# Patient Record
Sex: MALE | Race: WHITE | NOT HISPANIC OR LATINO | Employment: OTHER | URBAN - METROPOLITAN AREA
[De-identification: names, ages, dates, MRNs, and addresses within clinical notes are randomized per-mention and may not be internally consistent; named-entity substitution may affect disease eponyms.]

---

## 2017-06-28 ENCOUNTER — NON-PROVIDER VISIT (OUTPATIENT)
Dept: CARDIOLOGY | Facility: MEDICAL CENTER | Age: 67
End: 2017-06-28
Payer: MEDICARE

## 2017-06-28 ENCOUNTER — OFFICE VISIT (OUTPATIENT)
Dept: CARDIOLOGY | Facility: MEDICAL CENTER | Age: 67
End: 2017-06-28
Payer: MEDICARE

## 2017-06-28 VITALS
BODY MASS INDEX: 31.83 KG/M2 | HEIGHT: 72 IN | SYSTOLIC BLOOD PRESSURE: 140 MMHG | HEART RATE: 60 BPM | WEIGHT: 235 LBS | DIASTOLIC BLOOD PRESSURE: 80 MMHG

## 2017-06-28 DIAGNOSIS — Z95.0 CARDIAC PACEMAKER IN SITU: ICD-10-CM

## 2017-06-28 DIAGNOSIS — I44.2 AV BLOCK, 3RD DEGREE (HCC): ICD-10-CM

## 2017-06-28 DIAGNOSIS — I10 ESSENTIAL HYPERTENSION: ICD-10-CM

## 2017-06-28 DIAGNOSIS — Z95.3 S/P AORTIC VALVE REPLACEMENT WITH BIOPROSTHETIC VALVE: ICD-10-CM

## 2017-06-28 PROCEDURE — 99214 OFFICE O/P EST MOD 30 MIN: CPT | Mod: 25 | Performed by: INTERNAL MEDICINE

## 2017-06-28 PROCEDURE — 93280 PM DEVICE PROGR EVAL DUAL: CPT | Performed by: INTERNAL MEDICINE

## 2017-06-28 NOTE — MR AVS SNAPSHOT
"        Matti Olguin   2017 9:00 AM   Office Visit   MRN: [id number]    Department:  [facility]   Dept Phone:  [phone]    Description:  Male : 1950   Provider:  Conner Thibodeaux M.D.           Reason for Visit     Follow-Up no recent blood work done.      Allergies as of 2017     Allergen Noted Reactions    Nkda [No Known Drug Allergy] 2011         You were diagnosed with     Cardiac pacemaker in situ   [V45.01.ICD-9-CM]       S/P aortic valve replacement with bioprosthetic valve   [559828]       Long term current use of anticoagulant therapy   [5813418]         Vital Signs     Blood Pressure Pulse Height Weight Body Mass Index Smoking Status    140/80 mmHg 60 1.829 m (6' 0.01\") 106.595 kg (235 lb) 31.86 kg/m2 Never Smoker       Basic Information     Date Of Birth Sex Race Ethnicity Preferred Language    1950 Male White Non- English      Your appointments     2017  1:15 PM   MR EXTREMITY WITHOUT (30) with [address] [facility] 2   [facility] - MRI - [address]    [address]  [place] [address] [phone]              Problem List              ICD-10-CM Priority Class Noted - Resolved    Stroke (CMS-HCC) I63.9   2011 - Present    Long term current use of anticoagulant therapy Z79.01   3/11/2013 - Present    HTN (hypertension) I10   2013 - Present    Heart valve replaced Z95.2   10/8/2013 - Present    Stroke-like symptoms R29.90 High  9/3/2014 - Present    HLD (hyperlipidemia) E78.5   2014 - Present    Subtherapeutic international normalized ratio (INR) R79.1 Medium  2014 - Present    Syncope R55   10/5/2014 - Present    Chest pain R07.9   10/5/2014 - Present    Dyspnea R06.00   10/15/2014 - Present    S/P aortic valve replacement with bioprosthetic valve: repeat AVR 10/2014 Z95.4   2015 - Present    Cardiac pacemaker in situ Z95.0   2015 - Present      Health Maintenance        Date Due Completion Dates  "    IMM DTaP/Tdap/Td Vaccine (1 - Tdap) 2/10/1971 ---    COLONOSCOPY 2/10/2002 ---    IMM ZOSTER VACCINE 2/10/2012 ---    IMM PNEUMOCOCCAL 65+ (ADULT) LOW/MEDIUM RISK SERIES (1 of 2 - PCV13) 2/10/2017 ---            Current Immunizations     Influenza TIV (IM) 4/3/2014    Influenza Vaccine Quad Inj (Pf) 10/6/2014  2:58 AM      Below and/or attached are the medications your provider expects you to take. Review all of your home medications and newly ordered medications with your provider and/or pharmacist. Follow medication instructions as directed by your provider and/or pharmacist. Please keep your medication list with you and share with your provider. Update the information when medications are discontinued, doses are changed, or new medications (including over-the-counter products) are added; and carry medication information at all times in the event of emergency situations     Allergies:  NKDA - (reactions not documented)               Medications  Valid as of: June 28, 2017 -  9:17 AM    Generic Name Brand Name Tablet Size Instructions for use    Aspirin (Chew Tab) ASA 81 MG Take 1 Tab by mouth every Monday, Wednesday, and Friday.        Folic Acid (Tab) FOLVITE 1 MG Take 1 Tab by mouth every day.        HydroCHLOROthiazide (Tab) HYDRODIURIL 12.5 MG Take 1 Tab by mouth every day.        Lisinopril (Tab) PRINIVIL 20 MG Take 1 Tab by mouth every day.        Metoprolol Tartrate (Tab) LOPRESSOR 50 MG Take 1 Tab by mouth 2 times a day.        Multiple Vitamin (Tab) THERAGRAN  Take 1 Tab by mouth every day.        Potassium Chloride Ebony CR (Tab CR) Kdur 20 MEQ Take 1 Tab by mouth 2 times a day.        Pravastatin Sodium (Tab) PRAVACHOL 20 MG Take 20 mg by mouth every 48 hours.        Pregabalin (Cap) LYRICA 50 MG Take 50 mg by mouth 2 times a day.        Thiamine HCl (Tab) THIAMINE 100 MG Take 1 Tab by mouth every day.        Zolpidem Tartrate (Tab) AMBIEN 5 MG Take 5 mg by mouth at bedtime as needed.        .                  Medicines prescribed today were sent to:     [facility] - [place] - [address]    [address] [facility] [address]    Phone: [phone] Fax: [phone]    Open 24 Hours?: No      Medication refill instructions:       If your prescription bottle indicates you have medication refills left, it is not necessary to call your provider’s office. Please contact your pharmacy and they will refill your medication.    If your prescription bottle indicates you do not have any refills left, you may request refills at any time through one of the following ways: The online Cityblis system (except Urgent Care), by calling your provider’s office, or by asking your pharmacy to contact your provider’s office with a refill request. Medication refills are processed only during regular business hours and may not be available until the next business day. Your provider may request additional information or to have a follow-up visit with you prior to refilling your medication.   *Please Note: Medication refills are assigned a new Rx number when refilled electronically. Your pharmacy may indicate that no refills were authorized even though a new prescription for the same medication is available at the pharmacy. Please request the medicine by name with the pharmacy before contacting your provider for a refill.           Cityblis Access Code: [email or web address]  Expires: 7/28/2017  9:17 AM    Cityblis  A secure, online tool to manage your health information     [email or web address]’s Cityblis® is a secure, online tool that connects you to your personalized health information from the privacy of your home -- day or night - making it very easy for you to manage your healthcare. Once the activation process is completed, you can even access your medical information using the Cityblis jonah, which is available for free in the Apple Jonah store or Google Play store.     Cityblis provides the  following levels of access (as shown below):   My Chart Features   [facility] Doctor [facility]  Specialists [facility]  Urgent  Care [facility]  Primary Care  Doctor   Email your healthcare team securely and privately 24/7 X X X    Manage appointments: schedule your next appointment; view details of past/upcoming appointments X      Request prescription refills. X      View recent personal medical records, including lab and immunizations X X X X   View health record, including health history, allergies, medications X X X X   Read reports about your outpatient visits, procedures, consult and ER notes X X X X   See your discharge summary, which is a recap of your hospital and/or ER visit that includes your diagnosis, lab results, and care plan. X X       How to register for MediBeacon:  1. Go to  [email or web address].  2. Click on the Sign Up Now box, which takes you to the New Member Sign Up page. You will need to provide the following information:  a. Enter your MediBeacon Access Code exactly as it appears at the top of this page. (You will not need to use this code after you’ve completed the sign-up process. If you do not sign up before the expiration date, you must request a new code.)   b. Enter your date of birth.   c. Enter your home email address.   d. Click Submit, and follow the next screen’s instructions.  3. Create a MediBeacon ID. This will be your MediBeacon login ID and cannot be changed, so think of one that is secure and easy to remember.  4. Create a MediBeacon password. You can change your password at any time.  5. Enter your Password Reset Question and Answer. This can be used at a later time if you forget your password.   6. Enter your e-mail address. This allows you to receive e-mail notifications when new information is available in MediBeacon.  7. Click Sign Up. You can now view your health information.    For assistance activating your MediBeacon account, call [phone]        Quit Tobacco Information      Do you want to quit using tobacco?    Quitting tobacco decreases risks of cancer, heart and lung disease, increases life expectancy, improves sense of taste and smell, and increases spending money, among other benefits.    If you are thinking about quitting, we can help.  • [facility] Quit Tobacco Program: 732.401.1321  o Program occurs weekly for four weeks and includes pharmacist consultation on products to support quitting smoking or chewing tobacco. A provider referral is needed for pharmacist consultation.  • Tobacco Users Help Hotline: 8-260-QUIT-NOW (417-8330) or https://[place].quitlogix.org/  o Free, confidential telephone and online coaching for [place] residents. Sessions are designed on a schedule that is convenient for you. Eligible clients receive free nicotine replacement therapy.  • Nationally: www.smokefree.gov  o Information and professional assistance to support both immediate and long-term needs as you become, and remain, a non-smoker. Smokefree.gov allows you to choose the help that best fits your needs.

## 2017-06-28 NOTE — Clinical Note
"     [facility]   [address]  [place] [address]  Phone: [phone]  Fax: [phone]              Matti Olguin  1950    Encounter Date: 6/28/2017    Conner Thibodeaux M.D.          PROGRESS NOTE:  Subjective:   Matti Olguin is a 67 y.o. male who presents today for follow-up of his history of aortic valve replacement with redo    He has been doing well      Pacemaker check today finds is functioning appropriately  Past Medical History   Diagnosis Date   • Valvular heart disease      AVR 12/1988   • Sleep apnea 10-03-13     having sleep study soon   • Anticoagulation monitoring, special range    • HTN (hypertension) 9/23/2013   • High cholesterol    • Unspecified hemorrhagic conditions      Coumadin   • Snoring    • Stroke (CMS-HCC)      left sided \"tingling\"   • Dyspnea 10/15/2014   • S/P aortic valve replacement with bioprosthetic valve 1/13/2015   • Cardiac pacemaker in situ 1/13/2015     Past Surgical History   Procedure Laterality Date   • Other orthopedic surgery       knee scope, right shoulder   • Aortic valve replacement  1988     titanium   • Recovery  9/21/2012     Performed by Matti Clemens M.D. at [facility]   • Recovery  10/8/2013     Performed by [facility] at [facility]   • Recovery  11/8/2013     Performed by Cath-Recovery Surgery at [facility]     Family History   Problem Relation Age of Onset   • Heart Attack Father    • Heart Disease Sister      History   Smoking status   • Never Smoker    Smokeless tobacco   • Current User   • Types: Chew     Allergies   Allergen Reactions   • Nkda [No Known Drug Allergy]      Outpatient Encounter Prescriptions as of 6/28/2017   Medication Sig Dispense Refill   • lisinopril (PRINIVIL) 20 MG TABS Take 1 Tab by mouth every day. 90 Tab 3   • hydrochlorothiazide (HYDRODIURIL) 12.5 MG tablet Take 1 Tab by " "mouth every day. 90 Tab 3   • potassium chloride SA (K-DUR) 20 MEQ TBCR Take 1 Tab by mouth 2 times a day. 180 Tab 3   • metoprolol (LOPRESSOR) 50 MG TABS Take 1 Tab by mouth 2 times a day. 60 Tab 3   • thiamine (THIAMINE) 100 MG tablet Take 1 Tab by mouth every day. 30 Tab 3   • folic acid (FOLVITE) 1 MG TABS Take 1 Tab by mouth every day. 30 Tab 3   • aspirin (ASA) 81 MG CHEW chewable tablet Take 1 Tab by mouth every Monday, Wednesday, and Friday. 12 Tab 0   • pravastatin (PRAVACHOL) 20 MG TABS Take 20 mg by mouth every 48 hours.     • zolpidem (AMBIEN) 5 MG TABS Take 5 mg by mouth at bedtime as needed.     • pregabalin (LYRICA) 50 MG capsule Take 50 mg by mouth 2 times a day.     • multivitamin (THERAGRAN) TABS Take 1 Tab by mouth every day. 30 Tab 3     No facility-administered encounter medications on file as of 6/28/2017.     Review of Systems   Constitutional: Negative for fever and chills.   HENT: Negative for sore throat.    Eyes: Negative for blurred vision.   Respiratory: Negative for cough and shortness of breath.    Cardiovascular: Negative for chest pain, palpitations, claudication, leg swelling and PND.   Gastrointestinal: Negative for nausea and abdominal pain.   Musculoskeletal: Negative for falls.   Skin: Negative for rash.   Neurological: Negative for dizziness, focal weakness, loss of consciousness, weakness and headaches.   Endo/Heme/Allergies: Does not bruise/bleed easily.        Objective:   /80 mmHg  Pulse 60  Ht 1.829 m (6' 0.01\")  Wt 106.595 kg (235 lb)  BMI 31.86 kg/m2    Physical Exam   Constitutional: No distress.   HENT:   Mouth/Throat: Oropharynx is clear and moist.   Eyes: No scleral icterus.   Neck: Neck supple. No JVD present.   Cardiovascular: Normal rate, regular rhythm, normal heart sounds and intact distal pulses.  Exam reveals no gallop and no friction rub.    No murmur heard.  Pulmonary/Chest: Effort normal. He has no rales.   Abdominal: Soft. Bowel sounds are " normal. There is no tenderness.   Musculoskeletal: He exhibits no edema.   Neurological: He is alert.   Skin: No rash noted. He is not diaphoretic.   Psychiatric: He has a normal mood and affect.       Assessment:     1. Cardiac pacemaker in situ     2. S/P aortic valve replacement with bioprosthetic valve: repeat AVR 10/2014     3. Essential hypertension         Medical Decision Making:  Today's Assessment / Status / Plan:     It was my pleasure to meet with Mr. Olguin.    He is accompanied by his wife    For his bowel start the conversation eventually he may need to have her inside his bowels prosthetic valve will continue monitor is by echocardiogram in a couple of years    Pacemaker is functioning appropriately    I will see Mr. Olguin back in 1 year time and encouraged him to follow up with us over the phone or e-mail using my MyChart as issues arise.    It is my pleasure to participate in the care of Mr. Olguin.  Please do not hesitate to contact me with questions or concerns.    Conner Thibodeaux MD PhD FAC  Cardiologist [facility]        Germaine Martinez M.D.  [address] [place] [id number]  VIA Facsimile: [phone]

## 2017-06-30 ASSESSMENT — ENCOUNTER SYMPTOMS
SORE THROAT: 0
DIZZINESS: 0
FALLS: 0
CLAUDICATION: 0
SHORTNESS OF BREATH: 0
WEAKNESS: 0
LOSS OF CONSCIOUSNESS: 0
CHILLS: 0
FEVER: 0
COUGH: 0
ABDOMINAL PAIN: 0
BRUISES/BLEEDS EASILY: 0
BLURRED VISION: 0
NAUSEA: 0
FOCAL WEAKNESS: 0
HEADACHES: 0
PND: 0
PALPITATIONS: 0

## 2017-07-01 NOTE — PROGRESS NOTES
"Subjective:   Matti Olguin is a 67 y.o. male who presents today for follow-up of his history of aortic valve replacement with redo    He has been doing well      Pacemaker check today finds is functioning appropriately  Past Medical History   Diagnosis Date   • Valvular heart disease      AVR 12/1988   • Sleep apnea 10-03-13     having sleep study soon   • Anticoagulation monitoring, special range    • HTN (hypertension) 9/23/2013   • High cholesterol    • Unspecified hemorrhagic conditions      Coumadin   • Snoring    • Stroke (CMS-[facility])      left sided \"tingling\"   • Dyspnea 10/15/2014   • S/P aortic valve replacement with bioprosthetic valve 1/13/2015   • Cardiac pacemaker in situ 1/13/2015     Past Surgical History   Procedure Laterality Date   • Other orthopedic surgery       knee scope, right shoulder   • Aortic valve replacement  1988     titanium   • Recovery  9/21/2012     Performed by Matti Clemens M.D. at [facility]   • Recovery  10/8/2013     Performed by [facility]Rec[facility] Surgery at [facility]   • Recovery  11/8/2013     Performed by Cath-Recovery Surgery at [facility]     Family History   Problem Relation Age of Onset   • Heart Attack Father    • Heart Disease Sister      History   Smoking status   • Never Smoker    Smokeless tobacco   • Current User   • Types: Chew     Allergies   Allergen Reactions   • Nkda [No Known Drug Allergy]      Outpatient Encounter Prescriptions as of 6/28/2017   Medication Sig Dispense Refill   • lisinopril (PRINIVIL) 20 MG TABS Take 1 Tab by mouth every day. 90 Tab 3   • hydrochlorothiazide (HYDRODIURIL) 12.5 MG tablet Take 1 Tab by mouth every day. 90 Tab 3   • potassium chloride SA (K-DUR) 20 MEQ TBCR Take 1 Tab by mouth 2 times a day. 180 Tab 3   • metoprolol (LOPRESSOR) 50 MG TABS Take 1 Tab by mouth 2 times a day. 60 Tab 3   • thiamine (THIAMINE) 100 MG tablet Take 1 Tab by mouth every day. 30 Tab " "3   • folic acid (FOLVITE) 1 MG TABS Take 1 Tab by mouth every day. 30 Tab 3   • aspirin (ASA) 81 MG CHEW chewable tablet Take 1 Tab by mouth every Monday, Wednesday, and Friday. 12 Tab 0   • pravastatin (PRAVACHOL) 20 MG TABS Take 20 mg by mouth every 48 hours.     • zolpidem (AMBIEN) 5 MG TABS Take 5 mg by mouth at bedtime as needed.     • pregabalin (LYRICA) 50 MG capsule Take 50 mg by mouth 2 times a day.     • multivitamin (THERAGRAN) TABS Take 1 Tab by mouth every day. 30 Tab 3     No facility-administered encounter medications on file as of 6/28/2017.     Review of Systems   Constitutional: Negative for fever and chills.   HENT: Negative for sore throat.    Eyes: Negative for blurred vision.   Respiratory: Negative for cough and shortness of breath.    Cardiovascular: Negative for chest pain, palpitations, claudication, leg swelling and PND.   Gastrointestinal: Negative for nausea and abdominal pain.   Musculoskeletal: Negative for falls.   Skin: Negative for rash.   Neurological: Negative for dizziness, focal weakness, loss of consciousness, weakness and headaches.   Endo/Heme/Allergies: Does not bruise/bleed easily.        Objective:   /80 mmHg  Pulse 60  Ht 1.829 m (6' 0.01\")  Wt 106.595 kg (235 lb)  BMI 31.86 kg/m2    Physical Exam   Constitutional: No distress.   HENT:   Mouth/Throat: Oropharynx is clear and moist.   Eyes: No scleral icterus.   Neck: Neck supple. No JVD present.   Cardiovascular: Normal rate, regular rhythm, normal heart sounds and intact distal pulses.  Exam reveals no gallop and no friction rub.    No murmur heard.  Pulmonary/Chest: Effort normal. He has no rales.   Abdominal: Soft. Bowel sounds are normal. There is no tenderness.   Musculoskeletal: He exhibits no edema.   Neurological: He is alert.   Skin: No rash noted. He is not diaphoretic.   Psychiatric: He has a normal mood and affect.       Assessment:     1. Cardiac pacemaker in situ     2. S/P aortic valve " replacement with bioprosthetic valve: repeat AVR 10/2014     3. Essential hypertension         Medical Decision Making:  Today's Assessment / Status / Plan:     It was my pleasure to meet with Mr. Olguin.    He is accompanied by his wife    For his bowel start the conversation eventually he may need to have her inside his bowels prosthetic valve will continue monitor is by echocardiogram in a couple of years    Pacemaker is functioning appropriately    I will see Mr. Olguin back in 1 year time and encouraged him to follow up with us over the phone or e-mail using my [employer] as issues arise.    It is my pleasure to participate in the care of Mr. Olguin.  Please do not hesitate to contact me with questions or concerns.    Conner Thibodeaux MD PhD FACC  Cardiologist [facility]

## 2017-07-03 ENCOUNTER — HOSPITAL ENCOUNTER (OUTPATIENT)
Dept: RADIOLOGY | Facility: MEDICAL CENTER | Age: 67
End: 2017-07-03
Attending: ORTHOPAEDIC SURGERY
Payer: MEDICARE

## 2017-07-03 DIAGNOSIS — M75.102 TEAR OF LEFT ROTATOR CUFF, UNSPECIFIED TEAR EXTENT: ICD-10-CM

## 2017-07-03 PROCEDURE — 73221 MRI JOINT UPR EXTREM W/O DYE: CPT | Mod: LT

## 2018-05-10 ENCOUNTER — NON-PROVIDER VISIT (OUTPATIENT)
Dept: CARDIOLOGY | Facility: MEDICAL CENTER | Age: 68
End: 2018-05-10
Payer: MEDICARE

## 2018-05-10 ENCOUNTER — OFFICE VISIT (OUTPATIENT)
Dept: CARDIOLOGY | Facility: MEDICAL CENTER | Age: 68
End: 2018-05-10
Payer: MEDICARE

## 2018-05-10 VITALS
OXYGEN SATURATION: 92 % | HEIGHT: 72 IN | BODY MASS INDEX: 32.64 KG/M2 | SYSTOLIC BLOOD PRESSURE: 120 MMHG | HEART RATE: 80 BPM | DIASTOLIC BLOOD PRESSURE: 80 MMHG | WEIGHT: 241 LBS

## 2018-05-10 DIAGNOSIS — I44.2 COMPLETE HEART BLOCK (HCC): Chronic | ICD-10-CM

## 2018-05-10 DIAGNOSIS — Z95.0 CARDIAC PACEMAKER IN SITU: ICD-10-CM

## 2018-05-10 DIAGNOSIS — R06.02 SHORTNESS OF BREATH: ICD-10-CM

## 2018-05-10 DIAGNOSIS — Z95.3 S/P AORTIC VALVE REPLACEMENT WITH BIOPROSTHETIC VALVE: ICD-10-CM

## 2018-05-10 DIAGNOSIS — E78.5 DYSLIPIDEMIA: ICD-10-CM

## 2018-05-10 DIAGNOSIS — I10 ESSENTIAL HYPERTENSION: ICD-10-CM

## 2018-05-10 DIAGNOSIS — Z95.2 HEART VALVE REPLACED: ICD-10-CM

## 2018-05-10 PROCEDURE — 93280 PM DEVICE PROGR EVAL DUAL: CPT | Performed by: INTERNAL MEDICINE

## 2018-05-10 PROCEDURE — 99214 OFFICE O/P EST MOD 30 MIN: CPT | Performed by: INTERNAL MEDICINE

## 2018-05-10 RX ORDER — POTASSIUM CHLORIDE 20 MEQ/1
20 TABLET, EXTENDED RELEASE ORAL DAILY
Qty: 90 TAB | Refills: 3 | Status: SHIPPED | OUTPATIENT
Start: 2018-05-10 | End: 2020-10-08 | Stop reason: SDUPTHER

## 2018-05-10 RX ORDER — LORAZEPAM 1 MG/1
1 TABLET ORAL PRN
COMMUNITY
End: 2019-01-12 | Stop reason: CLARIF

## 2018-05-10 RX ORDER — PREGABALIN 75 MG/1
75 CAPSULE ORAL 2 TIMES DAILY
COMMUNITY
End: 2020-10-08

## 2018-05-10 RX ORDER — HYDROCHLOROTHIAZIDE 12.5 MG/1
12.5 TABLET ORAL DAILY
Qty: 90 TAB | Refills: 3 | Status: ON HOLD | OUTPATIENT
Start: 2018-05-10 | End: 2019-01-13

## 2018-05-10 RX ORDER — PRAVASTATIN SODIUM 20 MG
20 TABLET ORAL DAILY
Qty: 90 TAB | Refills: 3 | Status: ON HOLD | OUTPATIENT
Start: 2018-05-10 | End: 2019-01-13

## 2018-05-10 RX ORDER — LANOLIN ALCOHOL/MO/W.PET/CERES
400 CREAM (GRAM) TOPICAL DAILY
COMMUNITY

## 2018-05-10 RX ORDER — LISINOPRIL 10 MG/1
10 TABLET ORAL 2 TIMES DAILY
Qty: 180 TAB | Refills: 3 | Status: ON HOLD | OUTPATIENT
Start: 2018-05-10 | End: 2019-01-13

## 2018-05-10 RX ORDER — LISINOPRIL 10 MG/1
10 TABLET ORAL DAILY
COMMUNITY
End: 2019-01-12 | Stop reason: CLARIF

## 2018-05-10 RX ORDER — METOPROLOL TARTRATE 50 MG/1
50 TABLET, FILM COATED ORAL 2 TIMES DAILY
Qty: 180 TAB | Refills: 3 | Status: ON HOLD | OUTPATIENT
Start: 2018-05-10 | End: 2019-01-13

## 2018-05-10 ASSESSMENT — ENCOUNTER SYMPTOMS
FOCAL WEAKNESS: 0
ABDOMINAL PAIN: 0
WEAKNESS: 0
TINGLING: 1
CHILLS: 0
FEVER: 0
BRUISES/BLEEDS EASILY: 0
SORE THROAT: 0
BLURRED VISION: 0
PALPITATIONS: 0
NAUSEA: 0
PND: 0
SHORTNESS OF BREATH: 0
COUGH: 0
FALLS: 0
DIZZINESS: 0
CLAUDICATION: 0

## 2018-05-10 NOTE — PROGRESS NOTES
"Chief Complaint   Patient presents with   • HTN (Controlled)     follow up       Subjective:   Matti Olguin is a 68 y.o. male who presents today for follow-up of his history of aortic valve replacement he had a change to a bioprosthetic valve in 2014 with long-standing prior history of mechanical AVR in 1998 he also has complete heart block and pacer    He has been doing well apparently on his pacer check there was some noise on the atrial lead which may have correlated with use of a weed eater    He had gained some weight recently normally is weights around 225    Past Medical History:   Diagnosis Date   • Anticoagulation monitoring, special range    • Cardiac pacemaker in situ 1/13/2015   • Complete heart block (HCC)    • Dyspnea 10/15/2014   • High cholesterol    • HTN (hypertension) 9/23/2013   • S/P aortic valve replacement with bioprosthetic valve 1/13/2015   • Sleep apnea 10-03-13    having sleep study soon   • Snoring    • Stroke (HCC)     left sided \"tingling\"   • Unspecified hemorrhagic conditions     Coumadin   • Valvular heart disease     AVR 12/1988     Past Surgical History:   Procedure Laterality Date   • RECOVERY  11/8/2013    Performed by [facility] at [facility]   • RECOVERY  10/8/2013    Performed by [facility]Rec[facility] Surgery at [facility]   • RECOVERY  9/21/2012    Performed by Matti Clemens M.D. at [facility]   • AORTIC VALVE REPLACEMENT  1988    titanium   • OTHER ORTHOPEDIC SURGERY      knee scope, right shoulder     Family History   Problem Relation Age of Onset   • Heart Attack Father    • Heart Disease Sister      Social History     Social History   • Marital status:      Spouse name: N/A   • Number of children: N/A   • Years of education: N/A     Occupational History   • Not on file.     Social History Main Topics   • Smoking status: Never Smoker   • Smokeless tobacco: Current User     Types: Chew "   • Alcohol use Yes      Comment: social drinker, not daily   • Drug use: No   • Sexual activity: Not on file     Other Topics Concern   • Not on file     Social History Narrative   • No narrative on file     Allergies   Allergen Reactions   • Nkda [No Known Drug Allergy]      Outpatient Encounter Prescriptions as of 5/10/2018   Medication Sig Dispense Refill   • aspirin EC (ECOTRIN) 81 MG Tablet Delayed Response Take 81 mg by mouth every day.     • folic acid (FOLVITE) 400 MCG tablet Take 400 mcg by mouth every day.     • lisinopril (PRINIVIL) 10 MG Tab Take 10 mg by mouth every day.     • pregabalin (LYRICA) 75 MG Cap Take 75 mg by mouth 2 times a day.     • LORazepam (ATIVAN) 1 MG Tab Take 1 mg by mouth as needed.     • hydroCHLOROthiazide (HYDRODIURIL) 12.5 MG tablet Take 1 Tab by mouth every day. 90 Tab 3   • potassium chloride SA (KDUR) 20 MEQ Tab CR Take 1 Tab by mouth every day. 90 Tab 3   • metoprolol (LOPRESSOR) 50 MG Tab Take 1 Tab by mouth 2 times a day. 180 Tab 3   • pravastatin (PRAVACHOL) 20 MG Tab Take 1 Tab by mouth every day. 90 Tab 3   • lisinopril (PRINIVIL) 10 MG Tab Take 1 Tab by mouth 2 times a day. 180 Tab 3   • multivitamin (THERAGRAN) TABS Take 1 Tab by mouth every day. 30 Tab 3   • zolpidem (AMBIEN) 5 MG TABS Take 5 mg by mouth at bedtime as needed.     • [DISCONTINUED] lisinopril (PRINIVIL) 20 MG TABS Take 1 Tab by mouth every day. (Patient taking differently: Take 10 mg by mouth 2 times a day.) 90 Tab 3   • [DISCONTINUED] hydrochlorothiazide (HYDRODIURIL) 12.5 MG tablet Take 1 Tab by mouth every day. 90 Tab 3   • [DISCONTINUED] potassium chloride SA (K-DUR) 20 MEQ TBCR Take 1 Tab by mouth 2 times a day. (Patient taking differently: Take 20 mEq by mouth every day.) 180 Tab 3   • [DISCONTINUED] metoprolol (LOPRESSOR) 50 MG TABS Take 1 Tab by mouth 2 times a day. 60 Tab 3   • folic acid (FOLVITE) 1 MG TABS Take 1 Tab by mouth every day. (Patient not taking: Reported on 5/10/2018) 30 Tab 3    • [DISCONTINUED] thiamine (THIAMINE) 100 MG tablet Take 1 Tab by mouth every day. (Patient not taking: Reported on 5/10/2018) 30 Tab 3   • [DISCONTINUED] aspirin (ASA) 81 MG CHEW chewable tablet Take 1 Tab by mouth every Monday, Wednesday, and Friday. (Patient taking differently: Take 81 mg by mouth every day.) 12 Tab 0   • [DISCONTINUED] pravastatin (PRAVACHOL) 20 MG TABS Take 20 mg by mouth every day.     • [DISCONTINUED] pregabalin (LYRICA) 50 MG capsule Take 50 mg by mouth 2 times a day.       No facility-administered encounter medications on file as of 5/10/2018.      Review of Systems   Constitutional: Negative for chills and fever.   HENT: Negative for sore throat.    Eyes: Negative for blurred vision.   Respiratory: Negative for cough and shortness of breath.    Cardiovascular: Negative for chest pain, palpitations, claudication, leg swelling and PND.   Gastrointestinal: Negative for abdominal pain and nausea.   Musculoskeletal: Positive for joint pain. Negative for falls.   Skin: Negative for rash.   Neurological: Positive for tingling. Negative for dizziness, focal weakness and weakness.   Endo/Heme/Allergies: Does not bruise/bleed easily.        Objective:   /80   Pulse 80   Ht 1.829 m (6')   Wt 109.3 kg (241 lb)   SpO2 92%   BMI 32.69 kg/m²     Physical Exam   Constitutional: No distress.   HENT:   Mouth/Throat: Oropharynx is clear and moist. No oropharyngeal exudate.   Eyes: No scleral icterus.   Neck: No JVD present.   Cardiovascular: Normal rate.  Exam reveals no gallop and no friction rub.    Murmur (Systolic ejection murmur) heard.  Pulmonary/Chest: No respiratory distress. He has no wheezes. He has no rales.   Abdominal: Soft. Bowel sounds are normal.   Musculoskeletal: He exhibits no edema.   Neurological: He is alert.   Skin: No rash noted. He is not diaphoretic.   Psychiatric: He has a normal mood and affect.   Last echocardiogram was 2016 he had moderately increased aortic valve  gradients      Assessment:     1. Essential hypertension  hydroCHLOROthiazide (HYDRODIURIL) 12.5 MG tablet    metoprolol (LOPRESSOR) 50 MG Tab    lisinopril (PRINIVIL) 10 MG Tab   2. Shortness of breath  potassium chloride SA (KDUR) 20 MEQ Tab CR   3. Heart valve replaced     4. S/P aortic valve replacement with bioprosthetic valve: repeat AVR 10/2014  ECHOCARDIOGRAM COMP W/O CONT   5. Dyslipidemia     6. Cardiac pacemaker in situ     7. Complete heart block (HCC)         Medical Decision Making:  Today's Assessment / Status / Plan:     It was my pleasure to meet with Mr. Olguin.    I renewed his heart medicines he is doing well with these blood pressures well controlled cholesterol been well controlled in the past    He will be reestablishing with a new primary care doctor this month    Next year in 2019 we will update his echocardiogram with the moderate gradients of his bioprosthetic valve    We will continue to follow-up on his pacemaker twice a year    I will see Mr. Olguin back in 1 year time and encouraged him to follow up with us over the phone or e-mail using my MyChart as issues arise.    It is my pleasure to participate in the care of Mr. Olguin.  Please do not hesitate to contact me with questions or concerns.    Conner Thibodeaux MD PhD FAC  Cardiologist [facility]

## 2018-05-10 NOTE — PATIENT INSTRUCTIONS
Please work on increasing these foods in your diet to increase your potassium levels    Highest potassium foods  Dried figs, molasses, seaweed    High potassium foods  Dried fruits (dates, prunes), nuts, avocados, bran cereal, wheat germ, lima beans    Potassium-rich food  Vegetables-spinach, tomatoes, broccoli, winter squash, beets, carrots, cauliflower, potatoes    Fruits-bananas, cantaloupe, kiwis, oranges, mangoes    Meats-ground beef, steak, pork, veal, lamb    *Adapted: Grayson RASMUSSEN. Hypokalemia. [place] Journal of Medicine 1998; 339:451.

## 2018-05-10 NOTE — LETTER
"     [facility]   [address], [address]  [place] [address]  Phone: [phone]  Fax: [phone]              Matti Olguin  1950    Encounter Date: 5/10/2018    Conner Thibodeaux M.D.          PROGRESS NOTE:  Chief Complaint   Patient presents with   • HTN (Controlled)     follow up       Subjective:   Matti Olguin is a 68 y.o. male who presents today for follow-up of his history of aortic valve replacement he had a change to a bioprosthetic valve in 2014 with long-standing prior history of mechanical AVR in 1998 he also has complete heart block and pacer    He has been doing well apparently on his pacer check there was some noise on the atrial lead which may have correlated with use of a weed eater    He had gained some weight recently normally is weights around 225    Past Medical History:   Diagnosis Date   • Anticoagulation monitoring, special range    • Cardiac pacemaker in situ 1/13/2015   • Complete heart block (HCC)    • Dyspnea 10/15/2014   • High cholesterol    • HTN (hypertension) 9/23/2013   • S/P aortic valve replacement with bioprosthetic valve 1/13/2015   • Sleep apnea 10-03-13    having sleep study soon   • Snoring    • Stroke (HCC)     left sided \"tingling\"   • Unspecified hemorrhagic conditions     Coumadin   • Valvular heart disease     AVR 12/1988     Past Surgical History:   Procedure Laterality Date   • RECOVERY  11/8/2013    Performed by [facility]   • RECOVERY  10/8/2013    Performed by [facility] Surgery [facility]   • RECOVERY  9/21/2012    Performed by Matti Clemens M.D. at [facility]   • AORTIC VALVE REPLACEMENT  1988    titanium   • OTHER ORTHOPEDIC SURGERY      knee scope, right shoulder     Family History   Problem Relation Age of Onset   • Heart Attack Father    • Heart Disease Sister      Social History     "     Social History   • Marital status:      Spouse name: N/A   • Number of children: N/A   • Years of education: N/A     Occupational History   • Not on file.     Social History Main Topics   • Smoking status: Never Smoker   • Smokeless tobacco: Current User     Types: Chew   • Alcohol use Yes      Comment: social drinker, not daily   • Drug use: No   • Sexual activity: Not on file     Other Topics Concern   • Not on file     Social History Narrative   • No narrative on file     Allergies   Allergen Reactions   • Nkda [No Known Drug Allergy]      Outpatient Encounter Prescriptions as of 5/10/2018   Medication Sig Dispense Refill   • aspirin EC (ECOTRIN) 81 MG Tablet Delayed Response Take 81 mg by mouth every day.     • folic acid (FOLVITE) 400 MCG tablet Take 400 mcg by mouth every day.     • lisinopril (PRINIVIL) 10 MG Tab Take 10 mg by mouth every day.     • pregabalin (LYRICA) 75 MG Cap Take 75 mg by mouth 2 times a day.     • LORazepam (ATIVAN) 1 MG Tab Take 1 mg by mouth as needed.     • hydroCHLOROthiazide (HYDRODIURIL) 12.5 MG tablet Take 1 Tab by mouth every day. 90 Tab 3   • potassium chloride SA (KDUR) 20 MEQ Tab CR Take 1 Tab by mouth every day. 90 Tab 3   • metoprolol (LOPRESSOR) 50 MG Tab Take 1 Tab by mouth 2 times a day. 180 Tab 3   • pravastatin (PRAVACHOL) 20 MG Tab Take 1 Tab by mouth every day. 90 Tab 3   • lisinopril (PRINIVIL) 10 MG Tab Take 1 Tab by mouth 2 times a day. 180 Tab 3   • multivitamin (THERAGRAN) TABS Take 1 Tab by mouth every day. 30 Tab 3   • zolpidem (AMBIEN) 5 MG TABS Take 5 mg by mouth at bedtime as needed.     • [DISCONTINUED] lisinopril (PRINIVIL) 20 MG TABS Take 1 Tab by mouth every day. (Patient taking differently: Take 10 mg by mouth 2 times a day.) 90 Tab 3   • [DISCONTINUED] hydrochlorothiazide (HYDRODIURIL) 12.5 MG tablet Take 1 Tab by mouth every day. 90 Tab 3   • [DISCONTINUED] potassium chloride SA (K-DUR) 20 MEQ TBCR Take 1 Tab by mouth 2 times a day.  (Patient taking differently: Take 20 mEq by mouth every day.) 180 Tab 3   • [DISCONTINUED] metoprolol (LOPRESSOR) 50 MG TABS Take 1 Tab by mouth 2 times a day. 60 Tab 3   • folic acid (FOLVITE) 1 MG TABS Take 1 Tab by mouth every day. (Patient not taking: Reported on 5/10/2018) 30 Tab 3   • [DISCONTINUED] thiamine (THIAMINE) 100 MG tablet Take 1 Tab by mouth every day. (Patient not taking: Reported on 5/10/2018) 30 Tab 3   • [DISCONTINUED] aspirin (ASA) 81 MG CHEW chewable tablet Take 1 Tab by mouth every Monday, Wednesday, and Friday. (Patient taking differently: Take 81 mg by mouth every day.) 12 Tab 0   • [DISCONTINUED] pravastatin (PRAVACHOL) 20 MG TABS Take 20 mg by mouth every day.     • [DISCONTINUED] pregabalin (LYRICA) 50 MG capsule Take 50 mg by mouth 2 times a day.       No facility-administered encounter medications on file as of 5/10/2018.      Review of Systems   Constitutional: Negative for chills and fever.   HENT: Negative for sore throat.    Eyes: Negative for blurred vision.   Respiratory: Negative for cough and shortness of breath.    Cardiovascular: Negative for chest pain, palpitations, claudication, leg swelling and PND.   Gastrointestinal: Negative for abdominal pain and nausea.   Musculoskeletal: Positive for joint pain. Negative for falls.   Skin: Negative for rash.   Neurological: Positive for tingling. Negative for dizziness, focal weakness and weakness.   Endo/Heme/Allergies: Does not bruise/bleed easily.        Objective:   /80   Pulse 80   Ht 1.829 m (6')   Wt 109.3 kg (241 lb)   SpO2 92%   BMI 32.69 kg/m²      Physical Exam   Constitutional: No distress.   HENT:   Mouth/Throat: Oropharynx is clear and moist. No oropharyngeal exudate.   Eyes: No scleral icterus.   Neck: No JVD present.   Cardiovascular: Normal rate.  Exam reveals no gallop and no friction rub.    Murmur (Systolic ejection murmur) heard.  Pulmonary/Chest: No respiratory distress. He has no wheezes. He has no  rales.   Abdominal: Soft. Bowel sounds are normal.   Musculoskeletal: He exhibits no edema.   Neurological: He is alert.   Skin: No rash noted. He is not diaphoretic.   Psychiatric: He has a normal mood and affect.   Last echocardiogram was 2016 he had moderately increased aortic valve gradients      Assessment:     1. Essential hypertension  hydroCHLOROthiazide (HYDRODIURIL) 12.5 MG tablet    metoprolol (LOPRESSOR) 50 MG Tab    lisinopril (PRINIVIL) 10 MG Tab   2. Shortness of breath  potassium chloride SA (KDUR) 20 MEQ Tab CR   3. Heart valve replaced     4. S/P aortic valve replacement with bioprosthetic valve: repeat AVR 10/2014  ECHOCARDIOGRAM COMP W/O CONT   5. Dyslipidemia     6. Cardiac pacemaker in situ     7. Complete heart block (HCC)         Medical Decision Making:  Today's Assessment / Status / Plan:     It was my pleasure to meet with Mr. Olguin.    I renewed his heart medicines he is doing well with these blood pressures well controlled cholesterol been well controlled in the past    He will be reestablishing with a new primary care doctor this month    Next year in 2019 we will update his echocardiogram with the moderate gradients of his bioprosthetic valve    We will continue to follow-up on his pacemaker twice a year    I will see Mr. Olguin back in 1 year time and encouraged him to follow up with us over the phone or e-mail using my MyChart as issues arise.    It is my pleasure to participate in the care of Mr. Olguin.  Please do not hesitate to contact me with questions or concerns.    Conner Thibodeaux MD PhD [facility]  Cardiologist [facility]        Brittany Bradshaw M.D.  [address] [place] [id number]  VIA Facsimile: [phone]

## 2018-08-07 ENCOUNTER — TELEPHONE (OUTPATIENT)
Dept: CARDIOLOGY | Facility: MEDICAL CENTER | Age: 68
End: 2018-08-07

## 2018-08-07 NOTE — TELEPHONE ENCOUNTER
Called patient back and informed him that Dr. Thibodeaux will fill his cardiac meds but for the Lyrica he would need to contact his PCP  He understood and will do so

## 2018-08-07 NOTE — TELEPHONE ENCOUNTER
----- Message from Gloria Coates sent at 8/7/2018  3:05 PM PDT -----  Regarding: Requesting refill for lyrica   Contact: [phone]        Pt of CW is requesting 90 day refill for pregabalin (LYRICA) 75 MG Cap. Sent to mail order pharmacy listed in chart. If question's can be reached at [phone].

## 2019-01-12 ENCOUNTER — HOSPITAL ENCOUNTER (OUTPATIENT)
Facility: MEDICAL CENTER | Age: 69
End: 2019-01-13
Attending: EMERGENCY MEDICINE | Admitting: INTERNAL MEDICINE
Payer: MEDICARE

## 2019-01-12 ENCOUNTER — APPOINTMENT (OUTPATIENT)
Dept: RADIOLOGY | Facility: MEDICAL CENTER | Age: 69
End: 2019-01-12
Attending: INTERNAL MEDICINE
Payer: MEDICARE

## 2019-01-12 ENCOUNTER — HOSPITAL ENCOUNTER (OUTPATIENT)
Dept: RADIOLOGY | Facility: MEDICAL CENTER | Age: 69
End: 2019-01-12

## 2019-01-12 ENCOUNTER — APPOINTMENT (OUTPATIENT)
Dept: CARDIOLOGY | Facility: MEDICAL CENTER | Age: 69
End: 2019-01-12
Attending: INTERNAL MEDICINE
Payer: MEDICARE

## 2019-01-12 DIAGNOSIS — I10 HYPERTENSION, UNSPECIFIED TYPE: ICD-10-CM

## 2019-01-12 DIAGNOSIS — I10 ESSENTIAL HYPERTENSION: ICD-10-CM

## 2019-01-12 DIAGNOSIS — R42 DIZZINESS: ICD-10-CM

## 2019-01-12 PROBLEM — E80.6 HYPERBILIRUBINEMIA: Status: ACTIVE | Noted: 2019-01-12

## 2019-01-12 PROBLEM — E66.09 CLASS 1 OBESITY DUE TO EXCESS CALORIES WITH SERIOUS COMORBIDITY AND BODY MASS INDEX (BMI) OF 32.0 TO 32.9 IN ADULT: Status: ACTIVE | Noted: 2019-01-12

## 2019-01-12 PROBLEM — E66.9 OBESITY: Status: ACTIVE | Noted: 2019-01-12

## 2019-01-12 PROBLEM — E66.811 CLASS 1 OBESITY DUE TO EXCESS CALORIES WITH SERIOUS COMORBIDITY AND BODY MASS INDEX (BMI) OF 32.0 TO 32.9 IN ADULT: Status: ACTIVE | Noted: 2019-01-12

## 2019-01-12 LAB
ALBUMIN SERPL BCP-MCNC: 4.3 G/DL (ref 3.2–4.9)
ALBUMIN/GLOB SERPL: 1.4 G/DL
ALP SERPL-CCNC: 46 U/L (ref 30–99)
ALT SERPL-CCNC: 41 U/L (ref 2–50)
AMPHET UR QL SCN: NEGATIVE
ANION GAP SERPL CALC-SCNC: 7 MMOL/L (ref 0–11.9)
APPEARANCE UR: CLEAR
APTT PPP: 26.5 SEC (ref 24.7–36)
AST SERPL-CCNC: 33 U/L (ref 12–45)
BARBITURATES UR QL SCN: NEGATIVE
BASOPHILS # BLD AUTO: 0.6 % (ref 0–1.8)
BASOPHILS # BLD: 0.04 K/UL (ref 0–0.12)
BENZODIAZ UR QL SCN: NEGATIVE
BILIRUB SERPL-MCNC: 1.8 MG/DL (ref 0.1–1.5)
BILIRUB UR QL STRIP.AUTO: NEGATIVE
BNP SERPL-MCNC: 105 PG/ML (ref 0–100)
BUN SERPL-MCNC: 22 MG/DL (ref 8–22)
BZE UR QL SCN: NEGATIVE
CALCIUM SERPL-MCNC: 9.1 MG/DL (ref 8.5–10.5)
CANNABINOIDS UR QL SCN: NEGATIVE
CHLORIDE SERPL-SCNC: 106 MMOL/L (ref 96–112)
CHOLEST SERPL-MCNC: 177 MG/DL (ref 100–199)
CO2 SERPL-SCNC: 26 MMOL/L (ref 20–33)
COLOR UR: YELLOW
CORTIS SERPL-MCNC: 12.7 UG/DL (ref 0–23)
CORTIS SERPL-MCNC: 8.8 UG/DL (ref 0–23)
CREAT SERPL-MCNC: 0.98 MG/DL (ref 0.5–1.4)
CRP SERPL HS-MCNC: 0.33 MG/DL (ref 0–0.75)
D DIMER PPP IA.FEU-MCNC: 1.5 UG/ML (FEU) (ref 0–0.5)
EKG IMPRESSION: NORMAL
EKG IMPRESSION: NORMAL
EOSINOPHIL # BLD AUTO: 0.19 K/UL (ref 0–0.51)
EOSINOPHIL NFR BLD: 3.1 % (ref 0–6.9)
ERYTHROCYTE [DISTWIDTH] IN BLOOD BY AUTOMATED COUNT: 45.9 FL (ref 35.9–50)
ERYTHROCYTE [SEDIMENTATION RATE] IN BLOOD BY WESTERGREN METHOD: 5 MM/HOUR (ref 0–20)
EST. AVERAGE GLUCOSE BLD GHB EST-MCNC: 123 MG/DL
FOLATE SERPL-MCNC: >23.6 NG/ML
GLOBULIN SER CALC-MCNC: 3.1 G/DL (ref 1.9–3.5)
GLUCOSE SERPL-MCNC: 116 MG/DL (ref 65–99)
GLUCOSE UR STRIP.AUTO-MCNC: NEGATIVE MG/DL
HBA1C MFR BLD: 5.9 % (ref 0–5.6)
HCT VFR BLD AUTO: 44 % (ref 42–52)
HDLC SERPL-MCNC: 42 MG/DL
HGB BLD-MCNC: 15.1 G/DL (ref 14–18)
IMM GRANULOCYTES # BLD AUTO: 0.01 K/UL (ref 0–0.11)
IMM GRANULOCYTES NFR BLD AUTO: 0.2 % (ref 0–0.9)
INR PPP: 1.05 (ref 0.87–1.13)
INR PPP: 1.05 (ref 0.87–1.13)
KETONES UR STRIP.AUTO-MCNC: NEGATIVE MG/DL
LDLC SERPL CALC-MCNC: 120 MG/DL
LEUKOCYTE ESTERASE UR QL STRIP.AUTO: NEGATIVE
LV EJECT FRACT  99904: 60
LYMPHOCYTES # BLD AUTO: 1.12 K/UL (ref 1–4.8)
LYMPHOCYTES NFR BLD: 18.1 % (ref 22–41)
MAGNESIUM SERPL-MCNC: 2.1 MG/DL (ref 1.5–2.5)
MCH RBC QN AUTO: 33.9 PG (ref 27–33)
MCHC RBC AUTO-ENTMCNC: 34.3 G/DL (ref 33.7–35.3)
MCV RBC AUTO: 98.7 FL (ref 81.4–97.8)
METHADONE UR QL SCN: NEGATIVE
MICRO URNS: NORMAL
MONOCYTES # BLD AUTO: 0.9 K/UL (ref 0–0.85)
MONOCYTES NFR BLD AUTO: 14.5 % (ref 0–13.4)
NEUTROPHILS # BLD AUTO: 3.94 K/UL (ref 1.82–7.42)
NEUTROPHILS NFR BLD: 63.5 % (ref 44–72)
NITRITE UR QL STRIP.AUTO: NEGATIVE
NRBC # BLD AUTO: 0 K/UL
NRBC BLD-RTO: 0 /100 WBC
OPIATES UR QL SCN: NEGATIVE
OXYCODONE UR QL SCN: NEGATIVE
PCP UR QL SCN: NEGATIVE
PH UR STRIP.AUTO: 6.5 [PH]
PHOSPHATE SERPL-MCNC: 2.6 MG/DL (ref 2.5–4.5)
PLATELET # BLD AUTO: 117 K/UL (ref 164–446)
PMV BLD AUTO: 12 FL (ref 9–12.9)
POTASSIUM SERPL-SCNC: 3.4 MMOL/L (ref 3.6–5.5)
PROCALCITONIN SERPL-MCNC: 0.07 NG/ML
PROPOXYPH UR QL SCN: NEGATIVE
PROT SERPL-MCNC: 7.4 G/DL (ref 6–8.2)
PROT UR QL STRIP: NEGATIVE MG/DL
PROTHROMBIN TIME: 13.8 SEC (ref 12–14.6)
PROTHROMBIN TIME: 13.8 SEC (ref 12–14.6)
RBC # BLD AUTO: 4.46 M/UL (ref 4.7–6.1)
RBC UR QL AUTO: NEGATIVE
SODIUM SERPL-SCNC: 139 MMOL/L (ref 135–145)
SP GR UR STRIP.AUTO: 1.02
TRIGL SERPL-MCNC: 77 MG/DL (ref 0–149)
TROPONIN I SERPL-MCNC: 0.02 NG/ML (ref 0–0.04)
TSH SERPL DL<=0.005 MIU/L-ACNC: 2.08 UIU/ML (ref 0.38–5.33)
UROBILINOGEN UR STRIP.AUTO-MCNC: 1 MG/DL
VIT B12 SERPL-MCNC: 365 PG/ML (ref 211–911)
WBC # BLD AUTO: 6.2 K/UL (ref 4.8–10.8)

## 2019-01-12 PROCEDURE — 93005 ELECTROCARDIOGRAM TRACING: CPT | Performed by: EMERGENCY MEDICINE

## 2019-01-12 PROCEDURE — 85025 COMPLETE CBC W/AUTO DIFF WBC: CPT

## 2019-01-12 PROCEDURE — 80307 DRUG TEST PRSMV CHEM ANLYZR: CPT

## 2019-01-12 PROCEDURE — 71275 CT ANGIOGRAPHY CHEST: CPT

## 2019-01-12 PROCEDURE — 93005 ELECTROCARDIOGRAM TRACING: CPT | Performed by: INTERNAL MEDICINE

## 2019-01-12 PROCEDURE — 85379 FIBRIN DEGRADATION QUANT: CPT

## 2019-01-12 PROCEDURE — 83880 ASSAY OF NATRIURETIC PEPTIDE: CPT

## 2019-01-12 PROCEDURE — 84484 ASSAY OF TROPONIN QUANT: CPT | Mod: 91

## 2019-01-12 PROCEDURE — 93306 TTE W/DOPPLER COMPLETE: CPT

## 2019-01-12 PROCEDURE — 86140 C-REACTIVE PROTEIN: CPT

## 2019-01-12 PROCEDURE — 99220 PR INITIAL OBSERVATION CARE,LEVL III: CPT | Performed by: INTERNAL MEDICINE

## 2019-01-12 PROCEDURE — 80061 LIPID PANEL: CPT

## 2019-01-12 PROCEDURE — 82607 VITAMIN B-12: CPT

## 2019-01-12 PROCEDURE — 82533 TOTAL CORTISOL: CPT

## 2019-01-12 PROCEDURE — 84100 ASSAY OF PHOSPHORUS: CPT

## 2019-01-12 PROCEDURE — 85652 RBC SED RATE AUTOMATED: CPT

## 2019-01-12 PROCEDURE — A9270 NON-COVERED ITEM OR SERVICE: HCPCS | Performed by: NURSE PRACTITIONER

## 2019-01-12 PROCEDURE — 81003 URINALYSIS AUTO W/O SCOPE: CPT | Mod: XU

## 2019-01-12 PROCEDURE — A9270 NON-COVERED ITEM OR SERVICE: HCPCS | Performed by: INTERNAL MEDICINE

## 2019-01-12 PROCEDURE — 700102 HCHG RX REV CODE 250 W/ 637 OVERRIDE(OP): Performed by: NURSE PRACTITIONER

## 2019-01-12 PROCEDURE — 85610 PROTHROMBIN TIME: CPT | Mod: 91

## 2019-01-12 PROCEDURE — 700117 HCHG RX CONTRAST REV CODE 255: Performed by: INTERNAL MEDICINE

## 2019-01-12 PROCEDURE — 93306 TTE W/DOPPLER COMPLETE: CPT | Mod: 26 | Performed by: INTERNAL MEDICINE

## 2019-01-12 PROCEDURE — 99285 EMERGENCY DEPT VISIT HI MDM: CPT

## 2019-01-12 PROCEDURE — 93880 EXTRACRANIAL BILAT STUDY: CPT

## 2019-01-12 PROCEDURE — 96372 THER/PROPH/DIAG INJ SC/IM: CPT | Mod: XU

## 2019-01-12 PROCEDURE — 700111 HCHG RX REV CODE 636 W/ 250 OVERRIDE (IP): Performed by: INTERNAL MEDICINE

## 2019-01-12 PROCEDURE — 36415 COLL VENOUS BLD VENIPUNCTURE: CPT

## 2019-01-12 PROCEDURE — 83036 HEMOGLOBIN GLYCOSYLATED A1C: CPT

## 2019-01-12 PROCEDURE — 87040 BLOOD CULTURE FOR BACTERIA: CPT

## 2019-01-12 PROCEDURE — 85730 THROMBOPLASTIN TIME PARTIAL: CPT

## 2019-01-12 PROCEDURE — 84145 PROCALCITONIN (PCT): CPT

## 2019-01-12 PROCEDURE — 83735 ASSAY OF MAGNESIUM: CPT

## 2019-01-12 PROCEDURE — 93010 ELECTROCARDIOGRAM REPORT: CPT | Performed by: INTERNAL MEDICINE

## 2019-01-12 PROCEDURE — G0378 HOSPITAL OBSERVATION PER HR: HCPCS

## 2019-01-12 PROCEDURE — 96374 THER/PROPH/DIAG INJ IV PUSH: CPT | Mod: XU

## 2019-01-12 PROCEDURE — 80053 COMPREHEN METABOLIC PANEL: CPT

## 2019-01-12 PROCEDURE — 93880 EXTRACRANIAL BILAT STUDY: CPT | Mod: 26 | Performed by: SURGERY

## 2019-01-12 PROCEDURE — 84443 ASSAY THYROID STIM HORMONE: CPT

## 2019-01-12 PROCEDURE — 82746 ASSAY OF FOLIC ACID SERUM: CPT

## 2019-01-12 PROCEDURE — 700102 HCHG RX REV CODE 250 W/ 637 OVERRIDE(OP): Performed by: INTERNAL MEDICINE

## 2019-01-12 RX ORDER — ONDANSETRON 4 MG/1
4 TABLET, ORALLY DISINTEGRATING ORAL EVERY 4 HOURS PRN
Status: DISCONTINUED | OUTPATIENT
Start: 2019-01-12 | End: 2019-01-13 | Stop reason: HOSPADM

## 2019-01-12 RX ORDER — POTASSIUM CHLORIDE 20 MEQ/1
40 TABLET, EXTENDED RELEASE ORAL EVERY 4 HOURS
Status: COMPLETED | OUTPATIENT
Start: 2019-01-12 | End: 2019-01-12

## 2019-01-12 RX ORDER — ONDANSETRON 2 MG/ML
4 INJECTION INTRAMUSCULAR; INTRAVENOUS EVERY 4 HOURS PRN
Status: DISCONTINUED | OUTPATIENT
Start: 2019-01-12 | End: 2019-01-13 | Stop reason: HOSPADM

## 2019-01-12 RX ORDER — HYDROCODONE BITARTRATE AND ACETAMINOPHEN 5; 325 MG/1; MG/1
1 TABLET ORAL EVERY 4 HOURS PRN
COMMUNITY

## 2019-01-12 RX ORDER — LISINOPRIL 20 MG/1
20 TABLET ORAL 2 TIMES DAILY
Status: DISCONTINUED | OUTPATIENT
Start: 2019-01-12 | End: 2019-01-13 | Stop reason: HOSPADM

## 2019-01-12 RX ORDER — ZOLPIDEM TARTRATE 5 MG/1
5 TABLET ORAL NIGHTLY PRN
Status: DISCONTINUED | OUTPATIENT
Start: 2019-01-12 | End: 2019-01-13 | Stop reason: HOSPADM

## 2019-01-12 RX ORDER — PRAVASTATIN SODIUM 20 MG
20 TABLET ORAL EVERY EVENING
Status: DISCONTINUED | OUTPATIENT
Start: 2019-01-12 | End: 2019-01-13

## 2019-01-12 RX ORDER — ASPIRIN 81 MG/1
324 TABLET, CHEWABLE ORAL DAILY
Status: DISCONTINUED | OUTPATIENT
Start: 2019-01-12 | End: 2019-01-13

## 2019-01-12 RX ORDER — HYDRALAZINE HYDROCHLORIDE 20 MG/ML
10 INJECTION INTRAMUSCULAR; INTRAVENOUS EVERY 4 HOURS PRN
Status: DISCONTINUED | OUTPATIENT
Start: 2019-01-12 | End: 2019-01-13 | Stop reason: HOSPADM

## 2019-01-12 RX ORDER — ACETAMINOPHEN 325 MG/1
650 TABLET ORAL EVERY 6 HOURS PRN
Status: DISCONTINUED | OUTPATIENT
Start: 2019-01-12 | End: 2019-01-13 | Stop reason: HOSPADM

## 2019-01-12 RX ORDER — HYDROCODONE BITARTRATE AND ACETAMINOPHEN 5; 325 MG/1; MG/1
1 TABLET ORAL EVERY 4 HOURS PRN
Status: DISCONTINUED | OUTPATIENT
Start: 2019-01-12 | End: 2019-01-13 | Stop reason: HOSPADM

## 2019-01-12 RX ORDER — ASPIRIN 325 MG
325 TABLET ORAL DAILY
Status: DISCONTINUED | OUTPATIENT
Start: 2019-01-12 | End: 2019-01-13

## 2019-01-12 RX ORDER — PREGABALIN 75 MG/1
75 CAPSULE ORAL 2 TIMES DAILY
Status: DISCONTINUED | OUTPATIENT
Start: 2019-01-12 | End: 2019-01-13 | Stop reason: HOSPADM

## 2019-01-12 RX ORDER — ASPIRIN 300 MG/1
300 SUPPOSITORY RECTAL DAILY
Status: DISCONTINUED | OUTPATIENT
Start: 2019-01-12 | End: 2019-01-13

## 2019-01-12 RX ORDER — ENALAPRILAT 1.25 MG/ML
1.25 INJECTION INTRAVENOUS EVERY 6 HOURS PRN
Status: DISCONTINUED | OUTPATIENT
Start: 2019-01-12 | End: 2019-01-13 | Stop reason: HOSPADM

## 2019-01-12 RX ORDER — LISINOPRIL 10 MG/1
10 TABLET ORAL 2 TIMES DAILY
Status: DISCONTINUED | OUTPATIENT
Start: 2019-01-12 | End: 2019-01-12

## 2019-01-12 RX ADMIN — POTASSIUM CHLORIDE 40 MEQ: 1500 TABLET, EXTENDED RELEASE ORAL at 18:47

## 2019-01-12 RX ADMIN — HYDROCODONE BITARTRATE AND ACETAMINOPHEN 1 TABLET: 5; 325 TABLET ORAL at 20:46

## 2019-01-12 RX ADMIN — ENOXAPARIN SODIUM 40 MG: 100 INJECTION SUBCUTANEOUS at 18:49

## 2019-01-12 RX ADMIN — PREGABALIN 75 MG: 75 CAPSULE ORAL at 18:48

## 2019-01-12 RX ADMIN — LISINOPRIL 20 MG: 20 TABLET ORAL at 18:47

## 2019-01-12 RX ADMIN — ZOLPIDEM TARTRATE 5 MG: 5 TABLET ORAL at 22:03

## 2019-01-12 RX ADMIN — PRAVASTATIN SODIUM 20 MG: 20 TABLET ORAL at 18:47

## 2019-01-12 RX ADMIN — HUMAN ALBUMIN MICROSPHERES AND PERFLUTREN 3 ML: 10; .22 INJECTION, SOLUTION INTRAVENOUS at 12:09

## 2019-01-12 RX ADMIN — METOPROLOL TARTRATE 50 MG: 25 TABLET, FILM COATED ORAL at 13:58

## 2019-01-12 RX ADMIN — ASPIRIN 325 MG: 325 TABLET ORAL at 08:38

## 2019-01-12 RX ADMIN — METOPROLOL TARTRATE 50 MG: 25 TABLET, FILM COATED ORAL at 18:47

## 2019-01-12 RX ADMIN — PREGABALIN 75 MG: 75 CAPSULE ORAL at 13:58

## 2019-01-12 RX ADMIN — IOHEXOL 85 ML: 350 INJECTION, SOLUTION INTRAVENOUS at 19:15

## 2019-01-12 RX ADMIN — LISINOPRIL 10 MG: 10 TABLET ORAL at 13:58

## 2019-01-12 RX ADMIN — POTASSIUM CHLORIDE 40 MEQ: 1500 TABLET, EXTENDED RELEASE ORAL at 22:03

## 2019-01-12 ASSESSMENT — PAIN SCALES - GENERAL
PAINLEVEL_OUTOF10: 7
PAINLEVEL_OUTOF10: 0
PAINLEVEL_OUTOF10: 0
PAINLEVEL_OUTOF10: 3
PAINLEVEL_OUTOF10: 0

## 2019-01-12 ASSESSMENT — LIFESTYLE VARIABLES
TOTAL SCORE: 0
CONSUMPTION TOTAL: NEGATIVE
TOTAL SCORE: 0
ALCOHOL_USE: YES
HAVE YOU EVER FELT YOU SHOULD CUT DOWN ON YOUR DRINKING: NO
TOTAL SCORE: 0
ON A TYPICAL DAY WHEN YOU DRINK ALCOHOL HOW MANY DRINKS DO YOU HAVE: 2
EVER FELT BAD OR GUILTY ABOUT YOUR DRINKING: NO
EVER HAD A DRINK FIRST THING IN THE MORNING TO STEADY YOUR NERVES TO GET RID OF A HANGOVER: NO
EVER_SMOKED: NEVER
TOTAL SCORE: 0
HAVE PEOPLE ANNOYED YOU BY CRITICIZING YOUR DRINKING: NO
ON A TYPICAL DAY WHEN YOU DRINK ALCOHOL HOW MANY DRINKS DO YOU HAVE: 2
HOW MANY TIMES IN THE PAST YEAR HAVE YOU HAD 5 OR MORE DRINKS IN A DAY: 0
CONSUMPTION TOTAL: NEGATIVE
SUBSTANCE_ABUSE: 0
DO YOU DRINK ALCOHOL: YES
HOW MANY TIMES IN THE PAST YEAR HAVE YOU HAD 5 OR MORE DRINKS IN A DAY: 0
HAVE PEOPLE ANNOYED YOU BY CRITICIZING YOUR DRINKING: NO
HAVE YOU EVER FELT YOU SHOULD CUT DOWN ON YOUR DRINKING: NO
AVERAGE NUMBER OF DAYS PER WEEK YOU HAVE A DRINK CONTAINING ALCOHOL: 2
AVERAGE NUMBER OF DAYS PER WEEK YOU HAVE A DRINK CONTAINING ALCOHOL: 2
TOTAL SCORE: 0
EVER FELT BAD OR GUILTY ABOUT YOUR DRINKING: NO
EVER HAD A DRINK FIRST THING IN THE MORNING TO STEADY YOUR NERVES TO GET RID OF A HANGOVER: NO
EVER_SMOKED: NEVER
TOTAL SCORE: 0

## 2019-01-12 ASSESSMENT — ENCOUNTER SYMPTOMS
NECK PAIN: 0
ORTHOPNEA: 0
COUGH: 0
TREMORS: 0
VOMITING: 0
DIZZINESS: 1
FLANK PAIN: 0
TINGLING: 0
SHORTNESS OF BREATH: 1
MYALGIAS: 0
HEADACHES: 0
WEAKNESS: 0
SPEECH CHANGE: 0
LOSS OF CONSCIOUSNESS: 0
CONSTIPATION: 0
FALLS: 0
NERVOUS/ANXIOUS: 1
CHILLS: 0
ABDOMINAL PAIN: 0
BACK PAIN: 0
SPUTUM PRODUCTION: 0
NAUSEA: 0
CLAUDICATION: 0
FEVER: 0
SEIZURES: 0
DIARRHEA: 0
SENSORY CHANGE: 0
DOUBLE VISION: 0
FOCAL WEAKNESS: 0
PND: 0
DEPRESSION: 0
BLOOD IN STOOL: 0
WHEEZING: 0
MEMORY LOSS: 0
HALLUCINATIONS: 0
DIAPHORESIS: 0
PALPITATIONS: 0
BLURRED VISION: 0
HEMOPTYSIS: 0
INSOMNIA: 0
HEARTBURN: 0

## 2019-01-12 ASSESSMENT — COPD QUESTIONNAIRES
COPD SCREENING SCORE: 5
HAVE YOU SMOKED AT LEAST 100 CIGARETTES IN YOUR ENTIRE LIFE: NO/DON'T KNOW
DO YOU EVER COUGH UP ANY MUCUS OR PHLEGM?: YES, A FEW DAYS A WEEK OR MONTH
DURING THE PAST 4 WEEKS HOW MUCH DID YOU FEEL SHORT OF BREATH: SOME OF THE TIME

## 2019-01-12 ASSESSMENT — PATIENT HEALTH QUESTIONNAIRE - PHQ9
SUM OF ALL RESPONSES TO PHQ9 QUESTIONS 1 AND 2: 0
1. LITTLE INTEREST OR PLEASURE IN DOING THINGS: NOT AT ALL
1. LITTLE INTEREST OR PLEASURE IN DOING THINGS: NOT AT ALL
2. FEELING DOWN, DEPRESSED, IRRITABLE, OR HOPELESS: NOT AT ALL
2. FEELING DOWN, DEPRESSED, IRRITABLE, OR HOPELESS: NOT AT ALL
SUM OF ALL RESPONSES TO PHQ9 QUESTIONS 1 AND 2: 0

## 2019-01-12 NOTE — PROGRESS NOTES
Pt arrived to unit via WC at 0800. Pt oriented to room, unit, and plan of care. Tele-monitor placed. All questions answered at this time. Call light within reach, fall precautions in place, will continue to monitor. MD notified of pt arrival

## 2019-01-12 NOTE — ED NOTES
"Pt requesting to be discharged. PT stating \"I feel fine.\"  Pt ambulated to bathroom again and around the nurses station with a steady gait. ERP aware of pt wanting to go home and will be in to talk with pt.   "

## 2019-01-12 NOTE — ED PROVIDER NOTES
ED Provider Note    Scribed for YVES Fitzgerald II* by Chilo Ma. 1/12/2019  2:13 AM    Means of Arrival: Ambulance  History obtained by: Patient  Limitations: None     CHIEF COMPLAINT  Chief Complaint   Patient presents with   • Hypertension       HPI  Matti Olguin is a 68 y.o. Male with a history of stroke who presents to the Emergency Department as a transfer from [facility] for evaluation of intermittent lightheadedness onset about 4 hours ago. He reports that he first noticed the lightheadedness when he stood up and tried walking after sitting down watching TV. He states that after walking 10 steps, he began to experience associated dizziness and could barely stand up because of it. He describes the dizziness as though the entire room was spinning. After this episode, which he states lasted about 10 minutes,  Matti went to the hospital. After subsequent episodes of standing, he has been feeling similar symptoms that were more mild than the first episode. At the previous facility, he also states that his blood pressure was very high, despite being compliant with his blood pressure medication. Here in the ED his blood pressure has alleviated after medication administration (clonidine) from the transferring facility. He also confirms that he takes Coumadin. He denies experiencing nausea, vomiting, loss of consciousness, and chest pain.     REVIEW OF SYSTEMS  Review of Systems   HENT:        Positive for hypertension.    Respiratory: Positive for shortness of breath.    Cardiovascular: Negative for chest pain.   Gastrointestinal: Negative for nausea and vomiting.   Neurological: Positive for dizziness. Negative for loss of consciousness.        Positive lightheadedness   All other systems reviewed and are negative.    See HPI for further details.    PAST MEDICAL HISTORY   has a past medical history of Anticoagulation monitoring, special range; Cardiac pacemaker in situ (1/13/2015);  Complete heart block (HCC); Dyspnea (10/15/2014); High cholesterol; HTN (hypertension) (9/23/2013); S/P aortic valve replacement with bioprosthetic valve (1/13/2015); Sleep apnea (10-03-13); Snoring; Stroke (HCC) (); Unspecified hemorrhagic conditions; and Valvular heart disease.    SOCIAL HISTORY  Social History     Social History Main Topics   • Smoking status: Never Smoker   • Smokeless tobacco: Current User     Types: Chew   • Alcohol use Yes      Comment: social drinker, not daily   • Drug use: No   • Sexual activity: Not on file       SURGICAL HISTORY   has a past surgical history that includes other orthopedic surgery; aortic valve replacement (1988); recovery (9/21/2012); recovery (10/8/2013); and recovery (11/8/2013).    CURRENT MEDICATIONS  Home Medications     Reviewed by Ildefonso Kowalski (Pharmacy Tech) on 01/12/19 at 0749  Med List Status: Complete   Medication Last Dose Status   aspirin EC (ECOTRIN) 81 MG Tablet Delayed Response 1/11/2019 Active   folic acid (FOLVITE) 400 MCG tablet 1/11/2019 Active   hydroCHLOROthiazide (HYDRODIURIL) 12.5 MG tablet 1/11/2019 Active   lisinopril (PRINIVIL) 10 MG Tab 1/11/2019 Active   metoprolol (LOPRESSOR) 50 MG Tab 1/11/2019 Active   multivitamin (THERAGRAN) TABS 1/12/2019 Active   potassium chloride SA (KDUR) 20 MEQ Tab CR 1/12/2019 Active   pravastatin (PRAVACHOL) 20 MG Tab 1/12/2019 Active   pregabalin (LYRICA) 75 MG Cap 1/11/2019 Active   zolpidem (AMBIEN) 5 MG TABS PRN Active                ALLERGIES  Allergies   Allergen Reactions   • Nkda [No Known Drug Allergy]        PHYSICAL EXAM  VITAL SIGNS: BP (!) 181/95   Pulse 80   Resp 18   Ht 1.829 m (6')   Wt 104.3 kg (230 lb)   BMI 31.19 kg/m²       Pulse ox interpretation: I interpret this pulse ox as normal.  Constitutional: Alert in no apparent distress. Pleasant 68 year old male.   HENT: No signs of trauma, Bilateral external ears normal, Nose normal.   Eyes: Pupils are equal, Conjunctiva normal,  Non-icteric.   Neck: Normal range of motion, No tenderness, Supple, No stridor.   Cardiovascular: Regular rate and rhythm, murmur present. Symmetric distal pulses. No cyanosis of extremities. No peripheral edema of extremities.  Thorax & Lungs: Normal breath sounds, No respiratory distress, No wheezing, No chest tenderness. Sternotomy wound scar.   Abdomen: Bowel sounds normal, Soft, No tenderness, No masses, No pulsatile masses. No peritoneal signs.  Skin: Warm, Dry, No erythema, No rash.   Back: No midline bony tenderness, No CVA tenderness.   Musculoskeletal: Good range of motion in all major joints. No tenderness to palpation or major deformities noted.   Neurologic: AAOx4. No slurred speech. No facial droop. Normal eye movements. No nystagmus. 5/5 strength in all 4 extremities. Normal finger to nose testing. Negative Romberg. Ambulatory.   Psychiatric: Affect normal, Judgment normal, Mood normal.     DIAGNOSTIC STUDIES / PROCEDURES    EKG Interpretation:  Interpreted by me  Rhythm:  Ventricularly paced   Rate: 57  Ectopy: none  Conduction: normal  Intervals: Acceptable.   ST Segments: No ST elevations or depressions.   T waves: no acute change  Q Waves: none  Clinical Impression: Ventricularly paced EKG. Does not meet STEMI criteria.     COURSE & MEDICAL DECISION MAKING  Pertinent Labs & Imaging studies reviewed. (See chart for details)    2:13 AM This is a 68 y.o. male who presents as transfer from [place] with hypertension, dizziness, and lightheadedness and the differential diagnosis includes but is not limited to hypertensive emergency, benign positional vertigo, vasovagal episode, arrhythmia, MI, and TIA. Ordered for EKG to evaluate. Which was not consistent with arrhythmia or STEMI. He has ventricular pacemaker. His blood pressure is now normal after receiving clonidine at OSH. I informed him that due to his risk factors (multiple prior strokes) and presenting symptoms of persistent vertigo, we will admit  him to the hospital for observation. He understood and agreed to the plan of care.     2:54 AM Ordered PT/INR and Troponin.    2:57 AM Paged Hospitalist.    3:14 AM I reviewed Matti's INR which is normal. I asked him again if he takes Coumadin and he told me that he no longer takes it. He also reported that his aortic valve is biosynthetic.     3:20 AM Consult with Dr. Cardenas (Hospitalist) who agreed to admit the patient for further evaluation of vertigo episode. My working differential HTN encephalopathy, BPV, TIA.     6:47 AM Patient reassessed at bedside. He informed me that after extensive walking around the ED, he was feeling significant improvement and no longer had any major concerns about his symptoms. He requested to be discharged. His wife was now present at bedside and provided secondary history. Her concern was that he had a prolonged episode of vertigo like symptoms. She says she thought maybe he was having a stroke. He never complained of chest pain or shortness of breath. Difficult to tell if his symptoms were due to TIA. He has no current CVA symptoms. His last stroke was 11 years ago. He says last echo of his heart and biosynthetic valve was over 2 years ago.      I did review outside records and results. Fairly unremarkable. Previous provider did order a ddimer and it is elevated. I spoke with him prior to patient transfer regarding CT imaging of chest since this level was elevated. He says he had a low suspicion for PE.  Based on Mr. Olguin's history that he provides me, I do not think I would have ordered a ddimer. I have low suspicion for PE because he has no chest pain, shortness of breath, leg swelling. Vitals are normal. He has no orthostatic symptoms.       DISPOSITION:  Admit    FOLLOW UP:      OUTPATIENT MEDICATIONS:  Current Discharge Medication List          FINAL IMPRESSION  1. Hypertension, unspecified type    2. Dizziness          I, Chilo Ma (Divya), am scribing for,  and in the presence of, AVERY Fitzgerald II.    Electronically signed by: Chilo Ma (Scribe), 1/12/2019    I, AVERY Fitzgerald II personally performed the services described in this documentation, as scribed by Chilo Ma in my presence, and it is both accurate and complete. C.     The note accurately reflects work and decisions made by me.  Edmond Jade II  1/12/2019  7:57 AM

## 2019-01-12 NOTE — ED NOTES
Report from APRIL Escobar  Pt now wishing to stay.  On cardiac monitor.  Awaiting room assignment.

## 2019-01-12 NOTE — ED NOTES
EMS transfer from [facility] for hypertensive crisis. Pt was seen at [facility] ED for dizziness and found to have a BP of 220/110. Pt was given clonidine without relief of symptoms at transferred to [facility] ED. Pt arrives somewhat dizzy and HTN.

## 2019-01-12 NOTE — ED NOTES
Dr. Parrish will be this patient's attending physician beginning @ 0700 today. Please page him with updates/questions.

## 2019-01-13 ENCOUNTER — PATIENT OUTREACH (OUTPATIENT)
Dept: HEALTH INFORMATION MANAGEMENT | Facility: OTHER | Age: 69
End: 2019-01-13

## 2019-01-13 VITALS
DIASTOLIC BLOOD PRESSURE: 100 MMHG | TEMPERATURE: 97.4 F | SYSTOLIC BLOOD PRESSURE: 156 MMHG | HEART RATE: 51 BPM | WEIGHT: 240.3 LBS | HEIGHT: 72 IN | OXYGEN SATURATION: 94 % | RESPIRATION RATE: 18 BRPM | BODY MASS INDEX: 32.55 KG/M2

## 2019-01-13 PROBLEM — I16.0 HYPERTENSIVE URGENCY: Status: ACTIVE | Noted: 2019-01-13

## 2019-01-13 LAB
ANION GAP SERPL CALC-SCNC: 6 MMOL/L (ref 0–11.9)
BUN SERPL-MCNC: 18 MG/DL (ref 8–22)
CALCIUM SERPL-MCNC: 9.2 MG/DL (ref 8.5–10.5)
CHLORIDE SERPL-SCNC: 109 MMOL/L (ref 96–112)
CO2 SERPL-SCNC: 24 MMOL/L (ref 20–33)
CREAT SERPL-MCNC: 0.87 MG/DL (ref 0.5–1.4)
ERYTHROCYTE [DISTWIDTH] IN BLOOD BY AUTOMATED COUNT: 46.8 FL (ref 35.9–50)
GLUCOSE SERPL-MCNC: 100 MG/DL (ref 65–99)
HCT VFR BLD AUTO: 43.1 % (ref 42–52)
HGB BLD-MCNC: 14.9 G/DL (ref 14–18)
MCH RBC QN AUTO: 34.3 PG (ref 27–33)
MCHC RBC AUTO-ENTMCNC: 34.6 G/DL (ref 33.7–35.3)
MCV RBC AUTO: 99.1 FL (ref 81.4–97.8)
PLATELET # BLD AUTO: 115 K/UL (ref 164–446)
PMV BLD AUTO: 12.1 FL (ref 9–12.9)
POTASSIUM SERPL-SCNC: 3.8 MMOL/L (ref 3.6–5.5)
RBC # BLD AUTO: 4.35 M/UL (ref 4.7–6.1)
SODIUM SERPL-SCNC: 139 MMOL/L (ref 135–145)
WBC # BLD AUTO: 4.9 K/UL (ref 4.8–10.8)

## 2019-01-13 PROCEDURE — 700111 HCHG RX REV CODE 636 W/ 250 OVERRIDE (IP): Performed by: INTERNAL MEDICINE

## 2019-01-13 PROCEDURE — 85027 COMPLETE CBC AUTOMATED: CPT

## 2019-01-13 PROCEDURE — 80048 BASIC METABOLIC PNL TOTAL CA: CPT

## 2019-01-13 PROCEDURE — A9270 NON-COVERED ITEM OR SERVICE: HCPCS | Performed by: INTERNAL MEDICINE

## 2019-01-13 PROCEDURE — 36415 COLL VENOUS BLD VENIPUNCTURE: CPT

## 2019-01-13 PROCEDURE — A9270 NON-COVERED ITEM OR SERVICE: HCPCS | Performed by: NURSE PRACTITIONER

## 2019-01-13 PROCEDURE — 96376 TX/PRO/DX INJ SAME DRUG ADON: CPT

## 2019-01-13 PROCEDURE — 700102 HCHG RX REV CODE 250 W/ 637 OVERRIDE(OP): Performed by: NURSE PRACTITIONER

## 2019-01-13 PROCEDURE — 99217 PR OBSERVATION CARE DISCHARGE: CPT | Performed by: INTERNAL MEDICINE

## 2019-01-13 PROCEDURE — 96372 THER/PROPH/DIAG INJ SC/IM: CPT

## 2019-01-13 PROCEDURE — G0378 HOSPITAL OBSERVATION PER HR: HCPCS

## 2019-01-13 PROCEDURE — 97161 PT EVAL LOW COMPLEX 20 MIN: CPT

## 2019-01-13 PROCEDURE — 700102 HCHG RX REV CODE 250 W/ 637 OVERRIDE(OP): Performed by: INTERNAL MEDICINE

## 2019-01-13 RX ORDER — CLONIDINE HYDROCHLORIDE 0.1 MG/1
0.1 TABLET ORAL EVERY 6 HOURS PRN
Qty: 60 TAB | Refills: 2 | Status: SHIPPED | OUTPATIENT
Start: 2019-01-13

## 2019-01-13 RX ORDER — LORAZEPAM 2 MG/ML
1 INJECTION INTRAMUSCULAR
Status: DISCONTINUED | OUTPATIENT
Start: 2019-01-13 | End: 2019-01-13 | Stop reason: HOSPADM

## 2019-01-13 RX ORDER — LISINOPRIL 10 MG/1
20 TABLET ORAL 2 TIMES DAILY
Qty: 180 TAB | Refills: 3 | Status: SHIPPED | OUTPATIENT
Start: 2019-01-13 | End: 2019-03-29 | Stop reason: SDUPTHER

## 2019-01-13 RX ORDER — CLOPIDOGREL BISULFATE 75 MG/1
75 TABLET ORAL DAILY
Qty: 30 TAB | Refills: 2 | Status: SHIPPED | OUTPATIENT
Start: 2019-01-13 | End: 2019-03-29

## 2019-01-13 RX ORDER — HYDROCHLOROTHIAZIDE 12.5 MG/1
25 TABLET ORAL DAILY
Qty: 60 TAB | Refills: 2 | Status: SHIPPED | OUTPATIENT
Start: 2019-01-13 | End: 2019-03-29 | Stop reason: SDUPTHER

## 2019-01-13 RX ORDER — CLOPIDOGREL BISULFATE 75 MG/1
75 TABLET ORAL DAILY
Status: DISCONTINUED | OUTPATIENT
Start: 2019-01-13 | End: 2019-01-13 | Stop reason: HOSPADM

## 2019-01-13 RX ORDER — HYDROCHLOROTHIAZIDE 25 MG/1
25 TABLET ORAL
Status: DISCONTINUED | OUTPATIENT
Start: 2019-01-13 | End: 2019-01-13 | Stop reason: HOSPADM

## 2019-01-13 RX ORDER — ATORVASTATIN CALCIUM 80 MG/1
80 TABLET, FILM COATED ORAL
Qty: 30 TAB | Refills: 2 | Status: SHIPPED | OUTPATIENT
Start: 2019-01-13

## 2019-01-13 RX ORDER — CARVEDILOL 6.25 MG/1
25 TABLET ORAL 2 TIMES DAILY WITH MEALS
Status: DISCONTINUED | OUTPATIENT
Start: 2019-01-13 | End: 2019-01-13 | Stop reason: HOSPADM

## 2019-01-13 RX ORDER — CARVEDILOL 25 MG/1
25 TABLET ORAL 2 TIMES DAILY WITH MEALS
Qty: 60 TAB | Refills: 2 | Status: SHIPPED | OUTPATIENT
Start: 2019-01-13 | End: 2019-03-29 | Stop reason: SDUPTHER

## 2019-01-13 RX ORDER — ATORVASTATIN CALCIUM 80 MG/1
80 TABLET, FILM COATED ORAL
Status: DISCONTINUED | OUTPATIENT
Start: 2019-01-13 | End: 2019-01-13 | Stop reason: HOSPADM

## 2019-01-13 RX ADMIN — ENALAPRILAT 1.25 MG: 2.5 INJECTION INTRAVENOUS at 06:26

## 2019-01-13 RX ADMIN — HYDROCHLOROTHIAZIDE 25 MG: 25 TABLET ORAL at 14:22

## 2019-01-13 RX ADMIN — HYDROCODONE BITARTRATE AND ACETAMINOPHEN 1 TABLET: 5; 325 TABLET ORAL at 14:22

## 2019-01-13 RX ADMIN — METOPROLOL TARTRATE 50 MG: 25 TABLET, FILM COATED ORAL at 04:46

## 2019-01-13 RX ADMIN — LISINOPRIL 20 MG: 20 TABLET ORAL at 04:45

## 2019-01-13 RX ADMIN — HYDROCODONE BITARTRATE AND ACETAMINOPHEN 1 TABLET: 5; 325 TABLET ORAL at 04:58

## 2019-01-13 RX ADMIN — METOPROLOL TARTRATE 50 MG: 25 TABLET, FILM COATED ORAL at 09:44

## 2019-01-13 RX ADMIN — ASPIRIN 325 MG: 325 TABLET ORAL at 04:45

## 2019-01-13 RX ADMIN — CLOPIDOGREL 75 MG: 75 TABLET, FILM COATED ORAL at 14:22

## 2019-01-13 RX ADMIN — ENALAPRILAT 1.25 MG: 2.5 INJECTION INTRAVENOUS at 07:28

## 2019-01-13 RX ADMIN — ENOXAPARIN SODIUM 40 MG: 100 INJECTION SUBCUTANEOUS at 04:46

## 2019-01-13 RX ADMIN — PREGABALIN 75 MG: 75 CAPSULE ORAL at 04:46

## 2019-01-13 ASSESSMENT — COGNITIVE AND FUNCTIONAL STATUS - GENERAL
MOBILITY SCORE: 23
SUGGESTED CMS G CODE MODIFIER MOBILITY: CI
CLIMB 3 TO 5 STEPS WITH RAILING: A LITTLE

## 2019-01-13 ASSESSMENT — PAIN SCALES - GENERAL
PAINLEVEL_OUTOF10: 0
PAINLEVEL_OUTOF10: 2
PAINLEVEL_OUTOF10: 7
PAINLEVEL_OUTOF10: 0

## 2019-01-13 ASSESSMENT — GAIT ASSESSMENTS
DISTANCE (FEET): 250
GAIT LEVEL OF ASSIST: STAND BY ASSIST

## 2019-01-13 NOTE — PROGRESS NOTES
Assumed pt care at 0700. Received report from Beth CHEN. A&O x4. Pt denies pain at this time. Respirations even and unlabored on RA. Pt still hypertensive on recheck at 171/100. Medicated with 2nd dose of  PRN antihypertensive per MAR.    Updated on POC, communication board updated. Bed locked and in lowest position. Call light and belongings within reach. Non-skid socks in place. Needs met, will continue to monitor.

## 2019-01-13 NOTE — ASSESSMENT & PLAN NOTE
I believe this patient's symptoms are in the setting of cerebral hypoperfusion from increased afterload in the setting of possible restenosis of the aortic valve in the setting of uncontrolled hypertension.  Other issues that need to be considered include venous thromboembolism in the setting of elevated d-dimer, arrhythmias, orthostatic hypotension, carotid stenosis/TIA.    EKG has been personally reviewed by me, paced rhythm    Chest x-ray at the outlying facility has been personally reviewed by me, no acute concerns    Patient will be admitted to the CDU for observation  We will cycle troponins, administer aspirin 325, obtain echocardiogram  Elevated d-dimer, check CTA PE  Obtain carotid duplex  We will improve his hypertensive control to a systolic blood pressure less than 130, we will check orthostatic signs  We will obtain physical therapy evaluation  We will interrogate the pacemaker  We will check TSH, cortisol, B12, folate, ESR, CRP, pro calcitonin, blood cultures, urinalysis, urine drug screen  We will monitor this patient overnight in the CDU    Based on evaluation above, we may consider cardiology consultation

## 2019-01-13 NOTE — PROGRESS NOTES
bp range of 164/105- 196/105, refuses iv prn meds for now, will recheck bp at 6 am and if still elevated will agree to get iv prn bp meds. To continue to monitor.

## 2019-01-13 NOTE — ASSESSMENT & PLAN NOTE
Afterload reduction, Aim SBP < 130  Increasing lisinopril   Continue metoprolol  Will titrate to achieve normotensive control

## 2019-01-13 NOTE — PROGRESS NOTES
Pt DC'd. IV removed, discharge instructions provided to patient, pt verbalizes understanding. Pt states all questions have been answered. Copy of discharge paperwork provided to pt, signed copy in chart. Pt states all belongings in possession. Pt ambulated off unit with spouse, denied need for WC or hospital escort.

## 2019-01-13 NOTE — DISCHARGE INSTRUCTIONS
Discharge Instructions    Discharged to home by car with relative. Discharged via walking, hospital escort: Refused.  Special equipment needed: Not Applicable    Be sure to schedule a follow-up appointment with your primary care doctor or any specialists as instructed.     Discharge Plan:   Diet Plan: Discussed  Activity Level: Discussed  Confirmed Follow up Appointment: Patient to Call and Schedule Appointment  Confirmed Symptoms Management: Discussed  Medication Reconciliation Updated: Yes  Influenza Vaccine Indication: Not indicated: Previously immunized this influenza season and > 8 years of age    I understand that a diet low in cholesterol, fat, and sodium is recommended for good health. Unless I have been given specific instructions below for another diet, I accept this instruction as my diet prescription.   Other diet: Heart healthy    Special Instructions: None    · Is patient discharged on Warfarin / Coumadin?   No     Depression / Suicide Risk    As you are discharged from this [facility] facility, it is important to learn how to keep safe from harming yourself.    Recognize the warning signs:  · Abrupt changes in personality, positive or negative- including increase in energy   · Giving away possessions  · Change in eating patterns- significant weight changes-  positive or negative  · Change in sleeping patterns- unable to sleep or sleeping all the time   · Unwillingness or inability to communicate  · Depression  · Unusual sadness, discouragement and loneliness  · Talk of wanting to die  · Neglect of personal appearance   · Rebelliousness- reckless behavior  · Withdrawal from people/activities they love  · Confusion- inability to concentrate     If you or a loved one observes any of these behaviors or has concerns about self-harm, here's what you can do:  · Talk about it- your feelings and reasons for harming yourself  · Remove any means that you might use to hurt yourself (examples: pills, rope,  extension cords, firearm)  · Get professional help from the community (Mental Health, Substance Abuse, psychological counseling)  · Do not be alone:Call your Safe Contact- someone whom you trust who will be there for you.  · Call your local CRISIS HOTLINE 424-7366 or 498-677-0727  · Call your local Children's Mobile Crisis Response Team [place] (208) 951-4702 or www.[facility]  · Call the toll free National Suicide Prevention Hotlines   · National Suicide Prevention Lifeline 435-609-BGUE (1273)  · National Hope Line Network 800-SUICIDE (477-0653)

## 2019-01-13 NOTE — DISCHARGE SUMMARY
Discharge Summary    CHIEF COMPLAINT ON ADMISSION  Chief Complaint   Patient presents with   • Hypertension       Reason for Admission  EMS     Admission Date  1/12/2019  Discharge Date  01/13/19    CODE STATUS  Full Code    HPI & HOSPITAL COURSE  This is a 68 y.o. 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 Thibodeaux, Cardiology), PPM for complete heart block, and previous stroke here with near syncope.  He was evaluated at [facility] and sent here for ongoing dizziness and positive d-dimer.  CTA head at outlying facility showed no acute findings.  D-dimer was elevated at 1.50.  CTA showed no pulmonary embolism.  Troponin were trended and negative.  Twelve-lead EKG showed a ventricular paced rhythm.  Pacemaker interrogation was done by the [employer] rep with no reported events.  Blood cultures were negative.  He had no indications of infectious process.  Carotid ultrasound showed less than 50% stenosis bilaterally.  Echo showed normal LV with EF 60%, normal function of bioprosthetic aortic valve and no pericardial effusion.  It is likely his presentation was due to hypertensive urgency. Multiple adjustments were made to his daily medication regime including addition of Plavix daily, DC metoprolol and take carvedilol 25 mg twice daily, stop taking lovastatin and start atorvastatin, increase lisinopril to 20 mg twice daily, increase HCTZ to 25 mg daily, and clonidine 0.1 mg every 6 hours as needed for SBP greater than 150. Presentation with not consistent with stroke etiology although he does have multiple risk factors neurological deficits to indicate stroke.  Would consider outpatient MRI should symptoms persist.  Currently he reports feeling back to his baseline level of functioning.  He was evaluated by physical therapy who have no recommended therapy needs at this time.  He is  ambulatory independently without chest pain, shortness of breath, palpitations, dizziness or weakness.  He is tolerating a diet without nausea vomiting.  He he and his wife at bedside are very eager for DC this afternoon back to their home in [place].  They are instructed to call 911 or return to the nearest emergency department for worsening symptoms.    Therefore, he is discharged in good and stable condition to home with close outpatient follow-up.    FOLLOW UP ITEMS POST DISCHARGE  -FU with PCP and Cardiology  -Return to the nearest ED or call 911 for worsening symptoms.   -See medication adjustments above.    DISCHARGE DIAGNOSES  Active Problems:    Near syncope POA: Yes    History of stroke POA: Yes      Overview: Due to sub theraputic INR    Essential hypertension POA: Yes    Dyslipidemia POA: Yes    Dyspnea POA: Yes    S/P aortic valve replacement with bioprosthetic valve: repeat AVR 10/2014 POA: Yes    Cardiac pacemaker in situ POA: Yes    Complete heart block (HCC) POA: Yes    Class 1 obesity due to excess calories with serious comorbidity and body mass index (BMI) of 32.0 to 32.9 in adult POA: Yes    Hyperbilirubinemia POA: Yes    Hypertensive urgency POA: Unknown  Resolved Problems:    * No resolved hospital problems. *      FOLLOW UP  Future Appointments  Date Time Provider Department Center   1/25/2019 8:30 AM PACER CHECK-[facility] None     Brittany Bradshaw M.D.  [address] [place] [id number]  [phone]      [facility]  unable to call office to schedule appointment due to weekend. Please call to schedule your appointment. Thank you       MEDICATIONS ON DISCHARGE     Medication List      START taking these medications      Instructions   atorvastatin 80 MG tablet  Commonly known as:  LIPITOR   Take 1 Tab by mouth every bedtime.  Dose:  80 mg     carvedilol 25 MG Tabs  Commonly known as:  COREG   Take 1 Tab by mouth 2 times a day, with meals.  Dose:  25 mg     cloNIDine 0.1 MG Tabs  Commonly  known as:  CATAPRES   Take 1 Tab by mouth every 6 hours as needed. As needed for SBP >150  Dose:  0.1 mg     clopidogrel 75 MG Tabs  Commonly known as:  PLAVIX   Take 1 Tab by mouth every day.  Dose:  75 mg        CHANGE how you take these medications      Instructions   hydroCHLOROthiazide 12.5 MG tablet  What changed:  how much to take  Commonly known as:  HYDRODIURIL   Take 2 Tabs by mouth every day.  Dose:  25 mg     lisinopril 10 MG Tabs  What changed:  how much to take  Commonly known as:  PRINIVIL   Take 2 Tabs by mouth 2 times a day.  Dose:  20 mg        CONTINUE taking these medications      Instructions   aspirin EC 81 MG Tbec  Commonly known as:  ECOTRIN   Take 81 mg by mouth every day.  Dose:  81 mg     folic acid 400 MCG tablet  Commonly known as:  FOLVITE   Take 400 mcg by mouth every day.  Dose:  400 mcg     HYDROcodone-acetaminophen 5-325 MG Tabs per tablet  Commonly known as:  NORCO   Take 1 Tab by mouth every four hours as needed.  Dose:  1 Tab     multivitamin Tabs   Take 1 Tab by mouth every day.  Dose:  1 Tab     potassium chloride SA 20 MEQ Tbcr  Commonly known as:  Kdur   Take 1 Tab by mouth every day.  Dose:  20 mEq     pregabalin 75 MG Caps  Commonly known as:  LYRICA   Take 75 mg by mouth 2 times a day.  Dose:  75 mg     zolpidem 5 MG Tabs  Commonly known as:  AMBIEN   Take 5 mg by mouth at bedtime as needed.  Dose:  5 mg        STOP taking these medications    metoprolol 50 MG Tabs  Commonly known as:  LOPRESSOR     pravastatin 20 MG Tabs  Commonly known as:  PRAVACHOL            Allergies  Allergies   Allergen Reactions   • Nkda [No Known Drug Allergy]        DIET  Orders Placed This Encounter   Procedures   • Diet Order Cardiac, 2 Gram Sodium     Standing Status:   Standing     Number of Occurrences:   1     Order Specific Question:   Diet:     Answer:   Cardiac [6]     Order Specific Question:   Diet:     Answer:   2 Gram Sodium [7]       ACTIVITY  As tolerated.  Weight bearing as  tolerated    CONSULTATIONS  NA    PROCEDURES  NA    LABORATORY  Lab Results   Component Value Date    SODIUM 139 01/13/2019    POTASSIUM 3.8 01/13/2019    CHLORIDE 109 01/13/2019    CO2 24 01/13/2019    GLUCOSE 100 (H) 01/13/2019    BUN 18 01/13/2019    CREATININE 0.87 01/13/2019    CREATININE 1.1 05/29/2007        Lab Results   Component Value Date    WBC 4.9 01/13/2019    HEMOGLOBIN 14.9 01/13/2019    HEMATOCRIT 43.1 01/13/2019    PLATELETCT 115 (L) 01/13/2019        NEFTALY Ray.

## 2019-01-13 NOTE — THERAPY
"Physical Therapy Evaluation completed.   Bed Mobility:  Supine to Sit: Supervised  Transfers: Sit to Stand: Supervised  Gait: Level Of Assist: Stand by Assist with No Equipment Needed       Plan of Care: Patient with no further skilled PT needs in the acute care setting at this time  Discharge Recommendations: Equipment: No Equipment Needed. Recommend outpatient transitional care services for continued physical therapy services.    See \"Rehab Therapy-Acute\" Patient Summary Report for complete documentation.     Pt was recently admitted for syncope/dizziness and has a hx of aortic valve replacement, CVA (L-sided deficits), HTN, DLD, and obesity. Pt was able to demonstrate SBA to SPV for all functional mobility at this time w/ no AD use. Pt was able to complete high dynamic balance testing, however, was demonstrated with slight LOB during testing. However, per pt, he does have some balance deficits from prior CVA and his current balance is at baseline at this time. Pt reports of baseline gait mechanics with functional mobility and no hx of falls at home. Pt is in no acute skillled PT needs at this time, anticipate pt to d/c home once medically clear, will recommend outpatient therapy services for optimal progression of balance.   "

## 2019-01-13 NOTE — PROGRESS NOTES
With complaints of 7/10 neck pain, medicated as scheduled. Ambulated to the bathroom with a steady gait.

## 2019-01-13 NOTE — H&P
Hospital Medicine History & Physical Note    Date of Service  1/12/2019    Primary Care Physician  Brittany Bradshaw M.D.    Consultants  None    Code Status  FULL CODE     Chief Complaint  Near Syncope     History of Presenting Illness  68 y.o. male who presented 1/12/2019 with Near syncope.     Patient has an underlying history of aortic root dissection, status post aortic root graft followed by a mechanical aortic valve placement which was followed by removing valve replacement subsequently.  In the setting of a subtherapeutic INR, he has had a previous stroke leading to left-sided deficits.  In addition he has underlying history of hypertension, dyslipidemia, obesity, complete heart block status post permanent pacemaker placement.  He is followed by Dr. Thibodeaux from cardiology in the outpatient setting.  Last visit was in May 2018 with Dr. Thibodeaux.  In addition he has chronic dyspnea which is unchanged.  He was supposed to undergo an interval echocardiogram at the end of this month and subsequently follow-up with Dr. Thibodeaux.    Patient reports that over the last 3 days he has had intermittent dizziness, left side of his body feeling abnormal.  He has chronic left-sided tingling from his previous stroke.  He thought probably these were residual effects from the prior stroke.  Yesterday, while he was watching television in a recliner, he got up and suddenly felt very woozy, lightheaded and severely dizzy as if he was going to pass out.  He reports that he had to hold onto things, sit back down and did not feel well.  The dizziness/spinning sensation continued.  According to patient spouse, who is at bedside reports that at this time patient felt very cold, clammy and pale.  He looked stunned.  Subsequently with repeat standing he continued to have the symptoms.  Given that he presented to the [facility] emergency department in [place] where he had a negative CT of the head, noted to have an elevated d-dimer  and I am uncertain why a d-dimer was checked.  Subsequently he was sent to [facility] emergency department for further evaluation and management.  Otherwise at the [facility] facility was noted to have a white blood cell count of 4.9, hemoglobin of 15.8, platelet count of 135, normal LFTs apart from a bilirubin of 1.4, creatinine of 1.15, BUN of 25, creatinine kinase of 175, CK-MB of 1.8, negative troponins, lactic acid of 0.7 and BNP of 126.  Urinalysis did not reveal any evidence of a UTI.  In addition he was noted to have hypertension at the [facility] facility, he received clonidine.    Upon arrival here, patient has felt well.  He was evaluated by the emergency room physician.  He was noted to have hypertension here, diagnosed with hypertension related dizziness and advised admission to the CDU.  Upon evaluation in the CDU patient is alert and oriented x4.  Denies having any headaches but reports having intermittent chronic headaches.  No headaches with these episodes.  He has not had any fever or chills.  He denies any chest pain.  Denies any shortness of breath, cough, abdominal pain.  No genitourinary complaints.  No other GI related symptoms.  He denies having any palpitations, lower extremity edema.  Reports having orthopnea, intermittent paroxysmal nocturnal dyspnea and at times dyspnea on exertion which is worse than his baseline.  At this time he reports that his dyspnea is at his baseline but has chronic dyspnea.  When asked if he is able to go up and flight of stairs, he reports that he is restricted with this because of his prior history of stroke.  When asked if he does his own grocery shopping, he reports yes.  Reports that he is able to do grocery shopping, move his own cart without developing any dyspnea on exertion or chest pain.  He is able to carry out his own groceries without having any dyspnea on exertion or having any chest pain.    Review of Systems  Review of Systems    Constitutional: Positive for malaise/fatigue. Negative for chills, diaphoresis and fever.   HENT: Negative for hearing loss and tinnitus.    Eyes: Negative for blurred vision and double vision.   Respiratory: Positive for shortness of breath. Negative for cough, hemoptysis, sputum production and wheezing.    Cardiovascular: Negative for chest pain, palpitations, orthopnea, claudication, leg swelling and PND.   Gastrointestinal: Negative for abdominal pain, blood in stool, constipation, diarrhea, heartburn, melena, nausea and vomiting.   Genitourinary: Negative for dysuria, flank pain, frequency, hematuria and urgency.   Musculoskeletal: Negative for back pain, falls, joint pain, myalgias and neck pain.   Skin: Negative for itching and rash.   Neurological: Positive for dizziness. Negative for tingling, tremors, sensory change, speech change, focal weakness, seizures, loss of consciousness, weakness and headaches.   Psychiatric/Behavioral: Negative for depression, hallucinations, memory loss, substance abuse and suicidal ideas. The patient is nervous/anxious. The patient does not have insomnia.        Past Medical History   has a past medical history of Anticoagulation monitoring, special range; Cardiac pacemaker in situ (1/13/2015); Complete heart block (HCC); Dyspnea (10/15/2014); High cholesterol; HTN (hypertension) (9/23/2013); S/P aortic valve replacement with bioprosthetic valve (1/13/2015); Sleep apnea (10-03-13); Snoring; Stroke (HCC) (); Unspecified hemorrhagic conditions; and Valvular heart disease.    Surgical History   has a past surgical history that includes other orthopedic surgery; aortic valve replacement (1988); recovery (9/21/2012); recovery (10/8/2013); and recovery (11/8/2013).     Family History  family history includes Heart Attack in his father; Heart Disease in his sister.     Social History   reports that he has never smoked. His smokeless tobacco use includes Chew. He reports that  he drinks alcohol. He reports that he does not use drugs.    Allergies  Allergies   Allergen Reactions   • Nkda [No Known Drug Allergy]        Medications  Prior to Admission Medications   Prescriptions Last Dose Informant Patient Reported? Taking?   HYDROcodone-acetaminophen (NORCO) 5-325 MG Tab per tablet   Yes Yes   Sig: Take 1 Tab by mouth every four hours as needed.   aspirin EC (ECOTRIN) 81 MG Tablet Delayed Response 1/11/2019 at AM Patient Yes No   Sig: Take 81 mg by mouth every day.   folic acid (FOLVITE) 400 MCG tablet 1/11/2019 at AM Patient Yes No   Sig: Take 400 mcg by mouth every day.   hydroCHLOROthiazide (HYDRODIURIL) 12.5 MG tablet 1/11/2019 at AM Patient No No   Sig: Take 1 Tab by mouth every day.   lisinopril (PRINIVIL) 10 MG Tab 1/11/2019 at PM Patient No No   Sig: Take 1 Tab by mouth 2 times a day.   metoprolol (LOPRESSOR) 50 MG Tab 1/11/2019 at PM Patient No No   Sig: Take 1 Tab by mouth 2 times a day.   multivitamin (THERAGRAN) TABS 1/12/2019 at AM Patient No No   Sig: Take 1 Tab by mouth every day.   potassium chloride SA (KDUR) 20 MEQ Tab CR 1/12/2019 at AM Patient No No   Sig: Take 1 Tab by mouth every day.   pravastatin (PRAVACHOL) 20 MG Tab 1/12/2019 at AM Patient No No   Sig: Take 1 Tab by mouth every day.   pregabalin (LYRICA) 75 MG Cap 1/11/2019 at PM Patient Yes No   Sig: Take 75 mg by mouth 2 times a day.   zolpidem (AMBIEN) 5 MG TABS PRN at PRN Patient Yes No   Sig: Take 5 mg by mouth at bedtime as needed.      Facility-Administered Medications: None       Physical Exam  Temp:  [36.6 °C (97.8 °F)-36.7 °C (98.1 °F)] 36.6 °C (97.8 °F)  Pulse:  [52-80] 54  Resp:  [14-20] 17  BP: (147-181)/(91-95) 147/91    Physical Exam   Constitutional: He is oriented to person, place, and time. He appears well-developed and well-nourished. No distress.   Body mass index is 32.59 kg/m².   HENT:   Head: Normocephalic.   Mouth/Throat: Oropharynx is clear and moist. No oropharyngeal exudate.   Eyes:  Pupils are equal, round, and reactive to light. Conjunctivae and EOM are normal. No scleral icterus.   Neck: Normal range of motion. No JVD present. No thyromegaly present.   Cardiovascular: Normal rate and regular rhythm.  Exam reveals no gallop and no friction rub.    Murmur heard.  Pulses:       Posterior tibial pulses are 2+ on the right side, and 2+ on the left side.   Cap refill < 3s   Pulmonary/Chest: No stridor. No respiratory distress. He has no wheezes. He has no rales.   Abdominal: Soft. Bowel sounds are normal. He exhibits no distension. There is no tenderness. There is no rebound and no guarding.   Musculoskeletal: He exhibits no edema, tenderness or deformity.   Lymphadenopathy:     He has no cervical adenopathy.   Neurological: He is alert and oriented to person, place, and time. He has normal reflexes. No cranial nerve deficit.   Skin: Skin is warm and dry. He is not diaphoretic. No erythema.   Psychiatric: He has a normal mood and affect. His behavior is normal. Judgment and thought content normal.       Laboratory:  Recent Labs      01/12/19   1408   WBC  6.2   RBC  4.46*   HEMOGLOBIN  15.1   HEMATOCRIT  44.0   MCV  98.7*   MCH  33.9*   MCHC  34.3   RDW  45.9   PLATELETCT  117*   MPV  12.0     Recent Labs      01/12/19   0824   SODIUM  139   POTASSIUM  3.4*   CHLORIDE  106   CO2  26   GLUCOSE  116*   BUN  22   CREATININE  0.98   CALCIUM  9.1     Recent Labs      01/12/19   0824   ALTSGPT  41   ASTSGOT  33   ALKPHOSPHAT  46   TBILIRUBIN  1.8*   GLUCOSE  116*     Recent Labs      01/12/19   0205  01/12/19   1025  01/12/19   1408   APTT   --   26.5   --    INR  1.05   --   1.05     Recent Labs      01/12/19   1025   BNPBTYPENAT  105*     Recent Labs      01/12/19   0824   TRIGLYCERIDE  77   HDL  42   LDL  120*     Recent Labs      01/12/19   0205  01/12/19   0824  01/12/19   1408   TROPONINI  0.02  0.02  0.02       Urinalysis:    Recent Labs      01/12/19   1241   SPECGRAVITY  1.019   GLUCOSEUR   Negative   KETONES  Negative   NITRITE  Negative   LEUKESTERAS  Negative        Imaging:  US-CAROTID DOPPLER BILAT         OUTSIDE IMAGES-CT HEAD   Final Result      OUTSIDE IMAGES-DX CHEST   Final Result      EC-ECHOCARDIOGRAM COMPLETE W/ CONT    (Results Pending)   CT-CTA CHEST PULMONARY ARTERY W/ RECONS    (Results Pending)         Assessment/Plan:  I anticipate this patient is appropriate for observation status at this time.    Near syncope- (present on admission)   Assessment & Plan    I believe this patient's symptoms are in the setting of cerebral hypoperfusion from increased afterload in the setting of possible restenosis of the aortic valve in the setting of uncontrolled hypertension.  Other issues that need to be considered include venous thromboembolism in the setting of elevated d-dimer, arrhythmias, orthostatic hypotension, carotid stenosis/TIA.    EKG has been personally reviewed by me, paced rhythm    [facility], no acute concerns    Patient will be admitted to the CDU for observation  We will cycle troponins, administer aspirin 325, obtain echocardiogram  Elevated d-dimer, check CTA PE  Obtain carotid duplex  We will improve his hypertensive control to a systolic blood pressure less than 130, we will check orthostatic signs  We will obtain physical therapy evaluation  We will interrogate the pacemaker  We will check TSH, cortisol, B12, folate, ESR, CRP, pro calcitonin, blood cultures, urinalysis, urine drug screen  We will monitor this patient overnight in the CDU    Based on evaluation above, we may consider cardiology consultation     Hyperbilirubinemia- (present on admission)   Assessment & Plan    Chronic, stable      Class 1 obesity due to excess calories with serious comorbidity and body mass index (BMI) of 32.0 to 32.9 in adult- (present on admission)   Assessment & Plan    Body mass index is 32.59 kg/m².     Complete heart block  (HCC)- (present on admission)   Assessment & Plan    S/p, interrogation completed - no events      Cardiac pacemaker in situ- (present on admission)   Assessment & Plan    Interrogated no events      S/P aortic valve replacement with bioprosthetic valve: repeat AVR 10/2014- (present on admission)   Assessment & Plan    S/P AVR x 2   Interval echocardiogram pending      Dyspnea- (present on admission)   Assessment & Plan    Chronic, TTE pending      Dyslipidemia- (present on admission)   Assessment & Plan    Continue Statin      Essential hypertension- (present on admission)   Assessment & Plan    Afterload reduction, Aim SBP < 130  Increasing lisinopril   Continue metoprolol  Will titrate to achieve normotensive control      History of stroke- (present on admission)   Assessment & Plan    ASA / Risk factor modifications          VTE prophylaxis: SCD and SC lovenox

## 2019-01-13 NOTE — PROGRESS NOTES
Received in bed, aox4,   on monitor. Assessment as per CDU. Call light within reach. Needs attended. Plan of care discussed and understood.

## 2019-01-17 LAB
BACTERIA BLD CULT: NORMAL
BACTERIA BLD CULT: NORMAL
SIGNIFICANT IND 70042: NORMAL
SIGNIFICANT IND 70042: NORMAL
SITE SITE: NORMAL
SITE SITE: NORMAL
SOURCE SOURCE: NORMAL
SOURCE SOURCE: NORMAL

## 2019-03-29 ENCOUNTER — NON-PROVIDER VISIT (OUTPATIENT)
Dept: CARDIOLOGY | Facility: MEDICAL CENTER | Age: 69
End: 2019-03-29
Payer: MEDICARE

## 2019-03-29 ENCOUNTER — OFFICE VISIT (OUTPATIENT)
Dept: CARDIOLOGY | Facility: MEDICAL CENTER | Age: 69
End: 2019-03-29
Payer: MEDICARE

## 2019-03-29 VITALS
BODY MASS INDEX: 33.05 KG/M2 | SYSTOLIC BLOOD PRESSURE: 140 MMHG | WEIGHT: 244 LBS | OXYGEN SATURATION: 95 % | DIASTOLIC BLOOD PRESSURE: 80 MMHG | HEART RATE: 72 BPM | HEIGHT: 72 IN

## 2019-03-29 DIAGNOSIS — I10 ESSENTIAL HYPERTENSION: ICD-10-CM

## 2019-03-29 DIAGNOSIS — Z95.0 CARDIAC PACEMAKER IN SITU: ICD-10-CM

## 2019-03-29 DIAGNOSIS — I47.29 NSVT (NONSUSTAINED VENTRICULAR TACHYCARDIA) (HCC): Chronic | ICD-10-CM

## 2019-03-29 DIAGNOSIS — Z86.73 HISTORY OF STROKE: ICD-10-CM

## 2019-03-29 DIAGNOSIS — I44.2 COMPLETE HEART BLOCK (HCC): ICD-10-CM

## 2019-03-29 DIAGNOSIS — Z95.3 S/P AORTIC VALVE REPLACEMENT WITH BIOPROSTHETIC VALVE: ICD-10-CM

## 2019-03-29 DIAGNOSIS — E78.5 DYSLIPIDEMIA: ICD-10-CM

## 2019-03-29 PROCEDURE — 93280 PM DEVICE PROGR EVAL DUAL: CPT | Performed by: INTERNAL MEDICINE

## 2019-03-29 PROCEDURE — 99214 OFFICE O/P EST MOD 30 MIN: CPT | Mod: 25 | Performed by: INTERNAL MEDICINE

## 2019-03-29 RX ORDER — HYDROCHLOROTHIAZIDE 25 MG/1
25 TABLET ORAL DAILY
Qty: 90 TAB | Refills: 3 | Status: SHIPPED | OUTPATIENT
Start: 2019-03-29

## 2019-03-29 RX ORDER — LISINOPRIL 20 MG/1
20 TABLET ORAL 2 TIMES DAILY
Qty: 180 TAB | Refills: 3 | Status: SHIPPED | OUTPATIENT
Start: 2019-03-29

## 2019-03-29 RX ORDER — CARVEDILOL 25 MG/1
50 TABLET ORAL 2 TIMES DAILY WITH MEALS
Qty: 180 TAB | Refills: 3 | Status: ON HOLD | OUTPATIENT
Start: 2019-03-29 | End: 2023-09-27

## 2019-03-29 ASSESSMENT — ENCOUNTER SYMPTOMS
FEVER: 0
FOCAL WEAKNESS: 0
BRUISES/BLEEDS EASILY: 0
PALPITATIONS: 0
WEAKNESS: 0
SHORTNESS OF BREATH: 0
ABDOMINAL PAIN: 0
FALLS: 0
BLURRED VISION: 0
CHILLS: 0
PND: 0
CLAUDICATION: 0
NAUSEA: 0
SORE THROAT: 0
DIZZINESS: 0
COUGH: 0

## 2019-03-29 NOTE — LETTER
"     [facility]   [address], [address]  [place] [address]  Phone: [phone]  Fax: [phone]              Matti Olguin  1950    Encounter Date: 3/29/2019    Conner Thibodeaux M.D.          PROGRESS NOTE:  Chief Complaint   Patient presents with   • HTN (Controlled)     follow up       Subjective:   Matti Olguin is a 69 y.o. male who presents today for follow-up of his history of aortic valve replacement with redo of a bio prosthetic valve in 2014 also with pacemaker    He was recently hospitalized for hypertensive urgency with concern for strokelike symptoms and his medications were appropriately adjusted    Past Medical History:   Diagnosis Date   • Anticoagulation monitoring, special range    • Cardiac pacemaker in situ 1/13/2015   • Complete heart block (HCC)    • Dyspnea 10/15/2014   • High cholesterol    • HTN (hypertension) 9/23/2013   • NSVT (nonsustained ventricular tachycardia) (HCC) - on pacer check 3/29/2019   • S/P aortic valve replacement with bioprosthetic valve 1/13/2015   • Sleep apnea 10-03-13    having sleep study soon   • Snoring    • Stroke (HCC)     left sided \"tingling\"   • Unspecified hemorrhagic conditions     Coumadin   • Valvular heart disease     AVR 12/1988     Past Surgical History:   Procedure Laterality Date   • RECOVERY  11/8/2013    Performed by [facility]Rec[facility] Surgery at [facility]   • RECOVERY  10/8/2013    Performed by Cath[facility]Recovery Surgery at [facility]   • RECOVERY  9/21/2012    Performed by Matti Clemens M.D. at [facility]   • AORTIC VALVE REPLACEMENT  1988    titanium   • OTHER ORTHOPEDIC SURGERY      knee scope, right shoulder     Family History   Problem Relation Age of Onset   • Heart Attack Father    • Heart Disease Sister      Social History     Social History   • Marital status:      Spouse name: N/A   • Number of " children: N/A   • Years of education: N/A     Occupational History   • Not on file.     Social History Main Topics   • Smoking status: Never Smoker   • Smokeless tobacco: Current User     Types: Chew   • Alcohol use Yes      Comment: social drinker, not daily   • Drug use: No   • Sexual activity: Not on file     Other Topics Concern   • Not on file     Social History Narrative   • No narrative on file     Allergies   Allergen Reactions   • Nkda [No Known Drug Allergy]      Outpatient Encounter Prescriptions as of 3/29/2019   Medication Sig Dispense Refill   • carvedilol (COREG) 25 MG Tab Take 2 Tabs by mouth 2 times a day, with meals. 180 Tab 3   • lisinopril (PRINIVIL) 20 MG Tab Take 1 Tab by mouth 2 times a day. 180 Tab 3   • hydroCHLOROthiazide (HYDRODIURIL) 25 MG Tab Take 1 Tab by mouth every day. 90 Tab 3   • atorvastatin (LIPITOR) 80 MG tablet Take 1 Tab by mouth every bedtime. 30 Tab 2   • cloNIDine (CATAPRES) 0.1 MG Tab Take 1 Tab by mouth every 6 hours as needed. As needed for SBP >150 60 Tab 2   • HYDROcodone-acetaminophen (NORCO) 5-325 MG Tab per tablet Take 1 Tab by mouth every four hours as needed.     • aspirin EC (ECOTRIN) 81 MG Tablet Delayed Response Take 81 mg by mouth every day.     • folic acid (FOLVITE) 400 MCG tablet Take 400 mcg by mouth every day.     • pregabalin (LYRICA) 75 MG Cap Take 75 mg by mouth 2 times a day.     • potassium chloride SA (KDUR) 20 MEQ Tab CR Take 1 Tab by mouth every day. 90 Tab 3   • multivitamin (THERAGRAN) TABS Take 1 Tab by mouth every day. 30 Tab 3   • zolpidem (AMBIEN) 5 MG TABS Take 5 mg by mouth at bedtime as needed.     • [DISCONTINUED] lisinopril (PRINIVIL) 10 MG Tab Take 2 Tabs by mouth 2 times a day. 180 Tab 3   • [DISCONTINUED] carvedilol (COREG) 25 MG Tab Take 1 Tab by mouth 2 times a day, with meals. 60 Tab 2   • [DISCONTINUED] hydroCHLOROthiazide (HYDRODIURIL) 12.5 MG tablet Take 2 Tabs by mouth every day. 60 Tab 2   • [DISCONTINUED] clopidogrel  (PLAVIX) 75 MG Tab Take 1 Tab by mouth every day. 30 Tab 2     No facility-administered encounter medications on file as of 3/29/2019.      Review of Systems   Constitutional: Negative for chills and fever.   HENT: Negative for sore throat.    Eyes: Negative for blurred vision.   Respiratory: Negative for cough and shortness of breath.    Cardiovascular: Negative for chest pain, palpitations, claudication, leg swelling and PND.   Gastrointestinal: Negative for abdominal pain and nausea.   Musculoskeletal: Negative for falls and joint pain.   Skin: Negative for rash.   Neurological: Negative for dizziness, focal weakness and weakness.   Endo/Heme/Allergies: Does not bruise/bleed easily.        Objective:   /80 (BP Location: Left arm, Patient Position: Sitting)   Pulse 72   Ht 1.829 m (6')   Wt 110.7 kg (244 lb)   SpO2 95%   BMI 33.09 kg/m²      Physical Exam   Constitutional: No distress.   HENT:   Mouth/Throat: Oropharynx is clear and moist. No oropharyngeal exudate.   Eyes: No scleral icterus.   Neck: No JVD present.   Cardiovascular: Normal rate.  Exam reveals no gallop and no friction rub.    Murmur heard.  Pulmonary/Chest: No respiratory distress. He has no wheezes. He has no rales.   Abdominal: Soft. Bowel sounds are normal.   Musculoskeletal: He exhibits no edema.   Neurological: He is alert.   Skin: No rash noted. He is not diaphoretic.   Psychiatric: He has a normal mood and affect.     We reviewed in person the recent labs  Recent Results (from the past 4032 hour(s))   EKG    Collection Time: 19  2:01 AM   Result Value Ref Range    Report       [facility]    Test Date:  2019  Pt Name:    CRYSTAL SALAZAR             Department: ER  MRN:        [id number]                      Room:        12  Gender:     Male                         Technician: [id number]  :        1950                   Requested By:ER TRIAGE PROTOCOL  Order #:    [id number]                     Reading MD:    Measurements  Intervals                                Axis  Rate:       57                           P:          33  TN:         208                          QRS:        -77  QRSD:       168                          T:          125  QT:         500  QTc:        487    Interpretive Statements  VENTRICULAR-PACED COMPLEXES  NO FURTHER RHYTHM ANALYSIS ATTEMPTED DUE TO PACED RHYTHM  PROBABLE LEFT ATRIAL ABNORMALITY  NONSPECIFIC IVCD WITH LAD  LVH WITH SECONDARY REPOLARIZATION ABNORMALITY  Compared to ECG 11/05/2014 14:52:20  Intraventricular conduction delay now present  Left ventricular hypertrophy now present  Ear ly repolarization now present  Atrial-sensed ventricular-paced complex(es) or rhythm no longer present     PT/INR    Collection Time: 01/12/19  2:05 AM   Result Value Ref Range    PT 13.8 12.0 - 14.6 sec    INR 1.05 0.87 - 1.13   TROPONIN    Collection Time: 01/12/19  2:05 AM   Result Value Ref Range    Troponin I 0.02 0.00 - 0.04 ng/mL   Troponin - STAT Once    Collection Time: 01/12/19  8:24 AM   Result Value Ref Range    Troponin I 0.02 0.00 - 0.04 ng/mL   CORTISOL    Collection Time: 01/12/19  8:24 AM   Result Value Ref Range    Cortisol 12.7 0.0 - 23.0 ug/dL   COMP METABOLIC PANEL    Collection Time: 01/12/19  8:24 AM   Result Value Ref Range    Sodium 139 135 - 145 mmol/L    Potassium 3.4 (L) 3.6 - 5.5 mmol/L    Chloride 106 96 - 112 mmol/L    Co2 26 20 - 33 mmol/L    Anion Gap 7.0 0.0 - 11.9    Glucose 116 (H) 65 - 99 mg/dL    Bun 22 8 - 22 mg/dL    Creatinine 0.98 0.50 - 1.40 mg/dL    Calcium 9.1 8.5 - 10.5 mg/dL    AST(SGOT) 33 12 - 45 U/L    ALT(SGPT) 41 2 - 50 U/L    Alkaline Phosphatase 46 30 - 99 U/L    Total Bilirubin 1.8 (H) 0.1 - 1.5 mg/dL    Albumin 4.3 3.2 - 4.9 g/dL    Total Protein 7.4 6.0 - 8.2 g/dL    Globulin 3.1 1.9 - 3.5 g/dL    A-G Ratio 1.4 g/dL   CRP QUANTITIVE (NON-CARDIAC)    Collection Time: 01/12/19  8:24 AM   Result Value Ref Range    Stat  C-Reactive Protein 0.33 0.00 - 0.75 mg/dL   HEMOGLOBIN A1C    Collection Time: 01/12/19  8:24 AM   Result Value Ref Range    Glycohemoglobin 5.9 (H) 0.0 - 5.6 %    Est Avg Glucose 123 mg/dL   Lipid Profile    Collection Time: 01/12/19  8:24 AM   Result Value Ref Range    Cholesterol,Tot 177 100 - 199 mg/dL    Triglycerides 77 0 - 149 mg/dL    HDL 42 >=40 mg/dL     (H) <100 mg/dL   MAGNESIUM    Collection Time: 01/12/19  8:24 AM   Result Value Ref Range    Magnesium 2.1 1.5 - 2.5 mg/dL   PHOSPHORUS    Collection Time: 01/12/19  8:24 AM   Result Value Ref Range    Phosphorus 2.6 2.5 - 4.5 mg/dL   PROCALCITONIN    Collection Time: 01/12/19  8:24 AM   Result Value Ref Range    Procalcitonin 0.07 <0.25 ng/mL   WESTERGREN SED RATE    Collection Time: 01/12/19  8:24 AM   Result Value Ref Range    Sed Rate Westergren 5 0 - 20 mm/hour   ESTIMATED GFR    Collection Time: 01/12/19  8:24 AM   Result Value Ref Range    GFR If African American >60 >60 mL/min/1.73 m 2    GFR If Non African American >60 >60 mL/min/1.73 m 2   D-DIMER    Collection Time: 01/12/19 10:25 AM   Result Value Ref Range    D-Dimer Screen 1.50 (H) 0.00 - 0.50 ug/mL (FEU)   APTT    Collection Time: 01/12/19 10:25 AM   Result Value Ref Range    APTT 26.5 24.7 - 36.0 sec   BLOOD CULTURE    Collection Time: 01/12/19 10:25 AM   Result Value Ref Range    Significant Indicator NEG     Source BLD     Site PERIPHERAL     Blood Culture No growth after 5 days of incubation.    BLOOD CULTURE    Collection Time: 01/12/19 10:25 AM   Result Value Ref Range    Significant Indicator NEG     Source BLD     Site PERIPHERAL     Blood Culture No growth after 5 days of incubation.    BTYPE NATRIURETIC PEPTIDE    Collection Time: 01/12/19 10:25 AM   Result Value Ref Range    B Natriuretic Peptide 105 (H) 0 - 100 pg/mL   EKG in four (4) hours    Collection Time: 01/12/19 11:45 AM   Result Value Ref Range    Report       [facility]    Test Date:  2019-01-12  Pt Name:     CRYSTAL SALAZAR             Department: ER  MRN:        [id number]                      Room:       [facility]  Gender:     Male                         Technician: JADA  :        1950                   Requested By:KATJA BRANNON  Order #:    [id number]                    Reading MD: Isaac Angel MD    Measurements  Intervals                                Axis  Rate:       60                           P:          0  ND:         144                          QRS:        -75  QRSD:       184                          T:          108  QT:         488  QTc:        488    Interpretive Statements  A-V DUAL-PACED COMPLEXES W/ SOME INHIBITION  NO FURTHER ANALYSIS ATTEMPTED DUE TO PACED RHYTHM  Compared to ECG 2019 02:01:13  Intraventricular conduction delay no longer present  Left ventricular hypertrophy no longer present  Early repolarization no longer present    Electronically Signed On 2019 18:38:03 PST by Isaac Angel MD     EC-ECHOCARDIOGRAM COMPLETE W/ CONT    Collection Time: 19 12:07 PM   Result Value Ref Range    Left Ventrical Ejection Fraction 60    URINALYSIS    Collection Time: 19 12:41 PM   Result Value Ref Range    Color Yellow     Character Clear     Specific Gravity 1.019 <1.035    Ph 6.5 5.0 - 8.0    Glucose Negative Negative mg/dL    Ketones Negative Negative mg/dL    Protein Negative Negative mg/dL    Bilirubin Negative Negative    Urobilinogen, Urine 1.0 Negative    Nitrite Negative Negative    Leukocyte Esterase Negative Negative    Occult Blood Negative Negative    Micro Urine Req see below    URINE DRUG SCREEN    Collection Time: 19 12:41 PM   Result Value Ref Range    Amphetamines Urine Negative Negative    Barbiturates Negative Negative    Benzodiazepines Negative Negative    Cocaine Metabolite Negative Negative    Methadone Negative Negative    Opiates Negative Negative    Oxycodone Negative Negative    Phencyclidine -Pcp Negative Negative    Propoxyphene  Negative Negative    Cannabinoid Metab Negative Negative   Troponin in four (4) hours    Collection Time: 01/12/19  2:08 PM   Result Value Ref Range    Troponin I 0.02 0.00 - 0.04 ng/mL   CBC WITH DIFFERENTIAL    Collection Time: 01/12/19  2:08 PM   Result Value Ref Range    WBC 6.2 4.8 - 10.8 K/uL    RBC 4.46 (L) 4.70 - 6.10 M/uL    Hemoglobin 15.1 14.0 - 18.0 g/dL    Hematocrit 44.0 42.0 - 52.0 %    MCV 98.7 (H) 81.4 - 97.8 fL    MCH 33.9 (H) 27.0 - 33.0 pg    MCHC 34.3 33.7 - 35.3 g/dL    RDW 45.9 35.9 - 50.0 fL    Platelet Count 117 (L) 164 - 446 K/uL    MPV 12.0 9.0 - 12.9 fL    Neutrophils-Polys 63.50 44.00 - 72.00 %    Lymphocytes 18.10 (L) 22.00 - 41.00 %    Monocytes 14.50 (H) 0.00 - 13.40 %    Eosinophils 3.10 0.00 - 6.90 %    Basophils 0.60 0.00 - 1.80 %    Immature Granulocytes 0.20 0.00 - 0.90 %    Nucleated RBC 0.00 /100 WBC    Neutrophils (Absolute) 3.94 1.82 - 7.42 K/uL    Lymphs (Absolute) 1.12 1.00 - 4.80 K/uL    Monos (Absolute) 0.90 (H) 0.00 - 0.85 K/uL    Eos (Absolute) 0.19 0.00 - 0.51 K/uL    Baso (Absolute) 0.04 0.00 - 0.12 K/uL    Immature Granulocytes (abs) 0.01 0.00 - 0.11 K/uL    NRBC (Absolute) 0.00 K/uL   TSH    Collection Time: 01/12/19  2:08 PM   Result Value Ref Range    TSH 2.080 0.380 - 5.330 uIU/mL   FOLATE    Collection Time: 01/12/19  2:08 PM   Result Value Ref Range    Folate -Folic Acid >23.6 >4.0 ng/mL   VITAMIN B12    Collection Time: 01/12/19  2:08 PM   Result Value Ref Range    Vitamin B12 -True Cobalamin 365 211 - 911 pg/mL   Prothrombin Time    Collection Time: 01/12/19  2:08 PM   Result Value Ref Range    PT 13.8 12.0 - 14.6 sec    INR 1.05 0.87 - 1.13   CORTISOL    Collection Time: 01/12/19  2:08 PM   Result Value Ref Range    Cortisol 8.8 0.0 - 23.0 ug/dL   CBC WITHOUT DIFFERENTIAL    Collection Time: 01/13/19  5:02 AM   Result Value Ref Range    WBC 4.9 4.8 - 10.8 K/uL    RBC 4.35 (L) 4.70 - 6.10 M/uL    Hemoglobin 14.9 14.0 - 18.0 g/dL    Hematocrit 43.1 42.0 - 52.0  %    MCV 99.1 (H) 81.4 - 97.8 fL    MCH 34.3 (H) 27.0 - 33.0 pg    MCHC 34.6 33.7 - 35.3 g/dL    RDW 46.8 35.9 - 50.0 fL    Platelet Count 115 (L) 164 - 446 K/uL    MPV 12.1 9.0 - 12.9 fL   BASIC METABOLIC PANEL    Collection Time: 01/13/19  5:02 AM   Result Value Ref Range    Sodium 139 135 - 145 mmol/L    Potassium 3.8 3.6 - 5.5 mmol/L    Chloride 109 96 - 112 mmol/L    Co2 24 20 - 33 mmol/L    Glucose 100 (H) 65 - 99 mg/dL    Bun 18 8 - 22 mg/dL    Creatinine 0.87 0.50 - 1.40 mg/dL    Calcium 9.2 8.5 - 10.5 mg/dL    Anion Gap 6.0 0.0 - 11.9   ESTIMATED GFR    Collection Time: 01/13/19  5:02 AM   Result Value Ref Range    GFR If African American >60 >60 mL/min/1.73 m 2    GFR If Non African American >60 >60 mL/min/1.73 m 2     Recent echocardiogram report shows appropriate function of his bioprosthetic valve    Assessment:     1. S/P aortic valve replacement with bioprosthetic valve: repeat AVR 10/2014     2. History of stroke     3. Dyslipidemia     4. Cardiac pacemaker in situ     5. Complete heart block (HCC)     6. Essential hypertension  lisinopril (PRINIVIL) 20 MG Tab    hydroCHLOROthiazide (HYDRODIURIL) 25 MG Tab   7. NSVT (nonsustained ventricular tachycardia) (HCC) - on pacer check         Medical Decision Making:  Today's Assessment / Status / Plan:     It was my pleasure to meet with Mr. Olguin.    We have optimized his blood pressure regimen    He can stop Plavix but continue aspirin monitor closely for her neurologic symptoms    I will see Mr. Olguin back in 6 months time and encouraged him to follow up with us over the phone or e-mail using my MyChart as issues arise.    It is my pleasure to participate in the care of Mr. Olguin.  Please do not hesitate to contact me with questions or concerns.    Conner Thibodeaux MD PhD [facility]  Cardiologist [facility]    Please note that this dictation was created using voice recognition software. I have worked with  consultants from the vendor as well as technical experts from [facility] to optimize the interface. I have made every reasonable attempt to correct obvious errors, but I expect that there are errors of grammar and possibly content I did not discover before finalizing the note.         Brittany Bradshaw M.D.  [address] [place] [id number]  VIA Facsimile: [phone]

## 2019-03-30 NOTE — PROGRESS NOTES
"Chief Complaint   Patient presents with   • HTN (Controlled)     follow up       Subjective:   Matti Olguin is a 69 y.o. male who presents today for follow-up of his history of aortic valve replacement with redo of a bio prosthetic valve in 2014 also with pacemaker    He was recently hospitalized for hypertensive urgency with concern for strokelike symptoms and his medications were appropriately adjusted    Past Medical History:   Diagnosis Date   • Anticoagulation monitoring, special range    • Cardiac pacemaker in situ 1/13/2015   • Complete heart block (HCC)    • Dyspnea 10/15/2014   • High cholesterol    • HTN (hypertension) 9/23/2013   • NSVT (nonsustained ventricular tachycardia) (HCC) - on pacer check 3/29/2019   • S/P aortic valve replacement with bioprosthetic valve 1/13/2015   • Sleep apnea 10-03-13    having sleep study soon   • Snoring    • Stroke (HCC)     left sided \"tingling\"   • Unspecified hemorrhagic conditions     Coumadin   • Valvular heart disease     AVR 12/1988     Past Surgical History:   Procedure Laterality Date   • RECOVERY  11/8/2013    Performed by [facility]Rec[facility] Surgery at [facility]   • RECOVERY  10/8/2013    Performed by [facility]Recovery Surgery at [facility]   • RECOVERY  9/21/2012    Performed by Matti Clemens M.D. at [facility]   • AORTIC VALVE REPLACEMENT  1988    titanium   • OTHER ORTHOPEDIC SURGERY      knee scope, right shoulder     Family History   Problem Relation Age of Onset   • Heart Attack Father    • Heart Disease Sister      Social History     Social History   • Marital status:      Spouse name: N/A   • Number of children: N/A   • Years of education: N/A     Occupational History   • Not on file.     Social History Main Topics   • Smoking status: Never Smoker   • Smokeless tobacco: Current User     Types: Chew   • Alcohol use Yes      Comment: social drinker, not daily   • Drug use: No   • " Sexual activity: Not on file     Other Topics Concern   • Not on file     Social History Narrative   • No narrative on file     Allergies   Allergen Reactions   • Nkda [No Known Drug Allergy]      Outpatient Encounter Prescriptions as of 3/29/2019   Medication Sig Dispense Refill   • carvedilol (COREG) 25 MG Tab Take 2 Tabs by mouth 2 times a day, with meals. 180 Tab 3   • lisinopril (PRINIVIL) 20 MG Tab Take 1 Tab by mouth 2 times a day. 180 Tab 3   • hydroCHLOROthiazide (HYDRODIURIL) 25 MG Tab Take 1 Tab by mouth every day. 90 Tab 3   • atorvastatin (LIPITOR) 80 MG tablet Take 1 Tab by mouth every bedtime. 30 Tab 2   • cloNIDine (CATAPRES) 0.1 MG Tab Take 1 Tab by mouth every 6 hours as needed. As needed for SBP >150 60 Tab 2   • HYDROcodone-acetaminophen (NORCO) 5-325 MG Tab per tablet Take 1 Tab by mouth every four hours as needed.     • aspirin EC (ECOTRIN) 81 MG Tablet Delayed Response Take 81 mg by mouth every day.     • folic acid (FOLVITE) 400 MCG tablet Take 400 mcg by mouth every day.     • pregabalin (LYRICA) 75 MG Cap Take 75 mg by mouth 2 times a day.     • potassium chloride SA (KDUR) 20 MEQ Tab CR Take 1 Tab by mouth every day. 90 Tab 3   • multivitamin (THERAGRAN) TABS Take 1 Tab by mouth every day. 30 Tab 3   • zolpidem (AMBIEN) 5 MG TABS Take 5 mg by mouth at bedtime as needed.     • [DISCONTINUED] lisinopril (PRINIVIL) 10 MG Tab Take 2 Tabs by mouth 2 times a day. 180 Tab 3   • [DISCONTINUED] carvedilol (COREG) 25 MG Tab Take 1 Tab by mouth 2 times a day, with meals. 60 Tab 2   • [DISCONTINUED] hydroCHLOROthiazide (HYDRODIURIL) 12.5 MG tablet Take 2 Tabs by mouth every day. 60 Tab 2   • [DISCONTINUED] clopidogrel (PLAVIX) 75 MG Tab Take 1 Tab by mouth every day. 30 Tab 2     No facility-administered encounter medications on file as of 3/29/2019.      Review of Systems   Constitutional: Negative for chills and fever.   HENT: Negative for sore throat.    Eyes: Negative for blurred vision.    Respiratory: Negative for cough and shortness of breath.    Cardiovascular: Negative for chest pain, palpitations, claudication, leg swelling and PND.   Gastrointestinal: Negative for abdominal pain and nausea.   Musculoskeletal: Negative for falls and joint pain.   Skin: Negative for rash.   Neurological: Negative for dizziness, focal weakness and weakness.   Endo/Heme/Allergies: Does not bruise/bleed easily.        Objective:   /80 (BP Location: Left arm, Patient Position: Sitting)   Pulse 72   Ht 1.829 m (6')   Wt 110.7 kg (244 lb)   SpO2 95%   BMI 33.09 kg/m²     Physical Exam   Constitutional: No distress.   HENT:   Mouth/Throat: Oropharynx is clear and moist. No oropharyngeal exudate.   Eyes: No scleral icterus.   Neck: No JVD present.   Cardiovascular: Normal rate.  Exam reveals no gallop and no friction rub.    Murmur heard.  Pulmonary/Chest: No respiratory distress. He has no wheezes. He has no rales.   Abdominal: Soft. Bowel sounds are normal.   Musculoskeletal: He exhibits no edema.   Neurological: He is alert.   Skin: No rash noted. He is not diaphoretic.   Psychiatric: He has a normal mood and affect.     We reviewed in person the recent labs  Recent Results (from the past 4032 hour(s))   EKG    Collection Time: 19  2:01 AM   Result Value Ref Range    Report       [facility] Emergency Dept.    Test Date:  2019  Pt Name:    CRYSTAL SALAZAR             Department: ER  MRN:        [id number]                      Room:       [facility]  Gender:     Male                         Technician: [id number]  :        1950                   Requested By:ER TRIAGE PROTOCOL  Order #:    [id number]                    Reading MD:    Measurements  Intervals                                Axis  Rate:       57                           P:          33  MS:         208                          QRS:        -77  QRSD:       168                          T:          125  QT:          500  QTc:        487    Interpretive Statements  VENTRICULAR-PACED COMPLEXES  NO FURTHER RHYTHM ANALYSIS ATTEMPTED DUE TO PACED RHYTHM  PROBABLE LEFT ATRIAL ABNORMALITY  NONSPECIFIC IVCD WITH LAD  LVH WITH SECONDARY REPOLARIZATION ABNORMALITY  Compared to ECG 11/05/2014 14:52:20  Intraventricular conduction delay now present  Left ventricular hypertrophy now present  Ear ly repolarization now present  Atrial-sensed ventricular-paced complex(es) or rhythm no longer present     PT/INR    Collection Time: 01/12/19  2:05 AM   Result Value Ref Range    PT 13.8 12.0 - 14.6 sec    INR 1.05 0.87 - 1.13   TROPONIN    Collection Time: 01/12/19  2:05 AM   Result Value Ref Range    Troponin I 0.02 0.00 - 0.04 ng/mL   Troponin - STAT Once    Collection Time: 01/12/19  8:24 AM   Result Value Ref Range    Troponin I 0.02 0.00 - 0.04 ng/mL   CORTISOL    Collection Time: 01/12/19  8:24 AM   Result Value Ref Range    Cortisol 12.7 0.0 - 23.0 ug/dL   COMP METABOLIC PANEL    Collection Time: 01/12/19  8:24 AM   Result Value Ref Range    Sodium 139 135 - 145 mmol/L    Potassium 3.4 (L) 3.6 - 5.5 mmol/L    Chloride 106 96 - 112 mmol/L    Co2 26 20 - 33 mmol/L    Anion Gap 7.0 0.0 - 11.9    Glucose 116 (H) 65 - 99 mg/dL    Bun 22 8 - 22 mg/dL    Creatinine 0.98 0.50 - 1.40 mg/dL    Calcium 9.1 8.5 - 10.5 mg/dL    AST(SGOT) 33 12 - 45 U/L    ALT(SGPT) 41 2 - 50 U/L    Alkaline Phosphatase 46 30 - 99 U/L    Total Bilirubin 1.8 (H) 0.1 - 1.5 mg/dL    Albumin 4.3 3.2 - 4.9 g/dL    Total Protein 7.4 6.0 - 8.2 g/dL    Globulin 3.1 1.9 - 3.5 g/dL    A-G Ratio 1.4 g/dL   CRP QUANTITIVE (NON-CARDIAC)    Collection Time: 01/12/19  8:24 AM   Result Value Ref Range    Stat C-Reactive Protein 0.33 0.00 - 0.75 mg/dL   HEMOGLOBIN A1C    Collection Time: 01/12/19  8:24 AM   Result Value Ref Range    Glycohemoglobin 5.9 (H) 0.0 - 5.6 %    Est Avg Glucose 123 mg/dL   Lipid Profile    Collection Time: 01/12/19  8:24 AM   Result Value Ref Range     Cholesterol,Tot 177 100 - 199 mg/dL    Triglycerides 77 0 - 149 mg/dL    HDL 42 >=40 mg/dL     (H) <100 mg/dL   MAGNESIUM    Collection Time: 19  8:24 AM   Result Value Ref Range    Magnesium 2.1 1.5 - 2.5 mg/dL   PHOSPHORUS    Collection Time: 19  8:24 AM   Result Value Ref Range    Phosphorus 2.6 2.5 - 4.5 mg/dL   PROCALCITONIN    Collection Time: 19  8:24 AM   Result Value Ref Range    Procalcitonin 0.07 <0.25 ng/mL   WESTERGREN SED RATE    Collection Time: 19  8:24 AM   Result Value Ref Range    Sed Rate Westergren 5 0 - 20 mm/hour   ESTIMATED GFR    Collection Time: 19  8:24 AM   Result Value Ref Range    GFR If African American >60 >60 mL/min/1.73 m 2    GFR If Non African American >60 >60 mL/min/1.73 m 2   D-DIMER    Collection Time: 19 10:25 AM   Result Value Ref Range    D-Dimer Screen 1.50 (H) 0.00 - 0.50 ug/mL (FEU)   APTT    Collection Time: 19 10:25 AM   Result Value Ref Range    APTT 26.5 24.7 - 36.0 sec   BLOOD CULTURE    Collection Time: 19 10:25 AM   Result Value Ref Range    Significant Indicator NEG     Source BLD     Site PERIPHERAL     Blood Culture No growth after 5 days of incubation.    BLOOD CULTURE    Collection Time: 19 10:25 AM   Result Value Ref Range    Significant Indicator NEG     Source BLD     Site PERIPHERAL     Blood Culture No growth after 5 days of incubation.    BTYPE NATRIURETIC PEPTIDE    Collection Time: 19 10:25 AM   Result Value Ref Range    B Natriuretic Peptide 105 (H) 0 - 100 pg/mL   EKG in four (4) hours    Collection Time: 19 11:45 AM   Result Value Ref Range    Report       [facility]    Test Date:  2019  Pt Name:    CRYSTAL SALAZAR             Department: ER  MRN:        [id number]                      Room:       T203  Gender:     Male                         Technician: [id number]  :        1950                   Requested By:KATJA BRANNON  Order #:    [id number]                     Reading MD: Isaac Angel MD    Measurements  Intervals                                Axis  Rate:       60                           P:          0  IL:         144                          QRS:        -75  QRSD:       184                          T:          108  QT:         488  QTc:        488    Interpretive Statements  A-V DUAL-PACED COMPLEXES W/ SOME INHIBITION  NO FURTHER ANALYSIS ATTEMPTED DUE TO PACED RHYTHM  Compared to ECG 01/12/2019 02:01:13  Intraventricular conduction delay no longer present  Left ventricular hypertrophy no longer present  Early repolarization no longer present    Electronically Signed On 1- 18:38:03 PST by Isaac Angel MD     EC-ECHOCARDIOGRAM COMPLETE W/ CONT    Collection Time: 01/12/19 12:07 PM   Result Value Ref Range    Left Ventrical Ejection Fraction 60    URINALYSIS    Collection Time: 01/12/19 12:41 PM   Result Value Ref Range    Color Yellow     Character Clear     Specific Gravity 1.019 <1.035    Ph 6.5 5.0 - 8.0    Glucose Negative Negative mg/dL    Ketones Negative Negative mg/dL    Protein Negative Negative mg/dL    Bilirubin Negative Negative    Urobilinogen, Urine 1.0 Negative    Nitrite Negative Negative    Leukocyte Esterase Negative Negative    Occult Blood Negative Negative    Micro Urine Req see below    URINE DRUG SCREEN    Collection Time: 01/12/19 12:41 PM   Result Value Ref Range    Amphetamines Urine Negative Negative    Barbiturates Negative Negative    Benzodiazepines Negative Negative    Cocaine Metabolite Negative Negative    Methadone Negative Negative    Opiates Negative Negative    Oxycodone Negative Negative    Phencyclidine -Pcp Negative Negative    Propoxyphene Negative Negative    Cannabinoid Metab Negative Negative   Troponin in four (4) hours    Collection Time: 01/12/19  2:08 PM   Result Value Ref Range    Troponin I 0.02 0.00 - 0.04 ng/mL   CBC WITH DIFFERENTIAL    Collection Time: 01/12/19  2:08 PM   Result Value Ref Range     WBC 6.2 4.8 - 10.8 K/uL    RBC 4.46 (L) 4.70 - 6.10 M/uL    Hemoglobin 15.1 14.0 - 18.0 g/dL    Hematocrit 44.0 42.0 - 52.0 %    MCV 98.7 (H) 81.4 - 97.8 fL    MCH 33.9 (H) 27.0 - 33.0 pg    MCHC 34.3 33.7 - 35.3 g/dL    RDW 45.9 35.9 - 50.0 fL    Platelet Count 117 (L) 164 - 446 K/uL    MPV 12.0 9.0 - 12.9 fL    Neutrophils-Polys 63.50 44.00 - 72.00 %    Lymphocytes 18.10 (L) 22.00 - 41.00 %    Monocytes 14.50 (H) 0.00 - 13.40 %    Eosinophils 3.10 0.00 - 6.90 %    Basophils 0.60 0.00 - 1.80 %    Immature Granulocytes 0.20 0.00 - 0.90 %    Nucleated RBC 0.00 /100 WBC    Neutrophils (Absolute) 3.94 1.82 - 7.42 K/uL    Lymphs (Absolute) 1.12 1.00 - 4.80 K/uL    Monos (Absolute) 0.90 (H) 0.00 - 0.85 K/uL    Eos (Absolute) 0.19 0.00 - 0.51 K/uL    Baso (Absolute) 0.04 0.00 - 0.12 K/uL    Immature Granulocytes (abs) 0.01 0.00 - 0.11 K/uL    NRBC (Absolute) 0.00 K/uL   TSH    Collection Time: 01/12/19  2:08 PM   Result Value Ref Range    TSH 2.080 0.380 - 5.330 uIU/mL   FOLATE    Collection Time: 01/12/19  2:08 PM   Result Value Ref Range    Folate -Folic Acid >23.6 >4.0 ng/mL   VITAMIN B12    Collection Time: 01/12/19  2:08 PM   Result Value Ref Range    Vitamin B12 -True Cobalamin 365 211 - 911 pg/mL   Prothrombin Time    Collection Time: 01/12/19  2:08 PM   Result Value Ref Range    PT 13.8 12.0 - 14.6 sec    INR 1.05 0.87 - 1.13   CORTISOL    Collection Time: 01/12/19  2:08 PM   Result Value Ref Range    Cortisol 8.8 0.0 - 23.0 ug/dL   CBC WITHOUT DIFFERENTIAL    Collection Time: 01/13/19  5:02 AM   Result Value Ref Range    WBC 4.9 4.8 - 10.8 K/uL    RBC 4.35 (L) 4.70 - 6.10 M/uL    Hemoglobin 14.9 14.0 - 18.0 g/dL    Hematocrit 43.1 42.0 - 52.0 %    MCV 99.1 (H) 81.4 - 97.8 fL    MCH 34.3 (H) 27.0 - 33.0 pg    MCHC 34.6 33.7 - 35.3 g/dL    RDW 46.8 35.9 - 50.0 fL    Platelet Count 115 (L) 164 - 446 K/uL    MPV 12.1 9.0 - 12.9 fL   BASIC METABOLIC PANEL    Collection Time: 01/13/19  5:02 AM   Result Value Ref  Range    Sodium 139 135 - 145 mmol/L    Potassium 3.8 3.6 - 5.5 mmol/L    Chloride 109 96 - 112 mmol/L    Co2 24 20 - 33 mmol/L    Glucose 100 (H) 65 - 99 mg/dL    Bun 18 8 - 22 mg/dL    Creatinine 0.87 0.50 - 1.40 mg/dL    Calcium 9.2 8.5 - 10.5 mg/dL    Anion Gap 6.0 0.0 - 11.9   ESTIMATED GFR    Collection Time: 01/13/19  5:02 AM   Result Value Ref Range    GFR If African American >60 >60 mL/min/1.73 m 2    GFR If Non African American >60 >60 mL/min/1.73 m 2     Recent echocardiogram report shows appropriate function of his bioprosthetic valve    Assessment:     1. S/P aortic valve replacement with bioprosthetic valve: repeat AVR 10/2014     2. History of stroke     3. Dyslipidemia     4. Cardiac pacemaker in situ     5. Complete heart block (HCC)     6. Essential hypertension  lisinopril (PRINIVIL) 20 MG Tab    hydroCHLOROthiazide (HYDRODIURIL) 25 MG Tab   7. NSVT (nonsustained ventricular tachycardia) (HCC) - on pacer check         Medical Decision Making:  Today's Assessment / Status / Plan:     It was my pleasure to meet with Mr. Olguin.    We have optimized his blood pressure regimen    He can stop Plavix but continue aspirin monitor closely for her neurologic symptoms    I will see Mr. Olguin back in 6 months time and encouraged him to follow up with us over the phone or e-mail using my MyChart as issues arise.    It is my pleasure to participate in the care of Mr. Olguin.  Please do not hesitate to contact me with questions or concerns.    Conner Thibodeaux MD PhD FACC  Cardiologist [facility]    Please note that this dictation was created using voice recognition software. I have worked with consultants from the vendor as well as technical experts from [facility] to optimize the interface. I have made every reasonable attempt to correct obvious errors, but I expect that there are errors of grammar and possibly content I did not discover before finalizing the  note.

## 2019-06-07 ENCOUNTER — TELEPHONE (OUTPATIENT)
Dept: CARDIOLOGY | Facility: MEDICAL CENTER | Age: 69
End: 2019-06-07

## 2019-06-07 NOTE — TELEPHONE ENCOUNTER
FYI-- patient transmitted via home monitor. Had Brief NST VT lasting 1-2 seconds with VT rate of 170 bpm-- 5/31 10:00 pm

## 2019-10-22 ENCOUNTER — HOSPITAL ENCOUNTER (OUTPATIENT)
Dept: RADIOLOGY | Facility: MEDICAL CENTER | Age: 69
End: 2019-10-22
Attending: PHYSICAL MEDICINE & REHABILITATION
Payer: MEDICARE

## 2019-10-22 VITALS — OXYGEN SATURATION: 97 % | HEART RATE: 70 BPM

## 2019-10-22 DIAGNOSIS — M47.892 OTHER SPONDYLOSIS, CERVICAL REGION: ICD-10-CM

## 2019-10-22 PROCEDURE — 72141 MRI NECK SPINE W/O DYE: CPT

## 2019-10-22 NOTE — PROGRESS NOTES
Pt's PPM set to DOO 70 by VANDANA [employer] rep, for MRI C spine s contrast.  VSS.  Pt sat 92%/RA, hence placed on 2L of o2 for scan & recommended updating PCP re. Findings.  Pt & spouse vu.

## 2019-12-20 ENCOUNTER — TELEPHONE (OUTPATIENT)
Dept: CARDIOLOGY | Facility: MEDICAL CENTER | Age: 69
End: 2019-12-20

## 2019-12-20 NOTE — TELEPHONE ENCOUNTER
Patient transmitted via home monitor-- device functioning appropriately.  Had 1 brief episode RA lead noise 11/28--continue to monitor.

## 2020-10-06 ENCOUNTER — TELEPHONE (OUTPATIENT)
Dept: CARDIOLOGY | Facility: MEDICAL CENTER | Age: 70
End: 2020-10-06

## 2020-10-08 ENCOUNTER — OFFICE VISIT (OUTPATIENT)
Dept: CARDIOLOGY | Facility: MEDICAL CENTER | Age: 70
End: 2020-10-08
Payer: MEDICARE

## 2020-10-08 VITALS
DIASTOLIC BLOOD PRESSURE: 82 MMHG | HEART RATE: 86 BPM | HEIGHT: 72 IN | OXYGEN SATURATION: 94 % | BODY MASS INDEX: 33.43 KG/M2 | WEIGHT: 246.8 LBS | SYSTOLIC BLOOD PRESSURE: 138 MMHG

## 2020-10-08 DIAGNOSIS — G47.30 OBSERVED SLEEP APNEA: ICD-10-CM

## 2020-10-08 DIAGNOSIS — Z95.3 S/P AORTIC VALVE REPLACEMENT WITH BIOPROSTHETIC VALVE: ICD-10-CM

## 2020-10-08 DIAGNOSIS — I10 ESSENTIAL HYPERTENSION: ICD-10-CM

## 2020-10-08 DIAGNOSIS — E78.5 DYSLIPIDEMIA: ICD-10-CM

## 2020-10-08 DIAGNOSIS — Z95.0 CARDIAC PACEMAKER IN SITU: Chronic | ICD-10-CM

## 2020-10-08 DIAGNOSIS — R06.02 SHORTNESS OF BREATH: ICD-10-CM

## 2020-10-08 DIAGNOSIS — I47.29 NSVT (NONSUSTAINED VENTRICULAR TACHYCARDIA) (HCC): Chronic | ICD-10-CM

## 2020-10-08 DIAGNOSIS — I44.2 COMPLETE HEART BLOCK (HCC): ICD-10-CM

## 2020-10-08 PROCEDURE — 99214 OFFICE O/P EST MOD 30 MIN: CPT | Performed by: INTERNAL MEDICINE

## 2020-10-08 RX ORDER — CLOPIDOGREL BISULFATE 75 MG/1
75 TABLET ORAL DAILY
Status: ON HOLD | COMMUNITY
End: 2021-12-24

## 2020-10-08 RX ORDER — PREGABALIN 150 MG/1
150 CAPSULE ORAL 2 TIMES DAILY
COMMUNITY
End: 2023-05-19

## 2020-10-08 RX ORDER — LORAZEPAM 1 MG/1
1 TABLET ORAL EVERY 8 HOURS PRN
COMMUNITY

## 2020-10-08 RX ORDER — POTASSIUM CHLORIDE 20 MEQ/1
20 TABLET, EXTENDED RELEASE ORAL DAILY
Qty: 90 TAB | Refills: 3 | Status: ON HOLD | OUTPATIENT
Start: 2020-10-08 | End: 2023-09-27

## 2020-10-08 ASSESSMENT — ENCOUNTER SYMPTOMS
BRUISES/BLEEDS EASILY: 0
CHILLS: 0
NAUSEA: 0
COUGH: 0
FOCAL WEAKNESS: 0
ABDOMINAL PAIN: 0
PND: 0
SHORTNESS OF BREATH: 0
WEAKNESS: 0
DIZZINESS: 0
FEVER: 0
SORE THROAT: 0
PALPITATIONS: 0
FALLS: 0
CLAUDICATION: 0
BLURRED VISION: 0

## 2020-10-08 ASSESSMENT — FIBROSIS 4 INDEX: FIB4 SCORE: 3.05

## 2020-10-09 NOTE — PROGRESS NOTES
"Chief Complaint   Patient presents with   • Follow-Up     S/P Aortic Valve Replacement, Essential Hypertension       Subjective:   Matti Olguin is a 70 y.o. male who presents today for follow-up of his history of aortic valve replacement and pacemaker    He is been doing okay was a little bit over a year since we have seen each other but tolerating his medications well his primary care as well as prescriptions        Past Medical History:   Diagnosis Date   • Anticoagulation monitoring, special range    • Cardiac pacemaker in situ 1/13/2015   • Complete heart block (HCC)    • Dyspnea 10/15/2014   • High cholesterol    • HTN (hypertension) 9/23/2013   • NSVT (nonsustained ventricular tachycardia) ([facility]) - on pacer check 3/29/2019   • S/P aortic valve replacement with bioprosthetic valve 1/13/2015   • Sleep apnea 10-03-13    having sleep study soon   • Snoring    • Stroke (HCC)     left sided \"tingling\"   • Unspecified hemorrhagic conditions     Coumadin   • Valvular heart disease     AVR 12/1988     Past Surgical History:   Procedure Laterality Date   • RECOVERY  11/8/2013    Performed by Cath-Recovery Surgery at [facility]   • RECOVERY  10/8/2013    Performed by Cath-Recovery Surgery at [facility]   • RECOVERY  9/21/2012    [facility]   • AORTIC VALVE REPLACEMENT  1988    titanium   • OTHER ORTHOPEDIC SURGERY      knee scope, right shoulder     Family History   Problem Relation Age of Onset   • Heart Attack Father    • Heart Disease Sister      Social History     Socioeconomic History   • Marital status:      Spouse name: Not on file   • Number of children: Not on file   • Years of education: Not on file   • Highest education level: Not on file   Occupational History   • Not on file   Social Needs   • Financial resource strain: Not on file   • Food insecurity     Worry: Not on file     Inability: Not " on file   • Transportation needs     Medical: Not on file     Non-medical: Not on file   Tobacco Use   • Smoking status: Never Smoker   • Smokeless tobacco: Current User     Types: Chew   Substance and Sexual Activity   • Alcohol use: Yes     Comment: social drinker, not daily   • Drug use: No   • Sexual activity: Not on file   Lifestyle   • Physical activity     Days per week: Not on file     Minutes per session: Not on file   • Stress: Not on file   Relationships   • Social connections     Talks on phone: Not on file     Gets together: Not on file     Attends [employer] service: Not on file     Active member of club or organization: Not on file     Attends meetings of clubs or organizations: Not on file     Relationship status: Not on file   • Intimate partner violence     Fear of current or ex partner: Not on file     Emotionally abused: Not on file     Physically abused: Not on file     Forced sexual activity: Not on file   Other Topics Concern   • Not on file   Social History Narrative   • Not on file     Allergies   Allergen Reactions   • Nkda [No Known Drug Allergy]      Outpatient Encounter Medications as of 10/8/2020   Medication Sig Dispense Refill   • clopidogrel (PLAVIX) 75 MG Tab      • LORazepam (ATIVAN) 1 MG Tab TAKE 1 TABLET BY MOUTH ONCE DAILY AS NEEDED FOR 90 DAYS     • Sildenafil Citrate (VIAGRA PO) Viagra     • pregabalin (LYRICA) 150 MG Cap Take 150 mg by mouth. FOR 30 DAYS     • potassium chloride SA (KDUR) 20 MEQ Tab CR Take 1 Tab by mouth every day. 90 Tab 3   • carvedilol (COREG) 25 MG Tab Take 2 Tabs by mouth 2 times a day, with meals. 180 Tab 3   • lisinopril (PRINIVIL) 20 MG Tab Take 1 Tab by mouth 2 times a day. 180 Tab 3   • hydroCHLOROthiazide (HYDRODIURIL) 25 MG Tab Take 1 Tab by mouth every day. 90 Tab 3   • atorvastatin (LIPITOR) 80 MG tablet Take 1 Tab by mouth every bedtime. 30 Tab 2   • cloNIDine (CATAPRES) 0.1 MG Tab Take 1 Tab by mouth every 6 hours as needed. As needed for SBP  >150 60 Tab 2   • HYDROcodone-acetaminophen (NORCO) 5-325 MG Tab per tablet Take 1 Tab by mouth every four hours as needed.     • aspirin EC (ECOTRIN) 81 MG Tablet Delayed Response Take 81 mg by mouth every day.     • folic acid (FOLVITE) 400 MCG tablet Take 400 mcg by mouth every day.     • multivitamin (THERAGRAN) TABS Take 1 Tab by mouth every day. 30 Tab 3   • zolpidem (AMBIEN) 5 MG TABS Take 5 mg by mouth at bedtime as needed.     • [DISCONTINUED] pregabalin (LYRICA) 75 MG Cap Take 75 mg by mouth 2 times a day.     • [DISCONTINUED] potassium chloride SA (KDUR) 20 MEQ Tab CR Take 1 Tab by mouth every day. 90 Tab 3     No facility-administered encounter medications on file as of 10/8/2020.      Review of Systems   Constitutional: Negative for chills and fever.   HENT: Negative for sore throat.    Eyes: Negative for blurred vision.   Respiratory: Negative for cough and shortness of breath.    Cardiovascular: Negative for chest pain, palpitations, claudication, leg swelling and PND.   Gastrointestinal: Negative for abdominal pain and nausea.   Musculoskeletal: Negative for falls and joint pain.   Skin: Negative for rash.   Neurological: Negative for dizziness, focal weakness and weakness.   Endo/Heme/Allergies: Does not bruise/bleed easily.        Objective:   /82 (BP Location: Left arm, Patient Position: Sitting, BP Cuff Size: Adult)   Pulse 86   Ht 1.829 m (6')   Wt 111.9 kg (246 lb 12.8 oz)   SpO2 94%   BMI 33.47 kg/m²     Physical Exam   Constitutional: No distress.   HENT:   Patient wearing a mask due to COVID precautions   Eyes: No scleral icterus.   Neck: No JVD present.   Cardiovascular: Normal rate. Exam reveals no gallop and no friction rub.   Murmur heard.  Pulmonary/Chest: No respiratory distress. He has no wheezes. He has no rales.   Abdominal: Soft. Bowel sounds are normal.   Musculoskeletal:         General: No edema.   Neurological: He is alert.   Skin: No rash noted. He is not  diaphoretic.   Psychiatric: He has a normal mood and affect.     I personally reviewed in person with the patient his most recent pacemaker check it is functioning appropriately.    Assessment:     1. NSVT (nonsustained ventricular tachycardia) (HCC) - on pacer check     2. Cardiac pacemaker - [employer]     3. S/P aortic valve replacement with bioprosthetic valve: repeat AVR 10/2014     4. Essential hypertension     5. Dyslipidemia     6. Complete heart block (HCC)     7. Observed sleep apnea  REFERRAL TO PULMONARY AND SLEEP MEDICINE Sleep Medicine   8. Shortness of breath  potassium chloride SA (KDUR) 20 MEQ Tab CR       Medical Decision Making:  Today's Assessment / Status / Plan:     It was my pleasure to meet with Mr. Olguin.    Blood pressure is well controlled.  We specifically assessed the labs on hypertension treatment    He is on appropriate statin.    He and his wife endorse symptoms of sleep apnea will have formal evaluation remotely he had positive OPO    I will see Mr. Olguin back in 1 year time, with pacer check in 6 months in the interim, and I encouraged him to follow up with us over the phone or electronically using my MyChart as issues arise.    It is my pleasure to participate in the care of Mr. Olguin.  Please do not hesitate to contact me with questions or concerns.    Conner Thibodeaux MD PhD [facility]  Cardiologist [facility]    Please note that this dictation was created using voice recognition software. There may be errors I did not discover before finalizing the note.

## 2020-10-20 ENCOUNTER — NON-PROVIDER VISIT (OUTPATIENT)
Dept: CARDIOLOGY | Facility: MEDICAL CENTER | Age: 70
End: 2020-10-20
Payer: MEDICARE

## 2020-10-20 VITALS
HEIGHT: 72 IN | DIASTOLIC BLOOD PRESSURE: 68 MMHG | OXYGEN SATURATION: 92 % | SYSTOLIC BLOOD PRESSURE: 108 MMHG | WEIGHT: 245 LBS | BODY MASS INDEX: 33.18 KG/M2 | HEART RATE: 66 BPM

## 2020-10-20 DIAGNOSIS — I44.2 COMPLETE HEART BLOCK (HCC): ICD-10-CM

## 2020-10-20 DIAGNOSIS — Z95.0 CARDIAC PACEMAKER IN SITU: Chronic | ICD-10-CM

## 2020-10-20 PROCEDURE — 93280 PM DEVICE PROGR EVAL DUAL: CPT | Performed by: NURSE PRACTITIONER

## 2020-10-20 ASSESSMENT — FIBROSIS 4 INDEX: FIB4 SCORE: 3.05

## 2020-10-20 NOTE — PROGRESS NOTES
Device is working normally. 2 mode switching episodes (both <1 minutes, <0.1% of total time).  Normal sensing and capture of RA and RV leads; stable impedances. Battery longevity is 3.5 years.  No changes are made today.    FU in 12 months for next PM check at [facility] office. He does do remote monitoring the meantime, and I confirmed that we are receiving his transmissions.

## 2021-10-25 ENCOUNTER — OFFICE VISIT (OUTPATIENT)
Dept: CARDIOLOGY | Facility: MEDICAL CENTER | Age: 71
End: 2021-10-25
Payer: MEDICARE

## 2021-10-25 ENCOUNTER — NON-PROVIDER VISIT (OUTPATIENT)
Dept: CARDIOLOGY | Facility: MEDICAL CENTER | Age: 71
End: 2021-10-25
Payer: MEDICARE

## 2021-10-25 VITALS
SYSTOLIC BLOOD PRESSURE: 132 MMHG | HEIGHT: 72 IN | DIASTOLIC BLOOD PRESSURE: 86 MMHG | BODY MASS INDEX: 34.13 KG/M2 | WEIGHT: 252 LBS | RESPIRATION RATE: 18 BRPM | HEART RATE: 74 BPM | OXYGEN SATURATION: 94 %

## 2021-10-25 VITALS
BODY MASS INDEX: 34.13 KG/M2 | WEIGHT: 252 LBS | RESPIRATION RATE: 18 BRPM | HEART RATE: 74 BPM | HEIGHT: 72 IN | DIASTOLIC BLOOD PRESSURE: 86 MMHG | OXYGEN SATURATION: 94 % | SYSTOLIC BLOOD PRESSURE: 132 MMHG

## 2021-10-25 DIAGNOSIS — Z95.0 CARDIAC PACEMAKER IN SITU: Chronic | ICD-10-CM

## 2021-10-25 DIAGNOSIS — I63.9 CEREBROVASCULAR ACCIDENT (CVA) INVOLVING LEFT CEREBRAL HEMISPHERE (HCC): ICD-10-CM

## 2021-10-25 DIAGNOSIS — E78.5 DYSLIPIDEMIA: ICD-10-CM

## 2021-10-25 DIAGNOSIS — I44.2 COMPLETE HEART BLOCK (HCC): ICD-10-CM

## 2021-10-25 DIAGNOSIS — I10 ESSENTIAL HYPERTENSION: ICD-10-CM

## 2021-10-25 DIAGNOSIS — I47.29 NSVT (NONSUSTAINED VENTRICULAR TACHYCARDIA) (HCC): Chronic | ICD-10-CM

## 2021-10-25 DIAGNOSIS — Z95.3 S/P AORTIC VALVE REPLACEMENT WITH BIOPROSTHETIC VALVE: ICD-10-CM

## 2021-10-25 DIAGNOSIS — Z86.73 HISTORY OF STROKE: ICD-10-CM

## 2021-10-25 PROBLEM — K76.9 CHRONIC NONALCOHOLIC LIVER DISEASE: Status: ACTIVE | Noted: 2021-10-25

## 2021-10-25 PROCEDURE — 99214 OFFICE O/P EST MOD 30 MIN: CPT | Mod: 25 | Performed by: INTERNAL MEDICINE

## 2021-10-25 PROCEDURE — 93280 PM DEVICE PROGR EVAL DUAL: CPT | Performed by: NURSE PRACTITIONER

## 2021-10-25 NOTE — PROGRESS NOTES
Device is working normally. 3 mode switching episodes (all occurred on 5/20/2021, 2+ minutes total, <0.1% of total time).  Normal sensing and capture of RA and RV leads; stable impedances. Battery longevity is 2.5 years.  No changes are made today.    He is monitored remotely every 3 months.    FU in 12 months for next PM check (in office) with me.

## 2021-12-20 ENCOUNTER — APPOINTMENT (OUTPATIENT)
Dept: RADIOLOGY | Facility: MEDICAL CENTER | Age: 71
DRG: 065 | End: 2021-12-20
Attending: EMERGENCY MEDICINE
Payer: MEDICARE

## 2021-12-20 ENCOUNTER — HOSPITAL ENCOUNTER (INPATIENT)
Facility: MEDICAL CENTER | Age: 71
LOS: 4 days | DRG: 065 | End: 2021-12-24
Attending: EMERGENCY MEDICINE | Admitting: INTERNAL MEDICINE
Payer: MEDICARE

## 2021-12-20 DIAGNOSIS — S06.5XAA SDH (SUBDURAL HEMATOMA) (HCC): ICD-10-CM

## 2021-12-20 DIAGNOSIS — I62.00: ICD-10-CM

## 2021-12-20 DIAGNOSIS — S06.5XAA SUBDURAL HEMATOMA (HCC): ICD-10-CM

## 2021-12-20 DIAGNOSIS — R47.01 APHASIA: ICD-10-CM

## 2021-12-20 PROBLEM — I61.9 BRAIN BLEED (HCC): Status: ACTIVE | Noted: 2021-12-20

## 2021-12-20 LAB
ALBUMIN SERPL BCP-MCNC: 4.2 G/DL (ref 3.2–4.9)
ALBUMIN/GLOB SERPL: 1.2 G/DL
ALP SERPL-CCNC: 75 U/L (ref 30–99)
ALT SERPL-CCNC: 48 U/L (ref 2–50)
ANION GAP SERPL CALC-SCNC: 12 MMOL/L (ref 7–16)
APTT PPP: 29.9 SEC (ref 24.7–36)
AST SERPL-CCNC: 40 U/L (ref 12–45)
BASOPHILS # BLD AUTO: 0.4 % (ref 0–1.8)
BASOPHILS # BLD: 0.03 K/UL (ref 0–0.12)
BILIRUB SERPL-MCNC: 1.1 MG/DL (ref 0.1–1.5)
BUN SERPL-MCNC: 25 MG/DL (ref 8–22)
CALCIUM SERPL-MCNC: 9.3 MG/DL (ref 8.5–10.5)
CFT BLD TEG: 5.7 MIN (ref 4.6–9.1)
CFT P HPASE BLD TEG: 5 MIN (ref 4.3–8.3)
CHLORIDE SERPL-SCNC: 101 MMOL/L (ref 96–112)
CHOLEST SERPL-MCNC: 117 MG/DL (ref 100–199)
CLOT ANGLE BLD TEG: 76.3 DEGREES (ref 63–78)
CO2 SERPL-SCNC: 23 MMOL/L (ref 20–33)
CREAT SERPL-MCNC: 0.76 MG/DL (ref 0.5–1.4)
CT.EXTRINSIC BLD ROTEM: 1.1 MIN (ref 0.8–2.1)
EKG IMPRESSION: NORMAL
EOSINOPHIL # BLD AUTO: 0.25 K/UL (ref 0–0.51)
EOSINOPHIL NFR BLD: 3.4 % (ref 0–6.9)
ERYTHROCYTE [DISTWIDTH] IN BLOOD BY AUTOMATED COUNT: 48.5 FL (ref 35.9–50)
GLOBULIN SER CALC-MCNC: 3.5 G/DL (ref 1.9–3.5)
GLUCOSE SERPL-MCNC: 114 MG/DL (ref 65–99)
HCT VFR BLD AUTO: 44.2 % (ref 42–52)
HDLC SERPL-MCNC: 37 MG/DL
HGB BLD-MCNC: 15.3 G/DL (ref 14–18)
IMM GRANULOCYTES # BLD AUTO: 0.03 K/UL (ref 0–0.11)
IMM GRANULOCYTES NFR BLD AUTO: 0.4 % (ref 0–0.9)
INR PPP: 1.07 (ref 0.87–1.13)
LDLC SERPL CALC-MCNC: 64 MG/DL
LYMPHOCYTES # BLD AUTO: 1.05 K/UL (ref 1–4.8)
LYMPHOCYTES NFR BLD: 14.3 % (ref 22–41)
MCF BLD TEG: 62.5 MM (ref 52–69)
MCF.PLATELET INHIB BLD ROTEM: 27.2 MM (ref 15–32)
MCH RBC QN AUTO: 34.7 PG (ref 27–33)
MCHC RBC AUTO-ENTMCNC: 34.6 G/DL (ref 33.7–35.3)
MCV RBC AUTO: 100.2 FL (ref 81.4–97.8)
MONOCYTES # BLD AUTO: 1.11 K/UL (ref 0–0.85)
MONOCYTES NFR BLD AUTO: 15.1 % (ref 0–13.4)
NEUTROPHILS # BLD AUTO: 4.88 K/UL (ref 1.82–7.42)
NEUTROPHILS NFR BLD: 66.4 % (ref 44–72)
NRBC # BLD AUTO: 0 K/UL
NRBC BLD-RTO: 0 /100 WBC
PA AA BLD-ACNC: 51.4 % (ref 0–11)
PA ADP BLD-ACNC: 24.7 % (ref 0–17)
PLATELET # BLD AUTO: 151 K/UL (ref 164–446)
PMV BLD AUTO: 12.3 FL (ref 9–12.9)
POTASSIUM SERPL-SCNC: 3.7 MMOL/L (ref 3.6–5.5)
PROT SERPL-MCNC: 7.7 G/DL (ref 6–8.2)
PROTHROMBIN TIME: 13.5 SEC (ref 12–14.6)
RBC # BLD AUTO: 4.41 M/UL (ref 4.7–6.1)
SODIUM SERPL-SCNC: 134 MMOL/L (ref 135–145)
SODIUM SERPL-SCNC: 136 MMOL/L (ref 135–145)
TEG ALGORITHM TGALG: ABNORMAL
TRIGL SERPL-MCNC: 78 MG/DL (ref 0–149)
TROPONIN T SERPL-MCNC: 14 NG/L (ref 6–19)
WBC # BLD AUTO: 7.4 K/UL (ref 4.8–10.8)

## 2021-12-20 PROCEDURE — 80061 LIPID PANEL: CPT

## 2021-12-20 PROCEDURE — 700102 HCHG RX REV CODE 250 W/ 637 OVERRIDE(OP): Performed by: INTERNAL MEDICINE

## 2021-12-20 PROCEDURE — 84484 ASSAY OF TROPONIN QUANT: CPT

## 2021-12-20 PROCEDURE — 85730 THROMBOPLASTIN TIME PARTIAL: CPT

## 2021-12-20 PROCEDURE — 93005 ELECTROCARDIOGRAM TRACING: CPT | Performed by: EMERGENCY MEDICINE

## 2021-12-20 PROCEDURE — 99291 CRITICAL CARE FIRST HOUR: CPT | Performed by: INTERNAL MEDICINE

## 2021-12-20 PROCEDURE — 85347 COAGULATION TIME ACTIVATED: CPT

## 2021-12-20 PROCEDURE — 85576 BLOOD PLATELET AGGREGATION: CPT | Mod: 91

## 2021-12-20 PROCEDURE — 85610 PROTHROMBIN TIME: CPT

## 2021-12-20 PROCEDURE — 80053 COMPREHEN METABOLIC PANEL: CPT

## 2021-12-20 PROCEDURE — A9270 NON-COVERED ITEM OR SERVICE: HCPCS | Performed by: INTERNAL MEDICINE

## 2021-12-20 PROCEDURE — 96374 THER/PROPH/DIAG INJ IV PUSH: CPT

## 2021-12-20 PROCEDURE — 700117 HCHG RX CONTRAST REV CODE 255: Performed by: EMERGENCY MEDICINE

## 2021-12-20 PROCEDURE — 99291 CRITICAL CARE FIRST HOUR: CPT

## 2021-12-20 PROCEDURE — 770022 HCHG ROOM/CARE - ICU (200)

## 2021-12-20 PROCEDURE — 700111 HCHG RX REV CODE 636 W/ 250 OVERRIDE (IP): Performed by: INTERNAL MEDICINE

## 2021-12-20 PROCEDURE — 99291 CRITICAL CARE FIRST HOUR: CPT | Performed by: PSYCHIATRY & NEUROLOGY

## 2021-12-20 PROCEDURE — 84295 ASSAY OF SERUM SODIUM: CPT

## 2021-12-20 PROCEDURE — 94760 N-INVAS EAR/PLS OXIMETRY 1: CPT

## 2021-12-20 PROCEDURE — 85384 FIBRINOGEN ACTIVITY: CPT

## 2021-12-20 PROCEDURE — 70496 CT ANGIOGRAPHY HEAD: CPT | Mod: ME

## 2021-12-20 PROCEDURE — 700105 HCHG RX REV CODE 258: Performed by: INTERNAL MEDICINE

## 2021-12-20 PROCEDURE — 85025 COMPLETE CBC W/AUTO DIFF WBC: CPT

## 2021-12-20 RX ORDER — HYDRALAZINE HYDROCHLORIDE 20 MG/ML
10 INJECTION INTRAMUSCULAR; INTRAVENOUS EVERY 4 HOURS PRN
Status: DISCONTINUED | OUTPATIENT
Start: 2021-12-20 | End: 2021-12-21

## 2021-12-20 RX ORDER — ACETAMINOPHEN 650 MG/1
650 SUPPOSITORY RECTAL EVERY 4 HOURS PRN
Status: DISCONTINUED | OUTPATIENT
Start: 2021-12-20 | End: 2021-12-20

## 2021-12-20 RX ORDER — AMOXICILLIN 250 MG
2 CAPSULE ORAL 2 TIMES DAILY
Status: DISCONTINUED | OUTPATIENT
Start: 2021-12-21 | End: 2021-12-24 | Stop reason: HOSPADM

## 2021-12-20 RX ORDER — HYDROCODONE BITARTRATE AND ACETAMINOPHEN 5; 325 MG/1; MG/1
1 TABLET ORAL EVERY 4 HOURS PRN
Status: CANCELLED | OUTPATIENT
Start: 2021-12-20

## 2021-12-20 RX ORDER — M-VIT,TX,IRON,MINS/CALC/FOLIC 27MG-0.4MG
1 TABLET ORAL DAILY
COMMUNITY

## 2021-12-20 RX ORDER — ONDANSETRON 2 MG/ML
4 INJECTION INTRAMUSCULAR; INTRAVENOUS EVERY 4 HOURS PRN
Status: CANCELLED | OUTPATIENT
Start: 2021-12-20

## 2021-12-20 RX ORDER — AMOXICILLIN 250 MG
2 CAPSULE ORAL 2 TIMES DAILY
Status: CANCELLED | OUTPATIENT
Start: 2021-12-20

## 2021-12-20 RX ORDER — SODIUM CHLORIDE 9 MG/ML
INJECTION, SOLUTION INTRAVENOUS CONTINUOUS
Status: DISCONTINUED | OUTPATIENT
Start: 2021-12-20 | End: 2021-12-20

## 2021-12-20 RX ORDER — CLONIDINE HYDROCHLORIDE 0.1 MG/1
0.1 TABLET ORAL EVERY 6 HOURS PRN
Status: CANCELLED | OUTPATIENT
Start: 2021-12-20

## 2021-12-20 RX ORDER — LORAZEPAM 2 MG/ML
2 INJECTION INTRAMUSCULAR
Status: CANCELLED | OUTPATIENT
Start: 2021-12-20

## 2021-12-20 RX ORDER — SODIUM CHLORIDE 9 MG/ML
INJECTION, SOLUTION INTRAVENOUS CONTINUOUS
Status: DISCONTINUED | OUTPATIENT
Start: 2021-12-20 | End: 2021-12-23

## 2021-12-20 RX ORDER — ONDANSETRON 4 MG/1
4 TABLET, ORALLY DISINTEGRATING ORAL EVERY 4 HOURS PRN
Status: CANCELLED | OUTPATIENT
Start: 2021-12-20

## 2021-12-20 RX ORDER — LABETALOL HYDROCHLORIDE 5 MG/ML
10 INJECTION, SOLUTION INTRAVENOUS EVERY 4 HOURS PRN
Status: DISCONTINUED | OUTPATIENT
Start: 2021-12-20 | End: 2021-12-21

## 2021-12-20 RX ORDER — POLYETHYLENE GLYCOL 3350 17 G/17G
1 POWDER, FOR SOLUTION ORAL
Status: DISCONTINUED | OUTPATIENT
Start: 2021-12-20 | End: 2021-12-24 | Stop reason: HOSPADM

## 2021-12-20 RX ORDER — LORAZEPAM 2 MG/ML
2 INJECTION INTRAMUSCULAR
Status: DISCONTINUED | OUTPATIENT
Start: 2021-12-20 | End: 2021-12-22

## 2021-12-20 RX ORDER — HYDROCHLOROTHIAZIDE 25 MG/1
25 TABLET ORAL DAILY
Status: CANCELLED | OUTPATIENT
Start: 2021-12-21

## 2021-12-20 RX ORDER — ONDANSETRON 4 MG/1
4 TABLET, ORALLY DISINTEGRATING ORAL EVERY 4 HOURS PRN
Status: DISCONTINUED | OUTPATIENT
Start: 2021-12-20 | End: 2021-12-24 | Stop reason: HOSPADM

## 2021-12-20 RX ORDER — LISINOPRIL 20 MG/1
20 TABLET ORAL 2 TIMES DAILY
Status: CANCELLED | OUTPATIENT
Start: 2021-12-20

## 2021-12-20 RX ORDER — LABETALOL HYDROCHLORIDE 5 MG/ML
10 INJECTION, SOLUTION INTRAVENOUS EVERY 4 HOURS PRN
Status: CANCELLED | OUTPATIENT
Start: 2021-12-20

## 2021-12-20 RX ORDER — MORPHINE SULFATE 4 MG/ML
1-5 INJECTION INTRAVENOUS
Status: DISCONTINUED | OUTPATIENT
Start: 2021-12-20 | End: 2021-12-23

## 2021-12-20 RX ORDER — ACETAMINOPHEN 325 MG/1
650 TABLET ORAL EVERY 6 HOURS PRN
Status: CANCELLED | OUTPATIENT
Start: 2021-12-20

## 2021-12-20 RX ORDER — ENALAPRILAT 1.25 MG/ML
1.25 INJECTION INTRAVENOUS EVERY 6 HOURS PRN
Status: CANCELLED | OUTPATIENT
Start: 2021-12-20

## 2021-12-20 RX ORDER — BISACODYL 10 MG
10 SUPPOSITORY, RECTAL RECTAL
Status: DISCONTINUED | OUTPATIENT
Start: 2021-12-20 | End: 2021-12-24 | Stop reason: HOSPADM

## 2021-12-20 RX ORDER — HYDRALAZINE HYDROCHLORIDE 20 MG/ML
10 INJECTION INTRAMUSCULAR; INTRAVENOUS EVERY 4 HOURS PRN
Status: CANCELLED | OUTPATIENT
Start: 2021-12-20

## 2021-12-20 RX ORDER — ACETAMINOPHEN 650 MG/1
650 SUPPOSITORY RECTAL EVERY 4 HOURS PRN
Status: CANCELLED | OUTPATIENT
Start: 2021-12-20

## 2021-12-20 RX ORDER — ONDANSETRON 2 MG/ML
4 INJECTION INTRAMUSCULAR; INTRAVENOUS EVERY 4 HOURS PRN
Status: DISCONTINUED | OUTPATIENT
Start: 2021-12-20 | End: 2021-12-24 | Stop reason: HOSPADM

## 2021-12-20 RX ORDER — NIMODIPINE 30 MG/1
60 CAPSULE, LIQUID FILLED ORAL EVERY 4 HOURS
Status: CANCELLED | OUTPATIENT
Start: 2021-12-20 | End: 2022-01-10

## 2021-12-20 RX ORDER — CARVEDILOL 25 MG/1
50 TABLET ORAL 2 TIMES DAILY WITH MEALS
Status: CANCELLED | OUTPATIENT
Start: 2021-12-20

## 2021-12-20 RX ORDER — ACETAMINOPHEN 650 MG/1
650 SUPPOSITORY RECTAL EVERY 4 HOURS PRN
Status: DISCONTINUED | OUTPATIENT
Start: 2021-12-20 | End: 2021-12-21

## 2021-12-20 RX ORDER — LORAZEPAM 1 MG/1
1 TABLET ORAL EVERY 8 HOURS PRN
Status: CANCELLED | OUTPATIENT
Start: 2021-12-20

## 2021-12-20 RX ORDER — ACETAMINOPHEN 325 MG/1
650 TABLET ORAL EVERY 4 HOURS PRN
Status: DISCONTINUED | OUTPATIENT
Start: 2021-12-20 | End: 2021-12-20

## 2021-12-20 RX ORDER — POLYETHYLENE GLYCOL 3350 17 G/17G
1 POWDER, FOR SOLUTION ORAL
Status: CANCELLED | OUTPATIENT
Start: 2021-12-20

## 2021-12-20 RX ORDER — SODIUM CHLORIDE 9 MG/ML
INJECTION, SOLUTION INTRAVENOUS CONTINUOUS
Status: CANCELLED | OUTPATIENT
Start: 2021-12-20

## 2021-12-20 RX ORDER — ENALAPRILAT 1.25 MG/ML
1.25 INJECTION INTRAVENOUS EVERY 6 HOURS PRN
Status: DISCONTINUED | OUTPATIENT
Start: 2021-12-20 | End: 2021-12-24 | Stop reason: HOSPADM

## 2021-12-20 RX ORDER — ACETAMINOPHEN 325 MG/1
650 TABLET ORAL EVERY 4 HOURS PRN
Status: CANCELLED | OUTPATIENT
Start: 2021-12-20

## 2021-12-20 RX ORDER — PREGABALIN 150 MG/1
150 CAPSULE ORAL 2 TIMES DAILY
Status: CANCELLED | OUTPATIENT
Start: 2021-12-20

## 2021-12-20 RX ORDER — BISACODYL 10 MG
10 SUPPOSITORY, RECTAL RECTAL
Status: CANCELLED | OUTPATIENT
Start: 2021-12-20

## 2021-12-20 RX ORDER — ACETAMINOPHEN 500 MG
1000 TABLET ORAL EVERY 6 HOURS PRN
Status: DISCONTINUED | OUTPATIENT
Start: 2021-12-20 | End: 2021-12-21

## 2021-12-20 RX ADMIN — SODIUM CHLORIDE: 9 INJECTION, SOLUTION INTRAVENOUS at 21:00

## 2021-12-20 RX ADMIN — ACETAMINOPHEN 650 MG: 325 TABLET, FILM COATED ORAL at 20:52

## 2021-12-20 RX ADMIN — MORPHINE SULFATE 4 MG: 4 INJECTION, SOLUTION INTRAMUSCULAR; INTRAVENOUS at 23:51

## 2021-12-20 RX ADMIN — IOHEXOL 80 ML: 350 INJECTION, SOLUTION INTRAVENOUS at 18:44

## 2021-12-20 ASSESSMENT — ENCOUNTER SYMPTOMS
SHORTNESS OF BREATH: 0
NAUSEA: 0
WEAKNESS: 0
TREMORS: 0
LOSS OF CONSCIOUSNESS: 0
SEIZURES: 0
CHILLS: 0
FOCAL WEAKNESS: 1
TINGLING: 1
FALLS: 0
BLURRED VISION: 0
PHOTOPHOBIA: 0
DOUBLE VISION: 0
SENSORY CHANGE: 1
BACK PAIN: 0
SPEECH CHANGE: 1
MEMORY LOSS: 0
NECK PAIN: 0
MUSCULOSKELETAL NEGATIVE: 1
FEVER: 0
MYALGIAS: 0
PSYCHIATRIC NEGATIVE: 1
HEADACHES: 1
VOMITING: 0
CONSTITUTIONAL NEGATIVE: 1
DIZZINESS: 0

## 2021-12-20 ASSESSMENT — PAIN DESCRIPTION - PAIN TYPE
TYPE: ACUTE PAIN
TYPE: ACUTE PAIN

## 2021-12-21 ENCOUNTER — APPOINTMENT (OUTPATIENT)
Dept: RADIOLOGY | Facility: MEDICAL CENTER | Age: 71
DRG: 065 | End: 2021-12-21
Attending: PSYCHIATRY & NEUROLOGY
Payer: MEDICARE

## 2021-12-21 PROBLEM — S06.5XAA SUBDURAL HEMATOMA (HCC): Status: ACTIVE | Noted: 2021-12-20

## 2021-12-21 LAB
ANION GAP SERPL CALC-SCNC: 13 MMOL/L (ref 7–16)
BUN SERPL-MCNC: 20 MG/DL (ref 8–22)
CALCIUM SERPL-MCNC: 9 MG/DL (ref 8.5–10.5)
CHLORIDE SERPL-SCNC: 101 MMOL/L (ref 96–112)
CO2 SERPL-SCNC: 22 MMOL/L (ref 20–33)
CREAT SERPL-MCNC: 0.6 MG/DL (ref 0.5–1.4)
GLUCOSE SERPL-MCNC: 99 MG/DL (ref 65–99)
MAGNESIUM SERPL-MCNC: 1.9 MG/DL (ref 1.5–2.5)
POTASSIUM SERPL-SCNC: 3.3 MMOL/L (ref 3.6–5.5)
SODIUM SERPL-SCNC: 134 MMOL/L (ref 135–145)
SODIUM SERPL-SCNC: 135 MMOL/L (ref 135–145)
SODIUM SERPL-SCNC: 136 MMOL/L (ref 135–145)

## 2021-12-21 PROCEDURE — 700105 HCHG RX REV CODE 258: Performed by: STUDENT IN AN ORGANIZED HEALTH CARE EDUCATION/TRAINING PROGRAM

## 2021-12-21 PROCEDURE — 700101 HCHG RX REV CODE 250

## 2021-12-21 PROCEDURE — 84295 ASSAY OF SERUM SODIUM: CPT | Mod: 91

## 2021-12-21 PROCEDURE — 770000 HCHG ROOM/CARE - INTERMEDIATE ICU *

## 2021-12-21 PROCEDURE — 97166 OT EVAL MOD COMPLEX 45 MIN: CPT

## 2021-12-21 PROCEDURE — 700111 HCHG RX REV CODE 636 W/ 250 OVERRIDE (IP): Performed by: HOSPITALIST

## 2021-12-21 PROCEDURE — 700105 HCHG RX REV CODE 258: Performed by: INTERNAL MEDICINE

## 2021-12-21 PROCEDURE — 83735 ASSAY OF MAGNESIUM: CPT

## 2021-12-21 PROCEDURE — 92610 EVALUATE SWALLOWING FUNCTION: CPT

## 2021-12-21 PROCEDURE — 700102 HCHG RX REV CODE 250 W/ 637 OVERRIDE(OP): Performed by: HOSPITALIST

## 2021-12-21 PROCEDURE — 700111 HCHG RX REV CODE 636 W/ 250 OVERRIDE (IP): Performed by: INTERNAL MEDICINE

## 2021-12-21 PROCEDURE — 70450 CT HEAD/BRAIN W/O DYE: CPT | Mod: ME

## 2021-12-21 PROCEDURE — 700101 HCHG RX REV CODE 250: Performed by: INTERNAL MEDICINE

## 2021-12-21 PROCEDURE — 80048 BASIC METABOLIC PNL TOTAL CA: CPT

## 2021-12-21 PROCEDURE — A9270 NON-COVERED ITEM OR SERVICE: HCPCS | Performed by: HOSPITALIST

## 2021-12-21 PROCEDURE — 94640 AIRWAY INHALATION TREATMENT: CPT

## 2021-12-21 PROCEDURE — 94760 N-INVAS EAR/PLS OXIMETRY 1: CPT

## 2021-12-21 PROCEDURE — 99233 SBSQ HOSP IP/OBS HIGH 50: CPT | Performed by: PSYCHIATRY & NEUROLOGY

## 2021-12-21 PROCEDURE — 700101 HCHG RX REV CODE 250: Performed by: STUDENT IN AN ORGANIZED HEALTH CARE EDUCATION/TRAINING PROGRAM

## 2021-12-21 PROCEDURE — 97162 PT EVAL MOD COMPLEX 30 MIN: CPT

## 2021-12-21 PROCEDURE — 700111 HCHG RX REV CODE 636 W/ 250 OVERRIDE (IP)

## 2021-12-21 PROCEDURE — 99223 1ST HOSP IP/OBS HIGH 75: CPT | Performed by: HOSPITALIST

## 2021-12-21 RX ORDER — DIPHENHYDRAMINE HYDROCHLORIDE 50 MG/ML
25 INJECTION INTRAMUSCULAR; INTRAVENOUS ONCE
Status: COMPLETED | OUTPATIENT
Start: 2021-12-21 | End: 2021-12-21

## 2021-12-21 RX ORDER — ACETAMINOPHEN 500 MG
1000 TABLET ORAL EVERY 6 HOURS PRN
Status: DISCONTINUED | OUTPATIENT
Start: 2021-12-20 | End: 2021-12-24 | Stop reason: HOSPADM

## 2021-12-21 RX ORDER — HALOPERIDOL 5 MG/ML
5 INJECTION INTRAMUSCULAR ONCE
Status: COMPLETED | OUTPATIENT
Start: 2021-12-21 | End: 2021-12-21

## 2021-12-21 RX ORDER — ACETAMINOPHEN 650 MG/1
650 SUPPOSITORY RECTAL EVERY 4 HOURS PRN
Status: DISCONTINUED | OUTPATIENT
Start: 2021-12-20 | End: 2021-12-24 | Stop reason: HOSPADM

## 2021-12-21 RX ORDER — CARVEDILOL 25 MG/1
25 TABLET ORAL 2 TIMES DAILY WITH MEALS
Status: DISCONTINUED | OUTPATIENT
Start: 2021-12-21 | End: 2021-12-24 | Stop reason: HOSPADM

## 2021-12-21 RX ORDER — HYDRALAZINE HYDROCHLORIDE 20 MG/ML
20 INJECTION INTRAMUSCULAR; INTRAVENOUS EVERY 4 HOURS PRN
Status: DISCONTINUED | OUTPATIENT
Start: 2021-12-21 | End: 2021-12-24 | Stop reason: HOSPADM

## 2021-12-21 RX ORDER — MIDAZOLAM HYDROCHLORIDE 1 MG/ML
INJECTION INTRAMUSCULAR; INTRAVENOUS
Status: COMPLETED
Start: 2021-12-21 | End: 2021-12-21

## 2021-12-21 RX ORDER — IPRATROPIUM BROMIDE AND ALBUTEROL SULFATE 2.5; .5 MG/3ML; MG/3ML
3 SOLUTION RESPIRATORY (INHALATION)
Status: DISCONTINUED | OUTPATIENT
Start: 2021-12-21 | End: 2021-12-21

## 2021-12-21 RX ORDER — DEXMEDETOMIDINE HYDROCHLORIDE 4 UG/ML
.1-1.5 INJECTION, SOLUTION INTRAVENOUS CONTINUOUS
Status: DISCONTINUED | OUTPATIENT
Start: 2021-12-22 | End: 2021-12-23

## 2021-12-21 RX ORDER — LABETALOL HYDROCHLORIDE 5 MG/ML
20 INJECTION, SOLUTION INTRAVENOUS EVERY 4 HOURS PRN
Status: DISCONTINUED | OUTPATIENT
Start: 2021-12-21 | End: 2021-12-24 | Stop reason: HOSPADM

## 2021-12-21 RX ORDER — LISINOPRIL 20 MG/1
20 TABLET ORAL
Status: DISCONTINUED | OUTPATIENT
Start: 2021-12-21 | End: 2021-12-24 | Stop reason: HOSPADM

## 2021-12-21 RX ORDER — POTASSIUM CHLORIDE 20 MEQ/1
40 TABLET, EXTENDED RELEASE ORAL EVERY 6 HOURS
Status: COMPLETED | OUTPATIENT
Start: 2021-12-21 | End: 2021-12-21

## 2021-12-21 RX ORDER — DIPHENHYDRAMINE HYDROCHLORIDE 50 MG/ML
INJECTION INTRAMUSCULAR; INTRAVENOUS
Status: COMPLETED
Start: 2021-12-21 | End: 2021-12-21

## 2021-12-21 RX ORDER — HALOPERIDOL 5 MG/ML
INJECTION INTRAMUSCULAR
Status: COMPLETED
Start: 2021-12-21 | End: 2021-12-21

## 2021-12-21 RX ORDER — MAGNESIUM SULFATE HEPTAHYDRATE 40 MG/ML
2 INJECTION, SOLUTION INTRAVENOUS ONCE
Status: COMPLETED | OUTPATIENT
Start: 2021-12-22 | End: 2021-12-22

## 2021-12-21 RX ORDER — MIDAZOLAM HYDROCHLORIDE 1 MG/ML
1 INJECTION INTRAMUSCULAR; INTRAVENOUS ONCE
Status: COMPLETED | OUTPATIENT
Start: 2021-12-21 | End: 2021-12-21

## 2021-12-21 RX ORDER — MAGNESIUM SULFATE HEPTAHYDRATE 40 MG/ML
2 INJECTION, SOLUTION INTRAVENOUS ONCE
Status: COMPLETED | OUTPATIENT
Start: 2021-12-21 | End: 2021-12-21

## 2021-12-21 RX ORDER — LORAZEPAM 2 MG/1
2 TABLET ORAL EVERY 6 HOURS PRN
Status: DISCONTINUED | OUTPATIENT
Start: 2021-12-21 | End: 2021-12-23

## 2021-12-21 RX ADMIN — HALOPERIDOL LACTATE 5 MG: 5 INJECTION, SOLUTION INTRAMUSCULAR at 23:40

## 2021-12-21 RX ADMIN — ONDANSETRON 4 MG: 2 INJECTION INTRAMUSCULAR; INTRAVENOUS at 09:47

## 2021-12-21 RX ADMIN — ENALAPRILAT 1.25 MG: 1.25 INJECTION INTRAVENOUS at 09:50

## 2021-12-21 RX ADMIN — SODIUM CHLORIDE: 9 INJECTION, SOLUTION INTRAVENOUS at 00:32

## 2021-12-21 RX ADMIN — HALOPERIDOL 5 MG: 5 INJECTION INTRAMUSCULAR at 23:40

## 2021-12-21 RX ADMIN — MORPHINE SULFATE 4 MG: 4 INJECTION, SOLUTION INTRAMUSCULAR; INTRAVENOUS at 07:15

## 2021-12-21 RX ADMIN — MORPHINE SULFATE 4 MG: 4 INJECTION, SOLUTION INTRAMUSCULAR; INTRAVENOUS at 04:46

## 2021-12-21 RX ADMIN — DIPHENHYDRAMINE HYDROCHLORIDE 25 MG: 50 INJECTION INTRAMUSCULAR; INTRAVENOUS at 23:40

## 2021-12-21 RX ADMIN — ALBUTEROL SULFATE 2.5 MG: 2.5 SOLUTION RESPIRATORY (INHALATION) at 16:29

## 2021-12-21 RX ADMIN — POTASSIUM CHLORIDE 40 MEQ: 1500 TABLET, EXTENDED RELEASE ORAL at 11:20

## 2021-12-21 RX ADMIN — HYDRALAZINE HYDROCHLORIDE 20 MG: 20 INJECTION INTRAMUSCULAR; INTRAVENOUS at 11:20

## 2021-12-21 RX ADMIN — LISINOPRIL 20 MG: 20 TABLET ORAL at 10:40

## 2021-12-21 RX ADMIN — POTASSIUM CHLORIDE 40 MEQ: 1500 TABLET, EXTENDED RELEASE ORAL at 16:45

## 2021-12-21 RX ADMIN — CARVEDILOL 25 MG: 25 TABLET, FILM COATED ORAL at 16:44

## 2021-12-21 RX ADMIN — LABETALOL HYDROCHLORIDE 10 MG: 5 INJECTION, SOLUTION INTRAVENOUS at 06:34

## 2021-12-21 RX ADMIN — CARVEDILOL 25 MG: 25 TABLET, FILM COATED ORAL at 10:40

## 2021-12-21 RX ADMIN — LABETALOL HYDROCHLORIDE 20 MG: 5 INJECTION, SOLUTION INTRAVENOUS at 10:46

## 2021-12-21 RX ADMIN — LORAZEPAM 2 MG: 2 INJECTION INTRAMUSCULAR; INTRAVENOUS at 17:58

## 2021-12-21 RX ADMIN — LORAZEPAM 2 MG: 2 TABLET ORAL at 20:54

## 2021-12-21 RX ADMIN — MIDAZOLAM HYDROCHLORIDE 1 MG: 1 INJECTION INTRAMUSCULAR; INTRAVENOUS at 23:42

## 2021-12-21 RX ADMIN — HYDRALAZINE HYDROCHLORIDE 10 MG: 20 INJECTION INTRAMUSCULAR; INTRAVENOUS at 08:05

## 2021-12-21 RX ADMIN — DEXMEDETOMIDINE 0.2 MCG/KG/HR: 200 INJECTION, SOLUTION INTRAVENOUS at 23:50

## 2021-12-21 RX ADMIN — ONDANSETRON 4 MG: 2 INJECTION INTRAMUSCULAR; INTRAVENOUS at 16:49

## 2021-12-21 RX ADMIN — MORPHINE SULFATE 4 MG: 4 INJECTION, SOLUTION INTRAMUSCULAR; INTRAVENOUS at 13:41

## 2021-12-21 RX ADMIN — MIDAZOLAM HYDROCHLORIDE 1 MG: 1 INJECTION, SOLUTION INTRAMUSCULAR; INTRAVENOUS at 23:42

## 2021-12-21 RX ADMIN — LABETALOL HYDROCHLORIDE 10 MG: 5 INJECTION, SOLUTION INTRAVENOUS at 01:54

## 2021-12-21 RX ADMIN — MAGNESIUM SULFATE HEPTAHYDRATE 2 G: 40 INJECTION, SOLUTION INTRAVENOUS at 08:10

## 2021-12-21 ASSESSMENT — PAIN DESCRIPTION - PAIN TYPE
TYPE: ACUTE PAIN

## 2021-12-21 ASSESSMENT — ENCOUNTER SYMPTOMS
MYALGIAS: 0
FOCAL WEAKNESS: 0
SINUS PAIN: 0
DIARRHEA: 0
WEIGHT LOSS: 0
HEADACHES: 1
WEAKNESS: 0
DIZZINESS: 0
DIAPHORESIS: 0
CONSTIPATION: 0
TREMORS: 0
FLANK PAIN: 0
SENSORY CHANGE: 0
CHILLS: 0
NAUSEA: 0
ORTHOPNEA: 0
SPEECH CHANGE: 1
ABDOMINAL PAIN: 0
COUGH: 0
HEARTBURN: 0
BACK PAIN: 0
SPUTUM PRODUCTION: 0
DEPRESSION: 0
HEMOPTYSIS: 0
BLOOD IN STOOL: 0
SHORTNESS OF BREATH: 0
NECK PAIN: 0
NERVOUS/ANXIOUS: 0
DOUBLE VISION: 0
BLURRED VISION: 0
SORE THROAT: 0
STRIDOR: 0
PHOTOPHOBIA: 0
INSOMNIA: 0
TINGLING: 0
MEMORY LOSS: 0
FEVER: 0
VOMITING: 0
HALLUCINATIONS: 0
NERVOUS/ANXIOUS: 1
LOSS OF CONSCIOUSNESS: 0
PALPITATIONS: 0
CLAUDICATION: 0
FOCAL WEAKNESS: 1
WEAKNESS: 1
SEIZURES: 0

## 2021-12-21 ASSESSMENT — COGNITIVE AND FUNCTIONAL STATUS - GENERAL
WALKING IN HOSPITAL ROOM: A LITTLE
MOVING TO AND FROM BED TO CHAIR: A LITTLE
TOILETING: A LITTLE
DRESSING REGULAR LOWER BODY CLOTHING: A LITTLE
PERSONAL GROOMING: A LITTLE
SUGGESTED CMS G CODE MODIFIER DAILY ACTIVITY: CJ
TOILETING: A LITTLE
PERSONAL GROOMING: A LITTLE
SUGGESTED CMS G CODE MODIFIER MOBILITY: CK
MOVING FROM LYING ON BACK TO SITTING ON SIDE OF FLAT BED: A LITTLE
STANDING UP FROM CHAIR USING ARMS: A LITTLE
SUGGESTED CMS G CODE MODIFIER DAILY ACTIVITY: CK
DAILY ACTIVITIY SCORE: 19
DRESSING REGULAR LOWER BODY CLOTHING: A LITTLE
MOBILITY SCORE: 19
CLIMB 3 TO 5 STEPS WITH RAILING: A LITTLE
DRESSING REGULAR UPPER BODY CLOTHING: A LITTLE
CLIMB 3 TO 5 STEPS WITH RAILING: A LITTLE
DAILY ACTIVITIY SCORE: 20
MOBILITY SCORE: 22
SUGGESTED CMS G CODE MODIFIER MOBILITY: CJ
WALKING IN HOSPITAL ROOM: A LITTLE
DRESSING REGULAR UPPER BODY CLOTHING: A LITTLE
HELP NEEDED FOR BATHING: A LITTLE

## 2021-12-21 ASSESSMENT — FIBROSIS 4 INDEX: FIB4 SCORE: 2.64

## 2021-12-21 ASSESSMENT — COPD QUESTIONNAIRES
HAVE YOU SMOKED AT LEAST 100 CIGARETTES IN YOUR ENTIRE LIFE: NO/DON'T KNOW
COPD SCREENING SCORE: 2
DURING THE PAST 4 WEEKS HOW MUCH DID YOU FEEL SHORT OF BREATH: SOME OF THE TIME
COPD SCREENING SCORE: 5
DO YOU EVER COUGH UP ANY MUCUS OR PHLEGM?: YES, A FEW DAYS A WEEK OR MONTH
DURING THE PAST 4 WEEKS HOW MUCH DID YOU FEEL SHORT OF BREATH: NONE/LITTLE OF THE TIME
DO YOU EVER COUGH UP ANY MUCUS OR PHLEGM?: NO/ONLY WITH OCCASIONAL COLDS OR INFECTIONS
HAVE YOU SMOKED AT LEAST 100 CIGARETTES IN YOUR ENTIRE LIFE: NO/DON'T KNOW

## 2021-12-21 ASSESSMENT — PATIENT HEALTH QUESTIONNAIRE - PHQ9
SUM OF ALL RESPONSES TO PHQ9 QUESTIONS 1 AND 2: 0
1. LITTLE INTEREST OR PLEASURE IN DOING THINGS: NOT AT ALL
2. FEELING DOWN, DEPRESSED, IRRITABLE, OR HOPELESS: NOT AT ALL

## 2021-12-21 ASSESSMENT — LIFESTYLE VARIABLES
DOES PATIENT WANT TO STOP DRINKING: NO
HOW MANY TIMES IN THE PAST YEAR HAVE YOU HAD 5 OR MORE DRINKS IN A DAY: 2
EVER HAD A DRINK FIRST THING IN THE MORNING TO STEADY YOUR NERVES TO GET RID OF A HANGOVER: NO
AVERAGE NUMBER OF DAYS PER WEEK YOU HAVE A DRINK CONTAINING ALCOHOL: 5
HAVE YOU EVER FELT YOU SHOULD CUT DOWN ON YOUR DRINKING: NO
EVER FELT BAD OR GUILTY ABOUT YOUR DRINKING: NO
TOTAL SCORE: 0
ON A TYPICAL DAY WHEN YOU DRINK ALCOHOL HOW MANY DRINKS DO YOU HAVE: 2
TOTAL SCORE: 0
TOTAL SCORE: 0
CONSUMPTION TOTAL: POSITIVE
SUBSTANCE_ABUSE: 0
HAVE PEOPLE ANNOYED YOU BY CRITICIZING YOUR DRINKING: NO
ALCOHOL_USE: YES

## 2021-12-21 ASSESSMENT — GAIT ASSESSMENTS
DISTANCE (FEET): 50
DEVIATION: INCREASED BASE OF SUPPORT;DECREASED HEEL STRIKE;DECREASED TOE OFF

## 2021-12-21 ASSESSMENT — ACTIVITIES OF DAILY LIVING (ADL): TOILETING: INDEPENDENT

## 2021-12-21 NOTE — CARE PLAN
The patient is Watcher - Medium risk of patient condition declining or worsening    Shift Goals  Clinical Goals: Q1 Neuro  Patient Goals: Rest  Family Goals: Comfort    Progress made toward(s) clinical / shift goals:    Problem: Pain - Standard  Goal: Alleviation of pain or a reduction in pain to the patient’s comfort goal  Outcome: Progressing     Problem: Knowledge Deficit - Standard  Goal: Patient and family/care givers will demonstrate understanding of plan of care, disease process/condition, diagnostic tests and medications  Outcome: Progressing

## 2021-12-21 NOTE — CONSULTS
Referring Physician: Dr. Rl Norris    Referral Reason: Stroke code    HPI:  Mr. Matti Olguin is a 71 y.o. left-handed male with past medical history significant for mitral valve replacement, previous stroke with mild left-sided numbness who was brought to emergency room for evaluation of 5 days history of holoacranial headache and also acute onset of dysarthria that was noted around 2 PM this afternoon by his wife.  She also noted some right facial drooping and insisted that he come to hospital for evaluation.  Patient is currently on aspirin and Plavix.  His brain CT revealed small subacute left subdural hematoma with maximum thickness of 10 mm.  There is no significant mass-effect or shift.  He denies having any head trauma.      ROS:   Review of Systems   Constitutional: Negative for chills, fever and malaise/fatigue.   HENT: Negative for hearing loss and tinnitus.    Eyes: Negative for blurred vision, double vision and photophobia.   Respiratory: Negative for shortness of breath.    Cardiovascular: Negative for chest pain.   Gastrointestinal: Negative for nausea and vomiting.   Genitourinary: Negative for hematuria.   Musculoskeletal: Negative for back pain, falls, myalgias and neck pain.   Skin: Negative for rash.   Neurological: Positive for tingling, sensory change, speech change, focal weakness and headaches. Negative for dizziness, tremors, seizures, loss of consciousness and weakness.   Psychiatric/Behavioral: Negative for memory loss.       Past Medical History:   Past Medical History:   Diagnosis Date   • Anticoagulation monitoring, special range    • Complete heart block (HCC) 10/2014    Statust post pacemaker placement.   • Dyspnea    • High cholesterol    • HTN (hypertension)    • NSVT (nonsustained ventricular tachycardia) ([facility]) - on pacer check    • S/P aortic valve replacement with bioprosthetic valve 10/2014   • Sleep apnea    • Snoring    • Stroke ([facility])     left sided  "\"tingling\"   • Unspecified hemorrhagic conditions     Coumadin   • Valvular heart disease 12/1988    AVR       Past Surgical History:   Past Surgical History:   Procedure Laterality Date   • PACEMAKER INSERTION Left 10/25/2014    Medtronic Advisa DR COOL [id number] implanted at [facility].   • RECOVERY  11/8/2013    Performed by Cath-Recovery Surgery at SURGERY SAME DAY [facility]S   • RECOVERY  10/8/2013    Performed by Cath-Recovery Surgery at SURGERY SAME DAY [facility]   • RECOVERY  9/21/2012    Performed by Matti Clemens M.D. at SURGERY SAME DAY [facility]   • AORTIC VALVE REPLACEMENT  1988    titanium   • OTHER ORTHOPEDIC SURGERY      Knee scope, right shoulder       Social History:   Social History     Socioeconomic History   • Marital status:      Spouse name: Not on file   • Number of children: Not on file   • Years of education: Not on file   • Highest education level: Not on file   Occupational History   • Not on file   Tobacco Use   • Smoking status: Never Smoker   • Smokeless tobacco: Current User     Types: Chew   Vaping Use   • Vaping Use: Never used   Substance and Sexual Activity   • Alcohol use: Yes     Comment: social drinker, not daily   • Drug use: No   • Sexual activity: Not on file   Other Topics Concern   • Not on file   Social History Narrative   • Not on file     Social Determinants of Health     Financial Resource Strain:    • Difficulty of Paying Living Expenses: Not on file   Food Insecurity:    • Worried About Running Out of Food in the Last Year: Not on file   • Ran Out of Food in the Last Year: Not on file   Transportation Needs:    • Lack of Transportation (Medical): Not on file   • Lack of Transportation (Non-Medical): Not on file   Physical Activity:    • Days of Exercise per Week: Not on file   • Minutes of Exercise per Session: Not on file   Stress:    • Feeling of Stress : Not on file   Social Connections:    • Frequency of Communication with Friends and " Family: Not on file   • Frequency of Social Gatherings with Friends and Family: Not on file   • Attends Jainism Services: Not on file   • Active Member of Clubs or Organizations: Not on file   • Attends Club or Organization Meetings: Not on file   • Marital Status: Not on file   Intimate Partner Violence:    • Fear of Current or Ex-Partner: Not on file   • Emotionally Abused: Not on file   • Physically Abused: Not on file   • Sexually Abused: Not on file   Housing Stability:    • Unable to Pay for Housing in the Last Year: Not on file   • Number of Places Lived in the Last Year: Not on file   • Unstable Housing in the Last Year: Not on file       Family Hx:   Family History   Problem Relation Age of Onset   • Heart Attack Father    • Heart Disease Sister        Current Medications:   No current facility-administered medications for this encounter.     Current Outpatient Medications   Medication Sig Dispense Refill   • clopidogrel (PLAVIX) 75 MG Tab Take 75 mg by mouth every day.     • LORazepam (ATIVAN) 1 MG Tab TAKE 1 TABLET BY MOUTH ONCE DAILY AS NEEDED FOR 90 DAYS     • pregabalin (LYRICA) 150 MG Cap Take 150 mg by mouth every day. FOR 30 DAYS     • potassium chloride SA (KDUR) 20 MEQ Tab CR Take 1 Tab by mouth every day. 90 Tab 3   • carvedilol (COREG) 25 MG Tab Take 2 Tabs by mouth 2 times a day, with meals. 180 Tab 3   • lisinopril (PRINIVIL) 20 MG Tab Take 1 Tab by mouth 2 times a day. 180 Tab 3   • hydroCHLOROthiazide (HYDRODIURIL) 25 MG Tab Take 1 Tab by mouth every day. 90 Tab 3   • atorvastatin (LIPITOR) 80 MG tablet Take 1 Tab by mouth every bedtime. 30 Tab 2   • cloNIDine (CATAPRES) 0.1 MG Tab Take 1 Tab by mouth every 6 hours as needed. As needed for SBP >150 60 Tab 2   • HYDROcodone-acetaminophen (NORCO) 5-325 MG Tab per tablet Take 1 Tab by mouth every four hours as needed.     • aspirin EC (ECOTRIN) 81 MG Tablet Delayed Response Take 81 mg by mouth every day.     • folic acid (FOLVITE) 400 MCG  tablet Take 400 mcg by mouth every day.     • multivitamin (THERAGRAN) TABS Take 1 Tab by mouth every day. 30 Tab 3   • zolpidem (AMBIEN) 5 MG TABS Take 5 mg by mouth at bedtime as needed.         Allergies:   Allergies   Allergen Reactions   • Nkda [No Known Drug Allergy]        Physical Exam:   Vitals:    12/20/21 1801 12/20/21 1900   BP:  151/93   Pulse:  68   Resp:  18   SpO2:  93%   Weight: 114 kg (250 lb 10.6 oz)        Physical Exam   GENERAL:  Lying in the hospital bed in no apparent distress.  Head: Normocephalic and atraumatic.   Eyes: Pupils are equal, round, and reactive to light. EOM are normal.   Cardiovascular: Normal rate and regular rhythm.    Pulmonary/Chest: Breath sounds normal.   Abdominal: Soft. Bowel sounds are normal. He exhibits no distension. There is no tenderness.   Skin: Skin is warm and dry. No rash noted. No erythema.  Neuro Exam  MENTAL STATUS:  Awake, alert, oriented times 3.  Speech is slightly dysarthric but completely comprehendible, comprehension is intact.  CRANIAL NERVES:  PERRL, EOMI with no nystagmus, face is symmetric, facial sensation is intact, tongue is in the midline, palate is symmetric. Hearing is intact to finger rub bilaterally. Shoulder shrugs are normal.  MOTOR:  Motor examination showed normal strength in direct testing of both upper and lower extremities, proximal and distal.    SENSATION:  Intact to light touch, temperature on the right, he has chronic numbness in left upper and lower extremity.  REFLEXES:  2+ and symmetric, toes are downgoing bilaterally  COORDINATION:  Normal finger to nose and heel to shin bilaterally  GAIT:  Deferred       NIH Stroke Scale:    1a. Level of Consciousness (Alert, drowsy, etc): 0= Alert    1b. LOC Questions (Month, age): 0= Answers both correctly    1c. LOC Commands (Open/close eyes make fist/let go): 0= Obeys both correctly    2.   Best Gaze (Eyes open - patient follows examiner's finger on face): 0= Normal    3.   Visual  Shafer (introduce visual stimulus/threat to patient's field quadrants): 0= No visual loss    4.   Facial Paresis (Show teeth, raise eyebrows and squeeze eyes shut): 1= Minor     5a. Motor Arm - Left (Elevate arm to 90 degrees if patient is sitting, 45 degrees if  supine): 0= No drift    5b. Motor Arm - Right (Elevate arm to 90 degrees if patient is sitting, 45 degrees if supine): 0= No drift    6a. Motor Leg - Left (Elevate leg 30 degrees with patient supine): 0= No drift    6b. Motor Leg - Right  (Elevate leg 30 degrees with patient supine): 0= No drift    7.   Limb Ataxia (Finger-nose, heel down shin): 0= No ataxia    8.   Sensory (Pin prick to face, arm, trunk and leg - compare side to side): 1= Partial loss    9.  Best Language (Name item, describe a picture and read sentences): 0= No aphasia    10. Dysarthria (Evaluate speech clarity by patient repeating listed words): 1= Mild to moderate slurring    11. Extinction and Inattention (Use information from prior testing to identify neglect or  double simultaneous stimuli testing): 0= No neglect    Total NIH Score: 3     Prehospital modified Mayes Scale (MRS): 1 = No significant disability, despite symptoms; able to perform all usual duties and activities      Labs:  Recent Labs     12/20/21 1812   WBC 7.4   RBC 4.41*   HEMOGLOBIN 15.3   HEMATOCRIT 44.2   .2*   MCH 34.7*   MCHC 34.6   RDW 48.5   PLATELETCT 151*   MPV 12.3     Recent Labs     12/20/21 1812   SODIUM 136   POTASSIUM 3.7   CHLORIDE 101   CO2 23   GLUCOSE 114*   BUN 25*   CREATININE 0.76   CALCIUM 9.3     Recent Labs     12/20/21  1812   APTT 29.9   INR 1.07                 Recent Labs     12/20/21 1812   SODIUM 136   POTASSIUM 3.7   CHLORIDE 101   CO2 23   GLUCOSE 114*   BUN 25*     Recent Labs     12/20/21 1812   SODIUM 136   POTASSIUM 3.7   CHLORIDE 101   CO2 23   BUN 25*   CREATININE 0.76   CALCIUM 9.3     Recent Labs     12/20/21 1812   APTT 29.9   INR 1.07     Results for orders placed  or performed during the hospital encounter of 12/01/16   Echocardiogram Comp w/o Cont   Result Value Ref Range    Eject.Frac. MOD BP 54.35     Eject.Frac. MOD 4C 56.86     Eject.Frac. MOD 2C 54.68     Left Ventrical Ejection Fraction 55          Imaging reviewed:    CT-CTA HEAD WITH & W/O-POST PROCESS   Final Result      1.  CT angiogram of the [place] within normal limits.      2.  Small subacute left subdural hematoma with maximum thickness of 10 mm. No significant mass effect or shift      3.  Findings were discussed with KELSI FISHER on 12/20/2021 6:52 PM.      Hemorrhage grading-BIG 3             Assessment/Plan:   71 y.o. male with multiple medical problem as outlined above who presented with 5 days history of holoacranial headache and few hours history of dysarthria and right facial droop.  His NIH scale score is 3.  He underwent a brain CT which revealed small subacute left subdural hematoma with maximum thickness of 10 mm.  He is currently on Plavix and aspirin.  Obviously is noted candidate for administration of TPA and he is not a thrombectomy candidate due to his subdural.  We will hold his aspirin and Plavix.  He can be admitted to a stepdown unit with every 2-hour neuro check.  He denies having any trauma and this could be spontaneous subdural hematoma.  Maintain systolic blood pressure less than 160.  Avoid antiplatelet or anticoagulant.  DVT prevention with SCDs.  He needs to be evaluated by neurosurgery.  Discussed with ..  Discussed with his wife at the bedside  Total critical care time spent was 40 minutes.

## 2021-12-21 NOTE — CONSULTS
Hospital Medicine Consultation    Date of Service  12/21/2021    Referring Physician  Luma Lucio M.D.    Consulting Physician  Delio Herrera D.O.    Reason for Consultation  Assuming medical management    History of Presenting Illness  71 y.o. male who presented 12/20/2021 with a PMHx of AVT, HTN and complete heart block with PPM in place, CVA.  Pt presented with head ache and mild L upper extremity weakness.  He is left hand dominant.  On imaging he was found to have an acute 1cm L SDH.  Pt was on ASA and plavix for his Hx of CVA.      On my exam ROS is limted as pt is having significant issues with expressive aphasia.  He is anxious this am, states this is often times a problem for him.  Denies weakness or sensation changes.      Wife at bedside.  Reviewed current clinical issues and updates her.    DW Neurology Dr Peoples and Neurosurgery Dr Abrams    Review of Systems  Review of Systems   Unable to perform ROS: Other   Neurological: Positive for speech change and headaches. Negative for dizziness, tingling, focal weakness and weakness.   Psychiatric/Behavioral: The patient is nervous/anxious.        Past Medical History   has a past medical history of Anticoagulation monitoring, special range, Complete heart block (HCC) (10/2014), Dyspnea, High cholesterol, HTN (hypertension), NSVT (nonsustained ventricular tachycardia) ([facility]) - on pacer check, S/P aortic valve replacement with bioprosthetic valve (10/2014), Sleep apnea, Snoring, Stroke (HCC) (), Unspecified hemorrhagic conditions, and Valvular heart disease (12/1988).    Surgical History   has a past surgical history that includes other orthopedic surgery; aortic valve replacement (1988); recovery (9/21/2012); recovery (10/8/2013); recovery (11/8/2013); and pacemaker insertion (Left, 10/25/2014).    Family History  family history includes Heart Attack in his father; Heart Disease in his sister.    Social History   reports that he has  never smoked. His smokeless tobacco use includes chew. He reports current alcohol use. He reports that he does not use drugs.    Medications  Prior to Admission Medications   Prescriptions Last Dose Informant Patient Reported? Taking?   HYDROcodone-acetaminophen (NORCO) 5-325 MG Tab per tablet 12/19/2021 at 2200 Patient Yes No   Sig: Take 1 Tab by mouth every four hours as needed.   LORazepam (ATIVAN) 1 MG Tab 12/19/2021 at 2200 Patient Yes No   Sig: Take 1 mg by mouth every 8 hours as needed for Anxiety.   aspirin EC (ECOTRIN) 81 MG Tablet Delayed Response 12/20/2021 at 0900 Patient Yes No   Sig: Take 81 mg by mouth every day.   atorvastatin (LIPITOR) 80 MG tablet 12/19/2021 at 2200 Patient No No   Sig: Take 1 Tab by mouth every bedtime.   carvedilol (COREG) 25 MG Tab 12/20/2021 at 0900 Patient No No   Sig: Take 2 Tabs by mouth 2 times a day, with meals.   cloNIDine (CATAPRES) 0.1 MG Tab > 1 week at unkown Patient No No   Sig: Take 1 Tab by mouth every 6 hours as needed. As needed for SBP >150   clopidogrel (PLAVIX) 75 MG Tab 12/20/2021 at 0900 Patient Yes No   Sig: Take 75 mg by mouth every day.   folic acid (FOLVITE) 400 MCG tablet 12/20/2021 at 0900 Patient Yes No   Sig: Take 400 mcg by mouth every day.   hydroCHLOROthiazide (HYDRODIURIL) 25 MG Tab 12/20/2021 at 0900 Patient No No   Sig: Take 1 Tab by mouth every day.   lisinopril (PRINIVIL) 20 MG Tab 12/20/2021 at 0900 Patient No No   Sig: Take 1 Tab by mouth 2 times a day.   potassium chloride SA (KDUR) 20 MEQ Tab CR 12/20/2021 at 0900 Patient No No   Sig: Take 1 Tab by mouth every day.   pregabalin (LYRICA) 150 MG Cap 12/20/2021 at 0900 Patient Yes No   Sig: Take 150 mg by mouth 2 times a day.   therapeutic multivitamin-minerals (THERAGRAN-M) Tab 12/20/2021 at 0900 Patient Yes Yes   Sig: Take 1 Tablet by mouth every day.   zolpidem (AMBIEN) 5 MG TABS 12/19/2021 at 2200 Patient Yes No   Sig: Take 5 mg by mouth at bedtime as needed for Sleep.       Facility-Administered Medications: None       Allergies  Allergies   Allergen Reactions   • Nkda [No Known Drug Allergy]        Physical Exam  Temp:  [36.2 °C (97.2 °F)-36.7 °C (98 °F)] 36.4 °C (97.5 °F)  Pulse:  [57-70] 57  Resp:  [16-34] 19  BP: (124-197)/(76-95) 158/77  SpO2:  [91 %-93 %] 93 %    Physical Exam  Vitals reviewed.   Constitutional:       General: He is not in acute distress.     Appearance: He is well-developed. He is not diaphoretic.   HENT:      Head: Normocephalic and atraumatic.   Eyes:      Conjunctiva/sclera: Conjunctivae normal.   Neck:      Vascular: No JVD.   Cardiovascular:      Rate and Rhythm: Normal rate.      Heart sounds: No murmur heard.  No gallop.    Pulmonary:      Effort: Pulmonary effort is normal. No respiratory distress.      Breath sounds: No stridor. No wheezing or rales.   Abdominal:      Palpations: Abdomen is soft.      Tenderness: There is no abdominal tenderness. There is no guarding or rebound.   Musculoskeletal:         General: No tenderness.      Right lower leg: No edema.      Left lower leg: No edema.   Skin:     General: Skin is warm and dry.      Findings: No rash.   Neurological:      Mental Status: He is oriented to person, place, and time.      Comments: 5/5 motor x 4  Clear expressive deficits but follows verbal commands and answers questions appropriately  CN II-XII otherwise intact   Psychiatric:         Thought Content: Thought content normal.         Fluids      Laboratory  Recent Labs     12/20/21 1812   WBC 7.4   RBC 4.41*   HEMOGLOBIN 15.3   HEMATOCRIT 44.2   .2*   MCH 34.7*   MCHC 34.6   RDW 48.5   PLATELETCT 151*   MPV 12.3     Recent Labs     12/20/21 1812 12/20/21 2059 12/21/21  0220   SODIUM 136 134* 136   POTASSIUM 3.7  --   --    CHLORIDE 101  --   --    CO2 23  --   --    GLUCOSE 114*  --   --    BUN 25*  --   --    CREATININE 0.76  --   --    CALCIUM 9.3  --   --      Recent Labs     12/20/21 1812   APTT 29.9   INR 1.07           Recent Labs     12/20/21 1812   TRIGLYCERIDE 78   HDL 37*   LDL 64        Imaging  CT-CTA HEAD WITH & W/O-POST PROCESS   Final Result      1.  CT angiogram of the [place] within normal limits.      2.  Small subacute left subdural hematoma with maximum thickness of 10 mm. No significant mass effect or shift      3.  Findings were discussed with KELSI FISHER on 12/20/2021 6:52 PM.      Hemorrhage grading-BIG 3          Assessment/Plan  SDH (subdural hematoma) (HCC)- (present on admission)  Assessment & Plan  DW Neuro Srgry Dr Abrams: he had opportunity to review imaging.  Feels pt does not need intervention unless he were to worsen  Repeat CT today is stable  Neuro is following  PT/OT/SLP consulted  BP control  On no blood thinners presently, came in on ASA and plavix for Hx of previous CVA  Cont Neuro checks  CTA neg for vascular malformations  Neurology following    NSVT (nonsustained ventricular tachycardia) (HCC) - on pacer check- (present on admission)  Assessment & Plan  History of      S/P aortic valve replacement with bioprosthetic valve: repeat AVR 10/2014- (present on admission)  Assessment & Plan  bioprosthetic    Hypertension  Assessment & Plan  Resume home lisinopril 20, coreg 25  Cont IV hydralazine and labetalol IV for breakthrough

## 2021-12-21 NOTE — ED NOTES
Assumed care of patient. Sitting up in chair. Continues to have word finding difficulty. Equal strength bilaterally

## 2021-12-21 NOTE — ASSESSMENT & PLAN NOTE
Resume home lisinopril 20, coreg 25  Cont IV hydralazine and labetalol IV for breakthrough  Blood pressure goal less than 160

## 2021-12-21 NOTE — PROGRESS NOTES
Report called to APRIL Thompson. Patient transferred in wheelchair with all belongings and no medications in the drawer.

## 2021-12-21 NOTE — THERAPY
Speech Language Pathology   Clinical Swallow Evaluation     Patient Name: Matti Olguin  AGE:  71 y.o., SEX:  male  Medical Record #: [id number]  Today's Date: 12/21/2021     Precautions  Precautions: Swallow Precautions ( See Comments)    Assessment    71 y.o. male admitted with R sided facial droop and headache. PMHx includes 2 CVAs.  Head CT shows small subacute left subdural hematoma with maximum thickness of 10 mm. No significant mass effect or shift    Pt was seen for clinical swallow evaluation with spouse present at bedside.  Pt denied any previous dysphagia. Pt initially passed dysphagia screening, however RN reporting increased word finding deficits in the last hour, so requested formal speech evaluation.  Pt very anxious and reported claustrophobia.  He was sitting up in a chair, AAOx4, and noted to have word finding deficits, however no dysarthria or facial droop appreciated.  Voice was strong and cough was strong. No other gross deficits noted in oral exam, and laryngeal elevation was presumed complete with palpation.  Pt was given PO trials of ice chips, thin liquids by tsp and cup sip, and small amounts of applesauce.  No overt s/sx of aspiration were noted.  Oral acceptance and containment was WNL, and swallow trigger was timely.  NO further PO was given at this time as Neurologist reported he wanted stat CT and to make pt NPO.    SLP returned a few hours later, as pt now cleared for PO intake.  Pt reporting lack of hunger and declining PO initially, however with education and encouragement he participated minimally.  Pt consumed trials of thins by cup sip and straw sip, as well as small amounts of regular solids (crackers).  Mastication was effective with no oral residue appreciated, and again swallow trigger was timely.  Based upon limited evaluation, pt appears to be at the level to start regular diet with thins, with close monitoring.  SLP continues to follow.    Recommendations:  1) Start  regular diet with thins, with close monitoring  2) Sit up at 90 degrees for all PO intake, small bites and sips  3) Wash pills with liquids  4) Discontinue PO with s/sx of aspiration, or other difficulty    Plan    Recommend Speech Therapy 5 times per week until therapy goals are met for the following treatments:  Dysphagia Training and Patient / Family / Caregiver Education.    Discharge Recommendations: Anticipate that the patient will have no further speech therapy needs after discharge from the hospital       Objective     12/21/21 8260   Prior Living Situation   Prior Services None;Home-Independent   Lives with - Patient's Self Care Capacity Spouse   Prior Level Of Function   Communication Within Functional Limits   Swallow Within Functional Limits   Dentition Intact   Hearing Within Functional Limits for Evaluation   Vision Wears Corrective Lenses   Patient's Primary Language English   Occupation (Pre-Hospital Vocational) Retired Due To Age   Oral Motor Eval    Is Patient Able to Complete Oral Motor Eval Yes, Within Normal Limits   Laryngeal Function   Voice Quality Within Functional Limits   Volutional Cough Within Functional Limits   Excursion Upon Swallow Complete   Oral Food Presentation   Single Swallow Thin (0) Within Functional Limits   Serial Swallow Thin (0) Within Functional Limits   Liquidised (3) Within Functional Limits   Regular (7) Within Functional Limits   Self Feeding Independent   Tracheostomy   Tracheostomy  No   Dysphagia Strategies / Recommendations   Strategies / Interventions Recommended (Yes / No) Yes   Compensatory Strategies Monitor During Meals;Head of Bed 90 Degrees During Eating / Drinking;Single Sips / Bites   Diet / Liquid Recommendation Thin (0);Regular (7)   Medication Administration  Float Whole with Puree   Dysphagia Rating   Nutritional Liquid Intake Rating Scale Non thickened beverages   Nutritional Food Intake Rating Scale Total oral diet with no restrictions

## 2021-12-21 NOTE — CONSULTS
Critical Care Consultation    Date of consult: 12/20/2021    Referring Physician  Temo Gee M.D.    Reason for Consultation  I was asked by critical care consultation for acute left subdural hematoma.    History of Presenting Illness  71 y.o. male who presented 12/20/2021 with History of aortic history of aortic valve replacement, hypertension, and complete heart block with an MR conditional pacemaker in place by [employer]. He is on Plavix after previous stroke that left him with mild left upper extremity weakness.He has had an unrelenting 10/10 diffuse headache for several days.  However, today he developed aphasia and right upper extremity weakness. He was transferred to [facility] for evaluation.  A head CT revealed an acute left subdural hematoma.    Code Status  Full Code    Review of Systems  Review of Systems   Constitutional: Negative.    HENT: Negative.    Eyes: Negative for blurred vision and double vision.   Respiratory: Negative for shortness of breath.    Cardiovascular: Negative for chest pain.   Gastrointestinal: Negative for nausea and vomiting.   Genitourinary: Negative.    Musculoskeletal: Negative.    Neurological: Positive for speech change, focal weakness and headaches.   Psychiatric/Behavioral: Negative.    All other systems reviewed and are negative.      Past Medical History   has a past medical history of Anticoagulation monitoring, special range, Complete heart block (HCC) (10/2014), Dyspnea, High cholesterol, HTN (hypertension), NSVT (nonsustained ventricular tachycardia) (HCC) - on pacer check, S/P aortic valve replacement with bioprosthetic valve (10/2014), Sleep apnea, Snoring, Stroke (HCC) (), Unspecified hemorrhagic conditions, and Valvular heart disease (12/1988).    Surgical History   has a past surgical history that includes other orthopedic surgery; aortic valve replacement (1988); recovery (9/21/2012); recovery (10/8/2013); recovery (11/8/2013);  and pacemaker insertion (Left, 10/25/2014).    Family History  family history includes Heart Attack in his father; Heart Disease in his sister.    Social History   reports that he has never smoked. His smokeless tobacco use includes chew. He reports current alcohol use. He reports that he does not use drugs.    Medications  Home Medications     Reviewed by Ildefonso Keita (Pharmacy Tech) on 12/20/21 at 2012  Med List Status: Complete   Medication Last Dose Status   aspirin EC (ECOTRIN) 81 MG Tablet Delayed Response 12/20/2021 Active   atorvastatin (LIPITOR) 80 MG tablet 12/19/2021 Active   carvedilol (COREG) 25 MG Tab 12/20/2021 Active   cloNIDine (CATAPRES) 0.1 MG Tab > 1 week Active   clopidogrel (PLAVIX) 75 MG Tab 12/20/2021 Active   folic acid (FOLVITE) 400 MCG tablet 12/20/2021 Active   hydroCHLOROthiazide (HYDRODIURIL) 25 MG Tab 12/20/2021 Active   HYDROcodone-acetaminophen (NORCO) 5-325 MG Tab per tablet 12/19/2021 Active   lisinopril (PRINIVIL) 20 MG Tab 12/20/2021 Active   LORazepam (ATIVAN) 1 MG Tab 12/19/2021 Active   potassium chloride SA (KDUR) 20 MEQ Tab CR 12/20/2021 Active   pregabalin (LYRICA) 150 MG Cap 12/20/2021 Active   therapeutic multivitamin-minerals (THERAGRAN-M) Tab 12/20/2021 Active   zolpidem (AMBIEN) 5 MG TABS 12/19/2021 Active              Current Facility-Administered Medications   Medication Dose Route Frequency Provider Last Rate Last Admin   • Respiratory Therapy Consult   Nebulization Continuous RT Matti Pichardo M.D.       • LORazepam (ATIVAN) injection 2 mg  2 mg Intravenous Q5 MIN PRN Matti Pichardo M.D.       • ondansetron (ZOFRAN ODT) dispertab 4 mg  4 mg Oral Q4HRS PRN Matti Pichardo M.D.        Or   • ondansetron (ZOFRAN) syringe/vial injection 4 mg  4 mg Intravenous Q4HRS PRN Matti Pichardo M.D.       • MD Alert...ICU Electrolyte Replacement per Pharmacy   Other PHARMACY TO DOSE Matti Pichardo M.D.       • labetalol (NORMODYNE/TRANDATE) injection 10 mg  10 mg  Intravenous Q4HRS PRN Matti Pichardo M.D.       • hydrALAZINE (APRESOLINE) injection 10 mg  10 mg Intravenous Q4HRS PRN Matti Pichardo M.D.       • enalaprilat (Vasotec) injection 1.25 mg 1 mL  1.25 mg Intravenous Q6HRS PRN Matti Pichardo M.D.       • senna-docusate (PERICOLACE or SENOKOT S) 8.6-50 MG per tablet 2 Tablet  2 Tablet Oral BID Matti Pichardo M.D.        And   • polyethylene glycol/lytes (MIRALAX) PACKET 1 Packet  1 Packet Oral QDAY PRN Matti Pichardo M.D.        And   • magnesium hydroxide (MILK OF MAGNESIA) suspension 30 mL  30 mL Oral QDAY PRN Matti Pichardo M.D.        And   • bisacodyl (DULCOLAX) suppository 10 mg  10 mg Rectal QDAY PRN Matti Pichardo M.D.       • NS infusion   Intravenous Continuous Matti Pichardo M.D. 50 mL/hr at 12/20/21 2100 New Bag at 12/20/21 2100   • acetaminophen (TYLENOL) suppository 650 mg  650 mg Rectal Q4HRS PRN Matti Pichardo M.D.        Or   • acetaminophen (TYLENOL) tablet 1,000 mg  1,000 mg Oral Q6HRS PRN Matti Pichardo M.D.       • morphine 4 MG/ML injection 1-5 mg  1-5 mg Intravenous Q HOUR PRN Matti Pichardo M.D.   4 mg at 12/20/21 2351     Current Outpatient Medications   Medication Sig Dispense Refill   • therapeutic multivitamin-minerals (THERAGRAN-M) Tab Take 1 Tablet by mouth every day.     • clopidogrel (PLAVIX) 75 MG Tab Take 75 mg by mouth every day.     • LORazepam (ATIVAN) 1 MG Tab Take 1 mg by mouth every 8 hours as needed for Anxiety.     • pregabalin (LYRICA) 150 MG Cap Take 150 mg by mouth 2 times a day.     • potassium chloride SA (KDUR) 20 MEQ Tab CR Take 1 Tab by mouth every day. 90 Tab 3   • carvedilol (COREG) 25 MG Tab Take 2 Tabs by mouth 2 times a day, with meals. 180 Tab 3   • lisinopril (PRINIVIL) 20 MG Tab Take 1 Tab by mouth 2 times a day. 180 Tab 3   • hydroCHLOROthiazide (HYDRODIURIL) 25 MG Tab Take 1 Tab by mouth every day. 90 Tab 3   • atorvastatin (LIPITOR) 80 MG tablet Take 1 Tab by mouth every bedtime. 30 Tab 2   •  cloNIDine (CATAPRES) 0.1 MG Tab Take 1 Tab by mouth every 6 hours as needed. As needed for SBP >150 60 Tab 2   • HYDROcodone-acetaminophen (NORCO) 5-325 MG Tab per tablet Take 1 Tab by mouth every four hours as needed.     • aspirin EC (ECOTRIN) 81 MG Tablet Delayed Response Take 81 mg by mouth every day.     • folic acid (FOLVITE) 400 MCG tablet Take 400 mcg by mouth every day.     • zolpidem (AMBIEN) 5 MG TABS Take 5 mg by mouth at bedtime as needed for Sleep.         Allergies  Allergies   Allergen Reactions   • Nkda [No Known Drug Allergy]        Vital Signs last 24 hours  Temp:  [36.7 °C (98 °F)] 36.7 °C (98 °F)  Pulse:  [64-70] 66  Resp:  [18-19] 19  BP: (124-151)/(76-93) 124/76  SpO2:  [91 %-93 %] 91 %    Physical Exam  Physical Exam  Constitutional:       General: He is not in acute distress.  HENT:      Head: Normocephalic and atraumatic.      Mouth/Throat:      Mouth: Mucous membranes are moist.   Eyes:      Extraocular Movements: Extraocular movements intact.      Pupils: Pupils are equal, round, and reactive to light.   Cardiovascular:      Rate and Rhythm: Regular rhythm.      Heart sounds: No murmur heard.  No friction rub. No gallop.    Pulmonary:      Effort: Pulmonary effort is normal. No respiratory distress.   Abdominal:      General: There is no distension.      Palpations: Abdomen is soft. There is no mass.      Tenderness: There is no abdominal tenderness.   Musculoskeletal:         General: No swelling.      Cervical back: Neck supple.      Right lower leg: No edema.      Left lower leg: No edema.   Skin:     General: Skin is warm and dry.   Neurological:      Mental Status: He is alert.      Comments: Awake alert, follows commands.  Reception is intact.  However he has a conductive aphasia and right upper extremity weakness.  Left upper extremity weakness is at baseline.   Psychiatric:         Mood and Affect: Mood normal.         Behavior: Behavior normal.         Fluids  No intake or output  data in the 24 hours ending 12/21/21 0000    Laboratory  Recent Results (from the past 48 hour(s))   CBC WITH DIFFERENTIAL    Collection Time: 12/20/21  6:12 PM   Result Value Ref Range    WBC 7.4 4.8 - 10.8 K/uL    RBC 4.41 (L) 4.70 - 6.10 M/uL    Hemoglobin 15.3 14.0 - 18.0 g/dL    Hematocrit 44.2 42.0 - 52.0 %    .2 (H) 81.4 - 97.8 fL    MCH 34.7 (H) 27.0 - 33.0 pg    MCHC 34.6 33.7 - 35.3 g/dL    RDW 48.5 35.9 - 50.0 fL    Platelet Count 151 (L) 164 - 446 K/uL    MPV 12.3 9.0 - 12.9 fL    Neutrophils-Polys 66.40 44.00 - 72.00 %    Lymphocytes 14.30 (L) 22.00 - 41.00 %    Monocytes 15.10 (H) 0.00 - 13.40 %    Eosinophils 3.40 0.00 - 6.90 %    Basophils 0.40 0.00 - 1.80 %    Immature Granulocytes 0.40 0.00 - 0.90 %    Nucleated RBC 0.00 /100 WBC    Neutrophils (Absolute) 4.88 1.82 - 7.42 K/uL    Lymphs (Absolute) 1.05 1.00 - 4.80 K/uL    Monos (Absolute) 1.11 (H) 0.00 - 0.85 K/uL    Eos (Absolute) 0.25 0.00 - 0.51 K/uL    Baso (Absolute) 0.03 0.00 - 0.12 K/uL    Immature Granulocytes (abs) 0.03 0.00 - 0.11 K/uL    NRBC (Absolute) 0.00 K/uL   COMP METABOLIC PANEL    Collection Time: 12/20/21  6:12 PM   Result Value Ref Range    Sodium 136 135 - 145 mmol/L    Potassium 3.7 3.6 - 5.5 mmol/L    Chloride 101 96 - 112 mmol/L    Co2 23 20 - 33 mmol/L    Anion Gap 12.0 7.0 - 16.0    Glucose 114 (H) 65 - 99 mg/dL    Bun 25 (H) 8 - 22 mg/dL    Creatinine 0.76 0.50 - 1.40 mg/dL    Calcium 9.3 8.5 - 10.5 mg/dL    AST(SGOT) 40 12 - 45 U/L    ALT(SGPT) 48 2 - 50 U/L    Alkaline Phosphatase 75 30 - 99 U/L    Total Bilirubin 1.1 0.1 - 1.5 mg/dL    Albumin 4.2 3.2 - 4.9 g/dL    Total Protein 7.7 6.0 - 8.2 g/dL    Globulin 3.5 1.9 - 3.5 g/dL    A-G Ratio 1.2 g/dL   TROPONIN    Collection Time: 12/20/21  6:12 PM   Result Value Ref Range    Troponin T 14 6 - 19 ng/L   PROTHROMBIN TIME    Collection Time: 12/20/21  6:12 PM   Result Value Ref Range    PT 13.5 12.0 - 14.6 sec    INR 1.07 0.87 - 1.13   APTT    Collection Time:  21  6:12 PM   Result Value Ref Range    APTT 29.9 24.7 - 36.0 sec   ESTIMATED GFR    Collection Time: 21  6:12 PM   Result Value Ref Range    GFR If African American >60 >60 mL/min/1.73 m 2    GFR If Non African American >60 >60 mL/min/1.73 m 2   Lipid Profile    Collection Time: 21  6:12 PM   Result Value Ref Range    Cholesterol,Tot 117 100 - 199 mg/dL    Triglycerides 78 0 - 149 mg/dL    HDL 37 (A) >=40 mg/dL    LDL 64 <100 mg/dL   EKG (NOW)    Collection Time: 21  7:45 PM   Result Value Ref Range    Report       [facility] Emergency Dept.    Test Date:  2021  Pt Name:    CRYSTAL SALAZAR             Department: ER  MRN:        [id number]                      Room:       [facility]  Gender:     Male                         Technician: [id number]  :        1950                   Requested By:KELSI FISHER  Order #:    [id number]                    Reading MD:    Measurements  Intervals                                Axis  Rate:       73                           P:          -15  AK:         210                          QRS:        256  QRSD:       169                          T:          53  QT:         459  QTc:        506    Interpretive Statements  Atrial-sensed ventricular-paced rhythm  No further analysis attempted due to paced rhythm  Compared to ECG 2019 11:45:46  No significant changes     Sodium Serum (NA)    Collection Time: 21  8:59 PM   Result Value Ref Range    Sodium 134 (L) 135 - 145 mmol/L   Platelet Mapping with Basic TEG    Collection Time: 21  8:59 PM   Result Value Ref Range    Reaction Time Initial-R 5.7 4.6 - 9.1 min    React Time Initial Hep 5.0 4.3 - 8.3 min    Clot Kinetics-K 1.1 0.8 - 2.1 min    Clot Angle-Angle 76.3 63.0 - 78.0 degrees    Maximum Clot Strength-MA 62.5 52.0 - 69.0 mm    TEG Functional Fibrinogen(MA) 27.2 15.0 - 32.0 mm    % Inhibition ADP 24.7 (H) 0.0 - 17.0 %    % Inhibition AA 51.4 (H) 0.0 - 11.0 %     TEG Algorithm Link Algorithm        Imaging  CT-CTA HEAD WITH & W/O-POST PROCESS   Final Result      1.  CT angiogram of the [place] within normal limits.      2.  Small subacute left subdural hematoma with maximum thickness of 10 mm. No significant mass effect or shift      3.  Findings were discussed with KELSI FISHER on 12/20/2021 6:52 PM.      Hemorrhage grading-BIG 3          Assessment/Plan  SDH (subdural hematoma) (HCC)- (present on admission)  Assessment & Plan  Serial neurologic exams, every 2 hours  Follow-up CT scan in 6 hours.  Hold antiplatelet therapy  Focal neurologic deficits appear out of proportion to the size of his SDH. Recommend obtaining an MRI brain for further evaluation.  Neurosurgery consult for eventual evacuation.    NSVT (nonsustained ventricular tachycardia) (HCC) - on pacer check- (present on admission)  Assessment & Plan  [employer] MR Conditional pacemaker  Call [employer] representative prior to MRI    S/P aortic valve replacement with bioprosthetic valve: repeat AVR 10/2014- (present on admission)  Assessment & Plan  Porcine valve in place, anticoagulation not necessary.    Hypertension  Assessment & Plan  Controlled.  Goal SBP <140  Parenteral anti-hypertensives prn to maintain BP control  Resume home po meds after he passes swallow evaluation      Discussed patient condition and risk of morbidity and/or mortality with Family, RN, Pharmacy, Patient and neurology.      This patient remains at high risk for worsening central nervous system dysfunction, requiring frequent neurological assessment and strict blood pressure control.  I have assessed and reassessed the neurologic exam, blood pressure, and hemodynamics     Critical care time 60 minutes in directly providing and coordinating critical care and extensive data review.  No time overlap and excludes procedures.

## 2021-12-21 NOTE — ED TRIAGE NOTES
"Matti Olguin  71 y.o. male  Chief Complaint   Patient presents with   • Possible Stroke     HA x 1 wk. Yesterday wife noted that speech was \"a little slurry, but much worse since about noon.\" Expressive aphasia, R facial droop noted. Hx CVA     Triage RN contacted by tech. CN notified of symptoms, brought immediately back to  in WC. Report to receiving RN.    Wt 114 kg (250 lb 10.6 oz)   BMI 34.00 kg/m²       "

## 2021-12-21 NOTE — ASSESSMENT & PLAN NOTE
Serial neurologic exams, every 2 hours  Follow-up CT scan in 6 hours.  Hold antiplatelet therapy  Neurology recs: patient not a candidate for TPA and he is not a thrombectomy candidate due to his subdural Hold aspirin and plavix. Q2hour neuro checks.   Maintain SBP < 160  Recommend obtaining an MRI brain for further evaluation.  Neurosurgery consult for eventual evacuation.

## 2021-12-21 NOTE — CONSULTS
PMR aware of referral. Therapy notes are incomplete at this time. TCCs will follow in the periphery for rehab appropriateness, and a full consult will be done if the patient qualifies for IPR. If the patient is determined not to be a candidate for IPR, TCC's will sign off the case and will no longer follow. Look for updates in the Discharge tab. Please reach out with questions or requests for medical management.     Dx: Non-op SDH, 10mm, no midline shift. Presenting complaints of right sided weakness have resolved.     Awaiting: therapy notes, MR brain     Impression: Unlikely to need IPR       Jono Alexandra, DO   Physical Medicine and Rehabilitation

## 2021-12-21 NOTE — ED NOTES
ICU Stay Summary Documentation:    MAYDA Lopez is a 78 year old male  with a past medical history significant for MI, High cholesterol, GERD, CAD, Hx. Bladder tumor s/p TURP, Atrial Fibrillation (Anticoagulated on Eliquis), Hypothyroidism who presented to the [facility] ER  on 12/8/2020 with complaints of shortness of breath X 6-7 days.  He endorsed a non-productive cough and does admit he was exposed to COVID during the thanksgiving holiday.  He was found to be hypoxic at 80% on room air.  Rapid testing did confirm positive COVID.  CXR showing moderate bilateral mixed interstitial airspace opacities.  He was placed on 5L via NC and admitted to the hospitalist service.  He was started on IV remdesivir, decadron and continued on supplemental oxygen.  On 12/11 patient was transitioned to High flow NC and given IV lasix.   He continued to worsen from a respiratory standpoint and was transferred to the ICU on 12/13.      Upon arrival patient was placed on BiPap at 80% and remaine comfortable.  His respiratory rate has decreased to 18-20.  Echo done and decadron dosing was increased.           24-hour Summary:    12/20/2020 - Patient with some hypotension yesterday evening and overnight. Coreg dose decreased. Hypotensive episodes occur with sleep. Phenylephrine ordered, but not utilized. Has otherwise been hanging out in the 110's. On BiPAP overnight and into this morning. Tolerating diet. Unfortunately, unsuccessful attempts at midline placement on both arms yesterday. Has 1 PIV.    Visit Vitals  /73 (BP Location: LUE - Left upper extremity, Patient Position: Sitting)   Pulse 88   Temp 97.7 °F (36.5 °C) (Oral)   Resp 20   Ht 5' 11.5\" (1.816 m)   Wt 89.1 kg   SpO2 (!) 88%   BMI 27.01 kg/m²     Antibiotics:  None    LDAs:  No han catheter or central line present    Plan for 12/20/2020:    •  COVID-19 Pneumonia   IV Decadron increased 20 mg x 5 days, followeded by 10 mg x 5 days   High flow NC/ BIPAP   Goal net  Per MD Gee, neurochecks Q2H    negative fluid balance (-4 L since admission)    •  Acute Respiratory Failure with Hypoxia   Secondary to above   HFNC, BiPAP at night and as needed   Wean as tolerated   Continues to require high FiO2    •  Ischemic Cardiomyopathy s/p AICD   Holding Coreg due to hypotension   Monitor I/O   Telemetry monitoring    •  Hypothyroidisim   Continue PTA levothyroxine    •  Paroxysmal Atrial Fibrillation   Eliquis changed to Lovenox BID dosing   Continue amiodarone   Telemetry monitoring     MAINTENANCE THERAPIES  DVT prophylaxis:  Lovenox therapeutic dosing  and SCDs  GI prophylaxis: Protonix  Nutrition:  Tolerating diet  Sedation Holiday: Patient is not sedated, no sedation holiday required    Above plan was developed per Intensivist.    Alex Padilla PA-C  9:48 AM  12/20/2020  [facility] ICU

## 2021-12-21 NOTE — ED NOTES
Med rec updated and complete. Allergies reviewed . ( pt denies  Allergies)  No antibiotic use in last 30 days. Pt confirmed name and date of birth.      Home pharmacy ( short term) [facility] [phone]                               ( long term)  [facility] [phone]
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
## 2021-12-21 NOTE — PROGRESS NOTES
Updated resident on on return of patient's expressive aphasia 0730, per change of shift bedside report this waxes and wanes.  Updated Dr. Lucio on increase of patient's expressive aphasia 0800.

## 2021-12-21 NOTE — ED NOTES
Pt continues to complain of 8/10 headache. MD Pichardo notified. Awaiting new orders. Pt sitting up at bedside. VSS on RA

## 2021-12-21 NOTE — DISCHARGE PLANNING
Anticipated Discharge Disposition: likely IRF when medically cleared     Action: RN CM attended IDT rounds and reviewed patient chart.  Patient lives in [place] with wife.  Patient PCP is Dr. Bradshaw and preferred pharmacy on file is the [facility] on [address] in [place].    PMR consulted and IRF following patient for potential admission once therapy evals are completed.     Barriers to Discharge: medical clearance; ICU level of care.  Potential IRF, acceptance and bed availability     Plan: HCM to continue to follow and assist with discharge planning needs and barriers       Care Transition Team Assessment  Emergency Contact  Gunjan Olguin (spouse) [phone]    Information Source: chart review   Orientation Level: Oriented X4  Information Given By: Other (Comments)  Informant's Name: EHR  Who is responsible for making decisions for patient? : Patient    Readmission Evaluation  Is this a readmission?: No    Elopement Risk  Legal Hold: No  Ambulatory or Self Mobile in Wheelchair: No-Not an Elopement Risk  Elopement Risk: Not at Risk for Elopement    Interdisciplinary Discharge Planning  Lives with - Patient's Self Care Capacity: Spouse  Patient or legal guardian wants to designate a caregiver: No  Prior Services: None,Home-Independent    Discharge Preparedness  What is your plan after discharge?: Uncertain - pending medical team collaboration  What are your discharge supports?: Spouse  Prior Functional Level: Ambulatory,Independent with Activities of Daily Living  Difficulity with ADLs: None  Difficulity with IADLs: None    Functional Assesment  Prior Functional Level: Ambulatory,Independent with Activities of Daily Living    Finances  Financial Barriers to Discharge: No  Prescription Coverage: Yes    Vision / Hearing Impairment  Right Eye Vision: Wears Glasses  Left Eye Vision: Wears Glasses         Advance Directive  Advance Directive?: None    Domestic Abuse  Have you ever been the victim of abuse  or violence?: No  Physical Abuse or Sexual Abuse: No  Verbal Abuse or Emotional Abuse: No  Possible Abuse/Neglect Reported to:: Not Applicable    Psychological Assessment  History of Substance Abuse: Alcohol  Substance Abuse Comments:  (social drinker, drinks approx 1x week per h&p )  History of Psychiatric Problems: No  Non-compliant with Treatment: No  Newly Diagnosed Illness: No    Discharge Risks or Barriers  Discharge risks or barriers?: No    Anticipated Discharge Information  Discharge Disposition: Disch to IP rehab facility or distinct part unit (62)

## 2021-12-21 NOTE — DISCHARGE PLANNING
[facility] Transitional Care Coordination    Referral from:  Dr. Pichardo    Insurance Provider on Facesheet: MCR    Potential Rehab Diagnosis: SDH    Chart review indicates patient may have on going medical management and may have therapy needs to possibly meet inpatient rehab facility criteria with the goal of returning to community.    D/C support: TBD     Physiatry consultation forwarded per protocol.     SDH.  W/U & TX pending.      Thank you for the referral.

## 2021-12-21 NOTE — PROGRESS NOTES
Pulmonary/Critical Care Medicine   Progress Note    Date of service: 12/21/2021  Time: 0640    Mr. Olguin is a 71 year old man with the past medical history of aortic valve replacement, hypertension, complete heart block s/p pacemaker, and stroke on Plavix who presented with left upper extremity weakness with headache.  His work up revealed a small left subdural hematoma without shift or mass effect.  The patient was admitted to the ICU for observation.    Exam:  Neuro:  Expressive aphasia noted, LUE weakness 4/5, strength otherwise normal, a/ox4  CVS: RRR, click noted  Pulm: clear lungs  Abd: soft, NT/ND  Ext: FROM    A/P:  Acute left SDH   - neuro checks to every 2 hours   - MRI brain   - neuro following   - SBP goals < 140   - PT/OT/SLP  Hypokalemia   - replete to > 4  Hypomagnesemia   - replete to > 2    Patient is stable from an ICU perspective with no new neuro changes.  He can transfer to the neurosciences floor.  Case discussed with Dr. Vee who will take over care at this time.  The ICU team will sign off at this point.    Luma Lucio MD  Pulmonary and Critical Care Medicine

## 2021-12-21 NOTE — PROGRESS NOTES
NEUROLOGY PROGRESS NOTE      BACKGROUND:    71 y.o. male was admitted on 12/20/2021  6:00 PM for Brain bleed (HCC) [I61.9]  SDH (subdural hematoma) (HCC) [S06.5X9A].      SUBJECTIVE:   Expressive aphasia is a slightly worse today.  No significant changes in motor function.  Wife is at bedside.  Repeat stat brain CT is unchanged.    VITALS:  Vitals:    12/21/21 0500 12/21/21 0600 12/21/21 0604 12/21/21 1115   BP: 154/88 (!) 197/95 158/77 (!) 163/70   Pulse: 63 (!) 57  67   Resp: 16 19  13   Temp: 36.4 °C (97.5 °F)      TempSrc: Temporal      SpO2: 92% 93%  91%   Weight:       Height:           NEUROLOGICAL EXAM:   MENTAL STATUS:  Awake, alert, oriented times 3.    Expressive aphasia has worsened, comprehension is intact.  CRANIAL NERVES:  PERRL, EOMI with no nystagmus, face is symmetric, facial sensation is intact, tongue is in the midline, palate is symmetric. Hearing is intact to finger rub bilaterally. Shoulder shrugs are normal.  MOTOR:  Motor examination showed normal strength in direct testing of both upper and lower extremities, proximal and distal.    SENSATION:  Intact to light touch, temperature on the right, he has chronic numbness in left upper and lower extremity.  REFLEXES:  2+ and symmetric, toes are downgoing bilaterally  COORDINATION:  Normal finger to nose and heel to shin bilaterally  GAIT:  Deferred      OBJECTIVE:    NEUROIMAGING:    CT-HEAD W/O   Final Result      1. Stable 10 mm thick mixed density left convexity subdural hematoma.   2. No new intracranial abnormality.         CT-CTA HEAD WITH & W/O-POST PROCESS   Final Result      1.  CT angiogram of the [place] within normal limits.      2.  Small subacute left subdural hematoma with maximum thickness of 10 mm. No significant mass effect or shift      3.  Findings were discussed with KELSI FISHER on 12/20/2021 6:52 PM.      Hemorrhage grading-BIG 3      MR-BRAIN-W/O    (Results Pending)       MEDICATIONS:  Current  Facility-Administered Medications   Medication Dose Route Frequency Provider Last Rate Last Admin   • acetaminophen (TYLENOL) tablet 1,000 mg  1,000 mg Oral Q6HRS PRN Matti Pichardo M.D.        Or   • acetaminophen (TYLENOL) suppository 650 mg  650 mg Rectal Q4HRS PRN Matti Pichardo M.D.       • hydrALAZINE (APRESOLINE) injection 20 mg  20 mg Intravenous Q4HRS PRN Delio Herrera D.O.   20 mg at 12/21/21 1120   • labetalol (NORMODYNE/TRANDATE) injection 20 mg  20 mg Intravenous Q4HRS PRN Delio Herrera D.O.   20 mg at 12/21/21 1046   • carvedilol (COREG) tablet 25 mg  25 mg Oral BID WITH MEALS Delio Herrera D.O.   25 mg at 12/21/21 1040   • lisinopril (PRINIVIL) tablet 20 mg  20 mg Oral Q DAY Delio Herrera D.O.   20 mg at 12/21/21 1040   • potassium chloride SA (Kdur) tablet 40 mEq  40 mEq Oral Q6HR Delio Herrera D.O.   40 mEq at 12/21/21 1120   • Respiratory Therapy Consult   Nebulization Continuous RT Matti Pichardo M.D.       • LORazepam (ATIVAN) injection 2 mg  2 mg Intravenous Q5 MIN PRN Matti Pichardo M.D.       • ondansetron (ZOFRAN ODT) dispertab 4 mg  4 mg Oral Q4HRS PRN Matti Pichardo M.D.        Or   • ondansetron (ZOFRAN) syringe/vial injection 4 mg  4 mg Intravenous Q4HRS PRN Matti Pichadro M.D.   4 mg at 12/21/21 0947   • MD Alert...ICU Electrolyte Replacement per Pharmacy   Other PHARMACY TO DOSE Matti Pichardo M.D.       • enalaprilat (Vasotec) injection 1.25 mg 1 mL  1.25 mg Intravenous Q6HRS PRN Matti Pichardo M.D.   1.25 mg at 12/21/21 0950   • senna-docusate (PERICOLACE or SENOKOT S) 8.6-50 MG per tablet 2 Tablet  2 Tablet Oral BID Matti Pichardo M.D.        And   • polyethylene glycol/lytes (MIRALAX) PACKET 1 Packet  1 Packet Oral QDAY PRN Matti Pichardo M.D.        And   • magnesium hydroxide (MILK OF MAGNESIA) suspension 30 mL  30 mL Oral QDAY PRN Matti Pichardo M.D.        And   • bisacodyl (DULCOLAX) suppository 10 mg  10 mg Rectal  QDAY PRN Matti Pichardo M.D.       • NS infusion   Intravenous Continuous Matti Pichardo M.D. 50 mL/hr at 12/21/21 0032 New Bag at 12/21/21 0032   • morphine 4 MG/ML injection 1-5 mg  1-5 mg Intravenous Q HOUR PRN Matti Pichardo M.D.   4 mg at 12/21/21 0715       LABS:      Recent Labs     12/20/21 1812   WBC 7.4   RBC 4.41*   HEMOGLOBIN 15.3   HEMATOCRIT 44.2   .2*   MCH 34.7*   MCHC 34.6   RDW 48.5   PLATELETCT 151*   MPV 12.3     Recent Labs     12/20/21  1812 12/20/21  1812 12/20/21 2059 12/21/21 0220 12/21/21  0832   SODIUM 136   < > 134* 136 136  136   POTASSIUM 3.7  --   --   --  3.3*   CHLORIDE 101  --   --   --  101   CO2 23  --   --   --  22   GLUCOSE 114*  --   --   --  99   BUN 25*  --   --   --  20    < > = values in this interval not displayed.     INR   Date Value Ref Range Status   12/20/2021 1.07 0.87 - 1.13 Final     Comment:     INR - Non-therapeutic Reference Range: 0.87-1.13  INR - Therapeutic Reference Range: 2.0-4.0       No results found for: POCINR  Lab Results   Component Value Date/Time    CREATININE 0.60 12/21/2021 0832     Lab Results   Component Value Date/Time    IFAFRICA >60 12/21/2021 0832    IFNOTAFR >60 12/21/2021 0832       ECHO:  Results for orders placed or performed during the hospital encounter of 12/01/16   Echocardiogram Comp w/o Cont   Result Value Ref Range    Eject.Frac. MOD BP 54.35     Eject.Frac. MOD 4C 56.86     Eject.Frac. MOD 2C 54.68     Left Ventrical Ejection Fraction 55         ASSESSMENT AND PLAN:   71 y.o. male who presented on 12/20/2021 with 5 days history of holoacranial headache and few hours history of dysarthria and right facial droop.   He underwent a brain CT which revealed small subacute left subdural hematoma with maximum thickness of 10 mm.  He is currently on Plavix and aspirin.   We will hold his aspirin and Plavix, may resume aspirin in 2 weeks if remains stable clinically and no evidence of subdural expansion on neuroimaging.  He  denies having any trauma and this could be spontaneous subdural hematoma.  Maintain systolic blood pressure less than 160.  Avoid antiplatelet or anticoagulant.  DVT prevention with SCDs.    His expressive aphasia was worse this morning for which stat brain CT was obtained that is unchanged.  He still needs to be evaluated by neurosurgery.  Neurology will follow as needed, please call us if there is any question.

## 2021-12-21 NOTE — ED PROVIDER NOTES
"ED Provider Note    Scribed for Rl Norris by Mani Gomez. 12/20/2021  6:22 PM    Primary care provider: Brittany Bradshaw M.D.  Means of arrival: walk in  History obtained from: Patient, spouse  History limited by: None    CHIEF COMPLAINT  Chief Complaint   Patient presents with   • Possible Stroke     HA x 1 wk. Yesterday wife noted that speech was \"a little slurry, but much worse since about noon.\" Expressive aphasia, R facial droop noted. Hx CVA       HPI  Matti Olguin is a 71 y.o. male who presents to the Emergency Department for slurred speech onset roughly 2pm today. He notes associated right sided facial droop that also began today and headache for the past week. The patient went to the ED in [place], [place] on 12/16 for his headache and was discharged. He has prior history of two strokes. The patient takes Plavix and ASA. The patient denies any weakness, vision changes, chest pain, shortness of breath. The patient admits to drinking alcohol roughly once per week. He denies any drug use.    Quality: Sharp headache  Duration: one day  Severity: Moderate  Associated sx: Slurred speech    REVIEW OF SYSTEMS  As above, all other systems reviewed and are negative.   See HPI for further details.     PAST MEDICAL HISTORY   has a past medical history of Anticoagulation monitoring, special range, Complete heart block (HCC) (10/2014), Dyspnea, High cholesterol, HTN (hypertension), NSVT (nonsustained ventricular tachycardia) ([facility]) - on pacer check, S/P aortic valve replacement with bioprosthetic valve (10/2014), Sleep apnea, Snoring, Stroke ([facility]) (), Unspecified hemorrhagic conditions, and Valvular heart disease (12/1988).    SURGICAL HISTORY   has a past surgical history that includes other orthopedic surgery; aortic valve replacement (1988); recovery (9/21/2012); recovery (10/8/2013); recovery (11/8/2013); and pacemaker insertion (Left, 10/25/2014).    SOCIAL HISTORY  Social History     Tobacco " Use   • Smoking status: Never Smoker   • Smokeless tobacco: Current User     Types: Chew   Vaping Use   • Vaping Use: Never used   Substance Use Topics   • Alcohol use: Yes     Comment: social drinker, not daily   • Drug use: No      Social History     Substance and Sexual Activity   Drug Use No       FAMILY HISTORY  Family History   Problem Relation Age of Onset   • Heart Attack Father    • Heart Disease Sister        CURRENT MEDICATIONS  Home Medications    **Home medications have not yet been reviewed for this encounter**         ALLERGIES  Allergies   Allergen Reactions   • Nkda [No Known Drug Allergy]        PHYSICAL EXAM  VITAL SIGNS:   Vitals:    12/20/21 1801 12/20/21 1900   BP:  151/93   Pulse:  68   Resp:  18   SpO2:  93%   Weight: 114 kg (250 lb 10.6 oz)        Vitals: My interpretation: Hypertensive, not tachycardic, afebrile, not hypoxic    Reinterpretation of vitals: Unchanged    Cardiac Monitor Interpretation: The cardiac monitor revealed normal Sinus Rhythm  as interpreted by me. The cardiac monitor was ordered secondary to the patient's history of stroke and to monitor for dysrhythmia and/or tachycardia.    PE:   Constitutional: Well developed, Well nourished, No acute distress, Non-toxic appearance.   HENT: Normocephalic, Atraumatic, Bilateral external ears normal, Oropharynx is clear mucous membranes are moist. No oral exudates or nasal discharge.   Eyes: Pupils are equal round and reactive, EOMI, Conjunctiva normal, No discharge.   Neck: Normal range of motion, No tenderness, Supple, No stridor. No meningismus.  Lymphatic: No lymphadenopathy noted.   Cardiovascular: Regular rate and rhythm without murmur rub or gallop.  Thorax & Lungs: Clear breath sounds bilaterally without wheezes, rhonchi or rales. There is no chest wall tenderness.   Abdomen: Soft non-tender non-distended. There is no rebound or guarding. No organomegaly is appreciated. Bowel sounds are normal.  Skin: Normal without rash.    Back: No CVA or spinal tenderness.   Extremities: Intact distal pulses, No edema, No tenderness, No cyanosis, No clubbing. Capillary refill is less than 2 seconds.  Musculoskeletal: Good range of motion in all major joints. No tenderness to palpation or major deformities noted.   Neurologic: Alert & oriented x 3, Normal motor function, Normal sensory function, he has mild slurred speech and some mild expressive and aphasia. Reflexes are normal.  Able ambulate, tracking with his eyes, cranial nerves otherwise are intact, able to hold all extremities off the bed for 10 seconds each  Psychiatric: Affect normal, Judgment normal, Mood normal. There is no suicidal ideation or patient reported hallucinations.     DIAGNOSTIC STUDIES / PROCEDURES  NIHSS: 2  Accounting For: Aphasia and Dysarthria    LABS  Results for orders placed or performed during the hospital encounter of 12/20/21   CBC WITH DIFFERENTIAL   Result Value Ref Range    WBC 7.4 4.8 - 10.8 K/uL    RBC 4.41 (L) 4.70 - 6.10 M/uL    Hemoglobin 15.3 14.0 - 18.0 g/dL    Hematocrit 44.2 42.0 - 52.0 %    .2 (H) 81.4 - 97.8 fL    MCH 34.7 (H) 27.0 - 33.0 pg    MCHC 34.6 33.7 - 35.3 g/dL    RDW 48.5 35.9 - 50.0 fL    Platelet Count 151 (L) 164 - 446 K/uL    MPV 12.3 9.0 - 12.9 fL    Neutrophils-Polys 66.40 44.00 - 72.00 %    Lymphocytes 14.30 (L) 22.00 - 41.00 %    Monocytes 15.10 (H) 0.00 - 13.40 %    Eosinophils 3.40 0.00 - 6.90 %    Basophils 0.40 0.00 - 1.80 %    Immature Granulocytes 0.40 0.00 - 0.90 %    Nucleated RBC 0.00 /100 WBC    Neutrophils (Absolute) 4.88 1.82 - 7.42 K/uL    Lymphs (Absolute) 1.05 1.00 - 4.80 K/uL    Monos (Absolute) 1.11 (H) 0.00 - 0.85 K/uL    Eos (Absolute) 0.25 0.00 - 0.51 K/uL    Baso (Absolute) 0.03 0.00 - 0.12 K/uL    Immature Granulocytes (abs) 0.03 0.00 - 0.11 K/uL    NRBC (Absolute) 0.00 K/uL   COMP METABOLIC PANEL   Result Value Ref Range    Sodium 136 135 - 145 mmol/L    Potassium 3.7 3.6 - 5.5 mmol/L    Chloride 101 96  - 112 mmol/L    Co2 23 20 - 33 mmol/L    Anion Gap 12.0 7.0 - 16.0    Glucose 114 (H) 65 - 99 mg/dL    Bun 25 (H) 8 - 22 mg/dL    Creatinine 0.76 0.50 - 1.40 mg/dL    Calcium 9.3 8.5 - 10.5 mg/dL    AST(SGOT) 40 12 - 45 U/L    ALT(SGPT) 48 2 - 50 U/L    Alkaline Phosphatase 75 30 - 99 U/L    Total Bilirubin 1.1 0.1 - 1.5 mg/dL    Albumin 4.2 3.2 - 4.9 g/dL    Total Protein 7.7 6.0 - 8.2 g/dL    Globulin 3.5 1.9 - 3.5 g/dL    A-G Ratio 1.2 g/dL   TROPONIN   Result Value Ref Range    Troponin T 14 6 - 19 ng/L   PROTHROMBIN TIME   Result Value Ref Range    PT 13.5 12.0 - 14.6 sec    INR 1.07 0.87 - 1.13   APTT   Result Value Ref Range    APTT 29.9 24.7 - 36.0 sec   ESTIMATED GFR   Result Value Ref Range    GFR If African American >60 >60 mL/min/1.73 m 2    GFR If Non African American >60 >60 mL/min/1.73 m 2      All labs reviewed by me. Significant for no stenosis, no anemia, normal electrolytes, normal renal function, normal liver enzymes, normal bilirubin, troponin negative, PT/INR normal    RADIOLOGY  CT-CTA HEAD WITH & W/O-POST PROCESS   Final Result      1.  CT angiogram of the [place] within normal limits.      2.  Small subacute left subdural hematoma with maximum thickness of 10 mm. No significant mass effect or shift      3.  Findings were discussed with KELSI FISHER on 12/20/2021 6:52 PM.      Hemorrhage grading-BIG 3        The radiologist's interpretation of all radiological studies have been reviewed by me.    COURSE & MEDICAL DECISION MAKING  Nursing notes, VS, PMSFHx, labs, imaging, EKG reviewed in chart.    Heart Score: Low risk    MDM: 6:22 PM Matti Olguin is a 71 y.o. male who presented with acute strokelike symptoms of aphasia and dysarthria. Patient seen and examined at bedside and a stroke alert was called as soon as I saw the patient, onset of symptoms was about 2:00 this afternoon. Discussed plan of care, including labs and imaging to evaluate. Patient agrees to the plan  of care. Ordered for CT-CTA head w/ and w/o, CBC w/, CMP, troponin, prothrombin time, APTT, EKG to evaluate his symptoms.   CTA of the head came back with small subacute left subdural hematoma.  Neurology at bedside recommends admission for further evaluation.  Vital signs unremarkable.  NIH stroke scale is 2, patient not a TPA candidate as onset of symptoms was over 6 hours ago, no dictation for IR intervention as there is no large vessel occlusion on CTA head neck.  Patient updated on plan for admission and is amenable.  Labs including troponin, CBC, CMP, lipase, PT/INR all normal.    6:32 PM Spoke with Dr. Holly, about the patient's condition.     6:47 PM - Patient seen at bedside with Neurologist.    6:51 PM Spoke with Radiology about the patient's condition.    7:15 PM  Spoke with Dr. Gee, Hospitalist, about the patient's condition. They agree to evaluate the patient for hospitalization.    CRITICAL CARE TIME 35 minutes  There was a very real possibility of deterioration of the patient's condition.  This patient required the highest level of care.  I provided critical care services which included: review of the medical record, treatment orders, ordering and reviewing test results, frequent reevaluation of the patient's condition and response to treatment, as well as discussing the case with appropriate personnel and various consultants. The critical care time associated with the care of this patient is exclusive of any procedures or specific interventions.    DISPOSITION:  Admit to the hospitalist for further evaluation and treatment with neurology following.    FINAL IMPRESSION  1. Aphasia Acute   2. Subdural hemorrhage, nontraumatic (HCC) Acute       Mani CARIAS (Divya), am scribing for, and in the presence of, Rl Norris.    Electronically signed by: Mani Gomez (Divya), 12/20/2021    Rl CARIAS personally performed the services described in this documentation, as scribed  by Mani Gomez in my presence, and it is both accurate and complete.    The note accurately reflects work and decisions made by me.  Rl Norris  12/20/2021  7:05 PM

## 2021-12-21 NOTE — THERAPY
Occupational Therapy   Initial Evaluation     Patient Name: Matti Olguin  Age:  71 y.o., Sex:  male  Medical Record #: [id number]  Today's Date: 12/21/2021     Precautions  Precautions: (P) Fall Risk,Swallow Precautions ( See Comments)    Assessment  Patient is 71 y.o. male admitted for L SDH, PMH of CVA with residual tumors. Pt normally independent with all mobility and ADLs living in a 2 story house with spouse (who was at bedside). Pt required SBA for mobility, Graham for lower body ADLs and anticipate Graham for standing ADLs due to slight unsteadiness with mobility. Will continue to see for skilled therapy while admitted and anticipate pt will progress quickly and is safe to discharge home with spouse support when medically cleared.    Plan    Recommend Occupational Therapy 4 times per week until therapy goals are met for the following treatments:  Adaptive Equipment, Self Care/Activities of Daily Living, Therapeutic Activities and Therapeutic Exercises.    DC Equipment Recommendations: (P) None  Discharge Recommendations: (P) Anticipate that the patient will have no further occupational therapy needs after discharge from the hospital     Objective       12/21/21 1426   Prior Living Situation   Prior Services Home-Independent   Housing / Facility 2 Story House   Steps Into Home 2   Rail Left Rail  (Steps into Home)   Bathroom Set up Bathtub / Shower Combination   Equipment Owned Front-Wheel Walker   Lives with - Patient's Self Care Capacity Spouse   Comments Spouse able to assist as needed   Prior Level of ADL Function   Self Feeding Independent   Grooming / Hygiene Independent   Bathing Independent   Dressing Independent   Toileting Independent   Prior Level of IADL Function   Medication Management Independent   Laundry Independent   Kitchen Mobility Independent   Finances Independent   Home Management Independent   Shopping Independent   Prior Level Of Mobility Independent Without Device in Community    Driving / Transportation Relatives / Others Provide Transportation   Occupation (Pre-Hospital Vocational) Retired Due To Age   Precautions   Precautions Fall Risk;Swallow Precautions ( See Comments)   Pain 0 - 10 Group   Therapist Pain Assessment Post Activity Pain Same as Prior to Activity;Nurse Notified   Cognition    Cognition / Consciousness X   Speech/ Communication Word Finding Impairment   Level of Consciousness Alert   Comments Pleasant, cooperative, receptive to therapy   Active ROM Upper Body   Active ROM Upper Body  WDL   Dominant Hand Left   Strength Upper Body   Upper Body Strength  WDL   Sensation Upper Body   Upper Extremity Sensation  WDL   Upper Body Muscle Tone   Upper Body Muscle Tone  WDL   Neurological Concerns   Neurological Concerns No   Coordination Upper Body   Coordination WDL   Balance Assessment   Sitting Balance (Static) Fair   Sitting Balance (Dynamic) Fair   Standing Balance (Static) Fair -   Standing Balance (Dynamic) Fair -   Weight Shift Sitting Fair   Weight Shift Standing Fair   Comments no AD   Bed Mobility    Scooting Supervised   Comments up in chair before and after session   ADL Assessment   Upper Body Dressing Minimal Assist   Lower Body Dressing Minimal Assist   Toileting   (NT-refused need)   How much help from another person does the patient currently need...   Putting on and taking off regular lower body clothing? 3   Bathing (including washing, rinsing, and drying)? 3   Toileting, which includes using a toilet, bedpan, or urinal? 3   Putting on and taking off regular upper body clothing? 3   Taking care of personal grooming such as brushing teeth? 3   Eating meals? 4   6 Clicks Daily Activity Score 19   Modified Willacy (mRS)   Modified Willacy Score 3   Functional Mobility   Sit to Stand Supervised   Transfer Method Stand Step   Mobility STS from chair, hallway mobility, back to chair   Comments no AD   ICU Target Mobility Level   ICU Mobility - Targeted Level Level 4    Visual Perception   Visual Perception  WDL   Activity Tolerance   Sitting in Chair left seated in chair   Standing 10 min   Patient / Family Goals   Patient / Family Goal #1 To go home   Short Term Goals   Short Term Goal # 1 Pt will complete all ADL transfers with supervision   Short Term Goal # 2 Pt will complete full body dressing with supervision   Short Term Goal # 3 Pt will complete standing G/H with supervision   Education Group   Education Provided Role of Occupational Therapist;Stroke   Role of Occupational Therapist Patient Response Patient;Family;Acceptance;Explanation   Stroke Patient Response Patient;Family;Acceptance;Explanation   Problem List   Problem List Decreased Active Daily Living Skills;Decreased Homemaking Skills;Decreased Functional Mobility;Decreased Activity Tolerance;Impaired Postural Control / Balance   Interdisciplinary Plan of Care Collaboration   IDT Collaboration with  Nursing;Family / Caregiver;Physical Therapist   Patient Position at End of Therapy Seated;Chair Alarm On;Call Light within Reach;Tray Table within Reach;Phone within Reach;Family / Friend in Room   Collaboration Comments RN updated

## 2021-12-21 NOTE — ASSESSMENT & PLAN NOTE
DW Neuro Srgry Dr Abrams: he had opportunity to review imaging.  Feels pt does not need intervention unless he were to worsen  Repeat CT today is stable  Neuro is following  PT/OT/SLP consulted  BP control  On no blood thinners presently, came in on ASA and plavix for Hx of previous CVA  Cont Neuro checks  CTA neg for vascular malformations  Negative MRI other than known SDH

## 2021-12-21 NOTE — ASSESSMENT & PLAN NOTE
Controlled.  Goal SBP <140  Parenteral anti-hypertensives prn to maintain BP control  Resume home po meds after he passes swallow evaluation

## 2021-12-21 NOTE — THERAPY
Physical Therapy   Initial Evaluation     Patient Name: Matti Olguin  Age:  71 y.o., Sex:  male  Medical Record #: [id number]  Today's Date: 12/21/2021     Precautions: Fall Risk;Swallow Precautions (per SLP)    Assessment  Patient is 71 y.o. male presenting acutely with L SDH, PMH of CVA with residual tremors on L side. Pt presents with equal strength in BLE and reports intact sensation throughout. Today he is able to amb 50ft without AD requiring SBA for safety. Wide MIKA noted with amb and pt reports feeling more unsteady than usual. Anticipate improved balance and confidence with amb with upright activity. Wife available to assist pt at DC. Anticipate pt will be able to DC home with family support. Discussed follow-up with OP PT in [place] if pt conts to feel unsteady after increased practice with amb. Pt also has FWW to use upon DC.    Plan    Recommend Physical Therapy 4 times per week until therapy goals are met for the following treatments:  Equipment, Gait Training, Neuro Re-Education / Balance, Stair Training, Therapeutic Activities and Therapeutic Exercises    DC Equipment Recommendations: None (pt has FWW)  Discharge Recommendations: Other - (discussed f/u with OP PT if still feels unsteady in a few wk)     Objective     12/21/21 1423   Prior Living Situation   Prior Services Home-Independent   Housing / Facility 2 Story House (bed/bath on 1st level)   Steps Into Home 2 (platform steps)   Steps In Home (FOS)   Rail Left Rail  (Steps into Home)   Equipment Owned Front-Wheel Walker   Lives with - Patient's Self Care Capacity Spouse   Comments Spouse available to assist   Prior Level of Functional Mobility   Bed Mobility Independent   Transfer Status Independent   Ambulation Independent   Distance Ambulation (Feet) (Community distances)   Assistive Devices Used None   Stairs Independent   Comments pt/wife reports pt with previous CVA causing tremors in L side   Cognition    Speech/ Communication Word  Finding Impairment   Level of Consciousness Alert   Strength Lower Body   Lower Body Strength  WDL   Sensation Lower Body   Lower Extremity Sensation   WDL   Balance Assessment   Sitting Balance (Static) Fair   Sitting Balance (Dynamic) Fair   Standing Balance (Static) Fair -   Standing Balance (Dynamic) Fair -   Comments no AD, no LOB, feels unsteady likely related to decreased mobility since admit   Gait Analysis   Gait Level Of Assist (SBA)   Assistive Device None   Distance (Feet) 50   Deviation Increased Base Of Support;Decreased Heel Strike;Decreased Toe Off   # of Stairs Climbed 0   Weight Bearing Status No restrictions   Comments reports feeling more unsteady compared to PLOF, would benefit from FWW to improve confidence with amb   Bed Mobility    Comments NT - up in chair pre/post assessment   Functional Mobility   Sit to Stand Supervised   Bed, Chair, Wheelchair Transfer (SBA)   Short Term Goals    Short Term Goal # 1 Pt will amb >150ft with SPV within 6 visits to negotiate household distances at MN.   Short Term Goal # 2 Pt will asecnd/descend 2 platform steps with SPV within 6 visits to enter/exit home safely at [place].

## 2021-12-21 NOTE — PROGRESS NOTES
PT arrived to floor at 0030. Pt wife took wallet home, Pt glasses at bedside. All questions answered at this time.

## 2021-12-22 ENCOUNTER — HOSPITAL ENCOUNTER (INPATIENT)
Facility: REHABILITATION | Age: 71
End: 2021-12-22
Attending: PHYSICAL MEDICINE & REHABILITATION | Admitting: PHYSICAL MEDICINE & REHABILITATION
Payer: MEDICARE

## 2021-12-22 PROBLEM — R45.1 AGITATION: Status: ACTIVE | Noted: 2021-12-22

## 2021-12-22 LAB
ALBUMIN SERPL BCP-MCNC: 4.1 G/DL (ref 3.2–4.9)
ALBUMIN/GLOB SERPL: 1.3 G/DL
ALP SERPL-CCNC: 73 U/L (ref 30–99)
ALT SERPL-CCNC: 43 U/L (ref 2–50)
ANION GAP SERPL CALC-SCNC: 10 MMOL/L (ref 7–16)
AST SERPL-CCNC: 39 U/L (ref 12–45)
BILIRUB SERPL-MCNC: 1.7 MG/DL (ref 0.1–1.5)
BUN SERPL-MCNC: 19 MG/DL (ref 8–22)
CALCIUM SERPL-MCNC: 8.8 MG/DL (ref 8.5–10.5)
CHLORIDE SERPL-SCNC: 105 MMOL/L (ref 96–112)
CO2 SERPL-SCNC: 23 MMOL/L (ref 20–33)
CREAT SERPL-MCNC: 0.68 MG/DL (ref 0.5–1.4)
EKG IMPRESSION: NORMAL
ERYTHROCYTE [DISTWIDTH] IN BLOOD BY AUTOMATED COUNT: 48.9 FL (ref 35.9–50)
GLOBULIN SER CALC-MCNC: 3.2 G/DL (ref 1.9–3.5)
GLUCOSE BLD-MCNC: 111 MG/DL (ref 65–99)
GLUCOSE SERPL-MCNC: 138 MG/DL (ref 65–99)
HCT VFR BLD AUTO: 44.8 % (ref 42–52)
HGB BLD-MCNC: 15.1 G/DL (ref 14–18)
MAGNESIUM SERPL-MCNC: 2.9 MG/DL (ref 1.5–2.5)
MCH RBC QN AUTO: 34.1 PG (ref 27–33)
MCHC RBC AUTO-ENTMCNC: 33.7 G/DL (ref 33.7–35.3)
MCV RBC AUTO: 101.1 FL (ref 81.4–97.8)
PLATELET # BLD AUTO: 132 K/UL (ref 164–446)
PMV BLD AUTO: 12.4 FL (ref 9–12.9)
POTASSIUM SERPL-SCNC: 3.9 MMOL/L (ref 3.6–5.5)
PROT SERPL-MCNC: 7.3 G/DL (ref 6–8.2)
RBC # BLD AUTO: 4.43 M/UL (ref 4.7–6.1)
SODIUM SERPL-SCNC: 138 MMOL/L (ref 135–145)
WBC # BLD AUTO: 8.1 K/UL (ref 4.8–10.8)

## 2021-12-22 PROCEDURE — 700111 HCHG RX REV CODE 636 W/ 250 OVERRIDE (IP): Performed by: HOSPITALIST

## 2021-12-22 PROCEDURE — 93005 ELECTROCARDIOGRAM TRACING: CPT | Performed by: STUDENT IN AN ORGANIZED HEALTH CARE EDUCATION/TRAINING PROGRAM

## 2021-12-22 PROCEDURE — 700105 HCHG RX REV CODE 258: Performed by: STUDENT IN AN ORGANIZED HEALTH CARE EDUCATION/TRAINING PROGRAM

## 2021-12-22 PROCEDURE — 94640 AIRWAY INHALATION TREATMENT: CPT

## 2021-12-22 PROCEDURE — 85027 COMPLETE CBC AUTOMATED: CPT

## 2021-12-22 PROCEDURE — 94760 N-INVAS EAR/PLS OXIMETRY 1: CPT

## 2021-12-22 PROCEDURE — 83735 ASSAY OF MAGNESIUM: CPT

## 2021-12-22 PROCEDURE — 700102 HCHG RX REV CODE 250 W/ 637 OVERRIDE(OP): Performed by: INTERNAL MEDICINE

## 2021-12-22 PROCEDURE — 700111 HCHG RX REV CODE 636 W/ 250 OVERRIDE (IP): Performed by: INTERNAL MEDICINE

## 2021-12-22 PROCEDURE — 80053 COMPREHEN METABOLIC PANEL: CPT

## 2021-12-22 PROCEDURE — 700101 HCHG RX REV CODE 250: Performed by: HOSPITALIST

## 2021-12-22 PROCEDURE — 700111 HCHG RX REV CODE 636 W/ 250 OVERRIDE (IP)

## 2021-12-22 PROCEDURE — 770000 HCHG ROOM/CARE - INTERMEDIATE ICU *

## 2021-12-22 PROCEDURE — 93010 ELECTROCARDIOGRAM REPORT: CPT | Performed by: INTERNAL MEDICINE

## 2021-12-22 PROCEDURE — 82962 GLUCOSE BLOOD TEST: CPT

## 2021-12-22 PROCEDURE — 99291 CRITICAL CARE FIRST HOUR: CPT | Performed by: INTERNAL MEDICINE

## 2021-12-22 PROCEDURE — 700101 HCHG RX REV CODE 250: Performed by: STUDENT IN AN ORGANIZED HEALTH CARE EDUCATION/TRAINING PROGRAM

## 2021-12-22 PROCEDURE — 700111 HCHG RX REV CODE 636 W/ 250 OVERRIDE (IP): Performed by: STUDENT IN AN ORGANIZED HEALTH CARE EDUCATION/TRAINING PROGRAM

## 2021-12-22 PROCEDURE — A9270 NON-COVERED ITEM OR SERVICE: HCPCS | Performed by: INTERNAL MEDICINE

## 2021-12-22 PROCEDURE — 99233 SBSQ HOSP IP/OBS HIGH 50: CPT | Performed by: HOSPITALIST

## 2021-12-22 RX ORDER — LORAZEPAM 2 MG/ML
.5-2 INJECTION INTRAMUSCULAR
Status: DISCONTINUED | OUTPATIENT
Start: 2021-12-22 | End: 2021-12-22

## 2021-12-22 RX ORDER — MIDAZOLAM HYDROCHLORIDE 1 MG/ML
INJECTION INTRAMUSCULAR; INTRAVENOUS
Status: DISCONTINUED
Start: 2021-12-22 | End: 2021-12-22

## 2021-12-22 RX ORDER — POTASSIUM CHLORIDE 20 MEQ/1
20 TABLET, EXTENDED RELEASE ORAL ONCE
Status: ACTIVE | OUTPATIENT
Start: 2021-12-22 | End: 2021-12-23

## 2021-12-22 RX ORDER — VALPROATE SODIUM 100 MG/ML
1000 INJECTION INTRAVENOUS ONCE
Status: DISCONTINUED | OUTPATIENT
Start: 2021-12-22 | End: 2021-12-22

## 2021-12-22 RX ORDER — MIDAZOLAM HYDROCHLORIDE 1 MG/ML
2 INJECTION INTRAMUSCULAR; INTRAVENOUS ONCE
Status: COMPLETED | OUTPATIENT
Start: 2021-12-22 | End: 2021-12-22

## 2021-12-22 RX ORDER — MIDAZOLAM HYDROCHLORIDE 1 MG/ML
INJECTION INTRAMUSCULAR; INTRAVENOUS
Status: COMPLETED
Start: 2021-12-22 | End: 2021-12-22

## 2021-12-22 RX ORDER — LORAZEPAM 2 MG/ML
.5-2 INJECTION INTRAMUSCULAR
Status: DISCONTINUED | OUTPATIENT
Start: 2021-12-22 | End: 2021-12-23

## 2021-12-22 RX ORDER — VALPROATE SODIUM 100 MG/ML
250 INJECTION INTRAVENOUS 2 TIMES DAILY
Status: DISCONTINUED | OUTPATIENT
Start: 2021-12-22 | End: 2021-12-22

## 2021-12-22 RX ADMIN — ACETAMINOPHEN 1000 MG: 500 TABLET ORAL at 20:29

## 2021-12-22 RX ADMIN — SODIUM CHLORIDE 1000 MG: 9 INJECTION, SOLUTION INTRAVENOUS at 01:15

## 2021-12-22 RX ADMIN — LABETALOL HYDROCHLORIDE 20 MG: 5 INJECTION, SOLUTION INTRAVENOUS at 01:15

## 2021-12-22 RX ADMIN — LORAZEPAM 2 MG: 2 INJECTION INTRAMUSCULAR; INTRAVENOUS at 00:28

## 2021-12-22 RX ADMIN — LORAZEPAM 2 MG: 2 INJECTION INTRAMUSCULAR; INTRAVENOUS at 07:13

## 2021-12-22 RX ADMIN — DOCUSATE SODIUM 50 MG AND SENNOSIDES 8.6 MG 2 TABLET: 8.6; 5 TABLET, FILM COATED ORAL at 18:15

## 2021-12-22 RX ADMIN — DEXTROSE MONOHYDRATE 500 MG: 50 INJECTION, SOLUTION INTRAVENOUS at 06:22

## 2021-12-22 RX ADMIN — LORAZEPAM 1 MG: 2 INJECTION INTRAMUSCULAR; INTRAVENOUS at 20:51

## 2021-12-22 RX ADMIN — DEXMEDETOMIDINE 0.8 MCG/KG/HR: 200 INJECTION, SOLUTION INTRAVENOUS at 05:26

## 2021-12-22 RX ADMIN — DEXMEDETOMIDINE 0.4 MCG/KG/HR: 200 INJECTION, SOLUTION INTRAVENOUS at 10:44

## 2021-12-22 RX ADMIN — DEXMEDETOMIDINE 1.5 MCG/KG/HR: 200 INJECTION, SOLUTION INTRAVENOUS at 02:29

## 2021-12-22 RX ADMIN — DEXTROSE MONOHYDRATE 500 MG: 50 INJECTION, SOLUTION INTRAVENOUS at 18:22

## 2021-12-22 RX ADMIN — MIDAZOLAM HYDROCHLORIDE 2 MG: 1 INJECTION, SOLUTION INTRAMUSCULAR; INTRAVENOUS at 01:19

## 2021-12-22 RX ADMIN — MIDAZOLAM HYDROCHLORIDE 2 MG: 1 INJECTION INTRAMUSCULAR; INTRAVENOUS at 01:19

## 2021-12-22 RX ADMIN — MAGNESIUM SULFATE HEPTAHYDRATE 2 G: 40 INJECTION, SOLUTION INTRAVENOUS at 00:06

## 2021-12-22 RX ADMIN — ALBUTEROL SULFATE 2.5 MG: 2.5 SOLUTION RESPIRATORY (INHALATION) at 06:35

## 2021-12-22 RX ADMIN — HYDRALAZINE HYDROCHLORIDE 20 MG: 20 INJECTION INTRAMUSCULAR; INTRAVENOUS at 01:41

## 2021-12-22 RX ADMIN — HYDRALAZINE HYDROCHLORIDE 20 MG: 20 INJECTION INTRAMUSCULAR; INTRAVENOUS at 10:06

## 2021-12-22 ASSESSMENT — PAIN DESCRIPTION - PAIN TYPE: TYPE: ACUTE PAIN

## 2021-12-22 NOTE — PROGRESS NOTES
Called [employer] to discuss MRI compatibility of heart valve and pacemaker. The pacemaker is conditional (compatable) at 1.5/3.0t (MRI strengths). The heart valve is also conditional at 1.5/3.0t.     [employer] rep to call back for interegation.

## 2021-12-22 NOTE — DISCHARGE PLANNING
Non-op SDH - anticipate discharge home with spouse. TCC to no longer follow, please call with any questions [phone].

## 2021-12-22 NOTE — ASSESSMENT & PLAN NOTE
With prior CVAs x2, the patient with presenting dysarthria and right-sided facial weakness  No new CVA lesions per MRI

## 2021-12-22 NOTE — ASSESSMENT & PLAN NOTE
Interrogated by pacer rep, no evidence of atrial fibrillation, short VT episodes  Was managed during MRI successfully

## 2021-12-22 NOTE — PROGRESS NOTES
Called by Nursing staff as patient was physically and verbally aggressive requiring four-point restraints and multiple staff to keep him in bed. Patient initially treated with IV Haldol, IV Benadryl, and IV Versed. Security called to bedside as patient requiring leather restraints. Precedex drip ordered for continued sedation. Approximately 1 hour after initial medications patient continued to be aggressive. Curbsided intensivist who suggested to load with IV Depakote 1,000 mg and continue with Depakote 500 mg BID. Will continue to monitor.     Patient is critically ill.   The patient continues to have: acute delirium  The vital organ system that is affected is the: neurological  The critical care that I am providing today is: IV benzodiazepines, IV Haldol, Precedex, gtt  The critical that has been undertaken is medically complex.   There has been no overlap in critical care time.   Critical Care Time not including procedures: 32 minutes

## 2021-12-22 NOTE — ASSESSMENT & PLAN NOTE
Severe, possibly secondary to CVA, SDH versus other  The patient apparently does have some alcohol use  The patient is back to baseline now

## 2021-12-22 NOTE — THERAPY
SLP contact note:     Patient Name: Matti Olguin  Age:  71 y.o., Sex:  male  Medical Record #: [id number]  Today's Date: 12/22/2021    Attempted to see patient for dysphagia follow up. Pt fluctuating from agitated to obtunded per RN and not appropriate/wouldn't benefit from dysphagia therapy at this time. SLP will follow for dysphagia therapy as medically appropriate. Please hold Po with any concerns until SLP can reassess given change since initial evaluation. Thank you.

## 2021-12-22 NOTE — CARE PLAN
The patient is Watcher - Medium risk of patient condition declining or worsening    Shift Goals  Clinical Goals: Q1 Neuro  Patient Goals: Rest  Family Goals: Comfort    Progress made toward(s) clinical / shift goals:  q 2 hr neuro checks in place    Problem: Pain - Standard  Goal: Alleviation of pain or a reduction in pain to the patient’s comfort goal  Outcome: Progressing  Note: Pt assessed for pain regularly and medicated PRN per MAR.       Problem: Knowledge Deficit - Standard  Goal: Patient and family/care givers will demonstrate understanding of plan of care, disease process/condition, diagnostic tests and medications  Outcome: Progressing  Note: Pt educated regarding plan of care and medications. All questions answered.

## 2021-12-22 NOTE — PROGRESS NOTES
Hospital Medicine Daily Progress Note    Date of Service  12/22/2021    Chief Complaint  Matti Olguin is a 71 y.o. male admitted 12/20/2021 with weakness, speech change, headache    Hospital Course  71 y.o. male who presented 12/20/2021 with a PMHx of AVR, HTN and complete heart block with PPM in place, 2 prior CVA's.  Pt presented with head ache and mild L upper extremity weakness and speech changes. He is left hand dominant.  On imaging he was found to have an acute 1cm L SDH.  Pt was on ASA and plavix for his Hx of CVA.  There is no report of fall or head injury.    Interval Problem Update  Patient seen and examined today.    Patient tolerating treatment and therapies.  All Data, Medication data reviewed.  Case discussed with nursing as available.  Plan of Care reviewed with patient and notified of changes.  12/22 the patient required sedation this morning because he was very aggressive and required four-point leather restraints at some point, he is currently sedated and poorly arousable, family is at the bedside, explained need for further imaging and management of decision making.  The patient apparently is using some alcohol on the weekends sometimes heavier, the patient has a Medtronic pacemaker, did have MRIs in the past with adjustment by [employer].    I have personally seen and examined the patient at bedside. I discussed the plan of care with family.    Consultants/Specialty  neurology    Code Status  Full Code    Disposition  Patient is not medically cleared for discharge.   Anticipate discharge to to be determined  I have placed the appropriate orders for post-discharge needs.    Review of Systems  Review of Systems   Unable to perform ROS: Mental acuity        Physical Exam  Temp:  [35.9 °C (96.7 °F)-36.7 °C (98 °F)] 36.6 °C (97.8 °F)  Pulse:  [54-91] 54  Resp:  [13-87] 22  BP: (110-168)/() 133/74  SpO2:  [89 %-97 %] 97 %    Physical Exam  Vitals and nursing note reviewed.    Constitutional:       General: He is sleeping.      Appearance: He is well-developed.      Interventions: Nasal cannula in place.      Comments: Pt seen and examined.   HENT:      Head: Normocephalic and atraumatic.   Eyes:      Pupils: Pupils are equal, round, and reactive to light.   Cardiovascular:      Rate and Rhythm: Normal rate.      Heart sounds: Murmur heard.       Pulmonary:      Effort: Pulmonary effort is normal.      Breath sounds: Normal breath sounds.   Abdominal:      General: Bowel sounds are normal.      Palpations: Abdomen is soft.   Genitourinary:     Penis: Normal.    Musculoskeletal:         General: Normal range of motion.      Cervical back: Normal range of motion and neck supple.   Skin:     General: Skin is warm and dry.   Neurological:      Mental Status: He is disoriented.         Fluids    Intake/Output Summary (Last 24 hours) at 12/22/2021 0732  Last data filed at 12/22/2021 0645  Gross per 24 hour   Intake 655.29 ml   Output 450 ml   Net 205.29 ml       Laboratory  Recent Labs     12/20/21 1812 12/22/21  0517   WBC 7.4 8.1   RBC 4.41* 4.43*   HEMOGLOBIN 15.3 15.1   HEMATOCRIT 44.2 44.8   .2* 101.1*   MCH 34.7* 34.1*   MCHC 34.6 33.7   RDW 48.5 48.9   PLATELETCT 151* 132*   MPV 12.3 12.4     Recent Labs     12/20/21  1812 12/20/21  2059 12/21/21  0832 12/21/21  0832 12/21/21  1441 12/21/21  2243 12/22/21  0517   SODIUM 136   < > 136  136   < > 134* 135 138   POTASSIUM 3.7  --  3.3*  --   --   --  3.9   CHLORIDE 101  --  101  --   --   --  105   CO2 23  --  22  --   --   --  23   GLUCOSE 114*  --  99  --   --   --  138*   BUN 25*  --  20  --   --   --  19   CREATININE 0.76  --  0.60  --   --   --  0.68   CALCIUM 9.3  --  9.0  --   --   --  8.8    < > = values in this interval not displayed.     Recent Labs     12/20/21 1812   APTT 29.9   INR 1.07         Recent Labs     12/20/21  1812   TRIGLYCERIDE 78   HDL 37*   LDL 64       Imaging  CT-HEAD W/O   Final Result      1.  Stable 10 mm thick mixed density left convexity subdural hematoma.   2. No new intracranial abnormality.         CT-CTA HEAD WITH & W/O-POST PROCESS   Final Result      1.  CT angiogram of the [place] within normal limits.      2.  Small subacute left subdural hematoma with maximum thickness of 10 mm. No significant mass effect or shift      3.  Findings were discussed with KELSI FISHER on 12/20/2021 6:52 PM.      Hemorrhage grading-BIG 3      MR-BRAIN-W/O    (Results Pending)        Assessment/Plan  SDH (subdural hematoma) (HCC)- (present on admission)  Assessment & Plan  DW Neuro Srgry Dr Abrams: he had opportunity to review imaging.  Feels pt does not need intervention unless he were to worsen  Repeat CT today is stable  Neuro is following  PT/OT/SLP consulted  BP control  On no blood thinners presently, came in on ASA and plavix for Hx of previous CVA  Cont Neuro checks  CTA neg for vascular malformations  Await MRI    Agitation  Assessment & Plan  Severe, possibly secondary to CVA, SDH versus other  The patient apparently does have some alcohol use  Wean off Precedex and treat medically gently    NSVT (nonsustained ventricular tachycardia) (HCC) - on pacer check- (present on admission)  Assessment & Plan  History of, interrogate pacemaker, make sure the patient does not have underlying significant arrhythmias      Cardiac pacemaker - Medtronic- (present on admission)  Assessment & Plan  Reach out to [employer] for possibility to undergo MRI and pacemaker interrogation    S/P aortic valve replacement with bioprosthetic valve: repeat AVR 10/2014- (present on admission)  Assessment & Plan  bioprosthetic, history of mechanical AVR, await echocardiogram    Stroke-like symptoms- (present on admission)  Assessment & Plan  With prior CVAs x2, the patient with presenting dysarthria and right-sided facial weakness  Await MRI    Hypertension  Assessment & Plan  Resume home lisinopril 20, coreg 25  Cont IV  hydralazine and labetalol IV for breakthrough  Blood pressure goal less than 160     Plan  Await MRI  Pacer interrogation  Maintain current status, wean off Precedex, as needed medication for behavioral control  Continue blood pressure control  Optimize electrolytes  See orders  Medically complex high risk patient    VTE prophylaxis: pharmacologic prophylaxis contraindicated due to Intracranial hemorrhage    I have performed a physical exam and reviewed and updated ROS and Plan today (12/22/2021). In review of yesterday's note (12/21/2021), there are no changes except as documented above.    Please note that this dictation was created using voice recognition software. I have made every reasonable attempt to correct obvious errors, but I expect that there are errors of grammar and possibly context that I did not discover before finalizing the note.

## 2021-12-22 NOTE — PROGRESS NOTES
4 Eyes Skin Assessment Completed by APRIL Lackey and APRIL Kumar.    Head WDL  Ears WDL  Nose WDL  Mouth WDL  Neck WDL  Breast/Chest WDL  Shoulder Blades WDL  Spine WDL  (R) Arm/Elbow/Hand WDL  (L) Arm/Elbow/Hand WDL  Abdomen WDL  Groin WDL  Scrotum/Coccyx/Buttocks WDL  (R) Leg WDL  (L) Leg WDL  (R) Heel/Foot/Toe WDL  (L) Heel/Foot/Toe WDL          Devices In Places Pulse Ox      Interventions In Place N/A    Possible Skin Injury No    Pictures Uploaded Into Epic N/A  Wound Consult Placed N/A  RN Wound Prevention Protocol Ordered No

## 2021-12-22 NOTE — PROGRESS NOTES
Called by Dr Romo to assist with patient care.     Patient admitted with focal left posterior 10 mm SDH. Tonight with worsening acute agitated delirium patient had follow up CT today which was stable. His mental status has wax and waning placed on dex gtt, given haldol, benadryl by Dr Navarro earlier with some improved but then worsened requiring leather restraints, 4mg IV ativan and 2 mg IV versed.     Recommend depakote IV for agitated delirium 1000mg IV and then 500mg BID, continue with dex gtt. No reported hx of alcohol abuse per bedside nurse report. Aspiration precautions and head of bed elevated monitor for respiratory depression and need for airway protection.     Patient remains in critical condition from acute agitated delirium needing dex gtt and IV depakote and IV benzo's. Critical care time provided was 39 minutes. This excludes all separate billable procedures.     Bart Albarran MD  Critical Care Medicine

## 2021-12-22 NOTE — CARE PLAN
Problem: Pain - Standard  Goal: Alleviation of pain or a reduction in pain to the patient’s comfort goal  Outcome: Progressing     Problem: Knowledge Deficit - Standard  Goal: Patient and family/care givers will demonstrate understanding of plan of care, disease process/condition, diagnostic tests and medications  Outcome: Progressing     Problem: Safety - Violent/Self-destructive Restraint  Goal: Remains free of injury from restraints (Restraint for Violent/Self-Destructive Behavior)  Outcome: Progressing  Goal: Free from restraints (Restraint for Violent or Self-Destructive Behavior)  Outcome: Progressing     Problem: Safety - Medical Restraint  Goal: Remains free of injury from restraints (Restraint for Interference with Medical Device)  Outcome: Progressing  Goal: Free from restraint(s) (Restraint for Interference with Medical Device)  Outcome: Progressing     Problem: Fall Risk  Goal: Patient will remain free from falls  Outcome: Progressing     Problem: Skin Integrity  Goal: Skin integrity is maintained or improved  Outcome: Progressing   The patient is Watcher - Medium risk of patient condition declining or worsening    Shift Goals  Clinical Goals: calm, stable neuro assessments  Patient Goals: unable to assess, pt agitated  Family Goals: MRI, comfort    Progress made toward(s) clinical / shift goals:  All of the above    Patient is not progressing towards the following goals:

## 2021-12-23 ENCOUNTER — APPOINTMENT (OUTPATIENT)
Dept: RADIOLOGY | Facility: MEDICAL CENTER | Age: 71
DRG: 065 | End: 2021-12-23
Attending: HOSPITALIST
Payer: MEDICARE

## 2021-12-23 LAB
ALBUMIN SERPL BCP-MCNC: 3.6 G/DL (ref 3.2–4.9)
ALBUMIN/GLOB SERPL: 1.3 G/DL
ALP SERPL-CCNC: 64 U/L (ref 30–99)
ALT SERPL-CCNC: 39 U/L (ref 2–50)
ANION GAP SERPL CALC-SCNC: 9 MMOL/L (ref 7–16)
AST SERPL-CCNC: 30 U/L (ref 12–45)
BILIRUB SERPL-MCNC: 1.8 MG/DL (ref 0.1–1.5)
BUN SERPL-MCNC: 21 MG/DL (ref 8–22)
CALCIUM SERPL-MCNC: 8.6 MG/DL (ref 8.5–10.5)
CHLORIDE SERPL-SCNC: 109 MMOL/L (ref 96–112)
CO2 SERPL-SCNC: 23 MMOL/L (ref 20–33)
CREAT SERPL-MCNC: 0.74 MG/DL (ref 0.5–1.4)
ERYTHROCYTE [DISTWIDTH] IN BLOOD BY AUTOMATED COUNT: 50.2 FL (ref 35.9–50)
GLOBULIN SER CALC-MCNC: 2.8 G/DL (ref 1.9–3.5)
GLUCOSE SERPL-MCNC: 95 MG/DL (ref 65–99)
HCT VFR BLD AUTO: 44.6 % (ref 42–52)
HGB BLD-MCNC: 14.7 G/DL (ref 14–18)
MAGNESIUM SERPL-MCNC: 2.3 MG/DL (ref 1.5–2.5)
MCH RBC QN AUTO: 33.8 PG (ref 27–33)
MCHC RBC AUTO-ENTMCNC: 33 G/DL (ref 33.7–35.3)
MCV RBC AUTO: 102.5 FL (ref 81.4–97.8)
PHOSPHATE SERPL-MCNC: 2.8 MG/DL (ref 2.5–4.5)
PLATELET # BLD AUTO: 142 K/UL (ref 164–446)
PMV BLD AUTO: 12.3 FL (ref 9–12.9)
POTASSIUM SERPL-SCNC: 3.9 MMOL/L (ref 3.6–5.5)
PROT SERPL-MCNC: 6.4 G/DL (ref 6–8.2)
RBC # BLD AUTO: 4.35 M/UL (ref 4.7–6.1)
SODIUM SERPL-SCNC: 141 MMOL/L (ref 135–145)
WBC # BLD AUTO: 7.6 K/UL (ref 4.8–10.8)

## 2021-12-23 PROCEDURE — 80053 COMPREHEN METABOLIC PANEL: CPT

## 2021-12-23 PROCEDURE — 700102 HCHG RX REV CODE 250 W/ 637 OVERRIDE(OP): Performed by: INTERNAL MEDICINE

## 2021-12-23 PROCEDURE — 700111 HCHG RX REV CODE 636 W/ 250 OVERRIDE (IP): Performed by: HOSPITALIST

## 2021-12-23 PROCEDURE — A9270 NON-COVERED ITEM OR SERVICE: HCPCS | Performed by: HOSPITALIST

## 2021-12-23 PROCEDURE — 770001 HCHG ROOM/CARE - MED/SURG/GYN PRIV*

## 2021-12-23 PROCEDURE — 700105 HCHG RX REV CODE 258: Performed by: STUDENT IN AN ORGANIZED HEALTH CARE EDUCATION/TRAINING PROGRAM

## 2021-12-23 PROCEDURE — 70551 MRI BRAIN STEM W/O DYE: CPT | Mod: MG

## 2021-12-23 PROCEDURE — A9270 NON-COVERED ITEM OR SERVICE: HCPCS | Performed by: INTERNAL MEDICINE

## 2021-12-23 PROCEDURE — 700111 HCHG RX REV CODE 636 W/ 250 OVERRIDE (IP): Performed by: STUDENT IN AN ORGANIZED HEALTH CARE EDUCATION/TRAINING PROGRAM

## 2021-12-23 PROCEDURE — 700111 HCHG RX REV CODE 636 W/ 250 OVERRIDE (IP): Performed by: INTERNAL MEDICINE

## 2021-12-23 PROCEDURE — 84100 ASSAY OF PHOSPHORUS: CPT

## 2021-12-23 PROCEDURE — 85027 COMPLETE CBC AUTOMATED: CPT

## 2021-12-23 PROCEDURE — 83735 ASSAY OF MAGNESIUM: CPT

## 2021-12-23 PROCEDURE — 700102 HCHG RX REV CODE 250 W/ 637 OVERRIDE(OP): Performed by: HOSPITALIST

## 2021-12-23 PROCEDURE — 99233 SBSQ HOSP IP/OBS HIGH 50: CPT | Performed by: HOSPITALIST

## 2021-12-23 RX ORDER — ATORVASTATIN CALCIUM 80 MG/1
80 TABLET, FILM COATED ORAL
Status: DISCONTINUED | OUTPATIENT
Start: 2021-12-23 | End: 2021-12-24 | Stop reason: HOSPADM

## 2021-12-23 RX ORDER — OXYCODONE HYDROCHLORIDE 5 MG/1
2.5-5 TABLET ORAL EVERY 6 HOURS PRN
Status: DISCONTINUED | OUTPATIENT
Start: 2021-12-23 | End: 2021-12-24 | Stop reason: HOSPADM

## 2021-12-23 RX ORDER — PREGABALIN 150 MG/1
150 CAPSULE ORAL 2 TIMES DAILY
Status: DISCONTINUED | OUTPATIENT
Start: 2021-12-23 | End: 2021-12-24 | Stop reason: HOSPADM

## 2021-12-23 RX ADMIN — CARVEDILOL 25 MG: 25 TABLET, FILM COATED ORAL at 17:33

## 2021-12-23 RX ADMIN — MORPHINE SULFATE 2 MG: 4 INJECTION, SOLUTION INTRAMUSCULAR; INTRAVENOUS at 05:39

## 2021-12-23 RX ADMIN — LISINOPRIL 20 MG: 20 TABLET ORAL at 05:13

## 2021-12-23 RX ADMIN — PREGABALIN 150 MG: 150 CAPSULE ORAL at 09:37

## 2021-12-23 RX ADMIN — ACETAMINOPHEN 1000 MG: 500 TABLET ORAL at 08:06

## 2021-12-23 RX ADMIN — DEXTROSE MONOHYDRATE 500 MG: 50 INJECTION, SOLUTION INTRAVENOUS at 05:22

## 2021-12-23 RX ADMIN — LORAZEPAM 1 MG: 2 INJECTION INTRAMUSCULAR; INTRAVENOUS at 10:13

## 2021-12-23 RX ADMIN — CARVEDILOL 25 MG: 25 TABLET, FILM COATED ORAL at 07:54

## 2021-12-23 RX ADMIN — PREGABALIN 150 MG: 150 CAPSULE ORAL at 17:33

## 2021-12-23 RX ADMIN — ATORVASTATIN CALCIUM 80 MG: 80 TABLET, FILM COATED ORAL at 20:12

## 2021-12-23 RX ADMIN — MORPHINE SULFATE 3 MG: 4 INJECTION, SOLUTION INTRAMUSCULAR; INTRAVENOUS at 01:20

## 2021-12-23 ASSESSMENT — ENCOUNTER SYMPTOMS
GASTROINTESTINAL NEGATIVE: 1
HEADACHES: 1
RESPIRATORY NEGATIVE: 1
NERVOUS/ANXIOUS: 1
MUSCULOSKELETAL NEGATIVE: 1
EYES NEGATIVE: 1
CONSTITUTIONAL NEGATIVE: 1
WEAKNESS: 1
CARDIOVASCULAR NEGATIVE: 1

## 2021-12-23 ASSESSMENT — PAIN DESCRIPTION - PAIN TYPE
TYPE: ACUTE PAIN

## 2021-12-23 ASSESSMENT — FIBROSIS 4 INDEX: FIB4 SCORE: 3.11

## 2021-12-23 NOTE — CARE PLAN
The patient is Watcher - Medium risk of patient condition declining or worsening    Shift Goals  Clinical Goals: decrease headache, maintain hemodynamic stability  Patient Goals: rest, walking  Family Goals: continue improving, decreased headache    Progress made toward(s) clinical / shift goals:    Problem: Fall Risk  Goal: Patient will remain free from falls  Outcome: Progressing  Note: Patient and family verbalized the importance of calling for assistance prior to getting out of bed in order to help prevent falls.      Problem: Skin Integrity  Goal: Skin integrity is maintained or improved  Outcome: Progressing  Note: Patient verbalized the understanding of making sure to turn self at least every two hours while in bed in order to help prevent skin from breakdown

## 2021-12-23 NOTE — CARE PLAN
The patient is Watcher - Medium risk of patient condition declining or worsening    Shift Goals  Clinical Goals: calm, stable neuro assessments  Patient Goals: Sleep  Family Goals: MRI, Comfort    Progress made toward(s) clinical / shift goals:      Patient progressed in all areas of care throughout the night. Remained safe and free of falls, patient was able to sleep for a partial amount of night, pain was managed with tylenol and fever was reduced with changing rooms and providing tylenol.

## 2021-12-23 NOTE — DISCHARGE PLANNING
Care Transition Team Discharge Planning    Anticipated Discharge Disposition: d/c home w/ spouse and outpt therapy is needed    Action: Lsw observed Rehab has declined pt, and therapy assessments indicates does not qualify for further therapy after d/c from acute setting. One note indicates if needed, pt may f/u outpt w/ therapy.     Barriers to Discharge: TBD    Plan: Lsw will continue to follow, and assist w/ d/c planning.

## 2021-12-23 NOTE — PROGRESS NOTES
Hospital Medicine Daily Progress Note    Date of Service  12/23/2021    Chief Complaint  Matti Olguin is a 71 y.o. male admitted 12/20/2021 with weakness, speech change, headache    Hospital Course  71 y.o. male who presented 12/20/2021 with a PMHx of AVR, HTN and complete heart block with PPM in place, 2 prior CVA's.  Pt presented with head ache and mild L upper extremity weakness and speech changes. He is left hand dominant.  On imaging he was found to have an acute 1cm L SDH.  Pt was on ASA and plavix for his Hx of CVA.  There is no report of fall or head injury.    Interval Problem Update  Patient seen and examined today.    Patient tolerating treatment and therapies.  All Data, Medication data reviewed.  Case discussed with nursing as available.  Plan of Care reviewed with patient and notified of changes.  12/22 the patient required sedation this morning because he was very aggressive and required four-point leather restraints at some point, he is currently sedated and poorly arousable, family is at the bedside, explained need for further imaging and management of decision making.  The patient apparently is using some alcohol on the weekends sometimes heavier, the patient has a Medtronic pacemaker, did have MRIs in the past with adjustment by [employer].  12/23 the patient is much improved today, he has no definitive noticeable new weakness, he is awake alert and oriented x4, MRI shows subdural hematoma without evidence of acute CVA, subdural hematoma appears stable.  Pacer interrogation did not show atrial fibrillation or other noticeable severe dysrhythmia, was placed on MRI mode and then returned back to her regular mild by the [employer] tech  Patient questions again if he remembered any sort of head injury, he denies    I have personally seen and examined the patient at bedside. I discussed the plan of care with family.    Consultants/Specialty  neurology    Code Status  Full  Code    Disposition  Patient is not medically cleared for discharge.   Anticipate discharge to to be determined  I have placed the appropriate orders for post-discharge needs.    Review of Systems  Review of Systems   Constitutional: Negative.    HENT: Negative.    Eyes: Negative.    Respiratory: Negative.    Cardiovascular: Negative.    Gastrointestinal: Negative.    Genitourinary: Negative.    Musculoskeletal: Negative.    Skin: Negative.    Neurological: Positive for weakness and headaches.   Endo/Heme/Allergies: Negative.    Psychiatric/Behavioral: The patient is nervous/anxious.    All other systems reviewed and are negative.       Physical Exam  Temp:  [35.8 °C (96.5 °F)-36.8 °C (98.2 °F)] 36.8 °C (98.2 °F)  Pulse:  [64-87] 69  Resp:  [16-29] 25  BP: ()/(40-97) 131/70  SpO2:  [90 %-96 %] 93 %    Physical Exam  Vitals and nursing note reviewed.   Constitutional:       General: He is sleeping.      Appearance: He is well-developed.      Interventions: Nasal cannula in place.      Comments: Pt seen and examined.   HENT:      Head: Normocephalic and atraumatic.   Eyes:      Pupils: Pupils are equal, round, and reactive to light.   Cardiovascular:      Rate and Rhythm: Normal rate.      Heart sounds: Murmur heard.       Pulmonary:      Effort: Pulmonary effort is normal.      Breath sounds: Normal breath sounds.   Abdominal:      General: Bowel sounds are normal.      Palpations: Abdomen is soft.   Genitourinary:     Penis: Normal.    Musculoskeletal:         General: Normal range of motion.      Cervical back: Normal range of motion and neck supple.   Skin:     General: Skin is warm and dry.   Neurological:      Mental Status: He is oriented to person, place, and time. Mental status is at baseline.         Fluids    Intake/Output Summary (Last 24 hours) at 12/23/2021 1401  Last data filed at 12/23/2021 0809  Gross per 24 hour   Intake 1000 ml   Output 1100 ml   Net -100 ml       Laboratory  Recent Labs      12/20/21  1812 12/22/21  0517 12/23/21  0518   WBC 7.4 8.1 7.6   RBC 4.41* 4.43* 4.35*   HEMOGLOBIN 15.3 15.1 14.7   HEMATOCRIT 44.2 44.8 44.6   .2* 101.1* 102.5*   MCH 34.7* 34.1* 33.8*   MCHC 34.6 33.7 33.0*   RDW 48.5 48.9 50.2*   PLATELETCT 151* 132* 142*   MPV 12.3 12.4 12.3     Recent Labs     12/21/21  0832 12/21/21  1441 12/21/21  2243 12/22/21  0517 12/23/21  0518   SODIUM 136  136   < > 135 138 141   POTASSIUM 3.3*  --   --  3.9 3.9   CHLORIDE 101  --   --  105 109   CO2 22  --   --  23 23   GLUCOSE 99  --   --  138* 95   BUN 20  --   --  19 21   CREATININE 0.60  --   --  0.68 0.74   CALCIUM 9.0  --   --  8.8 8.6    < > = values in this interval not displayed.     Recent Labs     12/20/21 1812   APTT 29.9   INR 1.07         Recent Labs     12/20/21 1812   TRIGLYCERIDE 78   HDL 37*   LDL 64       Imaging  MR-BRAIN-W/O   Final Result      1.  Left frontal-parietal-temporal subacute subdural hematoma measuring up to about 11 mm over the left posterior frontal convexity. Minimal associated underlying subarachnoid hemorrhage.   2.  Old lacunar infarct right thalamus.   3.  Tiny old lacunar infarct left anterior thalamus.   4.  No evidence of acute cerebral infarction.   5.  Punctate focus of old microhemorrhage left frontal lobe.      CT-HEAD W/O   Final Result      1. Stable 10 mm thick mixed density left convexity subdural hematoma.   2. No new intracranial abnormality.         CT-CTA HEAD WITH & W/O-POST PROCESS   Final Result      1.  CT angiogram of the [place] within normal limits.      2.  Small subacute left subdural hematoma with maximum thickness of 10 mm. No significant mass effect or shift      3.  Findings were discussed with KELSI FISHER on 12/20/2021 6:52 PM.      Hemorrhage grading-BIG 3           Assessment/Plan  SDH (subdural hematoma) (HCC)- (present on admission)  Assessment & Plan  DW Neuro Srgry Dr Abrams: he had opportunity to review imaging.  Feels pt does not  need intervention unless he were to worsen  Repeat CT today is stable  Neuro is following  PT/OT/SLP consulted  BP control  On no blood thinners presently, came in on ASA and plavix for Hx of previous CVA  Cont Neuro checks  CTA neg for vascular malformations  Negative MRI other than known SDH    Agitation- (present on admission)  Assessment & Plan  Severe, possibly secondary to CVA, SDH versus other  The patient apparently does have some alcohol use  The patient is back to baseline now    NSVT (nonsustained ventricular tachycardia) (HCC) - on pacer check- (present on admission)  Assessment & Plan  History of, pacemaker interrogated, no evidence of sustained dysrhythmia or atrial fibrillation      Cardiac pacemaker - Medtronic- (present on admission)  Assessment & Plan  Interrogated by pacer rep, no evidence of atrial fibrillation, short VT episodes  Was managed during MRI successfully    S/P aortic valve replacement with bioprosthetic valve: repeat AVR 10/2014- (present on admission)  Assessment & Plan  bioprosthetic, history of mechanical AVR    Stroke-like symptoms- (present on admission)  Assessment & Plan  With prior CVAs x2, the patient with presenting dysarthria and right-sided facial weakness  No new CVA lesions per MRI    Hypertension- (present on admission)  Assessment & Plan  Resume home lisinopril 20, coreg 25  Cont IV hydralazine and labetalol IV for breakthrough  Blood pressure goal less than 160     Plan  MRI results noted  Pacer interrogation benign  Joshua to neuro with ongoing PT OT and discharge planning  Continue blood pressure control  Optimize electrolytes  See orders  Medically complex high risk patient    VTE prophylaxis: pharmacologic prophylaxis contraindicated due to Intracranial hemorrhage    I have performed a physical exam and reviewed and updated ROS and Plan today (12/23/2021). In review of yesterday's note (12/22/2021), there are no changes except as documented above.    Please  note that this dictation was created using voice recognition software. I have made every reasonable attempt to correct obvious errors, but I expect that there are errors of grammar and possibly context that I did not discover before finalizing the note.

## 2021-12-24 VITALS
TEMPERATURE: 97.6 F | HEART RATE: 80 BPM | SYSTOLIC BLOOD PRESSURE: 135 MMHG | WEIGHT: 244.05 LBS | DIASTOLIC BLOOD PRESSURE: 87 MMHG | RESPIRATION RATE: 18 BRPM | BODY MASS INDEX: 33.06 KG/M2 | OXYGEN SATURATION: 92 % | HEIGHT: 72 IN

## 2021-12-24 PROBLEM — R45.1 AGITATION: Status: RESOLVED | Noted: 2021-12-22 | Resolved: 2021-12-24

## 2021-12-24 LAB
ANION GAP SERPL CALC-SCNC: 11 MMOL/L (ref 7–16)
BUN SERPL-MCNC: 17 MG/DL (ref 8–22)
CALCIUM SERPL-MCNC: 9 MG/DL (ref 8.5–10.5)
CHLORIDE SERPL-SCNC: 105 MMOL/L (ref 96–112)
CO2 SERPL-SCNC: 22 MMOL/L (ref 20–33)
CREAT SERPL-MCNC: 0.72 MG/DL (ref 0.5–1.4)
ERYTHROCYTE [DISTWIDTH] IN BLOOD BY AUTOMATED COUNT: 49.1 FL (ref 35.9–50)
GLUCOSE SERPL-MCNC: 101 MG/DL (ref 65–99)
HCT VFR BLD AUTO: 45.2 % (ref 42–52)
HGB BLD-MCNC: 15.5 G/DL (ref 14–18)
MAGNESIUM SERPL-MCNC: 2.1 MG/DL (ref 1.5–2.5)
MCH RBC QN AUTO: 35.1 PG (ref 27–33)
MCHC RBC AUTO-ENTMCNC: 34.3 G/DL (ref 33.7–35.3)
MCV RBC AUTO: 102.3 FL (ref 81.4–97.8)
PHOSPHATE SERPL-MCNC: 2.7 MG/DL (ref 2.5–4.5)
PLATELET # BLD AUTO: 150 K/UL (ref 164–446)
PMV BLD AUTO: 12.1 FL (ref 9–12.9)
POTASSIUM SERPL-SCNC: 3.7 MMOL/L (ref 3.6–5.5)
RBC # BLD AUTO: 4.42 M/UL (ref 4.7–6.1)
SODIUM SERPL-SCNC: 138 MMOL/L (ref 135–145)
WBC # BLD AUTO: 8.2 K/UL (ref 4.8–10.8)

## 2021-12-24 PROCEDURE — 80048 BASIC METABOLIC PNL TOTAL CA: CPT

## 2021-12-24 PROCEDURE — 700102 HCHG RX REV CODE 250 W/ 637 OVERRIDE(OP): Performed by: HOSPITALIST

## 2021-12-24 PROCEDURE — A9270 NON-COVERED ITEM OR SERVICE: HCPCS | Performed by: HOSPITALIST

## 2021-12-24 PROCEDURE — 99239 HOSP IP/OBS DSCHRG MGMT >30: CPT | Performed by: HOSPITALIST

## 2021-12-24 PROCEDURE — 85027 COMPLETE CBC AUTOMATED: CPT

## 2021-12-24 PROCEDURE — 83735 ASSAY OF MAGNESIUM: CPT

## 2021-12-24 PROCEDURE — 84100 ASSAY OF PHOSPHORUS: CPT

## 2021-12-24 RX ADMIN — LISINOPRIL 20 MG: 20 TABLET ORAL at 04:51

## 2021-12-24 RX ADMIN — PREGABALIN 150 MG: 150 CAPSULE ORAL at 04:51

## 2021-12-24 RX ADMIN — CARVEDILOL 25 MG: 25 TABLET, FILM COATED ORAL at 09:37

## 2021-12-24 NOTE — DISCHARGE SUMMARY
"Discharge Summary    CHIEF COMPLAINT ON ADMISSION  Chief Complaint   Patient presents with   • Possible Stroke     HA x 1 wk. Yesterday wife noted that speech was \"a little slurry, but much worse since about noon.\" Expressive aphasia, R facial droop noted. Hx CVA       Reason for Admission  Possible Stroke     Admission Date  12/20/2021    CODE STATUS  Full code  HPI & HOSPITAL COURSE  This is a 71 y.o. male here with dysarthria and headache was noted to have acute 1 cm left subdural hematoma.  He had been maintained on aspirin and Plavix for prior history of stroke.  He was admitted and closely monitored and received neurologic exams.  Dr. Abrams from neurosurgery was consulted he reviewed his imaging and felt that no surgical intervention was needed.  He was evaluated by neurology who recommended follow-up CT in 2 weeks and if the patient remains clinically stable then he can resume aspirin at that time.  During his hospitalization the patient had an episode of confusion and agitation which was felt to be secondary to delirium.  His symptoms gradually improved and on my examination this morning he has some mild residual dysarthria.  He has been tolerating his diet.  He has been mobilizing without assistance.  He is clinically stable for discharge with close outpatient follow-up with neurology and neurosurgery with repeat head CT in 2 weeks  He will continue to hold his aspirin and Plavix until reevaluation with neurology.  The patient does not recall any episodes of trauma to account for his subdural hematoma.  I recommended he completely abstains from alcohol and discussed fall precautions.      Therefore, he is discharged in good and stable condition to home with close outpatient follow-up.    The patient met 2-midnight criteria for an inpatient stay at the time of discharge.    Discharge Date  12/24/2021    FOLLOW UP ITEMS POST DISCHARGE  Follow-up with neurology in 2 weeks with repeat head CT follow-up with his " cardiologist Dr. Thibodeaux      DISCHARGE DIAGNOSES  Active Problems:    Hypertension POA: Yes    S/P aortic valve replacement with bioprosthetic valve: repeat AVR 10/2014 POA: Yes    Cardiac pacemaker - Medtronic (Chronic) POA: Yes      Overview: October 2014: Medtronic Advisa DR COOL [id number] implanted at [facility].    SDH (subdural hematoma) (HCC) POA: Yes  Resolved Problems:    Stroke-like symptoms POA: Yes    Agitation POA: Yes      FOLLOW UP  No future appointments.  Brittany Bradshaw M.D.  [address]  [place] [address]  [phone]          Conner Thibodeaux M.D.  [address]  [address]  [place] [address]  [phone]          Daniel Abrams III, M.D.  [address]  [place] [address]  [phone]    In 2 weeks      Brittany Bradshaw M.D.  [address]  [place] [address]  [phone]          Brittany Bradshaw M.D.  [address]  [place] [address]  [phone]          [facility] - Neurology  [address], [address]  [facility] [address] [phone]  In 2 weeks  follow up CT      MEDICATIONS ON DISCHARGE     Medication List      CONTINUE taking these medications      Instructions   atorvastatin 80 MG tablet  Commonly known as: LIPITOR   Take 1 Tab by mouth every bedtime.  Dose: 80 mg     carvedilol 25 MG Tabs  Commonly known as: COREG   Take 2 Tabs by mouth 2 times a day, with meals.  Dose: 50 mg     cloNIDine 0.1 MG Tabs  Commonly known as: CATAPRES   Take 1 Tab by mouth every 6 hours as needed. As needed for SBP >150  Dose: 0.1 mg     folic acid 400 MCG tablet  Commonly known as: FOLVITE   Take 400 mcg by mouth every day.  Dose: 400 mcg     hydroCHLOROthiazide 25 MG Tabs  Commonly known as: HYDRODIURIL   Take 1 Tab by mouth every day.  Dose: 25 mg     HYDROcodone-acetaminophen 5-325 MG Tabs per tablet  Commonly known as: NORCO   Take 1 Tab by mouth every four hours as needed.  Dose: 1 Tablet     lisinopril 20 MG Tabs  Commonly known as: PRINIVIL   Take 1 Tab by  mouth 2 times a day.  Dose: 20 mg     LORazepam 1 MG Tabs  Commonly known as: ATIVAN   Take 1 mg by mouth every 8 hours as needed for Anxiety.  Dose: 1 mg     potassium chloride SA 20 MEQ Tbcr  Commonly known as: Kdur   Take 1 Tab by mouth every day.  Dose: 20 mEq     pregabalin 150 MG Caps  Commonly known as: LYRICA   Take 150 mg by mouth 2 times a day.  Dose: 150 mg     therapeutic multivitamin-minerals Tabs   Take 1 Tablet by mouth every day.  Dose: 1 Tablet     zolpidem 5 MG Tabs  Commonly known as: AMBIEN   Take 5 mg by mouth at bedtime as needed for Sleep.  Dose: 5 mg        STOP taking these medications    aspirin EC 81 MG Tbec  Commonly known as: ECOTRIN     clopidogrel 75 MG Tabs  Commonly known as: PLAVIX            Allergies  Allergies   Allergen Reactions   • Nkda [No Known Drug Allergy]        DIET  No orders of the defined types were placed in this encounter.      ACTIVITY  As tolerated.  Weight bearing as tolerated    CONSULTATIONS  Neurology  Neurosurgery    PROCEDURES  None    LABORATORY  Lab Results   Component Value Date    SODIUM 138 12/24/2021    POTASSIUM 3.7 12/24/2021    CHLORIDE 105 12/24/2021    CO2 22 12/24/2021    GLUCOSE 101 (H) 12/24/2021    BUN 17 12/24/2021    CREATININE 0.72 12/24/2021    CREATININE 1.1 05/29/2007        Lab Results   Component Value Date    WBC 8.2 12/24/2021    HEMOGLOBIN 15.5 12/24/2021    HEMATOCRIT 45.2 12/24/2021    PLATELETCT 150 (L) 12/24/2021        Total time of the discharge process exceeds 30 minutes.

## 2021-12-24 NOTE — DISCHARGE INSTRUCTIONS
Discharge Instructions    Discharged to home by car with relative. Discharged via walking, hospital escort: Refused.  Special equipment needed: Not Applicable    Be sure to schedule a follow-up appointment with your primary care doctor or any specialists as instructed.     Discharge Plan:   Diet Plan: Discussed  Activity Level: Discussed  Confirmed Follow up Appointment: Patient to Call and Schedule Appointment  Confirmed Symptoms Management: Discussed  Medication Reconciliation Updated: Yes  Influenza Vaccine Indication: Not indicated: Previously immunized this influenza season and > 8 years of age    I understand that a diet low in cholesterol, fat, and sodium is recommended for good health. Unless I have been given specific instructions below for another diet, I accept this instruction as my diet prescription.   Other diet: Regular    Special Instructions: None    · Is patient discharged on Warfarin / Coumadin?   No     Depression / Suicide Risk    As you are discharged from this [facility] facility, it is important to learn how to keep safe from harming yourself.    Recognize the warning signs:  · Abrupt changes in personality, positive or negative- including increase in energy   · Giving away possessions  · Change in eating patterns- significant weight changes-  positive or negative  · Change in sleeping patterns- unable to sleep or sleeping all the time   · Unwillingness or inability to communicate  · Depression  · Unusual sadness, discouragement and loneliness  · Talk of wanting to die  · Neglect of personal appearance   · Rebelliousness- reckless behavior  · Withdrawal from people/activities they love  · Confusion- inability to concentrate     If you or a loved one observes any of these behaviors or has concerns about self-harm, here's what you can do:  · Talk about it- your feelings and reasons for harming yourself  · Remove any means that you might use to hurt yourself (examples: pills, rope, extension  "cords, firearm)  · Get professional help from the community (Mental Health, Substance Abuse, psychological counseling)  · Do not be alone:Call your Safe Contact- someone whom you trust who will be there for you.  · Call your local CRISIS HOTLINE 960-0053 or 474-104-3956  · Call your local Children's Mobile Crisis Response Team [place] (966) 446-2306 or www.Conversocial  · Call the toll free National Suicide Prevention Hotlines   · National Suicide Prevention Lifeline 633-143-VGBP (1909)  · [place] [employer] Network 800-SUICIDE (631-7289)      What is a subdural hematoma?  A subdural hematoma is bleeding on the surface of the brain. The bleeding occurs under the tough, outer covering of the brain, called the \"dura\" (figure 1). Although the bleeding is not actually in the brain, if there is a big blood clot, it can put pressure on the brain. This can lead to symptoms.    An injury to the head causes most subdural hematomas. The most common causes of these injuries include:    ?A car crash    ?A fall    ?A physical attack    There are 2 main types of subdural hematomas:    ?Acute - This type happens in the first hours after a head injury. If the bleeding continues, the subdural hematoma can keep growing, days after the injury.    ?Chronic - This type happens most often in older people whose brains have shrunk slightly due to age. In these people, a mild head injury can cause a tear in a small vein. This can then lead to bleeding on the brain's surface. The injury can be so mild that the person doesn't even notice it or remember it later. The hematoma grows slowly, over several days or weeks.    What are the symptoms of subdural hematoma?  Some people pass out after getting a serious head injury and a large, acute subdural hematoma. Other times, the symptoms appear later and can include:    ?Headache    ?Vomiting    ?Weakness    ?Numbness    ?Trouble walking    ?Slurred speech or being unable to " "speak    ?Acting less alert than normal    ?Dizziness    ?Confusion or trouble thinking    ?Sleepiness    ?Seizures    Will I need tests?  Yes. Tests include imaging tests that take pictures of your brain, such as an MRI or CT scan. These will show the subdural hematoma and where and how big it is.    How is subdural hematoma treated?  The treatment depends on the size of the hematoma and your symptoms. You might need surgery to drain the hematoma and relieve the pressure on your brain. If the hematoma is small, your doctor might recommend waiting to see if it gets better on its own. In this case, they might do a follow-up MRI or CT scan to confirm that the hematoma is going away.    If you take certain medicines, your doctor might tell you to stop taking them for a time. Examples include aspirin, clopidogrel (brand name: Plavix), or \"blood thinners\" such as warfarin, dabigatran, apixaban, or rivaroxaban. Be sure to talk to the doctor who first prescribed these medicines about when and if you should start taking them again.    "

## 2021-12-24 NOTE — CARE PLAN
The patient is Stable - Low risk of patient condition declining or worsening    Shift Goals  Clinical Goals: monitor headache, sleep  Patient Goals: Sleep  Family Goals: transfer patient    Progress made toward(s) clinical / shift goals:      Patient progressed in all areas of care throughout the night. Patient and family was educated regarding future plan of care, remained free of falls throughout the night, and pain was well managed.

## 2022-01-12 ENCOUNTER — HOSPITAL ENCOUNTER (OUTPATIENT)
Dept: RADIOLOGY | Facility: MEDICAL CENTER | Age: 72
End: 2022-01-12
Attending: PHYSICIAN ASSISTANT
Payer: MEDICARE

## 2022-01-12 DIAGNOSIS — I62.02 NONTRAUMATIC SUBACUTE SUBDURAL HEMORRHAGE (HCC): ICD-10-CM

## 2022-01-12 PROCEDURE — 70450 CT HEAD/BRAIN W/O DYE: CPT | Mod: MH

## 2022-02-04 ENCOUNTER — OFFICE VISIT (OUTPATIENT)
Dept: NEUROLOGY | Facility: MEDICAL CENTER | Age: 72
End: 2022-02-04
Attending: PSYCHIATRY & NEUROLOGY
Payer: MEDICARE

## 2022-02-04 VITALS
WEIGHT: 242.51 LBS | DIASTOLIC BLOOD PRESSURE: 82 MMHG | HEIGHT: 72 IN | HEART RATE: 84 BPM | TEMPERATURE: 98.5 F | OXYGEN SATURATION: 93 % | BODY MASS INDEX: 32.85 KG/M2 | SYSTOLIC BLOOD PRESSURE: 128 MMHG

## 2022-02-04 DIAGNOSIS — Z86.73 HISTORY OF STROKE: ICD-10-CM

## 2022-02-04 DIAGNOSIS — S06.5XAA SUBDURAL HEMATOMA (HCC): ICD-10-CM

## 2022-02-04 PROBLEM — I63.9 CEREBROVASCULAR ACCIDENT (CVA) INVOLVING LEFT CEREBRAL HEMISPHERE (HCC): Status: RESOLVED | Noted: 2021-10-25 | Resolved: 2022-02-04

## 2022-02-04 PROCEDURE — 99212 OFFICE O/P EST SF 10 MIN: CPT | Performed by: PSYCHIATRY & NEUROLOGY

## 2022-02-04 PROCEDURE — 99204 OFFICE O/P NEW MOD 45 MIN: CPT | Performed by: PSYCHIATRY & NEUROLOGY

## 2022-02-04 RX ORDER — HYDROCODONE BITARTRATE AND ACETAMINOPHEN 5; 325 MG/1; MG/1
TABLET ORAL
COMMUNITY
End: 2023-09-21

## 2022-02-04 ASSESSMENT — PATIENT HEALTH QUESTIONNAIRE - PHQ9: CLINICAL INTERPRETATION OF PHQ2 SCORE: 0

## 2022-02-04 ASSESSMENT — FIBROSIS 4 INDEX: FIB4 SCORE: 2.21

## 2022-02-04 NOTE — PROGRESS NOTES
"Chief Complaint   Patient presents with   • New Patient     Subdural hematoma        History of present illness:  Matti Olguin 71 y.o. male with bioprosthetic aortic valve presented for dysarthria and headache and was found to have 1cm left subdural hematoma. He was on dual antiplatelet therapy with aspirin/plavix and this was held. He was told that he was supposed to be on dual antiplatelet therapy to \"prevent more stroke\".   He is doing well currently and his slurred speech is resolved.   He was only on aspirin 3 times per week and was on plavix once daily prior to the subdural hematoma.   He was started on both aspirin and plavix after he was transitioned off of warfarin after his bioprosthetic valve was placed.     Past medical history:   Past Medical History:   Diagnosis Date   • Anticoagulation monitoring, special range    • Complete heart block (HCC) 10/2014    Statust post pacemaker placement.   • Dyspnea    • High cholesterol    • HTN (hypertension)    • NSVT (nonsustained ventricular tachycardia) (HCC) - on pacer check    • S/P aortic valve replacement with bioprosthetic valve 10/2014   • Sleep apnea    • Snoring    • Stroke (HCC)     left sided \"tingling\"   • Unspecified hemorrhagic conditions     Coumadin   • Valvular heart disease 12/1988    AVR       Past surgical history:   Past Surgical History:   Procedure Laterality Date   • PACEMAKER INSERTION Left 10/25/2014    Medtronic Ivette COOL [id number] implanted at [facility].   • RECOVERY  11/8/2013    Performed by Cath-Recovery Surgery at SURGERY SAME DAY [facility] ORS   • RECOVERY  10/8/2013    Performed by Cath-Recovery Surgery at SURGERY SAME DAY [facility] ORS   • RECOVERY  9/21/2012    Performed by Matti Clemens M.D. at SURGERY SAME DAY [facility] ORS   • AORTIC VALVE REPLACEMENT  1988    titanium   • OTHER ORTHOPEDIC SURGERY      Knee scope, right shoulder       Family history:   Family History   Problem Relation Age of Onset "   • Heart Attack Father    • Heart Disease Sister        Social history:   Social History     Socioeconomic History   • Marital status:      Spouse name: Not on file   • Number of children: Not on file   • Years of education: Not on file   • Highest education level: Not on file   Occupational History   • Not on file   Tobacco Use   • Smoking status: Never Smoker   • Smokeless tobacco: Current User     Types: Chew   Vaping Use   • Vaping Use: Never used   Substance and Sexual Activity   • Alcohol use: Yes     Comment: social drinker, not daily   • Drug use: No   • Sexual activity: Not on file   Other Topics Concern   • Not on file   Social History Narrative   • Not on file     Social Determinants of Health     Financial Resource Strain:    • Difficulty of Paying Living Expenses: Not on file   Food Insecurity:    • Worried About Running Out of Food in the Last Year: Not on file   • Ran Out of Food in the Last Year: Not on file   Transportation Needs:    • Lack of Transportation (Medical): Not on file   • Lack of Transportation (Non-Medical): Not on file   Physical Activity:    • Days of Exercise per Week: Not on file   • Minutes of Exercise per Session: Not on file   Stress:    • Feeling of Stress : Not on file   Social Connections:    • Frequency of Communication with Friends and Family: Not on file   • Frequency of Social Gatherings with Friends and Family: Not on file   • Attends [employer] Services: Not on file   • Active Member of Clubs or Organizations: Not on file   • Attends Club or Organization Meetings: Not on file   • Marital Status: Not on file   Intimate Partner Violence:    • Fear of Current or Ex-Partner: Not on file   • Emotionally Abused: Not on file   • Physically Abused: Not on file   • Sexually Abused: Not on file   Housing Stability:    • Unable to Pay for Housing in the Last Year: Not on file   • Number of Places Lived in the Last Year: Not on file   • Unstable Housing in the Last Year: Not  on file       Current medications:   Current Outpatient Medications   Medication   • therapeutic multivitamin-minerals (THERAGRAN-M) Tab   • LORazepam (ATIVAN) 1 MG Tab   • pregabalin (LYRICA) 150 MG Cap   • potassium chloride SA (KDUR) 20 MEQ Tab CR   • carvedilol (COREG) 25 MG Tab   • lisinopril (PRINIVIL) 20 MG Tab   • hydroCHLOROthiazide (HYDRODIURIL) 25 MG Tab   • atorvastatin (LIPITOR) 80 MG tablet   • cloNIDine (CATAPRES) 0.1 MG Tab   • HYDROcodone-acetaminophen (NORCO) 5-325 MG Tab per tablet   • folic acid (FOLVITE) 400 MCG tablet   • zolpidem (AMBIEN) 5 MG TABS   • HYDROcodone-acetaminophen (NORCO) 5-325 MG Tab per tablet     No current facility-administered medications for this visit.       Medication Allergy:  Allergies   Allergen Reactions   • Nkda [No Known Drug Allergy]      Physical examination:   Vitals:    22 1120   BP: 128/82   BP Location: Right arm   Patient Position: Sitting   BP Cuff Size: Adult   Pulse: 84   Temp: 36.9 °C (98.5 °F)   TempSrc: Temporal   SpO2: 93%   Weight: 110 kg (242 lb 8.1 oz)   Height: 1.829 m (6')     Neurological Exam  Mental Status  Awake and alert.  +Vocal tremor.    Cranial Nerves  CN III, IV, VI: Extraocular movements intact bilaterally. No nystagmus. Normal smooth pursuit.  CN VII:  Right: There is no facial weakness.  Left: There is no facial weakness.  CN XI:  Right: Trapezius strength is normal.  Left: Trapezius strength is normal.    Motor   The following abnormal movements were seen: There is a frequent writhing movement of the left hand and fingers.  There is postural tremor of the left hand..    Coordination  Right: Finger-to-nose normal. Rapid alternating movement normal.  Left: Finger-to-nose normal. Rapid alternating movement normal.    Gait  Casual gait is normal including stance, stride, and arm swing.      Labs:  I reviewed the following labs personally:  None     Imagin21 MRI BRAIN W/O   I reviewed the images personally and agree with  the following read:     IMPRESSION:     1.  Left frontal-parietal-temporal subacute subdural hematoma measuring up to about 11 mm over the left posterior frontal convexity. Minimal associated underlying subarachnoid hemorrhage.  2.  Old lacunar infarct right thalamus.  3.  Tiny old lacunar infarct left anterior thalamus.  4.  No evidence of acute cerebral infarction.  5.  Punctate focus of old microhemorrhage left frontal lobe.    12/20/21 CTA HEAD:    IMPRESSION:     1.  CT angiogram of the [place] within normal limits.     2.  Small subacute left subdural hematoma with maximum thickness of 10 mm. No significant mass effect or shift    1/22/22 CT HEAD:  I reviewed the images personally and agree with the following read:     IMPRESSION:     1.  Subacute left subdural hematoma is mildly decreased in size compared to the prior examination.  2.  No new hemorrhage is identified.  3.  Atrophy  4.  Right thalamic lacunar infarct is again noted.    ASSESSMENT AND PLAN:  Problem List Items Addressed This Visit     History of stroke    Subdural hematoma (HCC)          1. Subdural hematoma (HCC)    2. History of stroke    Other orders  - HYDROcodone-acetaminophen (NORCO) 5-325 MG Tab per tablet; twice a day    This is a 71-year-old male who was recently hospitalized for left subdural hematoma.  This was a nontraumatic hematoma.  His work-up including CT angiogram of the head is normal and negative for aneurysm or arteriovenous malformation.  The most likely etiology of the subdural hematoma is dual antiplatelet therapy with aspirin and Plavix.  Per his history, he only requires a single antiplatelet therapy for stroke prevention given history of right thalamic infarct.  As an aside, he has endorsed left choreiform movement of the arm since his right thalamic stroke.  It is nonbothersome and I would not recommend medical treatment of this.    I have recommended that he resume a single antiplatelet drug for stroke  prevention, and resume aspirin 81 mg daily.  I have sent this note to Dr. Conner Thibodeaux to inquire whether he is fine with this recommendation.    FOLLOW-UP:   Return if symptoms worsen or fail to improve.    Total time spent for the day for this patient unrelated to procedure time is: 51 minutes. I spent 21 minutes in face to face time and I spent 25 minutes pre-charting and 5 minutes in post-visit documentation.      Dr. Rito Serrano D.O.  [facility]

## 2022-02-04 NOTE — Clinical Note
Are you okay with only resuming aspirin 81mg alone?  He has a bioprosthetic aortic valve and previously was on dual antiplatelet therapy with Plavix and aspirin.  Recently, he had a subdural hematoma, and given that I do not see an indication for dual antiplatelet therapy I would like for him to be on aspirin alone.

## 2022-02-04 NOTE — PATIENT INSTRUCTIONS
Resume aspirin 81mg once daily.     Discontinue plavix for now, however I will send the note with request for Dr. Thibodeaux to provide input.

## 2022-07-08 ENCOUNTER — NON-PROVIDER VISIT (OUTPATIENT)
Dept: CARDIOLOGY | Facility: MEDICAL CENTER | Age: 72
End: 2022-07-08
Payer: MEDICARE

## 2022-07-08 PROCEDURE — 93294 REM INTERROG EVL PM/LDLS PM: CPT | Performed by: INTERNAL MEDICINE

## 2022-07-08 NOTE — CARDIAC REMOTE MONITOR - SCAN
Device transmission reviewed. Device demonstrated appropriate function.       Electronically Signed by: James Gutierrez M.D.    7/25/2022  5:54 PM

## 2022-07-26 ENCOUNTER — TELEPHONE (OUTPATIENT)
Dept: CARDIOLOGY | Facility: MEDICAL CENTER | Age: 72
End: 2022-07-26
Payer: MEDICARE

## 2022-07-26 DIAGNOSIS — Z95.3 S/P AORTIC VALVE REPLACEMENT WITH BIOPROSTHETIC VALVE: ICD-10-CM

## 2022-07-26 NOTE — LETTER
"July 27, 2022        Matti Olguin  [address]  [facility] [address]          Dear Matti,    We have received the results of your recent: Lab Results    Your test came back and Dr Thibodeaux states, \"Labs look good, except sugar is borderline blood cells are stable.\"  Please follow up as previously discussed with your physician.      Feel free to call us with any questions.        Sincerely,          Darleen PNIO  Electronically Signed  "

## 2022-07-27 NOTE — TELEPHONE ENCOUNTER
Labs look good, except sugar is borderline blood cells are stable, please let him know     Thank you

## 2022-10-15 ENCOUNTER — NON-PROVIDER VISIT (OUTPATIENT)
Dept: CARDIOLOGY | Facility: MEDICAL CENTER | Age: 72
End: 2022-10-15
Payer: MEDICARE

## 2022-10-15 PROCEDURE — 93294 REM INTERROG EVL PM/LDLS PM: CPT | Performed by: INTERNAL MEDICINE

## 2022-10-17 NOTE — CARDIAC REMOTE MONITOR - SCAN
Device transmission reviewed. Device demonstrated appropriate function.       Electronically Signed by: Rl Mon M.D.    10/17/2022  7:04 PM

## 2022-11-02 ENCOUNTER — OFFICE VISIT (OUTPATIENT)
Dept: CARDIOLOGY | Facility: MEDICAL CENTER | Age: 72
End: 2022-11-02
Payer: MEDICARE

## 2022-11-02 ENCOUNTER — NON-PROVIDER VISIT (OUTPATIENT)
Dept: CARDIOLOGY | Facility: MEDICAL CENTER | Age: 72
End: 2022-11-02
Payer: MEDICARE

## 2022-11-02 VITALS
HEIGHT: 72 IN | RESPIRATION RATE: 16 BRPM | SYSTOLIC BLOOD PRESSURE: 128 MMHG | WEIGHT: 267 LBS | HEART RATE: 86 BPM | OXYGEN SATURATION: 94 % | DIASTOLIC BLOOD PRESSURE: 60 MMHG | BODY MASS INDEX: 36.16 KG/M2

## 2022-11-02 DIAGNOSIS — I47.29 NSVT (NONSUSTAINED VENTRICULAR TACHYCARDIA) (HCC): Chronic | ICD-10-CM

## 2022-11-02 DIAGNOSIS — S06.5XAA SUBDURAL HEMATOMA (HCC): ICD-10-CM

## 2022-11-02 DIAGNOSIS — Z95.3 S/P AORTIC VALVE REPLACEMENT WITH BIOPROSTHETIC VALVE: ICD-10-CM

## 2022-11-02 DIAGNOSIS — R51.9 CHRONIC INTRACTABLE HEADACHE, UNSPECIFIED HEADACHE TYPE: ICD-10-CM

## 2022-11-02 DIAGNOSIS — I10 PRIMARY HYPERTENSION: Chronic | ICD-10-CM

## 2022-11-02 DIAGNOSIS — G89.29 CHRONIC INTRACTABLE HEADACHE, UNSPECIFIED HEADACHE TYPE: ICD-10-CM

## 2022-11-02 DIAGNOSIS — Z95.0 CARDIAC PACEMAKER IN SITU: Chronic | ICD-10-CM

## 2022-11-02 DIAGNOSIS — I44.2 COMPLETE HEART BLOCK (HCC): ICD-10-CM

## 2022-11-02 PROCEDURE — 99214 OFFICE O/P EST MOD 30 MIN: CPT | Performed by: INTERNAL MEDICINE

## 2022-11-02 PROCEDURE — 93280 PM DEVICE PROGR EVAL DUAL: CPT | Performed by: INTERNAL MEDICINE

## 2022-11-02 ASSESSMENT — FIBROSIS 4 INDEX: FIB4 SCORE: 2.24

## 2022-11-02 NOTE — PROGRESS NOTES
"Chief Complaint   Patient presents with    Other     F/V Dx: S/P aortic valve replacement with bioprosthetic valve: repeat AVR 10/2014       Subjective     Matti Olguin is a 72 y.o. male who presents today for follow-up of his history of pacemaker with history of review aortic valve replacement    He had a stroke in December 2021    Past Medical History:   Diagnosis Date    Anticoagulation monitoring, special range     Complete heart block (HCC) 10/2014    Statust post pacemaker placement.    Dyspnea     High cholesterol     HTN (hypertension)     NSVT (nonsustained ventricular tachycardia) (HCC) - on pacer check     S/P aortic valve replacement with bioprosthetic valve 10/2014    Sleep apnea     Snoring     Stroke (formerly [facility])     left sided \"tingling\"    Unspecified hemorrhagic conditions     Coumadin    Valvular heart disease 12/1988    AVR     Past Surgical History:   Procedure Laterality Date    PACEMAKER INSERTION Left 10/25/2014    Medtronic Advisa DR COOL [id number] implanted at [facility].    RECOVERY  11/8/2013    Performed by Cath-Recovery Surgery at SURGERY SAME DAY [facility] ORS    RECOVERY  10/8/2013    Performed by Cath-Recovery Surgery at SURGERY SAME DAY [facility] ORS    RECOVERY  9/21/2012    Performed by Matti Clemens M.D. at SURGERY SAME DAY [facility] ORS    AORTIC VALVE REPLACEMENT  1988    titanium    OTHER ORTHOPEDIC SURGERY      Knee scope, right shoulder     Family History   Problem Relation Age of Onset    Heart Attack Father     Heart Disease Sister      Social History     Socioeconomic History    Marital status:      Spouse name: Not on file    Number of children: Not on file    Years of education: Not on file    Highest education level: Not on file   Occupational History    Not on file   Tobacco Use    Smoking status: Never    Smokeless tobacco: Current     Types: Chew   Vaping Use    Vaping Use: Never used   Substance and Sexual Activity    Alcohol use: Yes     " Comment: social drinker, not daily    Drug use: No    Sexual activity: Not on file   Other Topics Concern    Not on file   Social History Narrative    Not on file     Social Determinants of Health     Financial Resource Strain: Not on file   Food Insecurity: Not on file   Transportation Needs: Not on file   Physical Activity: Not on file   Stress: Not on file   Social Connections: Not on file   Intimate Partner Violence: Not on file   Housing Stability: Not on file     Allergies   Allergen Reactions    Nkda [No Known Drug Allergy]     Plavix [Clopidogrel]      [facility]     Outpatient Encounter Medications as of 11/2/2022   Medication Sig Dispense Refill    HYDROcodone-acetaminophen (NORCO) 5-325 MG Tab per tablet twice a day      aspirin EC (ECOTRIN) 81 MG Tablet Delayed Response Take 1 Tablet by mouth every day. (Patient taking differently: Take 81 mg by mouth every day. Twice a week) 90 Tablet 2    therapeutic multivitamin-minerals (THERAGRAN-M) Tab Take 1 Tablet by mouth every day.      LORazepam (ATIVAN) 1 MG Tab Take 1 mg by mouth every 8 hours as needed for Anxiety.      pregabalin (LYRICA) 150 MG Cap Take 150 mg by mouth 2 times a day.      potassium chloride SA (KDUR) 20 MEQ Tab CR Take 1 Tab by mouth every day. 90 Tab 3    carvedilol (COREG) 25 MG Tab Take 2 Tabs by mouth 2 times a day, with meals. 180 Tab 3    lisinopril (PRINIVIL) 20 MG Tab Take 1 Tab by mouth 2 times a day. 180 Tab 3    hydroCHLOROthiazide (HYDRODIURIL) 25 MG Tab Take 1 Tab by mouth every day. 90 Tab 3    atorvastatin (LIPITOR) 80 MG tablet Take 1 Tab by mouth every bedtime. 30 Tab 2    cloNIDine (CATAPRES) 0.1 MG Tab Take 1 Tab by mouth every 6 hours as needed. As needed for SBP >150 60 Tab 2    HYDROcodone-acetaminophen (NORCO) 5-325 MG Tab per tablet Take 1 Tab by mouth every four hours as needed.      folic acid (FOLVITE) 400 MCG tablet Take 400 mcg by mouth every day.      zolpidem (AMBIEN) 5 MG TABS Take 5 mg by mouth at bedtime as  needed for Sleep.       No facility-administered encounter medications on file as of 11/2/2022.     ROS           Objective     /60 (BP Location: Left arm, Patient Position: Sitting, BP Cuff Size: Adult)   Pulse 86   Resp 16   Ht 1.829 m (6')   Wt 121 kg (267 lb)   SpO2 94%   BMI 36.21 kg/m²     Physical Exam  Constitutional:       General: He is not in acute distress.     Appearance: He is not diaphoretic.   HENT:      Mouth/Throat:      Comments: Wearing a mask for COVID protocol  Eyes:      General: No scleral icterus.  Neck:      Vascular: No JVD.   Cardiovascular:      Rate and Rhythm: Normal rate.      Heart sounds: Murmur heard.     No friction rub. No gallop.   Pulmonary:      Effort: No respiratory distress.      Breath sounds: No wheezing or rales.   Abdominal:      General: Bowel sounds are normal.      Palpations: Abdomen is soft.   Musculoskeletal:      Right lower leg: No edema.      Left lower leg: No edema.   Skin:     Findings: No rash.   Neurological:      Mental Status: He is alert. Mental status is at baseline.   Psychiatric:         Mood and Affect: Mood normal.          Hospital records reviewed    Assessment & Plan     1. NSVT (nonsustained ventricular tachycardia) (HCC) - on pacer check        2. Primary hypertension        3. Complete heart block (HCC)        4. Cardiac pacemaker - Medtronic        5. S/P aortic valve replacement with bioprosthetic valve: repeat AVR 10/2014        6. Chronic intractable headache, unspecified headache type  Referral to Neurology      7. Subdural hematoma  Referral to Neurology          Medical Decision Making: Today's Assessment/Status/Plan:        It was my pleasure to meet with Mr. Olguin.    We addressed the management of hypertension at today's visit. Blood pressure is well controlled.  We specifically assessed the labs on hypertension treatment    We addressed the management of dyslipidemia and atherosclerosis at today's visit. He is on  appropriate statin.    We addressed the management of antiplatelet therapy at today's visit. We specifically discussed and he understands the importance of antiplatelet therapy as well as the risk.    With pacer check in 6 months    I will see Mr. Olguin back in 1 year time, with pacer check  and I encouraged him to follow up with us over the phone or electronically using my MyChart as issues arise.    It is my pleasure to participate in the care of Mr. Olguin.  Please do not hesitate to contact me with questions or concerns.    Conner Thibodeaux MD PhD [facility]  Cardiologist [facility]    Please note that this dictation was created using voice recognition software. There may be errors I did not discover before finalizing the note.

## 2023-03-03 ENCOUNTER — NON-PROVIDER VISIT (OUTPATIENT)
Dept: CARDIOLOGY | Facility: MEDICAL CENTER | Age: 73
End: 2023-03-03
Payer: MEDICARE

## 2023-03-03 PROCEDURE — 93294 REM INTERROG EVL PM/LDLS PM: CPT | Performed by: INTERNAL MEDICINE

## 2023-03-04 NOTE — CARDIAC REMOTE MONITOR - SCAN
Device transmission reviewed. Device demonstrated appropriate function.       Electronically Signed by: James Gutierrez M.D.    3/24/2023  10:53 AM

## 2023-05-19 ENCOUNTER — OFFICE VISIT (OUTPATIENT)
Dept: NEUROLOGY | Facility: MEDICAL CENTER | Age: 73
End: 2023-05-19
Attending: PSYCHIATRY & NEUROLOGY
Payer: MEDICARE

## 2023-05-19 ENCOUNTER — TELEPHONE (OUTPATIENT)
Dept: NEUROLOGY | Facility: MEDICAL CENTER | Age: 73
End: 2023-05-19
Payer: MEDICARE

## 2023-05-19 VITALS
HEIGHT: 72 IN | HEART RATE: 86 BPM | OXYGEN SATURATION: 94 % | TEMPERATURE: 97.3 F | SYSTOLIC BLOOD PRESSURE: 128 MMHG | BODY MASS INDEX: 35.18 KG/M2 | WEIGHT: 259.7 LBS | DIASTOLIC BLOOD PRESSURE: 76 MMHG

## 2023-05-19 DIAGNOSIS — R51.9 CHRONIC DAILY HEADACHE: ICD-10-CM

## 2023-05-19 DIAGNOSIS — S06.5XAA SUBDURAL HEMATOMA (HCC): ICD-10-CM

## 2023-05-19 PROCEDURE — 3074F SYST BP LT 130 MM HG: CPT | Performed by: PSYCHIATRY & NEUROLOGY

## 2023-05-19 PROCEDURE — 99212 OFFICE O/P EST SF 10 MIN: CPT | Performed by: PSYCHIATRY & NEUROLOGY

## 2023-05-19 PROCEDURE — 99215 OFFICE O/P EST HI 40 MIN: CPT | Performed by: PSYCHIATRY & NEUROLOGY

## 2023-05-19 PROCEDURE — 3078F DIAST BP <80 MM HG: CPT | Performed by: PSYCHIATRY & NEUROLOGY

## 2023-05-19 RX ORDER — PREGABALIN 200 MG/1
200 CAPSULE ORAL 2 TIMES DAILY
COMMUNITY
Start: 2023-05-13

## 2023-05-19 ASSESSMENT — PATIENT HEALTH QUESTIONNAIRE - PHQ9: CLINICAL INTERPRETATION OF PHQ2 SCORE: 0

## 2023-05-19 ASSESSMENT — FIBROSIS 4 INDEX: FIB4 SCORE: 2.27

## 2023-05-19 ASSESSMENT — ENCOUNTER SYMPTOMS: HEADACHES: 1

## 2023-05-19 NOTE — PROGRESS NOTES
Subjective     Matti Olguin is a 73 y.o. male who presents with his wife Gunjan, for follow-up, initially seen in this clinic last year as a follow-up for nontraumatic subdural hematoma and increasing headaches, but whose headaches have persisted though at a lower level.     DESTINY Chino states that his headaches have been present for going on for 40+ years.  They have not changed much during that time.  He describes an occipital head pain that radiates to the vertex, occasionally it is bitemporal.  It fluctuates in severity over the course of the day, it is constant, rarely pulsatile, and is more of an annoyance than anything else.  There are no migrainous stigmata.  The only thing that seems to make it worse is if he drinks too much alcohol.  The headaches do not keep him awake at night.  He has not had any associated focal neurologic deficits with the headaches.    With the evolution of the incidental subdural hematoma, his headaches at the time had increased significantly, and became associated with slurred speech.  Occurring in December, 2021, Gunjan brought into the emergency room, CT of the brain revealed a nontraumatic right subdural hematoma.  Repeat imaging 1 month later revealed stabilization of the hematoma size.  Since then the headaches have returned to their premorbid baseline.    He was followed up by Dr. Conner Serrano MD, in February, 2022.  He was on dual antiplatelet therapy with a history of cerebrovascular disease.  Plavix was discontinued, it was recommended he stay with the baby aspirin daily.  Since then he has been playing with the total dose of baby aspirin.  He then followed up with his cardiologist, Dr. Conner Thibodeaux MD, who sees him for follow-up with aortic valve replacement and complete heart block, who recommended he see a neurologist and follow-up for the subdural hematoma as well as the persistent headaches.    The medical history is remarkable for a right  thalamic stroke about 16 years ago, which is left him with left-sided choreiform movements involving the upper extremity.  There is also history of hypertension, dyslipidemia, complete heart block status post pacemaker placement, and prostatic aortic valve replacement.  There is no other surgical history of note.    He is not aware of anyone in the family who has suffered from headaches except his son.    He drinks alcohol only occasionally.  He does not smoke.  He is on HCTZ, Prinivil, baby aspirin, K-Dur Lipitor, Coreg, Catapres, Folvite, Vicodin as needed, Ativan, Ambien, Lyrica, and a multivitamin.    Review of Systems   Neurological:  Positive for headaches.   All other systems reviewed and are negative.    Objective     /76 (BP Location: Right arm, Patient Position: Sitting, BP Cuff Size: Adult)   Pulse 86   Temp 36.3 °C (97.3 °F) (Temporal)   Ht 1.829 m (6')   Wt 118 kg (259 lb 11.2 oz)   SpO2 94%   BMI 35.22 kg/m²      Physical Exam    He appears in no acute distress.  His vital signs are stable.  There is no malar rash, temporal or jaw tenderness, jaw claudication, or allodynia.  The neck is supple, range of motion is full.  Carotid pulses are present bilaterally without asymmetry.  Cardiac evaluation reveals a regular rhythm.  There is some mild bilateral pretibial edema.     Neurological Exam    Fully oriented, there is no aphasia, agnosia, apraxia, or inattention.    PERRLA/EOMI, visual fields are full to finger counting on confrontation.  There is no facial asymmetry, sensory exam is intact to temperature and pinprick bilaterally.  The tongue and uvula are midline, there is no bulbar dysfunction.  Shoulder shrug and head rotation are normal.    Musculoskeletal exam reveals normal tone.  There is some choreiform type movements involving the left upper extremity in its entirety.  Strength is 5/5 throughout.  Reflexes show a left-sided predominance, especially in the upper extremities.  Ankle  jerks are absent bilaterally.  The right toe is downgoing, the left equivocal on plantar stimulation.    He stands easily, gait is normal and station and stride length.  There is no circumduction of the left lower extremity.  Repetitive movements are symmetric throughout, there is no appendicular dystaxia.  Heel, toe, and tandem walking are all normal.    Sensory exam reveals a stocking pattern decrement of vibration below the knees.    Assessment & Plan     1. Subdural hematoma (HCC)  His examination today reveals no evidence suggestive of recurrent or reformulation of the left subdural hematoma.  Repeat imaging of the brain should be done for thoroughness, but if it remains stable compared to studies from last year, there is no reason to repeat imaging on any kind of routine basis.  Nontraumatic event, related to dual antiplatelet therapy, from my standpoint, he only requires baby aspirin taken daily.  He will need follow-up with Dr. Thibodeaux in this regard to see if there are any other recommendations from a cardiac standpoint.    - CT-HEAD W/O; Future    2. Chronic daily headache  Also benign, not likely medication related, it was pre-extant to the subdural hematoma and the increase in headaches occurring at the time.  The headaches have now returned to baseline.  When these types of headaches will resolve cannot be predicted, but they are mild enough that it does not even warrant medications.  He is comfortable with this.  For now there is no need for scheduled neurologic follow-up.  We will contact him with the imaging results if necessary.    Time: 60 minutes in total spent on patient care including pre-charting, record review, discussion with healthcare staff and documentation.  This includes face-to-face time for exam, review, discussion, as well as counseling and coordinating care.

## 2023-05-19 NOTE — TELEPHONE ENCOUNTER
NEUROLOGY PATIENT PRE-VISIT PLANNING     Patient was NOT contacted to complete PVP.    Patient Appointment is scheduled as: New Patient     Is visit type and length scheduled correctly? Yes    [facility] Patient [facility]? Yes    3.   Is referral attached to visit? Yes    4. Were records received from referring provider? Yes, referring provider is a [facility] provider so records are in Epic.     4. Patient was NOT contacted to have someone accompany them to visit.     5. Is this appointment scheduled as a Hospital Follow-Up?  No    6. Does the patient require any pre procedure or post procedure follow up? No    7. If any orders were placed at last visit or intended to be done for this visit do we have Results/Consult Notes? Yes  Labs - Labs were not ordered at last office visit.  Imaging - Imaging was not ordered at last office visit.  Referrals - No referrals were ordered at last office visit.    8. If patient appointment is for Botox - is order pended for provider? N/A

## 2023-06-02 ENCOUNTER — NON-PROVIDER VISIT (OUTPATIENT)
Dept: CARDIOLOGY | Facility: MEDICAL CENTER | Age: 73
End: 2023-06-02
Payer: MEDICARE

## 2023-06-02 PROCEDURE — 93294 REM INTERROG EVL PM/LDLS PM: CPT | Performed by: INTERNAL MEDICINE

## 2023-06-02 NOTE — CARDIAC REMOTE MONITOR - SCAN
Device transmission reviewed. Device demonstrated appropriate function.       Electronically Signed by: James Gutierrez M.D.    6/3/2023  9:52 AM

## 2023-06-06 ENCOUNTER — HOSPITAL ENCOUNTER (OUTPATIENT)
Dept: RADIOLOGY | Facility: MEDICAL CENTER | Age: 73
End: 2023-06-06
Attending: PSYCHIATRY & NEUROLOGY
Payer: MEDICARE

## 2023-06-06 DIAGNOSIS — S06.5XAA SUBDURAL HEMATOMA (HCC): ICD-10-CM

## 2023-06-06 PROCEDURE — 70450 CT HEAD/BRAIN W/O DYE: CPT

## 2023-09-20 ENCOUNTER — APPOINTMENT (OUTPATIENT)
Dept: RADIOLOGY | Facility: MEDICAL CENTER | Age: 73
DRG: 417 | End: 2023-09-20
Attending: EMERGENCY MEDICINE
Payer: MEDICARE

## 2023-09-20 ENCOUNTER — HOSPITAL ENCOUNTER (INPATIENT)
Facility: MEDICAL CENTER | Age: 73
LOS: 6 days | DRG: 417 | End: 2023-09-27
Attending: EMERGENCY MEDICINE | Admitting: HOSPITALIST
Payer: MEDICARE

## 2023-09-20 DIAGNOSIS — Z95.3 S/P AORTIC VALVE REPLACEMENT WITH BIOPROSTHETIC VALVE: ICD-10-CM

## 2023-09-20 DIAGNOSIS — Z86.73 HISTORY OF STROKE: ICD-10-CM

## 2023-09-20 DIAGNOSIS — E78.5 DYSLIPIDEMIA: ICD-10-CM

## 2023-09-20 DIAGNOSIS — I35.0 SEVERE AORTIC STENOSIS: ICD-10-CM

## 2023-09-20 LAB
ALBUMIN SERPL BCP-MCNC: 3.9 G/DL (ref 3.2–4.9)
ALBUMIN/GLOB SERPL: 1.3 G/DL
ALP SERPL-CCNC: 228 U/L (ref 30–99)
ALT SERPL-CCNC: 303 U/L (ref 2–50)
AMMONIA PLAS-SCNC: 26 UMOL/L (ref 11–45)
ANION GAP SERPL CALC-SCNC: 14 MMOL/L (ref 7–16)
AST SERPL-CCNC: 298 U/L (ref 12–45)
BASOPHILS # BLD AUTO: 0.4 % (ref 0–1.8)
BASOPHILS # BLD: 0.02 K/UL (ref 0–0.12)
BILIRUB SERPL-MCNC: 14.6 MG/DL (ref 0.1–1.5)
BUN SERPL-MCNC: 31 MG/DL (ref 8–22)
CALCIUM ALBUM COR SERPL-MCNC: 8.3 MG/DL (ref 8.5–10.5)
CALCIUM SERPL-MCNC: 8.2 MG/DL (ref 8.5–10.5)
CHLORIDE SERPL-SCNC: 101 MMOL/L (ref 96–112)
CO2 SERPL-SCNC: 23 MMOL/L (ref 20–33)
CREAT SERPL-MCNC: 1.26 MG/DL (ref 0.5–1.4)
EOSINOPHIL # BLD AUTO: 0.05 K/UL (ref 0–0.51)
EOSINOPHIL NFR BLD: 0.9 % (ref 0–6.9)
ERYTHROCYTE [DISTWIDTH] IN BLOOD BY AUTOMATED COUNT: 58.7 FL (ref 35.9–50)
GFR SERPLBLD CREATININE-BSD FMLA CKD-EPI: 61 ML/MIN/1.73 M 2
GLOBULIN SER CALC-MCNC: 3 G/DL (ref 1.9–3.5)
GLUCOSE SERPL-MCNC: 116 MG/DL (ref 65–99)
HCT VFR BLD AUTO: 38.1 % (ref 42–52)
HGB BLD-MCNC: 12.6 G/DL (ref 14–18)
IMM GRANULOCYTES # BLD AUTO: 0.03 K/UL (ref 0–0.11)
IMM GRANULOCYTES NFR BLD AUTO: 0.5 % (ref 0–0.9)
LIPASE SERPL-CCNC: 192 U/L (ref 11–82)
LYMPHOCYTES # BLD AUTO: 0.27 K/UL (ref 1–4.8)
LYMPHOCYTES NFR BLD: 4.8 % (ref 22–41)
MCH RBC QN AUTO: 34.8 PG (ref 27–33)
MCHC RBC AUTO-ENTMCNC: 33.1 G/DL (ref 32.3–36.5)
MCV RBC AUTO: 105.2 FL (ref 81.4–97.8)
MONOCYTES # BLD AUTO: 0.82 K/UL (ref 0–0.85)
MONOCYTES NFR BLD AUTO: 14.6 % (ref 0–13.4)
NEUTROPHILS # BLD AUTO: 4.43 K/UL (ref 1.82–7.42)
NEUTROPHILS NFR BLD: 78.8 % (ref 44–72)
NRBC # BLD AUTO: 0 K/UL
NRBC BLD-RTO: 0 /100 WBC (ref 0–0.2)
PLATELET # BLD AUTO: 77 K/UL (ref 164–446)
PLATELETS.RETICULATED NFR BLD AUTO: 20.4 % (ref 0.6–13.1)
POTASSIUM SERPL-SCNC: 3.1 MMOL/L (ref 3.6–5.5)
PROT SERPL-MCNC: 6.9 G/DL (ref 6–8.2)
RBC # BLD AUTO: 3.62 M/UL (ref 4.7–6.1)
SODIUM SERPL-SCNC: 138 MMOL/L (ref 135–145)
TROPONIN T SERPL-MCNC: 40 NG/L (ref 6–19)
WBC # BLD AUTO: 5.6 K/UL (ref 4.8–10.8)

## 2023-09-20 PROCEDURE — 71045 X-RAY EXAM CHEST 1 VIEW: CPT

## 2023-09-20 PROCEDURE — 85025 COMPLETE CBC W/AUTO DIFF WBC: CPT

## 2023-09-20 PROCEDURE — 700105 HCHG RX REV CODE 258: Performed by: EMERGENCY MEDICINE

## 2023-09-20 PROCEDURE — 81001 URINALYSIS AUTO W/SCOPE: CPT

## 2023-09-20 PROCEDURE — 82140 ASSAY OF AMMONIA: CPT

## 2023-09-20 PROCEDURE — 87040 BLOOD CULTURE FOR BACTERIA: CPT

## 2023-09-20 PROCEDURE — 93005 ELECTROCARDIOGRAM TRACING: CPT

## 2023-09-20 PROCEDURE — 700117 HCHG RX CONTRAST REV CODE 255: Performed by: EMERGENCY MEDICINE

## 2023-09-20 PROCEDURE — 700111 HCHG RX REV CODE 636 W/ 250 OVERRIDE (IP): Performed by: EMERGENCY MEDICINE

## 2023-09-20 PROCEDURE — 74177 CT ABD & PELVIS W/CONTRAST: CPT

## 2023-09-20 PROCEDURE — 80053 COMPREHEN METABOLIC PANEL: CPT

## 2023-09-20 PROCEDURE — 99285 EMERGENCY DEPT VISIT HI MDM: CPT

## 2023-09-20 PROCEDURE — 36415 COLL VENOUS BLD VENIPUNCTURE: CPT

## 2023-09-20 PROCEDURE — 84484 ASSAY OF TROPONIN QUANT: CPT

## 2023-09-20 PROCEDURE — 83605 ASSAY OF LACTIC ACID: CPT

## 2023-09-20 PROCEDURE — 96365 THER/PROPH/DIAG IV INF INIT: CPT

## 2023-09-20 PROCEDURE — 85055 RETICULATED PLATELET ASSAY: CPT

## 2023-09-20 PROCEDURE — 93005 ELECTROCARDIOGRAM TRACING: CPT | Performed by: EMERGENCY MEDICINE

## 2023-09-20 PROCEDURE — 83690 ASSAY OF LIPASE: CPT

## 2023-09-20 RX ADMIN — IOHEXOL 100 ML: 350 INJECTION, SOLUTION INTRAVENOUS at 22:30

## 2023-09-20 RX ADMIN — PIPERACILLIN AND TAZOBACTAM 3.38 G: 3; .375 INJECTION, POWDER, FOR SOLUTION INTRAVENOUS at 23:45

## 2023-09-20 ASSESSMENT — FIBROSIS 4 INDEX: FIB4 SCORE: 2.27

## 2023-09-21 ENCOUNTER — ANESTHESIA (OUTPATIENT)
Dept: SURGERY | Facility: MEDICAL CENTER | Age: 73
DRG: 417 | End: 2023-09-21
Payer: MEDICARE

## 2023-09-21 ENCOUNTER — APPOINTMENT (OUTPATIENT)
Dept: RADIOLOGY | Facility: MEDICAL CENTER | Age: 73
DRG: 417 | End: 2023-09-21
Attending: INTERNAL MEDICINE
Payer: MEDICARE

## 2023-09-21 ENCOUNTER — ANESTHESIA EVENT (OUTPATIENT)
Dept: SURGERY | Facility: MEDICAL CENTER | Age: 73
DRG: 417 | End: 2023-09-21
Payer: MEDICARE

## 2023-09-21 PROBLEM — K72.00 ACUTE LIVER FAILURE: Status: ACTIVE | Noted: 2023-09-21

## 2023-09-21 PROBLEM — K80.42 CHOLEDOCHOLITHIASIS WITH ACUTE CHOLECYSTITIS: Status: ACTIVE | Noted: 2023-09-21

## 2023-09-21 LAB
AMMONIA PLAS-SCNC: 23 UMOL/L (ref 11–45)
APPEARANCE UR: ABNORMAL
BACTERIA #/AREA URNS HPF: ABNORMAL /HPF
BILIRUB CONJ SERPL-MCNC: 9.7 MG/DL (ref 0.1–0.5)
BILIRUB UR QL STRIP.AUTO: ABNORMAL
COLOR UR: ABNORMAL
EKG IMPRESSION: NORMAL
EPI CELLS #/AREA URNS HPF: ABNORMAL /HPF
GLUCOSE UR STRIP.AUTO-MCNC: 100 MG/DL
GRAN CASTS #/AREA URNS LPF: ABNORMAL /LPF
HAV IGM SERPL QL IA: NORMAL
HBV CORE IGM SER QL: NORMAL
HBV SURFACE AG SER QL: NORMAL
HCV AB SER QL: NORMAL
HYALINE CASTS #/AREA URNS LPF: ABNORMAL /LPF
INR PPP: 1.17 (ref 0.87–1.13)
KETONES UR STRIP.AUTO-MCNC: 15 MG/DL
LACTATE SERPL-SCNC: 1.1 MMOL/L (ref 0.5–2)
LEUKOCYTE ESTERASE UR QL STRIP.AUTO: ABNORMAL
MICRO URNS: ABNORMAL
NITRITE UR QL STRIP.AUTO: POSITIVE
PH UR STRIP.AUTO: 6.5 [PH] (ref 5–8)
PROT UR QL STRIP: 100 MG/DL
PROTHROMBIN TIME: 15 SEC (ref 12–14.6)
RBC # URNS HPF: ABNORMAL /HPF
RBC UR QL AUTO: ABNORMAL
SP GR UR STRIP.AUTO: 1.01
UROBILINOGEN UR STRIP.AUTO-MCNC: 1 MG/DL
VIT B12 SERPL-MCNC: 1807 PG/ML (ref 211–911)
WBC #/AREA URNS HPF: ABNORMAL /HPF

## 2023-09-21 PROCEDURE — 36415 COLL VENOUS BLD VENIPUNCTURE: CPT

## 2023-09-21 PROCEDURE — 0FC98ZZ EXTIRPATION OF MATTER FROM COMMON BILE DUCT, VIA NATURAL OR ARTIFICIAL OPENING ENDOSCOPIC: ICD-10-PCS | Performed by: INTERNAL MEDICINE

## 2023-09-21 PROCEDURE — C1769 GUIDE WIRE: HCPCS | Performed by: INTERNAL MEDICINE

## 2023-09-21 PROCEDURE — 700101 HCHG RX REV CODE 250: Performed by: ANESTHESIOLOGY

## 2023-09-21 PROCEDURE — 43264 ERCP REMOVE DUCT CALCULI: CPT | Performed by: INTERNAL MEDICINE

## 2023-09-21 PROCEDURE — 700102 HCHG RX REV CODE 250 W/ 637 OVERRIDE(OP): Mod: JZ | Performed by: HOSPITALIST

## 2023-09-21 PROCEDURE — 700111 HCHG RX REV CODE 636 W/ 250 OVERRIDE (IP): Performed by: ANESTHESIOLOGY

## 2023-09-21 PROCEDURE — 770020 HCHG ROOM/CARE - TELE (206)

## 2023-09-21 PROCEDURE — 82140 ASSAY OF AMMONIA: CPT

## 2023-09-21 PROCEDURE — 82248 BILIRUBIN DIRECT: CPT

## 2023-09-21 PROCEDURE — 700105 HCHG RX REV CODE 258: Performed by: ANESTHESIOLOGY

## 2023-09-21 PROCEDURE — 82607 VITAMIN B-12: CPT

## 2023-09-21 PROCEDURE — 87040 BLOOD CULTURE FOR BACTERIA: CPT

## 2023-09-21 PROCEDURE — C1889 IMPLANT/INSERT DEVICE, NOC: HCPCS | Performed by: INTERNAL MEDICINE

## 2023-09-21 PROCEDURE — 700117 HCHG RX CONTRAST REV CODE 255: Performed by: INTERNAL MEDICINE

## 2023-09-21 PROCEDURE — 160048 HCHG OR STATISTICAL LEVEL 1-5: Performed by: INTERNAL MEDICINE

## 2023-09-21 PROCEDURE — 700111 HCHG RX REV CODE 636 W/ 250 OVERRIDE (IP): Mod: JZ | Performed by: HOSPITALIST

## 2023-09-21 PROCEDURE — 85610 PROTHROMBIN TIME: CPT

## 2023-09-21 PROCEDURE — 80074 ACUTE HEPATITIS PANEL: CPT

## 2023-09-21 PROCEDURE — 160035 HCHG PACU - 1ST 60 MINS PHASE I: Performed by: INTERNAL MEDICINE

## 2023-09-21 PROCEDURE — 99223 1ST HOSP IP/OBS HIGH 75: CPT | Performed by: HOSPITALIST

## 2023-09-21 PROCEDURE — 99221 1ST HOSP IP/OBS SF/LOW 40: CPT | Performed by: STUDENT IN AN ORGANIZED HEALTH CARE EDUCATION/TRAINING PROGRAM

## 2023-09-21 PROCEDURE — 160009 HCHG ANES TIME/MIN: Performed by: INTERNAL MEDICINE

## 2023-09-21 PROCEDURE — 99223 1ST HOSP IP/OBS HIGH 75: CPT | Mod: 25 | Performed by: NURSE PRACTITIONER

## 2023-09-21 PROCEDURE — 96366 THER/PROPH/DIAG IV INF ADDON: CPT

## 2023-09-21 PROCEDURE — 160002 HCHG RECOVERY MINUTES (STAT): Performed by: INTERNAL MEDICINE

## 2023-09-21 PROCEDURE — 700105 HCHG RX REV CODE 258: Performed by: HOSPITALIST

## 2023-09-21 PROCEDURE — 160203 HCHG ENDO MINUTES - 1ST 30 MINS LEVEL 4: Performed by: INTERNAL MEDICINE

## 2023-09-21 PROCEDURE — 700117 HCHG RX CONTRAST REV CODE 255

## 2023-09-21 PROCEDURE — 43262 ENDO CHOLANGIOPANCREATOGRAPH: CPT | Performed by: INTERNAL MEDICINE

## 2023-09-21 PROCEDURE — A9270 NON-COVERED ITEM OR SERVICE: HCPCS | Mod: JZ | Performed by: HOSPITALIST

## 2023-09-21 PROCEDURE — 700105 HCHG RX REV CODE 258: Performed by: EMERGENCY MEDICINE

## 2023-09-21 PROCEDURE — 96367 TX/PROPH/DG ADDL SEQ IV INF: CPT

## 2023-09-21 RX ORDER — HYDROMORPHONE HYDROCHLORIDE 1 MG/ML
0.1 INJECTION, SOLUTION INTRAMUSCULAR; INTRAVENOUS; SUBCUTANEOUS
Status: DISCONTINUED | OUTPATIENT
Start: 2023-09-21 | End: 2023-09-21 | Stop reason: HOSPADM

## 2023-09-21 RX ORDER — MORPHINE SULFATE 4 MG/ML
4 INJECTION INTRAVENOUS
Status: DISCONTINUED | OUTPATIENT
Start: 2023-09-21 | End: 2023-09-27 | Stop reason: HOSPADM

## 2023-09-21 RX ORDER — DEXAMETHASONE SODIUM PHOSPHATE 4 MG/ML
INJECTION, SOLUTION INTRA-ARTICULAR; INTRALESIONAL; INTRAMUSCULAR; INTRAVENOUS; SOFT TISSUE PRN
Status: DISCONTINUED | OUTPATIENT
Start: 2023-09-21 | End: 2023-09-21 | Stop reason: SURG

## 2023-09-21 RX ORDER — OXYCODONE HYDROCHLORIDE 10 MG/1
10 TABLET ORAL
Status: DISCONTINUED | OUTPATIENT
Start: 2023-09-21 | End: 2023-09-27 | Stop reason: HOSPADM

## 2023-09-21 RX ORDER — ROCURONIUM BROMIDE 10 MG/ML
INJECTION, SOLUTION INTRAVENOUS PRN
Status: DISCONTINUED | OUTPATIENT
Start: 2023-09-21 | End: 2023-09-21 | Stop reason: SURG

## 2023-09-21 RX ORDER — OXYCODONE HCL 5 MG/5 ML
5 SOLUTION, ORAL ORAL
Status: DISCONTINUED | OUTPATIENT
Start: 2023-09-21 | End: 2023-09-21 | Stop reason: HOSPADM

## 2023-09-21 RX ORDER — OXYCODONE HCL 5 MG/5 ML
10 SOLUTION, ORAL ORAL
Status: DISCONTINUED | OUTPATIENT
Start: 2023-09-21 | End: 2023-09-21 | Stop reason: HOSPADM

## 2023-09-21 RX ORDER — LIDOCAINE HYDROCHLORIDE 20 MG/ML
INJECTION, SOLUTION EPIDURAL; INFILTRATION; INTRACAUDAL; PERINEURAL PRN
Status: DISCONTINUED | OUTPATIENT
Start: 2023-09-21 | End: 2023-09-21 | Stop reason: SURG

## 2023-09-21 RX ORDER — IPRATROPIUM BROMIDE AND ALBUTEROL SULFATE 2.5; .5 MG/3ML; MG/3ML
3 SOLUTION RESPIRATORY (INHALATION)
Status: DISCONTINUED | OUTPATIENT
Start: 2023-09-21 | End: 2023-09-21 | Stop reason: HOSPADM

## 2023-09-21 RX ORDER — SODIUM CHLORIDE, SODIUM LACTATE, POTASSIUM CHLORIDE, CALCIUM CHLORIDE 600; 310; 30; 20 MG/100ML; MG/100ML; MG/100ML; MG/100ML
INJECTION, SOLUTION INTRAVENOUS
Status: DISCONTINUED | OUTPATIENT
Start: 2023-09-21 | End: 2023-09-21 | Stop reason: SURG

## 2023-09-21 RX ORDER — ONDANSETRON 2 MG/ML
4 INJECTION INTRAMUSCULAR; INTRAVENOUS EVERY 4 HOURS PRN
Status: DISCONTINUED | OUTPATIENT
Start: 2023-09-21 | End: 2023-09-27 | Stop reason: HOSPADM

## 2023-09-21 RX ORDER — SODIUM CHLORIDE, SODIUM LACTATE, POTASSIUM CHLORIDE, CALCIUM CHLORIDE 600; 310; 30; 20 MG/100ML; MG/100ML; MG/100ML; MG/100ML
INJECTION, SOLUTION INTRAVENOUS CONTINUOUS
Status: DISCONTINUED | OUTPATIENT
Start: 2023-09-21 | End: 2023-09-21 | Stop reason: HOSPADM

## 2023-09-21 RX ORDER — ONDANSETRON 4 MG/1
4 TABLET, ORALLY DISINTEGRATING ORAL EVERY 4 HOURS PRN
Status: DISCONTINUED | OUTPATIENT
Start: 2023-09-21 | End: 2023-09-27 | Stop reason: HOSPADM

## 2023-09-21 RX ORDER — POTASSIUM CHLORIDE 20 MEQ/1
40 TABLET, EXTENDED RELEASE ORAL ONCE
Status: COMPLETED | OUTPATIENT
Start: 2023-09-21 | End: 2023-09-21

## 2023-09-21 RX ORDER — LISINOPRIL 20 MG/1
20 TABLET ORAL 2 TIMES DAILY
Status: DISCONTINUED | OUTPATIENT
Start: 2023-09-21 | End: 2023-09-23

## 2023-09-21 RX ORDER — ATORVASTATIN CALCIUM 80 MG/1
80 TABLET, FILM COATED ORAL
Status: DISCONTINUED | OUTPATIENT
Start: 2023-09-21 | End: 2023-09-27 | Stop reason: HOSPADM

## 2023-09-21 RX ORDER — LIDOCAINE HYDROCHLORIDE 40 MG/ML
SOLUTION TOPICAL PRN
Status: DISCONTINUED | OUTPATIENT
Start: 2023-09-21 | End: 2023-09-21 | Stop reason: SURG

## 2023-09-21 RX ORDER — SODIUM CHLORIDE, SODIUM LACTATE, POTASSIUM CHLORIDE, CALCIUM CHLORIDE 600; 310; 30; 20 MG/100ML; MG/100ML; MG/100ML; MG/100ML
1000 INJECTION, SOLUTION INTRAVENOUS ONCE
Status: COMPLETED | OUTPATIENT
Start: 2023-09-21 | End: 2023-09-21

## 2023-09-21 RX ORDER — HYDROMORPHONE HYDROCHLORIDE 1 MG/ML
0.2 INJECTION, SOLUTION INTRAMUSCULAR; INTRAVENOUS; SUBCUTANEOUS
Status: DISCONTINUED | OUTPATIENT
Start: 2023-09-21 | End: 2023-09-21 | Stop reason: HOSPADM

## 2023-09-21 RX ORDER — CARVEDILOL 25 MG/1
50 TABLET ORAL 2 TIMES DAILY WITH MEALS
Status: DISCONTINUED | OUTPATIENT
Start: 2023-09-21 | End: 2023-09-27 | Stop reason: HOSPADM

## 2023-09-21 RX ORDER — HALOPERIDOL 5 MG/ML
1 INJECTION INTRAMUSCULAR
Status: DISCONTINUED | OUTPATIENT
Start: 2023-09-21 | End: 2023-09-21 | Stop reason: HOSPADM

## 2023-09-21 RX ORDER — HYDROMORPHONE HYDROCHLORIDE 1 MG/ML
0.4 INJECTION, SOLUTION INTRAMUSCULAR; INTRAVENOUS; SUBCUTANEOUS
Status: DISCONTINUED | OUTPATIENT
Start: 2023-09-21 | End: 2023-09-21 | Stop reason: HOSPADM

## 2023-09-21 RX ORDER — OXYCODONE HYDROCHLORIDE 5 MG/1
5 TABLET ORAL
Status: DISCONTINUED | OUTPATIENT
Start: 2023-09-21 | End: 2023-09-27 | Stop reason: HOSPADM

## 2023-09-21 RX ORDER — ZOLPIDEM TARTRATE 5 MG/1
5 TABLET ORAL NIGHTLY PRN
Status: DISCONTINUED | OUTPATIENT
Start: 2023-09-21 | End: 2023-09-27 | Stop reason: HOSPADM

## 2023-09-21 RX ORDER — ONDANSETRON 2 MG/ML
INJECTION INTRAMUSCULAR; INTRAVENOUS PRN
Status: DISCONTINUED | OUTPATIENT
Start: 2023-09-21 | End: 2023-09-21 | Stop reason: SURG

## 2023-09-21 RX ORDER — PHENYLEPHRINE HYDROCHLORIDE 10 MG/ML
INJECTION, SOLUTION INTRAMUSCULAR; INTRAVENOUS; SUBCUTANEOUS PRN
Status: DISCONTINUED | OUTPATIENT
Start: 2023-09-21 | End: 2023-09-21 | Stop reason: SURG

## 2023-09-21 RX ORDER — LISINOPRIL 20 MG/1
20 TABLET ORAL 2 TIMES DAILY
Status: DISCONTINUED | OUTPATIENT
Start: 2023-09-21 | End: 2023-09-21

## 2023-09-21 RX ORDER — CALCIUM GLUCONATE 20 MG/ML
2 INJECTION, SOLUTION INTRAVENOUS ONCE
Status: COMPLETED | OUTPATIENT
Start: 2023-09-21 | End: 2023-09-21

## 2023-09-21 RX ORDER — PREGABALIN 100 MG/1
200 CAPSULE ORAL 2 TIMES DAILY
Status: DISCONTINUED | OUTPATIENT
Start: 2023-09-21 | End: 2023-09-27 | Stop reason: HOSPADM

## 2023-09-21 RX ORDER — ONDANSETRON 2 MG/ML
4 INJECTION INTRAMUSCULAR; INTRAVENOUS
Status: DISCONTINUED | OUTPATIENT
Start: 2023-09-21 | End: 2023-09-21 | Stop reason: HOSPADM

## 2023-09-21 RX ORDER — SODIUM CHLORIDE, SODIUM LACTATE, POTASSIUM CHLORIDE, CALCIUM CHLORIDE 600; 310; 30; 20 MG/100ML; MG/100ML; MG/100ML; MG/100ML
INJECTION, SOLUTION INTRAVENOUS CONTINUOUS
Status: DISCONTINUED | OUTPATIENT
Start: 2023-09-21 | End: 2023-09-22

## 2023-09-21 RX ORDER — DIPHENHYDRAMINE HYDROCHLORIDE 50 MG/ML
12.5 INJECTION INTRAMUSCULAR; INTRAVENOUS
Status: DISCONTINUED | OUTPATIENT
Start: 2023-09-21 | End: 2023-09-21 | Stop reason: HOSPADM

## 2023-09-21 RX ADMIN — PHENYLEPHRINE HYDROCHLORIDE 200 MCG: 10 INJECTION INTRAVENOUS at 15:49

## 2023-09-21 RX ADMIN — OXYCODONE HYDROCHLORIDE 5 MG: 5 TABLET ORAL at 08:02

## 2023-09-21 RX ADMIN — ZOLPIDEM TARTRATE 5 MG: 5 TABLET ORAL at 21:09

## 2023-09-21 RX ADMIN — OXYCODONE HYDROCHLORIDE 5 MG: 5 TABLET ORAL at 03:15

## 2023-09-21 RX ADMIN — DEXAMETHASONE SODIUM PHOSPHATE 8 MG: 4 INJECTION INTRA-ARTICULAR; INTRALESIONAL; INTRAMUSCULAR; INTRAVENOUS; SOFT TISSUE at 15:41

## 2023-09-21 RX ADMIN — PHENYLEPHRINE HYDROCHLORIDE 200 MCG: 10 INJECTION INTRAVENOUS at 15:43

## 2023-09-21 RX ADMIN — SUGAMMADEX 200 MG: 100 INJECTION, SOLUTION INTRAVENOUS at 16:00

## 2023-09-21 RX ADMIN — SODIUM CHLORIDE, POTASSIUM CHLORIDE, SODIUM LACTATE AND CALCIUM CHLORIDE: 600; 310; 30; 20 INJECTION, SOLUTION INTRAVENOUS at 02:00

## 2023-09-21 RX ADMIN — PHENYLEPHRINE HYDROCHLORIDE 200 MCG: 10 INJECTION INTRAVENOUS at 15:54

## 2023-09-21 RX ADMIN — SODIUM CHLORIDE, POTASSIUM CHLORIDE, SODIUM LACTATE AND CALCIUM CHLORIDE: 600; 310; 30; 20 INJECTION, SOLUTION INTRAVENOUS at 21:15

## 2023-09-21 RX ADMIN — PIPERACILLIN AND TAZOBACTAM 3.38 G: 3; .375 INJECTION, POWDER, FOR SOLUTION INTRAVENOUS at 13:07

## 2023-09-21 RX ADMIN — CALCIUM GLUCONATE 2 G: 20 INJECTION, SOLUTION INTRAVENOUS at 02:18

## 2023-09-21 RX ADMIN — ONDANSETRON 4 MG: 2 INJECTION INTRAMUSCULAR; INTRAVENOUS at 16:00

## 2023-09-21 RX ADMIN — POTASSIUM CHLORIDE 40 MEQ: 1500 TABLET, EXTENDED RELEASE ORAL at 02:13

## 2023-09-21 RX ADMIN — ROCURONIUM BROMIDE 50 MG: 50 INJECTION, SOLUTION INTRAVENOUS at 15:41

## 2023-09-21 RX ADMIN — IOHEXOL: 300 INJECTION, SOLUTION INTRAVENOUS at 14:45

## 2023-09-21 RX ADMIN — SODIUM CHLORIDE, POTASSIUM CHLORIDE, SODIUM LACTATE AND CALCIUM CHLORIDE: 600; 310; 30; 20 INJECTION, SOLUTION INTRAVENOUS at 15:36

## 2023-09-21 RX ADMIN — LIDOCAINE HYDROCHLORIDE 100 MG: 20 INJECTION, SOLUTION EPIDURAL; INFILTRATION; INTRACAUDAL at 15:41

## 2023-09-21 RX ADMIN — LIDOCAINE HYDROCHLORIDE 4 ML: 40 SOLUTION TOPICAL at 15:42

## 2023-09-21 RX ADMIN — PROPOFOL 200 MG: 10 INJECTION, EMULSION INTRAVENOUS at 15:41

## 2023-09-21 RX ADMIN — PIPERACILLIN AND TAZOBACTAM 4.5 G: 4; .5 INJECTION, POWDER, FOR SOLUTION INTRAVENOUS at 04:11

## 2023-09-21 RX ADMIN — ATORVASTATIN CALCIUM 80 MG: 80 TABLET, FILM COATED ORAL at 21:09

## 2023-09-21 RX ADMIN — PIPERACILLIN AND TAZOBACTAM 3.38 G: 3; .375 INJECTION, POWDER, FOR SOLUTION INTRAVENOUS at 21:11

## 2023-09-21 RX ADMIN — PREGABALIN 200 MG: 100 CAPSULE ORAL at 17:14

## 2023-09-21 RX ADMIN — PREGABALIN 200 MG: 100 CAPSULE ORAL at 09:12

## 2023-09-21 RX ADMIN — SODIUM CHLORIDE, POTASSIUM CHLORIDE, SODIUM LACTATE AND CALCIUM CHLORIDE 1000 ML: 600; 310; 30; 20 INJECTION, SOLUTION INTRAVENOUS at 01:39

## 2023-09-21 ASSESSMENT — ENCOUNTER SYMPTOMS
PHOTOPHOBIA: 0
ORTHOPNEA: 0
DIAPHORESIS: 0
NERVOUS/ANXIOUS: 0
DIZZINESS: 0
EYE PAIN: 0
FALLS: 0
INSOMNIA: 0
PALPITATIONS: 0
CHILLS: 0
COUGH: 0
NAUSEA: 0
WEAKNESS: 0
DOUBLE VISION: 0
FLANK PAIN: 0
SINUS PAIN: 0
HEMOPTYSIS: 0
WHEEZING: 0
BLOOD IN STOOL: 0
SHORTNESS OF BREATH: 1
BLURRED VISION: 0
CLAUDICATION: 0
TINGLING: 0
BRUISES/BLEEDS EASILY: 0
DEPRESSION: 0
SHORTNESS OF BREATH: 0
MYALGIAS: 0
CONSTIPATION: 0
NECK PAIN: 0
PND: 0
ABDOMINAL PAIN: 1
POLYDIPSIA: 0
DIARRHEA: 1
SPUTUM PRODUCTION: 0
HEADACHES: 0
BACK PAIN: 0
SORE THROAT: 0
STRIDOR: 0
HALLUCINATIONS: 0
HEARTBURN: 0
DIARRHEA: 0
MEMORY LOSS: 0
VOMITING: 0
TREMORS: 0
FEVER: 0

## 2023-09-21 ASSESSMENT — COGNITIVE AND FUNCTIONAL STATUS - GENERAL
MOVING TO AND FROM BED TO CHAIR: A LITTLE
MOBILITY SCORE: 20
SUGGESTED CMS G CODE MODIFIER DAILY ACTIVITY: CH
WALKING IN HOSPITAL ROOM: A LITTLE
DAILY ACTIVITIY SCORE: 24
CLIMB 3 TO 5 STEPS WITH RAILING: A LITTLE
DAILY ACTIVITIY SCORE: 24
MOBILITY SCORE: 20
MOVING TO AND FROM BED TO CHAIR: A LITTLE
WALKING IN HOSPITAL ROOM: A LITTLE
STANDING UP FROM CHAIR USING ARMS: A LITTLE
CLIMB 3 TO 5 STEPS WITH RAILING: A LITTLE
SUGGESTED CMS G CODE MODIFIER DAILY ACTIVITY: CH
SUGGESTED CMS G CODE MODIFIER MOBILITY: CJ
SUGGESTED CMS G CODE MODIFIER MOBILITY: CJ
STANDING UP FROM CHAIR USING ARMS: A LITTLE

## 2023-09-21 ASSESSMENT — LIFESTYLE VARIABLES
ALCOHOL_USE: YES
TOTAL SCORE: 0
CONSUMPTION TOTAL: POSITIVE
HAVE PEOPLE ANNOYED YOU BY CRITICIZING YOUR DRINKING: NO
EVER HAD A DRINK FIRST THING IN THE MORNING TO STEADY YOUR NERVES TO GET RID OF A HANGOVER: NO
HAVE YOU EVER FELT YOU SHOULD CUT DOWN ON YOUR DRINKING: NO
AVERAGE NUMBER OF DAYS PER WEEK YOU HAVE A DRINK CONTAINING ALCOHOL: 4
HOW MANY TIMES IN THE PAST YEAR HAVE YOU HAD 5 OR MORE DRINKS IN A DAY: 50
TOTAL SCORE: 0
DOES PATIENT WANT TO STOP DRINKING: NO
ON A TYPICAL DAY WHEN YOU DRINK ALCOHOL HOW MANY DRINKS DO YOU HAVE: 6
SUBSTANCE_ABUSE: 0
SUBSTANCE_ABUSE: 1
EVER FELT BAD OR GUILTY ABOUT YOUR DRINKING: NO
TOTAL SCORE: 0

## 2023-09-21 ASSESSMENT — PATIENT HEALTH QUESTIONNAIRE - PHQ9
1. LITTLE INTEREST OR PLEASURE IN DOING THINGS: NOT AT ALL
2. FEELING DOWN, DEPRESSED, IRRITABLE, OR HOPELESS: NOT AT ALL
SUM OF ALL RESPONSES TO PHQ9 QUESTIONS 1 AND 2: 0

## 2023-09-21 ASSESSMENT — PAIN DESCRIPTION - PAIN TYPE
TYPE: ACUTE PAIN

## 2023-09-21 ASSESSMENT — FIBROSIS 4 INDEX: FIB4 SCORE: 15.79

## 2023-09-21 ASSESSMENT — PAIN SCALES - GENERAL: PAIN_LEVEL: 0

## 2023-09-21 NOTE — CONSULTS
"Date of Consultation:  9/21/2023    Patient: : Matti Olguin  MRN: [id number]    Referring Physician: GARCIA Spencer     GI:BISHNU Pace     Reason for Consultation: Choledocholithiasis, obstructive jaundice    History of Present Illness:   This is a pleasant 73-year-old male with a past medical history including complete heart block s/p pacer, aortic stenosis s/p mechanical valve replacement at 38 years of age and redo with bioprosthetic valve replacement in 2012 (on daily asa only), spontaneous subdural hemorrhage on DAPT, CVA who presented to [facility] 9/20/2023 with epigastric pain.  Patient reports he has had right upper quadrant/epigastric pain for several weeks/months.  He states that over the last 2 weeks, pain has been constant and worse over the last 4 days prior to arrival.  He describes pain as \"feeling like a bruise\" and extends underneath his ribs.  Additionally, on 9/18/2023, he noticed that his skin and eyes were turning yellow and it had worsened in the following days.  He reports abdominal distention/bloating as well as light-colored, floating, diarrhea and dark urine.  He denies nausea, vomiting, fevers, dysuria, chest pain, headache.  He states that he was taking dual antiplatelet therapy for his prosthetic valve replacement until January 2022 during which time he had a spontaneous subdural hematoma.  For the past year he has been taking aspirin 81 mg p.o. daily.  In the emergency department, no leukocytosis, macrocytic anemia with a hemoglobin 12.6, hematocrit 38.1, .2.  Platelets 77.  Hypokalemia 3.1.  BUN 31 otherwise renal functions normal.  , , alk phos 228, total bilirubin 14.6, albumin 3.9.  Lipase 192.  Ammonia 26, lactic acid 1.1. INR 1.17. Troponin mildly elevated at 40.  EKG with atrial paced rhythm with a ventricular rate of 69 bpm.  Chest x-ray without acute abnormalities.  CT abdomen showed multiple gallstones within " "the gallbladder and gallbladder neck.  Pericholecystic edema.  Additionally, there are stones in the distal common bile duct.  Called and clarified with radiology regarding sizes of stones in CBD.  There are multiple stones measuring approximately 1 cm or less with a large stone in the gallbladder neck.  Additionally distal common bile duct stone measuring 6 mm.  Common bile duct not dilated, measuring 6 mm as well.  Splenomegaly also seen.  Patient seen by surgical team who recommended evaluation for ERCP prior to cholecystectomy.      Tobacco use: Patient states that he uses tobacco chew  Alcohol use: Reports he drinks 1 sixpack of beer 4 times per week, no history of withdrawals  Illicit drug use: Occasional marijuana use    Last EGD: None prior  Last colonoscopy: Patient states he had colonoscopy through primary care provider in [place], [place].  Last colonoscopy was 5-10 years ago and polyps were seen at that time.  NSAID/ASA use: Aspirin 81 mg p.o. daily.  Patient reports rare use of ibuprofen with last use approximately 1 week prior to admission  Anticoagulation use: Denies      Past Medical History:   Diagnosis Date    S/P aortic valve replacement with bioprosthetic valve 10/2014    Complete heart block (HCC) 10/2014    Statust post pacemaker placement.    Stroke (HCC)     left sided \"tingling\"    Valvular heart disease 12/1988    AVR    Anticoagulation monitoring, special range     Dyspnea     High cholesterol     HTN (hypertension)     NSVT (nonsustained ventricular tachycardia) ([facility], [facility] within [facility]) - on pacer check     Sleep apnea     Snoring     Unspecified hemorrhagic conditions     Coumadin         Past Surgical History:   Procedure Laterality Date    PACEMAKER INSERTION Left 10/25/2014    [employer]tronic Advisjose j COOL [id number] implanted at [facility].    RECOVERY  11/8/2013    Performed by Cath-Recovery Surgery at SURGERY SAME DAY [facility]    RECOVERY  10/8/2013    Performed by Cath-Recovery Surgery " at SURGERY SAME DAY [facility] ORS    RECOVERY  9/21/2012    Performed by Matti Clemens M.D. at SURGERY SAME DAY [facility]    AORTIC VALVE REPLACEMENT  1988    titanium    OTHER ORTHOPEDIC SURGERY      Knee scope, right shoulder       Family History   Problem Relation Age of Onset    Heart Attack Father     Heart Disease Sister        Social History     Socioeconomic History    Marital status:    Tobacco Use    Smoking status: Never    Smokeless tobacco: Current     Types: Chew   Vaping Use    Vaping Use: Some days    Substances: THC   Substance and Sexual Activity    Alcohol use: Yes     Comment: more days than not 6-8 beers    Drug use: No       Review of systems:  Review of Systems   Constitutional:  Negative for chills, fever and malaise/fatigue.   HENT:  Negative for congestion, hearing loss and sore throat.    Respiratory:  Positive for shortness of breath. Negative for cough. Sputum production: With exertion.   Cardiovascular:  Negative for chest pain and leg swelling.   Gastrointestinal:  Positive for abdominal pain and diarrhea (Loose, floating, juan jose colored stools). Negative for blood in stool, constipation, heartburn, melena, nausea and vomiting.   Genitourinary:  Negative for dysuria, flank pain, frequency and urgency.        + Dark urine   Musculoskeletal:  Negative for falls and myalgias.   Skin:  Negative for itching and rash.   Neurological:  Negative for dizziness, weakness and headaches.   Psychiatric/Behavioral:  Positive for substance abuse. Negative for memory loss. The patient is not nervous/anxious and does not have insomnia.    All other systems reviewed and are negative.        Physical Exam:  Vitals:    09/21/23 0727 09/21/23 0819 09/21/23 0936 09/21/23 1035   BP:  100/59 100/59 99/48   Pulse: 84 60 60 69   Resp:  18 18    Temp: 36.6 °C (97.9 °F) 37.2 °C (98.9 °F) 37.2 °C (98.9 °F)    TempSrc: Temporal Temporal Temporal    SpO2: 92% 90% 90%    Weight:   112 kg (246 lb 0.5 oz)     Height:   1.829 m (6')        Physical Exam  Vitals reviewed.   Constitutional:       General: He is not in acute distress.     Appearance: Normal appearance. He is obese. He is not ill-appearing.   HENT:      Head: Normocephalic.      Nose: Nose normal. No congestion.      Mouth/Throat:      Mouth: Mucous membranes are moist.      Pharynx: Oropharynx is clear. No oropharyngeal exudate.   Eyes:      General: Scleral icterus present.      Extraocular Movements: Extraocular movements intact.      Conjunctiva/sclera: Conjunctivae normal.   Cardiovascular:      Rate and Rhythm: Normal rate and regular rhythm.      Pulses: Normal pulses.      Heart sounds: Murmur heard.   Pulmonary:      Effort: Pulmonary effort is normal. No respiratory distress.      Breath sounds: Normal breath sounds.   Abdominal:      General: Abdomen is flat. Bowel sounds are normal. There is distension.      Palpations: Abdomen is soft.      Tenderness: There is abdominal tenderness (Tenderness with palpation to epigastric, right upper quadrant areas). There is no guarding.   Musculoskeletal:      Right lower leg: No edema.      Left lower leg: No edema.   Skin:     General: Skin is warm and dry.      Capillary Refill: Capillary refill takes less than 2 seconds.      Coloration: Skin is jaundiced.   Neurological:      General: No focal deficit present.      Mental Status: He is alert and oriented to person, place, and time. Mental status is at baseline.      Cranial Nerves: No cranial nerve deficit.      Motor: No weakness.   Psychiatric:         Mood and Affect: Mood normal.         Behavior: Behavior normal.         Thought Content: Thought content normal.         Judgment: Judgment normal.           Labs:  Recent Labs     09/20/23  1856   WBC 5.6   RBC 3.62*   HEMOGLOBIN 12.6*   HEMATOCRIT 38.1*   .2*   MCH 34.8*   MCHC 33.1   RDW 58.7*   PLATELETCT 77*     Recent Labs     09/20/23  1856   SODIUM 138   POTASSIUM 3.1*   CHLORIDE 101    CO2 23   GLUCOSE 116*   BUN 31*     Recent Labs     09/21/23  0245   INR 1.17*       Recent Labs     09/20/23  1856 09/20/23  2256 09/21/23  0245 09/21/23  0256   ASTSGOT 298*  --   --   --    ALTSGPT 303*  --   --   --    TBILIRUBIN 14.6*  --   --   --    DBILIRUBIN  --   --  9.7*  --    ALKPHOSPHAT 228*  --   --   --    GLOBULIN 3.0  --   --   --    INR  --   --  1.17*  --    AMMONIA  --  26  --  23         Imaging:  CT-ABDOMEN-PELVIS WITH  Narrative: 9/20/2023 10:21 PM    HISTORY/REASON FOR EXAM:  Epigastric Pain; IV contrast only, hyperbilirubinemia.    TECHNIQUE/EXAM DESCRIPTION:   CT scan of the abdomen and pelvis with contrast.    Contrast-enhanced helical scanning was obtained from the diaphragmatic domes through the pubic symphysis following the bolus administration of nonionic contrast without complication.    100 mL of Omnipaque 350 nonionic contrast was administered without complication.    Low dose optimization technique was utilized for this CT exam including automated exposure control and adjustment of the mA and/or kV according to patient size.    COMPARISON: No prior studies available.    FINDINGS:  Lower Chest: Trace LEFT pleural fluid.    Liver: Normal.    Spleen: 14.7 cm in the coronal plane.    Pancreas: Unremarkable.    Gallbladder: Gallstones are present including gallstones the gallbladder neck. There is pericholecystic edema. There are stones in the distal common bile duct. Mild intrahepatic biliary dilatation.    Biliary: Nondilated.    Adrenal glands: Normal.    Kidneys: Unremarkable without hydronephrosis.    Bowel: No obstruction or acute inflammation. There are distal colonic diverticuli.    Lymph nodes: No adenopathy.    Vasculature: There is calcific atherosclerotic plaque..    Peritoneum: Unremarkable without ascites.    Musculoskeletal: No acute or destructive process.    Pelvis: No adenopathy or free fluid.  Impression: 1.  Cholelithiasis and choledocholithiasis with findings  suspicious for cholecystitis  2.  Splenomegaly  DX-CHEST-PORTABLE (1 VIEW)  Narrative: 9/20/2023 9:14 PM    HISTORY/REASON FOR EXAM:  Chest Pain    TECHNIQUE/EXAM DESCRIPTION AND NUMBER OF VIEWS:  Single portable view of the chest.    COMPARISON: 10/8/2014    FINDINGS:    There has been interval placement of a dual-lead LEFT subclavian cardiac pacing device.    HEART: Stable size. There is atherosclerotic calcification in the aortic arch. There has been prior sternotomy.  LUNGS: No areas of air space disease are demonstrated.  PLEURA: No effusion or pneumothorax.  Impression: No acute cardiac or pulmonary abnormalities are identified. Lung volumes are low.            Impressions:  Obstructive jaundice  Choledocholithiasis  Acute cholecystitis  Hypertension  History of complete heart block s/p pacemaker insertion  History of aortic stenosis s/p mechanical valve replacement and redo with bioprosthetic valve on daily aspirin  History of spontaneous subdural hemorrhage   History of CVA      MDM:  This is a pleasant 73-year-old male with a past medical history as listed above who presented to [facility] 9/20/2023 with epigastric pain.  Afebrile, no leukocytosis.  , , alk phos 228, total bilirubin 14.6, albumin 3.9.  Lipase 192.  Ammonia 26, lactic acid 1.1. INR 1.17. CT abdomen showed multiple gallstones within the gallbladder and gallbladder neck.  Pericholecystic edema.  Additionally, there are stones in the distal common bile duct.  Called and clarified with radiology regarding sizes of stones in CBD.  There are multiple stones measuring approximately 1 cm or less with a large stone in the gallbladder neck.  Additionally distal common bile duct stone measuring 6 mm.  Common bile duct not dilated, measuring 6 mm as well.  Splenomegaly also seen.  Findings discussed with patient and his wife at bedside as well as further treatment with ERCP for stone removal.  He is agreeable to  procedure.  He has has not eaten since 9/20/2023 at 1800.  Patient also discussed with advanced endoscopist, Dr. Hebert who agreed to do ERCP today at 1530.  Of note, patient's R factor showing mix of cholestatic and hepatocellular disease.  Patient is obese I suspect that he likely has a component of fatty liver disease in addition to his excessive alcohol intake which would explain his thrombocytopenia and MCV of 105.  It is possible that he is in early stages of developing cirrhosis/portal hypertension given splenomegaly seen on CT scan.  Would recommend cessation of alcohol use to prevent further deterioration/injury of his liver.    Recommendations:  Trend LFTs  Maintain n.p.o. status  Plan for ERCP with possible stent placement today with Dr. Hebert at 1530-further recommendations to follow.  Encourage alcohol cessation  Surgical team following-appreciate their recs.      Discussed with patient, wife at bedside, Juan CHEN, Dr. Harvey (GI), Dr. Hebert (advanced GI), Dr. Ruelas (radiology), Mateo ZELAYA (hospitalist)      This note was generated using voice recognition software which has a small chance of producing errors of grammar and possibly content. I have made every reasonable attempt to find and correct any obvious errors, but expect that some may not be found prior to finalization of this note.

## 2023-09-21 NOTE — ANESTHESIA TIME REPORT
Anesthesia Start and Stop Event Times     Date Time Event    9/21/2023 1531 Ready for Procedure     1536 Anesthesia Start     1610 Anesthesia Stop        Responsible Staff  09/21/23    Name Role Begin End    Justino Montalvo M.D. Anesth 1536 1610        Overtime Reason:  no overtime (within assigned shift)    Comments:

## 2023-09-21 NOTE — ANESTHESIA PROCEDURE NOTES
Airway    Date/Time: 9/21/2023 3:42 PM    Performed by: Justino Montalvo M.D.  Authorized by: Justino Montalvo M.D.    Location:  OR  Urgency:  Elective  Indications for Airway Management:  Anesthesia      Spontaneous Ventilation: absent    Sedation Level:  Deep  Preoxygenated: Yes    Patient Position:  Sniffing  Mask Difficulty Assessment:  0 - not attempted  Final Airway Type:  Endotracheal airway  Final Endotracheal Airway:  ETT  Cuffed: Yes    Technique Used for Successful ETT Placement:  Direct laryngoscopy    Insertion Site:  Oral  Blade Type:  Cameron  Laryngoscope Blade/Videolaryngoscope Blade Size:  4  ETT Size (mm):  7.5  Measured from:  Teeth  ETT to Teeth (cm):  22  Placement Verified by: auscultation and capnometry    Cormack-Lehane Classification:  Grade I - full view of glottis  Number of Attempts at Approach:  1

## 2023-09-21 NOTE — PROGRESS NOTES
.Indication:Choledocholithiasis, elevated LFT.   Risks, benefits, and alternatives were discussed with with consenting person(s). Consenting person(s) were given an opportunity to ask questions and discuss other options. Risks including but not limited to failed or incomplete ERCP, stent migration, ineffective therapy, radiation exposure, pancreatitis (with potential future complications), contrast reaction, perforation, infection, bleeding, missed lesion(s), cardiac and/or pulmonary event, aspiration, stroke, possible need for surgery, hospitalization possibly prolonged, discomfort, unsuccessful and/or incomplete procedure, possible need for repeat procedures and/or additional testings, damage to adjacent organs and/or vascular structures, medication reaction, disability, death, and other adverse events possibly life-threatening. Discussion was undertaken with Layman's terms. Consenting persons stated understanding and acceptance of these risks, and wished to proceed. Consent was given in clear state of mind.

## 2023-09-21 NOTE — ED PROVIDER NOTES
"ED Provider Note    CHIEF COMPLAINT  Chief Complaint   Patient presents with    Headache     \"For years, but much more intense today.\"    Jaundice     Noticed it on Monday, no hx of same.    Abdominal Pain     Bilateral upper abd pain for years, but much worse recently. Per pt does not feel like he abd is tight or swollen       EXTERNAL RECORDS REVIEWED  Other multiple outpatient cardiology notes demonstrate history of unsustained ventricular tachycardia status post aortic valve and pacemaker placement no history of any biliary disease    HPI/ROS  LIMITATION TO HISTORY   Select: : None  OUTSIDE HISTORIAN(S):      Matti Olguin is a 73 y.o. male who presents to the emergency department with abdominal pain and jaundice.  Patient noted about 2 days prior that he began to turn somewhat yellow and is gotten increasingly severe since that time.  He is also had severe epigastric abdominal pain this radiated to both sides.  States that his liver enzymes have always been borderline high on multiple blood checks and was attributed to his blood pressure medication.  He has had no confusion no fevers no cough chest pain shortness of breath no other acute symptom changes or concerns at this time.    PAST MEDICAL HISTORY   has a past medical history of Anticoagulation monitoring, special range, Complete heart block (HCC) (10/2014), Dyspnea, High cholesterol, HTN (hypertension), NSVT (nonsustained ventricular tachycardia) ([facility]) - on pacer check, S/P aortic valve replacement with bioprosthetic valve (10/2014), Sleep apnea, Snoring, Stroke (HCC) (), Unspecified hemorrhagic conditions, and Valvular heart disease (12/1988).    SURGICAL HISTORY   has a past surgical history that includes other orthopedic surgery; aortic valve replacement (1988); recovery (9/21/2012); recovery (10/8/2013); recovery (11/8/2013); and pacemaker insertion (Left, 10/25/2014).    FAMILY HISTORY  Family History   Problem Relation Age of Onset "    Heart Attack Father     Heart Disease Sister        SOCIAL HISTORY  Social History     Tobacco Use    Smoking status: Never    Smokeless tobacco: Current     Types: Chew   Vaping Use    Vaping Use: Some days    Substances: THC   Substance and Sexual Activity    Alcohol use: Yes     Comment: more days than not 6-8 beers    Drug use: No    Sexual activity: Not on file       CURRENT MEDICATIONS  Home Medications       Reviewed by Hui Camacho R.N. (Registered Nurse) on 09/20/23 at 1846  Med List Status: Partial     Medication Last Dose Status   aspirin EC (ECOTRIN) 81 MG Tablet Delayed Response  Active   atorvastatin (LIPITOR) 80 MG tablet  Active   carvedilol (COREG) 25 MG Tab  Active   cloNIDine (CATAPRES) 0.1 MG Tab  Active   folic acid (FOLVITE) 400 MCG tablet  Active   hydroCHLOROthiazide (HYDRODIURIL) 25 MG Tab  Active   HYDROcodone-acetaminophen (NORCO) 5-325 MG Tab per tablet  Active   HYDROcodone-acetaminophen (NORCO) 5-325 MG Tab per tablet  Active   lisinopril (PRINIVIL) 20 MG Tab  Active   LORazepam (ATIVAN) 1 MG Tab  Active   potassium chloride SA (KDUR) 20 MEQ Tab CR  Active   pregabalin (LYRICA) 200 MG capsule  Active   therapeutic multivitamin-minerals (THERAGRAN-M) Tab  Active   zolpidem (AMBIEN) 5 MG TABS  Active                    ALLERGIES  Allergies   Allergen Reactions    Yellow Jacket Venom Anaphylaxis    Nkda [No Known Drug Allergy]     Plavix [Clopidogrel]      SDH       PHYSICAL EXAM  VITAL SIGNS: BP 99/57   Pulse 67   Temp (!) 38.1 °C (100.6 °F) (Temporal)   Resp 16   Wt 112 kg (246 lb 0.5 oz)   SpO2 92%   BMI 33.37 kg/m²      Pulse ox interpretation: I interpret this pulse ox as normal.  Constitutional: Alert and oriented x 3, moderate distress  HEENT: Atraumatic normocephalic, pupils are equal round reactive to light extraocular movements are intact.  Moderate scleral icterus the nares is clear, external ears are normal, mouth shows moist mucous membranes normal dentition  for age  Neck: Supple, no JVD no tracheal deviation  Cardiovascular: Regular rate and rhythm no murmur rub or gallop 2+ pulses peripherally x4  Thorax & Lungs: No respiratory distress, no wheezes rales or rhonchi, No chest tenderness.   GI: Tender to palpation in the epigastrium bilateral upper quadrants no rebound or guarding positive bowel sounds  Skin: Jaundice  Musculoskeletal: Moving all extremities with full range and 5 of 5 strength no acute  deformity   Neurologic: Cranial nerves III through XII are grossly intact no sensory deficit no cerebellar dysfunction   Psychiatric: Appropriate affect for situation at this time      DIAGNOSTIC STUDIES / PROCEDURES  Results for orders placed or performed during the hospital encounter of 09/20/23   CBC WITH DIFFERENTIAL   Result Value Ref Range    WBC 5.6 4.8 - 10.8 K/uL    RBC 3.62 (L) 4.70 - 6.10 M/uL    Hemoglobin 12.6 (L) 14.0 - 18.0 g/dL    Hematocrit 38.1 (L) 42.0 - 52.0 %    .2 (H) 81.4 - 97.8 fL    MCH 34.8 (H) 27.0 - 33.0 pg    MCHC 33.1 32.3 - 36.5 g/dL    RDW 58.7 (H) 35.9 - 50.0 fL    Platelet Count 77 (L) 164 - 446 K/uL    Neutrophils-Polys 78.80 (H) 44.00 - 72.00 %    Lymphocytes 4.80 (L) 22.00 - 41.00 %    Monocytes 14.60 (H) 0.00 - 13.40 %    Eosinophils 0.90 0.00 - 6.90 %    Basophils 0.40 0.00 - 1.80 %    Immature Granulocytes 0.50 0.00 - 0.90 %    Nucleated RBC 0.00 0.00 - 0.20 /100 WBC    Neutrophils (Absolute) 4.43 1.82 - 7.42 K/uL    Lymphs (Absolute) 0.27 (L) 1.00 - 4.80 K/uL    Monos (Absolute) 0.82 0.00 - 0.85 K/uL    Eos (Absolute) 0.05 0.00 - 0.51 K/uL    Baso (Absolute) 0.02 0.00 - 0.12 K/uL    Immature Granulocytes (abs) 0.03 0.00 - 0.11 K/uL    NRBC (Absolute) 0.00 K/uL   COMP METABOLIC PANEL   Result Value Ref Range    Sodium 138 135 - 145 mmol/L    Potassium 3.1 (L) 3.6 - 5.5 mmol/L    Chloride 101 96 - 112 mmol/L    Co2 23 20 - 33 mmol/L    Anion Gap 14.0 7.0 - 16.0    Glucose 116 (H) 65 - 99 mg/dL    Bun 31 (H) 8 - 22 mg/dL     Creatinine 1.26 0.50 - 1.40 mg/dL    Calcium 8.2 (L) 8.5 - 10.5 mg/dL    Correct Calcium 8.3 (L) 8.5 - 10.5 mg/dL    AST(SGOT) 298 (H) 12 - 45 U/L    ALT(SGPT) 303 (H) 2 - 50 U/L    Alkaline Phosphatase 228 (H) 30 - 99 U/L    Total Bilirubin 14.6 (H) 0.1 - 1.5 mg/dL    Albumin 3.9 3.2 - 4.9 g/dL    Total Protein 6.9 6.0 - 8.2 g/dL    Globulin 3.0 1.9 - 3.5 g/dL    A-G Ratio 1.3 g/dL   LIPASE   Result Value Ref Range    Lipase 192 (H) 11 - 82 U/L   URINALYSIS    Specimen: Urine   Result Value Ref Range    Color Orange (A)     Character Turbid (A)     Specific Gravity 1.015 <1.035    Ph 6.5 5.0 - 8.0    Glucose 100 (A) Negative mg/dL    Ketones 15 (A) Negative mg/dL    Protein 100 (A) Negative mg/dL    Bilirubin Large (A) Negative    Urobilinogen, Urine 1.0 Negative    Nitrite Positive (A) Negative    Leukocyte Esterase Trace (A) Negative    Occult Blood Small (A) Negative    Micro Urine Req Microscopic    Troponin   Result Value Ref Range    Troponin T 40 (H) 6 - 19 ng/L   IMMATURE PLT FRACTION   Result Value Ref Range    Imm. Plt Fraction 20.4 (H) 0.6 - 13.1 %   ESTIMATED GFR   Result Value Ref Range    GFR (CKD-EPI) 61 >60 mL/min/1.73 m 2   AMMONIA   Result Value Ref Range    Ammonia 26 11 - 45 umol/L   Lactic Acid   Result Value Ref Range    Lactic Acid 1.1 0.5 - 2.0 mmol/L   URINE MICROSCOPIC (W/UA)   Result Value Ref Range    WBC 5-10 (A) /hpf    RBC 20-50 (A) /hpf    Bacteria Many (A) None /hpf    Epithelial Cells Many (A) /hpf    Hyaline Cast 3-5 (A) /lpf    Granular Casts 0-2 /lpf   AMMONIA   Result Value Ref Range    Ammonia 23 11 - 45 umol/L   EKG   Result Value Ref Range    Report       [facility] Emergency Dept.    Test Date:  2023  Pt Name:    CRYSTAL SALAZAR             Department: ER  MRN:        [id number]                      Room:  Gender:     Male                         Technician: [id number]  :        1950                   Requested By:ER TRIAGE PROTOCOL  Order #:     [id number]                    Reading MD:    Measurements  Intervals                                Axis  Rate:       69                           P:          18  WV:         202                          QRS:        -75  QRSD:       180                          T:          102  QT:         506  QTc:        542    Interpretive Statements  Atrial-sensed ventricular-paced complexes  No further analysis attempted due to paced rhythm  Compared to ECG 12/22/2021 01:12:13  No significant changes         RADIOLOGY  I have independently interpreted the diagnostic imaging associated with this visit and am waiting the final reading from the radiologist.     My preliminary interpretation is as follows:   Chest x-ray by triage protocol shows no acute cardiopulmonary process      Radiologist interpretation:   CT-ABDOMEN-PELVIS WITH   Final Result      1.  Cholelithiasis and choledocholithiasis with findings suspicious for cholecystitis   2.  Splenomegaly      DX-CHEST-PORTABLE (1 VIEW)   Final Result      No acute cardiac or pulmonary abnormalities are identified. Lung volumes are low.            COURSE & MEDICAL DECISION MAKING    ED Observation Status? No; Patient does not meet criteria for ED Observation.     ASSESSMENT, COURSE AND PLAN  Care Narrative: Very pleasant 73-year-old male presents with epigastric abdominal pain complicated by new onset jaundice.  CT scans concerning for choledocholithiasis and probable cholecystitis.  Patient does not have a white count however he was febrile at a arrival.  Not tachycardic however pacemaker dependent.  Normotensive at arrival but did have a few bouts of soft blood pressures with systolics in the 90s.  Given a liter of LR given in 3.375 g of Zosyn.  Discussed with general surgeon Dr. Man who is quickly at the patient's bedside.  With bilirubin of 14 and obstructive choledocholithiasis we are both in agreement that it is appropriate for medical admission with GI consultation for  probable ERCP prior to stabilization for cholecystectomy.  This was also discussed with hospitalist Dr. Rowland and patient's admitted in guarded condition.          FINAL DIAGNOSIS  1.  Abdominal pain  2.  New onset jaundice  3.  Choledocholithiasis  4.  Cholecystitis  5.  Hyperbilirubinemia  6.  Pancreatitis  7.  Transaminitis  8.  Febrile illness         Electronically signed by: Dannie Whitman M.D., 9/20/2023

## 2023-09-21 NOTE — ANESTHESIA POSTPROCEDURE EVALUATION
Patient: Matti Olguin    Procedure Summary     Date: 09/21/23 Room / Location: [facility] ROOM 26 / SURGERY SAME DAY [facility]    Anesthesia Start: 1536 Anesthesia Stop: 1610    Procedures:       ERCP (ENDOSCOPIC RETROGRADE CHOLANGIOPANCREATOGRAPHY) (Esophagus)      ERCP, WITH CALCULUS REMOVAL FROM BILE OR PANCREATIC DUCT (Abdomen)      SPHINCTEROTOMY (Abdomen) Diagnosis: (Choledocholithiasis)    Surgeons: Pradeep Hebert M.D. Responsible Provider: Justino Montalvo M.D.    Anesthesia Type: general ASA Status: 3          Final Anesthesia Type: general  Last vitals  BP   Blood Pressure : 98/55, NIBP: 111/55    Temp   36.9 °C (98.5 °F)    Pulse   69   Resp   20    SpO2   93 %      Anesthesia Post Evaluation    Patient location during evaluation: PACU  Patient participation: complete - patient participated  Level of consciousness: sleepy but conscious  Pain score: 0    Airway patency: patent  Anesthetic complications: no  Cardiovascular status: hemodynamically stable  Respiratory status: acceptable  Hydration status: balanced    PONV: none          There were no known notable events for this encounter.     Nurse Pain Score: 0 (NPRS)

## 2023-09-21 NOTE — ED NOTES
Pt wheeled to room. Pt able to stand and transfer to bed unassisted. Connected to monitor. PIV placed. Wife at bedside.

## 2023-09-21 NOTE — PROGRESS NOTES
Pt arrives as a hallway bed from ER with choledocholithiasis with acute cholecystitis. Pt is alert and oriented x4, VSS on 1L, GCS 15. Pt NPO since midnight. Pain 5/10 in abdomen after oxycodone. Plan for ERCP at 1520.    4 Eyes Skin Assessment Completed by APRIL Triplett and APRIL Martinez.    Head Jaundice  Ears Jaundice  Nose Jaundice  Mouth WDL  Neck Jaundice  Breast/Chest Jaundice  Shoulder Blades WDL  Spine WDL  (R) Arm/Elbow/Hand WDL  (L) Arm/Elbow/Hand WDL  Abdomen WDL  Groin WDL  Scrotum/Coccyx/Buttocks Redness and Blanching  (R) Leg Jaundice  (L) Leg Jaundice  (R) Heel/Foot/Toe Jaundice  (L) Heel/Foot/Toe Jaundice          Devices In Places Tele Box, Blood Pressure Cuff, and Pulse Ox      Interventions In Place Gray Ear Foams and Pillows    Possible Skin Injury No    Pictures Uploaded Into Epic N/A  Wound Consult Placed N/A  RN Wound Prevention Protocol Ordered Yes

## 2023-09-21 NOTE — PROCEDURES
.Endoscopic Retrograde Cholangiopancreatography    Date of Procedure:  9/21/2023  Attending Physician:  Pradeep Hebert MD  Indications: Choledocholithiasis, elevated LFT    Instrument: Olympus Flexible Sideviewing Endoscope  Sedation:   Anesthesiologist/Type of Anesthesia:  Anesthesiologist: Justino Montalvo M.D./General    Surgical Staff:  Endoscopy Technician: Alex Atwood  Radiology Technologist: Marcus Oropeza  Endoscopy Nurse: Gunjan Cade RRaNRa; Kavon Unger RRaNRa    Pre-Anesthesia Assessment:  Prior to the procedure, a History and Physical was performed, and patient medications and allergies were reviewed. The patient’s tolerance of previous anesthesia was also reviewed. The risks and benefits of the procedure and the sedation options and risks were discussed with the patient including but not limited to infection, bleeding, aspiration, perforation, adverse medication reaction, missed diagnosis, missed lesions, and pancreatitis. The patient verbalized understanding. All questions were answered, and informed consent was obtained      After I obtained informed consent from the patient, the patient was placed in the prone/swimmer position. Appropriate time-out protocol was followed: the correct patient, the correct procedure, and the correct equipment in the room were confirmed. Throughout the procedure, the patient’s blood pressure, pulse, and oxygen saturations were monitored continuously. The Olympus flexible sideviewing duodenoscope was inserted through the oropharynx, esophagus intubated, then advanced to the gastrointestinal tract to the major papilla. The duct(s) were cannulated and contrast was injected I personally interpreted the ductal images.  Findings and interventions were performed and documented below. Air was then withdrawn and the duodenoscope was removed. The patient tolerated the procedure well. There were no immediate postoperative complications    Findings:     film was normal.   Scope was inserted to second portion duodenum without difficulty.  Papilla appeared normal.  Cannulation with a 0.025 sphincterotome was pure.  Throughout the entire procedure pancreatic duct was never cannulated or injected.  Wire was advanced into the bile duct advanced into the intrahepatic portion.  Aspiration confirmed bile.  Test cholangiogram define anatomy.  There appears to be filling defect in the distal common bile duct.  A traction biliary sphincterotomy was performed to the edge of transverse fold.  Utilizing a 9-12 mm extractor balloon multiple balloon sweeps were performed with removal of 2 black pigmented stones.  Subsequently stepwise occlusion cholangiogram demonstrated no filling defect no extravasation contrast.  There appears to be filling of the gallbladder with contrast suggesting patency of the cystic duct.  Impacted cholangiogram demonstrated no extravasation contrast.  Excessive contrast was then swept out.  Bilateral films under diaphragm was negative for free air.  Please note upon withdrawal of duodenum there appears to be mild erythema.  This may need to be clinically correlated to NSAID usage.  If no significant NSAID use is consideration for H. Pylori testing    Impressions:   Choledocholithiasis x2 treated by ERCP sphincterotomy balloon sweep  Mild erythema in the duodenal bulb without ulceration; could be secondary to NSAID use can clinically correlate to NSAID use and if not significant usage consider H. pylori testing    Recommendations:   Monitor for postprocedure complication including perforation bleeding pancreatitis  May start ice chips in 4 hours can start clears then n.p.o. postmidnight time  Timing of cholecystectomy as per surgical team  4. Trend LFT  5. Consider H. Pylori testing      NOTE: Radiologic interpretation of dynamic and static fluoroscopic imaging by myself.  At no time was/were a Radiologist present.     This note was generated using voice recognition  software which has a small chance of producing errors of grammar and possibly content. I have made every reasonable attempt to find and correct any obvious errors, but expect that some may not be found prior to finalization of this note

## 2023-09-21 NOTE — H&P
"Hospital Medicine History & Physical Note    Date of Service  9/21/2023    Primary Care Physician  Brittany Bradshaw M.D.    Consultants  general surgery  Consult GI in a.m.    Specialist Names:     Code Status  Full Code    Chief Complaint  Chief Complaint   Patient presents with    Headache     \"For years, but much more intense today.\"    Jaundice     Noticed it on Monday, no hx of same.    Abdominal Pain     Bilateral upper abd pain for years, but much worse recently. Per pt does not feel like he abd is tight or swollen       History of Presenting Illness  Matti Olguin is a 73 y.o. male who presented 9/20/2023 with past medical history of aortic valve replacement, hypertension, history of pacemaker: Stroke who presents to the hospital for right upper quadrant abdominal pain for the past 2 weeks.  The pain has been increasingly worse for the past 4 days.  It radiates to the left upper quadrant and describes it as a dull ache.  Patient has noticed his skin and eyes turning yellow.  He denies any nausea, vomiting or diarrhea.  Patient does occasionally drink alcohol.  He only takes aspirin now since he has a history of ICH.    EKG interpreted by me found atrial paced rhythm  Chest x-ray interpreted by me found no acute pulmonary process    I discussed the plan of care with patient.    Review of Systems  Review of Systems   Constitutional:  Negative for chills, diaphoresis, fever and malaise/fatigue.   HENT:  Negative for congestion, ear discharge, ear pain, hearing loss, nosebleeds, sinus pain, sore throat and tinnitus.    Eyes:  Negative for blurred vision, double vision, photophobia and pain.   Respiratory:  Negative for cough, hemoptysis, sputum production, shortness of breath, wheezing and stridor.    Cardiovascular:  Negative for chest pain, palpitations, orthopnea, claudication, leg swelling and PND.   Gastrointestinal:  Positive for abdominal pain. Negative for blood in stool, constipation, " diarrhea, heartburn, melena, nausea and vomiting.   Genitourinary:  Negative for dysuria, flank pain, frequency, hematuria and urgency.   Musculoskeletal:  Negative for back pain, falls, joint pain, myalgias and neck pain.   Skin:  Negative for itching and rash.   Neurological:  Negative for dizziness, tingling, tremors, weakness and headaches.   Endo/Heme/Allergies:  Negative for environmental allergies and polydipsia. Does not bruise/bleed easily.   Psychiatric/Behavioral:  Negative for depression, hallucinations, substance abuse and suicidal ideas.        Past Medical History   has a past medical history of Anticoagulation monitoring, special range, Complete heart block (HCC) (10/2014), Dyspnea, High cholesterol, HTN (hypertension), NSVT (nonsustained ventricular tachycardia) ([facility]) - on pacer check, S/P aortic valve replacement with bioprosthetic valve (10/2014), Sleep apnea, Snoring, Stroke ([facility]) (), Unspecified hemorrhagic conditions, and Valvular heart disease (12/1988).    Surgical History   has a past surgical history that includes other orthopedic surgery; aortic valve replacement (1988); recovery (9/21/2012); recovery (10/8/2013); recovery (11/8/2013); and pacemaker insertion (Left, 10/25/2014).     Family History  family history includes Heart Attack in his father; Heart Disease in his sister.   Family history reviewed with patient. There is no family history that is pertinent to the chief complaint.     Social History   reports that he has never smoked. His smokeless tobacco use includes chew. He reports current alcohol use. He reports that he does not use drugs.    Allergies  Allergies   Allergen Reactions    Yellow Jacket Venom Anaphylaxis    Nkda [No Known Drug Allergy]     Plavix [Clopidogrel]      SDH       Medications  Prior to Admission Medications   Prescriptions Last Dose Informant Patient Reported? Taking?   HYDROcodone-acetaminophen (NORCO) 5-325 MG Tab per tablet  Patient Yes No   Sig:  Take 1 Tab by mouth every four hours as needed.   HYDROcodone-acetaminophen (NORCO) 5-325 MG Tab per tablet   Yes No   Sig: twice a day   LORazepam (ATIVAN) 1 MG Tab  Patient Yes No   Sig: Take 1 mg by mouth every 8 hours as needed for Anxiety.   aspirin EC (ECOTRIN) 81 MG Tablet Delayed Response   No No   Sig: Take 1 Tablet by mouth every day.   Patient taking differently: Take 81 mg by mouth every day. Twice a week   atorvastatin (LIPITOR) 80 MG tablet  Patient No No   Sig: Take 1 Tab by mouth every bedtime.   carvedilol (COREG) 25 MG Tab  Patient No No   Sig: Take 2 Tabs by mouth 2 times a day, with meals.   cloNIDine (CATAPRES) 0.1 MG Tab  Patient No No   Sig: Take 1 Tab by mouth every 6 hours as needed. As needed for SBP >150   folic acid (FOLVITE) 400 MCG tablet  Patient Yes No   Sig: Take 400 mcg by mouth every day.   hydroCHLOROthiazide (HYDRODIURIL) 25 MG Tab  Patient No No   Sig: Take 1 Tab by mouth every day.   lisinopril (PRINIVIL) 20 MG Tab  Patient No No   Sig: Take 1 Tab by mouth 2 times a day.   potassium chloride SA (KDUR) 20 MEQ Tab CR  Patient No No   Sig: Take 1 Tab by mouth every day.   pregabalin (LYRICA) 200 MG capsule   Yes No   Sig: Take 200 mg by mouth 2 times a day.   therapeutic multivitamin-minerals (THERAGRAN-M) Tab  Patient Yes No   Sig: Take 1 Tablet by mouth every day.   zolpidem (AMBIEN) 5 MG TABS  Patient Yes No   Sig: Take 5 mg by mouth at bedtime as needed for Sleep.      Facility-Administered Medications: None       Physical Exam  Temp:  [38.1 °C (100.6 °F)] 38.1 °C (100.6 °F)  Pulse:  [61-75] 63  Resp:  [15-18] 15  BP: ()/(47-57) 84/51  SpO2:  [90 %-92 %] 92 %  Blood Pressure : (!) 84/51   Temperature: (!) 38.1 °C (100.6 °F)   Pulse: 63   Respiration: 15   Pulse Oximetry: 92 %       Physical Exam  Vitals and nursing note reviewed.   Constitutional:       General: He is not in acute distress.     Appearance: Normal appearance. He is not ill-appearing, toxic-appearing or  "diaphoretic.   HENT:      Head: Normocephalic and atraumatic.      Nose: No congestion or rhinorrhea.      Mouth/Throat:      Pharynx: No posterior oropharyngeal erythema.   Eyes:      General: Scleral icterus present.         Right eye: No discharge.   Cardiovascular:      Rate and Rhythm: Normal rate and regular rhythm.      Pulses: Normal pulses.      Heart sounds: Normal heart sounds. No murmur heard.     No friction rub. No gallop.   Pulmonary:      Effort: Pulmonary effort is normal. No respiratory distress.      Breath sounds: Normal breath sounds. No stridor. No wheezing, rhonchi or rales.   Abdominal:      General: There is distension.      Tenderness: There is abdominal tenderness.   Musculoskeletal:         General: No swelling, tenderness, deformity or signs of injury.      Cervical back: Normal range of motion.      Right lower leg: No edema.      Left lower leg: No edema.   Skin:     Capillary Refill: Capillary refill takes 2 to 3 seconds.      Coloration: Skin is not jaundiced or pale.      Findings: No bruising, erythema, lesion or rash.   Neurological:      General: No focal deficit present.      Mental Status: He is alert and oriented to person, place, and time.         Laboratory:  Recent Labs     09/20/23  1856   WBC 5.6   RBC 3.62*   HEMOGLOBIN 12.6*   HEMATOCRIT 38.1*   .2*   MCH 34.8*   MCHC 33.1   RDW 58.7*   PLATELETCT 77*     Recent Labs     09/20/23  1856   SODIUM 138   POTASSIUM 3.1*   CHLORIDE 101   CO2 23   GLUCOSE 116*   BUN 31*   CREATININE 1.26   CALCIUM 8.2*     Recent Labs     09/20/23  1856   ALTSGPT 303*   ASTSGOT 298*   ALKPHOSPHAT 228*   TBILIRUBIN 14.6*   LIPASE 192*   GLUCOSE 116*         No results for input(s): \"NTPROBNP\" in the last 72 hours.      Recent Labs     09/20/23  1856   TROPONINT 40*       Imaging:  CT-ABDOMEN-PELVIS WITH   Final Result      1.  Cholelithiasis and choledocholithiasis with findings suspicious for cholecystitis   2.  Splenomegaly    "   DX-CHEST-PORTABLE (1 VIEW)   Final Result      No acute cardiac or pulmonary abnormalities are identified. Lung volumes are low.          X-Ray:  I have personally reviewed the images and compared with prior images.  EKG:  I have personally reviewed the images and compared with prior images.    Assessment/Plan:  Justification for Admission Status  I anticipate this patient will require at least two midnights for appropriate medical management, necessitating inpatient admission because choledocholithiasis    Patient will need a Med/Surg bed on MEDICAL service .  The need is secondary to choledocholithiasis.    * Choledocholithiasis with acute cholecystitis- (present on admission)  Assessment & Plan  Patient has elevated LFTs and bilirubin  CBD does not appear to be dilated on CT scan  Start IV Zosyn  Pain control with oral and IV narcotics  N.p.o.  Surgery has been consulted  Consult GI in a.m. for ERCP    Acute liver failure  Assessment & Plan  It appears that he does have underlying nonalcoholic liver disease with evidence of choledocholithiasis  I have ordered ammonia, INR      Cardiac pacemaker - Medtronic- (present on admission)  Assessment & Plan  Monitor on telemetry    Primary hypertension- (present on admission)  Assessment & Plan  controlled  Continue current home medications  IV as needed medications have been ordered          VTE prophylaxis: SCDs/TEDs

## 2023-09-21 NOTE — ANESTHESIA PREPROCEDURE EVALUATION
" Case: [id number] Date/Time: 09/21/23 1520    Procedure: ERCP (ENDOSCOPIC RETROGRADE CHOLANGIOPANCREATOGRAPHY) (Esophagus)    Anesthesia type: General    Pre-op diagnosis: Choledocholithiasis, obstructive jaundice    Location: [facility] ROOM 26 / SURGERY SAME DAY [facility]    Surgeons: Pradeep Hebert M.D.        Past Medical History:   Diagnosis Date   • Anticoagulation monitoring, special range    • Complete heart block (HCC) 10/2014    Statust post pacemaker placement.   • Dyspnea    • High cholesterol    • HTN (hypertension)    • NSVT (nonsustained ventricular tachycardia) (HCC) - on pacer check    • S/P aortic valve replacement with bioprosthetic valve 10/2014   • Sleep apnea    • Snoring    • Stroke (HCC)     left sided \"tingling\"   • Unspecified hemorrhagic conditions     Coumadin   • Valvular heart disease 12/1988    AVR     Past Surgical History:   Procedure Laterality Date   • PACEMAKER INSERTION Left 10/25/2014    Medtronic Advisjose j COOL [id number] implanted at [facility].   • RECOVERY  11/8/2013    Performed by Cath-Recovery Surgery at SURGERY SAME DAY [facility] ORS   • RECOVERY  10/8/2013    Performed by Cath-Recovery Surgery at SURGERY SAME DAY [facility] ORS   • RECOVERY  9/21/2012    Performed by Matti Clemens M.D. at SURGERY SAME DAY [facility] ORS   • AORTIC VALVE REPLACEMENT  1988    titanium   • OTHER ORTHOPEDIC SURGERY      Knee scope, right shoulder     Current Outpatient Medications   Medication Instructions   • aspirin EC (ECOTRIN) 81 mg, Oral, DAILY   • atorvastatin (LIPITOR) 80 mg, Oral, EVERY BEDTIME   • carvedilol (COREG) 50 mg, Oral, 2 TIMES DAILY WITH MEALS   • cloNIDine (CATAPRES) 0.1 mg, Oral, EVERY 6 HOURS PRN, As needed for SBP >150   • folic acid (FOLVITE) 400 mcg, Oral, DAILY   • hydroCHLOROthiazide (HYDRODIURIL) 25 mg, Oral, DAILY   • HYDROcodone-acetaminophen (NORCO) 5-325 MG Tab per tablet 1 Tablet, Oral, EVERY 4 HOURS PRN   • lisinopril (PRINIVIL) 20 mg, Oral, 2 TIMES DAILY   • " LORazepam (ATIVAN) 1 mg, Oral, EVERY 8 HOURS PRN   • potassium chloride SA (KDUR) 20 MEQ Tab CR 20 mEq, Oral, DAILY   • pregabalin (LYRICA) 200 mg, Oral, 2 TIMES DAILY   • therapeutic multivitamin-minerals (THERAGRAN-M) Tab 1 Tablet, Oral, DAILY   • zolpidem (AMBIEN) 5 mg, Oral, NIGHTLY PRN     Allergies   Allergen Reactions   • Yellow Jacket Venom Anaphylaxis   • Plavix [Clopidogrel]      SDH     Lab Results   Component Value Date/Time    SODIUM 138 09/20/2023 06:56 PM    POTASSIUM 3.1 (L) 09/20/2023 06:56 PM    CHLORIDE 101 09/20/2023 06:56 PM    CO2 23 09/20/2023 06:56 PM    GLUCOSE 116 (H) 09/20/2023 06:56 PM    BUN 31 (H) 09/20/2023 06:56 PM    CREATININE 1.26 09/20/2023 06:56 PM    CREATININE 1.1 05/29/2007 04:30 AM      Lab Results   Component Value Date/Time    WBC 5.6 09/20/2023 06:56 PM    RBC 3.62 (L) 09/20/2023 06:56 PM    HEMOGLOBIN 12.6 (L) 09/20/2023 06:56 PM    HEMATOCRIT 38.1 (L) 09/20/2023 06:56 PM    .2 (H) 09/20/2023 06:56 PM    MCH 34.8 (H) 09/20/2023 06:56 PM    MCHC 33.1 09/20/2023 06:56 PM    MPV 12.1 12/24/2021 06:45 AM    NEUTSPOLYS 78.80 (H) 09/20/2023 06:56 PM    LYMPHOCYTES 4.80 (L) 09/20/2023 06:56 PM    MONOCYTES 14.60 (H) 09/20/2023 06:56 PM    EOSINOPHILS 0.90 09/20/2023 06:56 PM    BASOPHILS 0.40 09/20/2023 06:56 PM      Lab Results   Component Value Date/Time    PROTHROMBTM 15.0 (H) 09/21/2023 02:45 AM    INR 1.17 (H) 09/21/2023 02:45 AM      TTE 01/2019:  CONCLUSIONS  Compared to the images of the prior study 12/01/216, there has been no significant change.  Normal left ventricular systolic function.   Normal function of bioprosthetic valve. No aortic insufficiency.   Transvalvular gradients are - Peak:  33mmHg, Mean:  22mmHg.  Ascending aorta diameter is  3.8cm.    Relevant Problems   PULMONARY   (positive) Dyspnea      CARDIAC   (positive) Cardiac pacemaker - Medtronic   (positive) Complete heart block (HCC)   (positive) Primary hypertension         (positive) Acute  liver failure   (positive) Chronic nonalcoholic liver disease       Physical Exam    Airway   Mallampati: II  TM distance: >3 FB  Neck ROM: full       Cardiovascular - normal exam  Rhythm: regular  Rate: normal  (+) murmur     Dental - normal exam           Pulmonary - normal exam  Breath sounds clear to auscultation     Abdominal    Neurological - normal exam                 Anesthesia Plan    ASA 3   ASA physical status 3 criteria: CVA or TIA - history (> 3 months)    Plan - general       Airway plan will be ETT          Induction: intravenous    Postoperative Plan: Postoperative administration of opioids is intended.    Pertinent diagnostic labs and testing reviewed    Informed Consent:    Anesthetic plan and risks discussed with patient.      Patient fully consented for general anesthesia. Anesthetic procedure and risks discussed in detail. Risks include but are not limited to PONV, pain, sore throat, damage to teeth/lips/gums, positioning injury, allergic reaction, vocal cord injury, and/or cardiopulmonary problems up to and including death. Patient indicates complete understanding. All patient questions answered to full satisfaction and they agree to proceed as planned.

## 2023-09-21 NOTE — OR NURSING
1607- Pt arrive from Endo, 10L O2 via mask. Pt A&O. Report received from RN & anesthesia. Pt denies discomfort. BP low, anesthesia aware, stated baseline low.    1620-Ice chips given to pt. Denies pain or discomfort.    1623-MD to bedside. POC reviewed.    1632-Call report to APRIL Triplett. Plan of care reviewed. All questions answered    1636-Pt off floor via sylvie with RN. Zoll, O2 tank full. All belongings with pt.

## 2023-09-21 NOTE — CONSULTS
"  DATE OF CONSULTATION:  9/21/2023     REFERRING PHYSICIAN:   Dannie Whitman M.D.     CONSULTING PHYSICIAN:  Matti Man M.D.     REASON FOR CONSULTATION:  I have been asked by  to see the patient in surgical consultation for evaluation of jaundice, RUQ pain.    HISTORY OF PRESENT ILLNESS: The patient is a  73 year-old White elderly man who presents to the Emergency Department with weeks to months of intermittent RUQ pain that evolved into 4 days of constant epigastric pain and worsening jaundice.  He denies any associated fevers, nausea, or vomiting, but does endorse worsening abdominal distention/\"bloating\" and diarrhea notable for juan jose color, floating, and foul smell.  He has never had abdominal surgery in the past.  His medical history is notable for complete heart block s/p pacer and aortic stenosis s/p mechanical valve placement at 38 years old and redo bioprosthetic valve placement in 2012.  He was taking Plavix up until Jan-2022 but at that time had a spontaneous SDH.  For the past year he takes daily 81 ASA only.  In his current state he reports daily issues with shortness of breath even with minimal exertion \"like getting out of bed in the morning.\"  His last Echo on file here was 2019 showing EF 60% and well functioning aortic valve.    PAST MEDICAL HISTORY:  has a past medical history of Anticoagulation monitoring, special range, Complete heart block (HCC) (10/2014), Dyspnea, High cholesterol, HTN (hypertension), NSVT (nonsustained ventricular tachycardia) ([facility]) - on pacer check, S/P aortic valve replacement with bioprosthetic valve (10/2014), Sleep apnea, Snoring, Stroke ([facility]) (), Unspecified hemorrhagic conditions, and Valvular heart disease (12/1988).    PAST SURGICAL HISTORY:  has a past surgical history that includes other orthopedic surgery; aortic valve replacement (1988); recovery (9/21/2012); recovery (10/8/2013); recovery (11/8/2013); and pacemaker insertion (Left, " 10/25/2014).    ALLERGIES:   Allergies   Allergen Reactions    Yellow Jacket Venom Anaphylaxis    Nkda [No Known Drug Allergy]     Plavix [Clopidogrel]      SDH       CURRENT MEDICATIONS:    Home Medications       Reviewed by Hui Camacho R.N. (Registered Nurse) on 09/20/23 at 1846  Med List Status: Partial     Medication Last Dose Status   aspirin EC (ECOTRIN) 81 MG Tablet Delayed Response  Active   atorvastatin (LIPITOR) 80 MG tablet  Active   carvedilol (COREG) 25 MG Tab  Active   cloNIDine (CATAPRES) 0.1 MG Tab  Active   folic acid (FOLVITE) 400 MCG tablet  Active   hydroCHLOROthiazide (HYDRODIURIL) 25 MG Tab  Active   HYDROcodone-acetaminophen (NORCO) 5-325 MG Tab per tablet  Active   HYDROcodone-acetaminophen (NORCO) 5-325 MG Tab per tablet  Active   lisinopril (PRINIVIL) 20 MG Tab  Active   LORazepam (ATIVAN) 1 MG Tab  Active   potassium chloride SA (KDUR) 20 MEQ Tab CR  Active   pregabalin (LYRICA) 200 MG capsule  Active   therapeutic multivitamin-minerals (THERAGRAN-M) Tab  Active   zolpidem (AMBIEN) 5 MG TABS  Active                    FAMILY HISTORY: family history includes Heart Attack in his father; Heart Disease in his sister.    SOCIAL HISTORY:  reports that he has never smoked. His smokeless tobacco use includes chew. He reports current alcohol use. He reports that he does not use drugs.    REVIEW OF SYSTEMS: Review of systems is remarkable for the following abdominal pain and jaundice. The remainder of the comprehensive ROS is negative with the exception of the aforementioned HPI, PMH, and PSH bullets in accordance with CMS guideline.    PHYSICAL EXAMINATION:    Physical Exam  Constitutional:       General: He is not in acute distress.     Appearance: He is obese. He is ill-appearing.   HENT:      Left Ear: External ear normal.      Mouth/Throat:      Mouth: Mucous membranes are dry.      Comments: Jaundiced mucous membranes  Eyes:      General: Scleral icterus present.      Pupils: Pupils  are equal, round, and reactive to light.   Cardiovascular:      Rate and Rhythm: Normal rate.      Heart sounds: Murmur heard.      Comments: Pacer spikes seen at every beat on telemetry  Pulmonary:      Effort: Pulmonary effort is normal.      Breath sounds: Normal breath sounds.   Abdominal:      General: There is distension.      Palpations: Abdomen is soft. There is no mass.      Tenderness: There is no guarding or rebound.      Hernia: No hernia is present.      Comments: Moderate diffuse epigastric tenderness.  Moderate RUQ tenderness with deep palpation.   Musculoskeletal:         General: No swelling or tenderness.      Cervical back: No tenderness.   Skin:     Capillary Refill: Capillary refill takes 2 to 3 seconds.   Neurological:      General: No focal deficit present.      Mental Status: He is alert and oriented to person, place, and time.         LABORATORY VALUES:   Recent Labs     09/20/23 1856   WBC 5.6   RBC 3.62*   HEMOGLOBIN 12.6*   HEMATOCRIT 38.1*   .2*   MCH 34.8*   MCHC 33.1   RDW 58.7*   PLATELETCT 77*     Recent Labs     09/20/23 1856   SODIUM 138   POTASSIUM 3.1*   CHLORIDE 101   CO2 23   GLUCOSE 116*   BUN 31*   CREATININE 1.26   CALCIUM 8.2*     Recent Labs     09/20/23 1856 09/20/23  2256   ASTSGOT 298*  --    ALTSGPT 303*  --    TBILIRUBIN 14.6*  --    ALKPHOSPHAT 228*  --    GLOBULIN 3.0  --    AMMONIA  --  26            IMAGING:   CT-ABDOMEN-PELVIS WITH   Final Result      1.  Cholelithiasis and choledocholithiasis with findings suspicious for cholecystitis   2.  Splenomegaly      DX-CHEST-PORTABLE (1 VIEW)   Final Result      No acute cardiac or pulmonary abnormalities are identified. Lung volumes are low.          ASSESSMENT AND PLAN:   Choledocholithiasis    72 yo with extensive cardiac history presents with months of intermittent RUQ pain, now constant epigastric pain and jaundice.  Imaging and lab workup consistent with choledocholithiasis and resultant transaminitis  and mild pancreatitis.    - Given his comorbidities and what will be a protracted hospital stay for completion of GI workup and eventual surgery recommend admission to internal medicine service  - GI Consult  - Recommend IV antibiotic coverage until cholecystectomy or cholecystostomy  - Will need cardiac risk stratification prior to general anesthesia      DISPOSITION: Medical evaluation and admission for valvular disease. [facility] will follow.     ____________________________________     Matti Man M.D.    DD: 9/21/2023  12:01 AM    AAST Grading System for EGS Conditions  ACS NSQIP Surgical Risk Calculator

## 2023-09-21 NOTE — ASSESSMENT & PLAN NOTE
Choledocholithiasis  ERCP with extraction of 2 stones  9/22 Bilirubin remains elevated  9/24 Bilirubin trend down   Laparoscopic cholecystectomy

## 2023-09-21 NOTE — ED TRIAGE NOTES
"Ambulates to triage  Chief Complaint   Patient presents with    Headache     \"For years, but much more intense today.\"    Jaundice     Noticed it on Monday, no hx of same.    Abdominal Pain     Bilateral upper abd pain for years, but much worse recently. Per pt does not feel like he abd is tight or swollen     Pt had labs done in [facility] yesterday, but does not have any results.  Protocol ordered. Pt c/o of 8/10 head and abd pain.  "

## 2023-09-21 NOTE — ASSESSMENT & PLAN NOTE
Patient has elevated LFTs and bilirubin  IV Zosyn  Pain control with oral and IV narcotics  N.p.o.  General surgery and GI following  ERCP 9/22 9/24 lap cholecystectomy successful.  Follow LFTs and bilirubin.

## 2023-09-21 NOTE — PROGRESS NOTES
DATE: 9/21/2023    Hospital Day 1  choledocholithiasis .    INTERVAL EVENTS:  ERCP pending  Abdominal pain improved    Review of Systems   Constitutional:  Negative for chills and fever.   Gastrointestinal:  Positive for abdominal pain (minimal, epigastric). Negative for nausea and vomiting.   Musculoskeletal:  Negative for myalgias.         PHYSICAL EXAMINATION:  Vital Signs: Blood Pressure 97/48   Pulse 70   Temperature 37.4 °C (99.4 °F) (Temporal)   Respiration 18   Height 1.829 m (6')   Weight 112 kg (246 lb 0.5 oz)   Oxygen Saturation 93%     Physical Exam  Vitals and nursing note reviewed. Chaperone present: Significant another at bedside.   Constitutional:       General: He is not in acute distress.     Appearance: He is not toxic-appearing.   HENT:      Head: Normocephalic.      Mouth/Throat:      Mouth: Mucous membranes are moist.      Pharynx: Oropharynx is clear.   Cardiovascular:      Rate and Rhythm: Normal rate and regular rhythm.      Pulses: Normal pulses.   Pulmonary:      Effort: Pulmonary effort is normal.   Abdominal:      General: There is no distension.      Palpations: Abdomen is soft.      Tenderness: There is no abdominal tenderness. There is no guarding.   Musculoskeletal:      Comments: Moves all extremities   Skin:     Coloration: Skin is jaundiced.   Neurological:      Mental Status: He is alert and oriented to person, place, and time.           LABORATORY VALUES:  Recent Labs     09/20/23 1856   WBC 5.6   RBC 3.62*   HEMOGLOBIN 12.6*   HEMATOCRIT 38.1*   .2*   MCH 34.8*   MCHC 33.1   RDW 58.7*   PLATELETCT 77*     Recent Labs     09/20/23 1856   SODIUM 138   POTASSIUM 3.1*   CHLORIDE 101   CO2 23   GLUCOSE 116*   BUN 31*   CREATININE 1.26   CALCIUM 8.2*     Recent Labs     09/20/23  1856 09/20/23  2256 09/21/23  0245 09/21/23  0256   ASTSGOT 298*  --   --   --    ALTSGPT 303*  --   --   --    TBILIRUBIN 14.6*  --   --   --    DBILIRUBIN  --   --  9.7*  --    ALKPHOSPHAT  228*  --   --   --    GLOBULIN 3.0  --   --   --    INR  --   --  1.17*  --    AMMONIA  --  26  --  23     Recent Labs     09/21/23  0245   INR 1.17*        IMAGING:  CT-ABDOMEN-PELVIS WITH   Final Result      1.  Cholelithiasis and choledocholithiasis with findings suspicious for cholecystitis   2.  Splenomegaly      DX-CHEST-PORTABLE (1 VIEW)   Final Result      No acute cardiac or pulmonary abnormalities are identified. Lung volumes are low.      [id number] DUCTS    (Results Pending)       ASSESSMENT AND PLAN:  * Choledocholithiasis with acute cholecystitis- (present on admission)  Assessment & Plan  Choledocholithiasis  ERCP pending           ____________________________________     BISHNU Santos    DD: 9/21/2023  3:12 PM

## 2023-09-21 NOTE — ED NOTES
Bedside report from Vitaly Ramirez. Pt resting in bed with wife on RA. Pt alert and oriented x 4. Fall precautions in place. Pt assisted to restroom with standby assist. Warm blankets provided and pt updated on POC. No new needs at this time.

## 2023-09-22 LAB
ABO GROUP BLD: NORMAL
ALBUMIN SERPL BCP-MCNC: 3.3 G/DL (ref 3.2–4.9)
ALBUMIN/GLOB SERPL: 1.2 G/DL
ALP SERPL-CCNC: 196 U/L (ref 30–99)
ALT SERPL-CCNC: 230 U/L (ref 2–50)
ANION GAP SERPL CALC-SCNC: 14 MMOL/L (ref 7–16)
AST SERPL-CCNC: 226 U/L (ref 12–45)
BASOPHILS # BLD AUTO: 0.4 % (ref 0–1.8)
BASOPHILS # BLD: 0.01 K/UL (ref 0–0.12)
BILIRUB SERPL-MCNC: 13.9 MG/DL (ref 0.1–1.5)
BLD GP AB SCN SERPL QL: NORMAL
BUN SERPL-MCNC: 47 MG/DL (ref 8–22)
CALCIUM ALBUM COR SERPL-MCNC: 8.6 MG/DL (ref 8.5–10.5)
CALCIUM SERPL-MCNC: 8 MG/DL (ref 8.5–10.5)
CFT BLD TEG: 6.1 MIN (ref 4.6–9.1)
CFT P HPASE BLD TEG: 5.7 MIN (ref 4.3–8.3)
CHLORIDE SERPL-SCNC: 103 MMOL/L (ref 96–112)
CLOT ANGLE BLD TEG: 76.3 DEGREES (ref 63–78)
CLOT LYSIS 30M P MA LENFR BLD TEG: 1 % (ref 0–2.6)
CO2 SERPL-SCNC: 19 MMOL/L (ref 20–33)
CREAT SERPL-MCNC: 1.92 MG/DL (ref 0.5–1.4)
CT.EXTRINSIC BLD ROTEM: 0.9 MIN (ref 0.8–2.1)
EOSINOPHIL # BLD AUTO: 0 K/UL (ref 0–0.51)
EOSINOPHIL NFR BLD: 0 % (ref 0–6.9)
ERYTHROCYTE [DISTWIDTH] IN BLOOD BY AUTOMATED COUNT: 59.3 FL (ref 35.9–50)
GFR SERPLBLD CREATININE-BSD FMLA CKD-EPI: 37 ML/MIN/1.73 M 2
GLOBULIN SER CALC-MCNC: 2.8 G/DL (ref 1.9–3.5)
GLUCOSE SERPL-MCNC: 161 MG/DL (ref 65–99)
HCT VFR BLD AUTO: 33.8 % (ref 42–52)
HGB BLD-MCNC: 11 G/DL (ref 14–18)
IMM GRANULOCYTES # BLD AUTO: 0.01 K/UL (ref 0–0.11)
IMM GRANULOCYTES NFR BLD AUTO: 0.4 % (ref 0–0.9)
LYMPHOCYTES # BLD AUTO: 0.17 K/UL (ref 1–4.8)
LYMPHOCYTES NFR BLD: 7.3 % (ref 22–41)
MCF BLD TEG: 63.5 MM (ref 52–69)
MCF.PLATELET INHIB BLD ROTEM: 36.7 MM (ref 15–32)
MCH RBC QN AUTO: 34.5 PG (ref 27–33)
MCHC RBC AUTO-ENTMCNC: 32.5 G/DL (ref 32.3–36.5)
MCV RBC AUTO: 106 FL (ref 81.4–97.8)
MONOCYTES # BLD AUTO: 0.2 K/UL (ref 0–0.85)
MONOCYTES NFR BLD AUTO: 8.6 % (ref 0–13.4)
NEUTROPHILS # BLD AUTO: 1.93 K/UL (ref 1.82–7.42)
NEUTROPHILS NFR BLD: 83.3 % (ref 44–72)
NRBC # BLD AUTO: 0 K/UL
NRBC BLD-RTO: 0 /100 WBC (ref 0–0.2)
PA AA BLD-ACNC: 4.9 % (ref 0–11)
PA ADP BLD-ACNC: 5.2 % (ref 0–17)
PLATELET # BLD AUTO: 58 K/UL (ref 164–446)
PLATELETS.RETICULATED NFR BLD AUTO: 18.9 % (ref 0.6–13.1)
POTASSIUM SERPL-SCNC: 3.9 MMOL/L (ref 3.6–5.5)
PROT SERPL-MCNC: 6.1 G/DL (ref 6–8.2)
RBC # BLD AUTO: 3.19 M/UL (ref 4.7–6.1)
RH BLD: NORMAL
SODIUM SERPL-SCNC: 136 MMOL/L (ref 135–145)
TEG ALGORITHM TGALG: ABNORMAL
TROPONIN T SERPL-MCNC: 32 NG/L (ref 6–19)
WBC # BLD AUTO: 2.3 K/UL (ref 4.8–10.8)

## 2023-09-22 PROCEDURE — A9270 NON-COVERED ITEM OR SERVICE: HCPCS | Performed by: HOSPITALIST

## 2023-09-22 PROCEDURE — 85384 FIBRINOGEN ACTIVITY: CPT

## 2023-09-22 PROCEDURE — 85347 COAGULATION TIME ACTIVATED: CPT

## 2023-09-22 PROCEDURE — 700105 HCHG RX REV CODE 258: Performed by: HOSPITALIST

## 2023-09-22 PROCEDURE — 770020 HCHG ROOM/CARE - TELE (206)

## 2023-09-22 PROCEDURE — 86901 BLOOD TYPING SEROLOGIC RH(D): CPT

## 2023-09-22 PROCEDURE — 700102 HCHG RX REV CODE 250 W/ 637 OVERRIDE(OP): Performed by: HOSPITALIST

## 2023-09-22 PROCEDURE — 99232 SBSQ HOSP IP/OBS MODERATE 35: CPT | Performed by: NURSE PRACTITIONER

## 2023-09-22 PROCEDURE — 86900 BLOOD TYPING SEROLOGIC ABO: CPT

## 2023-09-22 PROCEDURE — 86850 RBC ANTIBODY SCREEN: CPT

## 2023-09-22 PROCEDURE — 80053 COMPREHEN METABOLIC PANEL: CPT

## 2023-09-22 PROCEDURE — 36415 COLL VENOUS BLD VENIPUNCTURE: CPT

## 2023-09-22 PROCEDURE — 700111 HCHG RX REV CODE 636 W/ 250 OVERRIDE (IP): Mod: JZ | Performed by: HOSPITALIST

## 2023-09-22 PROCEDURE — 85025 COMPLETE CBC W/AUTO DIFF WBC: CPT

## 2023-09-22 PROCEDURE — 84484 ASSAY OF TROPONIN QUANT: CPT

## 2023-09-22 PROCEDURE — 99233 SBSQ HOSP IP/OBS HIGH 50: CPT | Performed by: STUDENT IN AN ORGANIZED HEALTH CARE EDUCATION/TRAINING PROGRAM

## 2023-09-22 PROCEDURE — 85576 BLOOD PLATELET AGGREGATION: CPT

## 2023-09-22 PROCEDURE — 85055 RETICULATED PLATELET ASSAY: CPT

## 2023-09-22 RX ADMIN — PIPERACILLIN AND TAZOBACTAM 3.38 G: 3; .375 INJECTION, POWDER, FOR SOLUTION INTRAVENOUS at 14:29

## 2023-09-22 RX ADMIN — ZOLPIDEM TARTRATE 5 MG: 5 TABLET ORAL at 21:01

## 2023-09-22 RX ADMIN — PIPERACILLIN AND TAZOBACTAM 3.38 G: 3; .375 INJECTION, POWDER, FOR SOLUTION INTRAVENOUS at 05:06

## 2023-09-22 RX ADMIN — ATORVASTATIN CALCIUM 80 MG: 80 TABLET, FILM COATED ORAL at 21:01

## 2023-09-22 RX ADMIN — SODIUM CHLORIDE, POTASSIUM CHLORIDE, SODIUM LACTATE AND CALCIUM CHLORIDE: 600; 310; 30; 20 INJECTION, SOLUTION INTRAVENOUS at 05:05

## 2023-09-22 RX ADMIN — PREGABALIN 200 MG: 100 CAPSULE ORAL at 05:09

## 2023-09-22 RX ADMIN — PIPERACILLIN AND TAZOBACTAM 3.38 G: 3; .375 INJECTION, POWDER, FOR SOLUTION INTRAVENOUS at 21:02

## 2023-09-22 RX ADMIN — PREGABALIN 200 MG: 100 CAPSULE ORAL at 17:01

## 2023-09-22 RX ADMIN — LISINOPRIL 20 MG: 20 TABLET ORAL at 05:09

## 2023-09-22 ASSESSMENT — ENCOUNTER SYMPTOMS
WEAKNESS: 0
SHORTNESS OF BREATH: 0
COUGH: 0
MYALGIAS: 0
VOMITING: 0
SORE THROAT: 0
FEVER: 0
NERVOUS/ANXIOUS: 0
HEARTBURN: 0
FLANK PAIN: 0
BLOOD IN STOOL: 0
CHILLS: 0
DIARRHEA: 0
HEADACHES: 0
FALLS: 0
NAUSEA: 0
ABDOMINAL PAIN: 0
INSOMNIA: 0
CONSTIPATION: 0

## 2023-09-22 ASSESSMENT — PAIN DESCRIPTION - PAIN TYPE: TYPE: ACUTE PAIN

## 2023-09-22 ASSESSMENT — FIBROSIS 4 INDEX: FIB4 SCORE: 18.24

## 2023-09-22 NOTE — PROGRESS NOTES
DATE: 9/22/2023    Hospital Day 2  choledocholithiasis    INTERVAL EVENTS:  ERCP completed with extraction of 2 stones  Bilirubin remains elevated at 13.9  Denies abdominal pain    REVIEW OF SYSTEMS:  Review of Systems   Constitutional:  Negative for chills and fever.   Gastrointestinal:  Negative for abdominal pain, nausea and vomiting.   Musculoskeletal:  Negative for myalgias.       PHYSICAL EXAMINATION:  Vital Signs: Blood Pressure 103/58   Pulse 90   Temperature 36.9 °C (98.5 °F) (Temporal)   Respiration 18   Height 1.829 m (6')   Weight 114 kg (252 lb 3.3 oz)   Oxygen Saturation 98%   Physical Exam  Vitals and nursing note reviewed. Chaperone present: significant other at beside.   Constitutional:       General: He is not in acute distress.     Appearance: He is not toxic-appearing.   HENT:      Head: Normocephalic.   Cardiovascular:      Rate and Rhythm: Normal rate.   Pulmonary:      Effort: Pulmonary effort is normal.   Abdominal:      General: There is no distension.      Palpations: Abdomen is soft.      Tenderness: There is no abdominal tenderness. There is no guarding.   Neurological:      Mental Status: He is alert and oriented to person, place, and time.         LABORATORY VALUES:   Recent Labs     09/20/23 1856 09/22/23 0146   WBC 5.6 2.3*   RBC 3.62* 3.19*   HEMOGLOBIN 12.6* 11.0*   HEMATOCRIT 38.1* 33.8*   .2* 106.0*   MCH 34.8* 34.5*   MCHC 33.1 32.5   RDW 58.7* 59.3*   PLATELETCT 77* 58*     Recent Labs     09/20/23 1856 09/22/23  0146   SODIUM 138 136   POTASSIUM 3.1* 3.9   CHLORIDE 101 103   CO2 23 19*   GLUCOSE 116* 161*   BUN 31* 47*   CREATININE 1.26 1.92*   CALCIUM 8.2* 8.0*     Recent Labs     09/20/23 1856 09/20/23  2256 09/21/23  0245 09/21/23  0256 09/22/23  0146   ASTSGOT 298*  --   --   --  226*   ALTSGPT 303*  --   --   --  230*   TBILIRUBIN 14.6*  --   --   --  13.9*   DBILIRUBIN  --   --  9.7*  --   --    ALKPHOSPHAT 228*  --   --   --  196*   GLOBULIN 3.0  --    --   --  2.8   INR  --   --  1.17*  --   --    AMMONIA  --  26  --  23  --      Recent Labs     09/21/23  0245   INR 1.17*        IMAGING:   DX-PORTABLE FLUORO > 1 HOUR   Final Result      1.  Endoscopic cholangiography showing patent common duct with no appreciable persisting filling defect, stricture, and satisfactory passive drainage following removal of the endoscope.      CT-ABDOMEN-PELVIS WITH   Final Result      1.  Cholelithiasis and choledocholithiasis with findings suspicious for cholecystitis   2.  Splenomegaly      DX-CHEST-PORTABLE (1 VIEW)   Final Result      No acute cardiac or pulmonary abnormalities are identified. Lung volumes are low.      EC-ECHOCARDIOGRAM COMPLETE W/O CONT    (Results Pending)       Core Measures & Quality Metrics    ASSESSMENT AND PLAN:   * Choledocholithiasis with acute cholecystitis- (present on admission)  Assessment & Plan  Choledocholithiasis  ERCP with extraction of 2 stones  9/22 Bilirubin remains elevated  Trend labs  Lap liyah pending    - Bilirubin remains elevated  - Interval lap liyah pending, anticipate Sunday  - Okay for diet    Discussed patient condition with Family, RN, Patient, and general surgery, Dr. Sepulveda and Dr. Shoemaker.

## 2023-09-22 NOTE — DIETARY
Nutrition services: Day 1 of admit.  Matti Olguin is a 73 y.o. male with admitting DX of Choledocholithiasis with acute cholecystitis    Consult received for MST of 2 on nutrition screen due to report of 14-23 lb wt loss x 3 months. No report of poor PO.      Assessment:  Height: 182.9 cm (6')  Weight: 114 kg (252 lb 3.3 oz)  Body mass index is 34.21 kg/m²., BMI classification: class 1 obesity  Diet/Intake: cardiac/ % of breakfast    Evaluation:   Pt was not in his room when RD tried to visit.   Wt hx reviewed in Epic. Pt with wt loss of 3% x 4 months. Wt loss is not significant.   No report of poor PO PTA per nursing's nutrition admit screen. PO at breakfast was adequate.     Malnutrition Risk: ASPEN criteria not met.     Recommendations/Plan:   Encourage intake of >50%  Document intake of all meals  as % taken in ADL's to provide interdisciplinary communication across all shifts.   Monitor weight.  Nutrition rep will continue to see patient for ongoing meal and snack preferences.       RD will follow per dept guidelines.

## 2023-09-22 NOTE — PROGRESS NOTES
Pt arrives back from an ERCP. Pt is alert and oriented x4, VSS on 2L nc. BP 94/49. NPO at midnight.

## 2023-09-22 NOTE — CARE PLAN
The patient is Stable - Low risk of patient condition declining or worsening    Shift Goals  Clinical Goals: patient will maintain stable hemodynamics through shift  Patient Goals: Comfort, rest  Family Goals: LALA    Progress made toward(s) clinical / shift goals:  denies any pain. Intermittently  on o2 - room air at base. Ambulates independently. Calls appropriately.     Patient is not progressing towards the following goals:

## 2023-09-22 NOTE — PROGRESS NOTES
Hospital Medicine Daily Progress Note    Date of Service  9/21/2023    Chief Complaint  Matti Olguin is a 73 y.o. male admitted 9/20/2023 with abdominal pain and jaundice    Hospital Course  Matti Olguin is a 73 y.o. 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 Quality is described as a dull ache. He also noticed that he was developing yellowed skin and eyes and stool has been light colored, foul smelling, and floating. Previously was on DAPT until 1/2022 when he developed a spontaneous SDH, and now is just on aspirin 81mg daily. Does drink a 6 pack of beer about 3-4 times per week.      In ED, he was initially febrile but no leukocytosis or tachycardia, BP borderline with SBPs in 90s, and given 1 liter of LR. Labs notable for , , , lipase 192, and bilirubin 14.6. CT concerning for choledocholithiasis and probable cholecystitis. General surgery consulted and evaluated at bedside. Patient admitted for GI consultation and medical stabilization prior to planning cholecystectomy.      Interval Problem Update  9/21/2023: Patient feeling overall well today, pain present but controlled. Consulted GI service. Anticipate ERCP this afternoon. Monitor afterwards for signs of bleeding, pancreatitis, perforation.     I have discussed this patient's plan of care and discharge plan at IDT rounds today with Case Management, Nursing, Nursing leadership, and other members of the IDT team.    Consultants/Specialty  general surgery and GI    Code Status  Full  Code    Disposition  The patient is not medically cleared for discharge to home or a post-acute facility.      I have placed the appropriate orders for post-discharge needs.    Review of Systems  Review of Systems   Constitutional:  Negative for chills, fever and malaise/fatigue.   HENT:  Negative for congestion and sore throat.    Respiratory:  Positive for shortness of breath. Negative for cough. Sputum production: With exertion.   Cardiovascular:  Negative for chest pain and leg swelling.   Gastrointestinal:  Positive for abdominal pain (mild, epigastric) and diarrhea (Loose, floating, juan jose colored stools). Negative for blood in stool, constipation, heartburn, melena, nausea and vomiting.   Genitourinary:  Negative for dysuria, flank pain and frequency.        Dark urine   Musculoskeletal:  Negative for falls and myalgias.   Neurological:  Negative for dizziness, weakness and headaches.   Psychiatric/Behavioral:  Positive for substance abuse (alcohol, chewing tobacco, THC).    All other systems reviewed and are negative.       Physical Exam  Temp:  [36 °C (96.8 °F)-37.6 °C (99.7 °F)] 36 °C (96.8 °F)  Pulse:  [57-84] 63  Resp:  [15-22] 20  BP: ()/(48-60) 104/60  SpO2:  [90 %-96 %] 93 %    Physical Exam  Vitals and nursing note reviewed.   Constitutional:       General: He is not in acute distress.     Appearance: Normal appearance. He is ill-appearing.   HENT:      Head: Normocephalic and atraumatic.      Nose: Nose normal.      Mouth/Throat:      Mouth: Mucous membranes are moist.   Eyes:      General: Scleral icterus present.   Cardiovascular:      Rate and Rhythm: Normal rate and regular rhythm.      Pulses: Normal pulses.      Heart sounds: Normal heart sounds.   Pulmonary:      Effort: Pulmonary effort is normal. No respiratory distress.      Breath sounds: Normal breath sounds.   Abdominal:      General: There is distension.      Tenderness: There is abdominal tenderness.   Musculoskeletal:       Cervical back: Normal range of motion.      Right lower leg: No edema.      Left lower leg: No edema.   Skin:     Capillary Refill: Capillary refill takes less than 2 seconds.      Coloration: Skin is jaundiced.   Neurological:      General: No focal deficit present.      Mental Status: He is alert and oriented to person, place, and time.         Fluids    Intake/Output Summary (Last 24 hours) at 9/21/2023 2236  Last data filed at 9/21/2023 2000  Gross per 24 hour   Intake 680 ml   Output 2 ml   Net 678 ml       Laboratory  Recent Labs     09/20/23  1856   WBC 5.6   RBC 3.62*   HEMOGLOBIN 12.6*   HEMATOCRIT 38.1*   .2*   MCH 34.8*   MCHC 33.1   RDW 58.7*   PLATELETCT 77*     Recent Labs     09/20/23  1856   SODIUM 138   POTASSIUM 3.1*   CHLORIDE 101   CO2 23   GLUCOSE 116*   BUN 31*   CREATININE 1.26   CALCIUM 8.2*     Recent Labs     09/21/23  0245   INR 1.17*               Imaging  DX-PORTABLE FLUORO > 1 HOUR   Final Result      1.  Endoscopic cholangiography showing patent common duct with no appreciable persisting filling defect, stricture, and satisfactory passive drainage following removal of the endoscope.      CT-ABDOMEN-PELVIS WITH   Final Result      1.  Cholelithiasis and choledocholithiasis with findings suspicious for cholecystitis   2.  Splenomegaly      DX-CHEST-PORTABLE (1 VIEW)   Final Result      No acute cardiac or pulmonary abnormalities are identified. Lung volumes are low.           Assessment/Plan  * Choledocholithiasis with acute cholecystitis- (present on admission)  Assessment & Plan  Patient has elevated LFTs and bilirubin  IV Zosyn  Pain control with oral and IV narcotics  N.p.o.  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 Last coronary angiogram free of disease, however this was in 2013. Further stratification as indicated after ERCP.  Follows Dr. Thibodeaux for cardiology outpatient.    Acute liver failure  Assessment & Plan  In setting of choledocholithiasis and alcohol use  ERCP pending  Encourage alcohol cessation      Cardiac pacemaker - [employer]- (present on admission)  Assessment & Plan  Monitor on telemetry    Primary hypertension- (present on admission)  Assessment & Plan  Continue current home medications  IV as needed medications have been ordered         VTE prophylaxis:   SCDs/TEDs    I have performed a physical exam and reviewed and updated ROS and Plan today (9/21/2023). In review of yesterday's note (9/20/2023), there are no changes except as documented above.

## 2023-09-22 NOTE — PROGRESS NOTES
"Gastroenterology Progress Note               Author:  BISHNU Pace Date & Time Created: 9/22/2023 10:18 AM       Patient ID:  Name:             Matti Olguin  YOB: 1950  Age:                 73 y.o.  male  MRN:               [id number]        Medical Decision Making, by Problem:  Active Hospital Problems    Diagnosis     Choledocholithiasis with acute cholecystitis [K80.42]     Acute liver failure [K72.00]     Cardiac pacemaker - [employer] [Z95.0]     Primary hypertension [I10]            Presenting Chief Complaint:  Choledocholithiasis, obstructive jaundice     History of Present Illness:   This is a pleasant 73-year-old male with a past medical history including complete heart block s/p pacer, aortic stenosis s/p mechanical valve replacement at 38 years of age and redo with bioprosthetic valve replacement in 2012 (on daily asa only), spontaneous subdural hemorrhage on DAPT, CVA who presented to [facility] 9/20/2023 with epigastric pain.  Patient reports he has had right upper quadrant/epigastric pain for several weeks/months.  He states that over the last 2 weeks, pain has been constant and worse over the last 4 days prior to arrival.  He describes pain as \"feeling like a bruise\" and extends underneath his ribs.  Additionally, on 9/18/2023, he noticed that his skin and eyes were turning yellow and it had worsened in the following days.  He reports abdominal distention/bloating as well as light-colored, floating, diarrhea and dark urine.  He denies nausea, vomiting, fevers, dysuria, chest pain, headache.  He states that he was taking dual antiplatelet therapy for his prosthetic valve replacement until January 2022 during which time he had a spontaneous subdural hematoma.  For the past year he has been taking aspirin 81 mg p.o. daily.  In the emergency department, no leukocytosis, macrocytic anemia with a hemoglobin 12.6, hematocrit 38.1, .2.  Platelets " 77.  Hypokalemia 3.1.  BUN 31 otherwise renal functions normal.  , , alk phos 228, total bilirubin 14.6, albumin 3.9.  Lipase 192.  Ammonia 26, lactic acid 1.1. INR 1.17. Troponin mildly elevated at 40.  EKG with atrial paced rhythm with a ventricular rate of 69 bpm.  Chest x-ray without acute abnormalities.  CT abdomen showed multiple gallstones within the gallbladder and gallbladder neck.  Pericholecystic edema.  Additionally, there are stones in the distal common bile duct.  Called and clarified with radiology regarding sizes of stones in CBD.  There are multiple stones measuring approximately 1 cm or less with a large stone in the gallbladder neck.  Additionally distal common bile duct stone measuring 6 mm.  Common bile duct not dilated, measuring 6 mm as well.  Splenomegaly also seen.  Patient seen by surgical team who recommended evaluation for ERCP prior to cholecystectomy.         Interval History:  9/22/2023: Patient postprocedure day 1 s/p ERCP.  Patient has 2 stones removed.  Post procedurally, patient reports feeling well without nausea, vomiting, abdominal pain.  He states that his previous abdominal discomfort is relieved.  LFTs downtrending, total bilirubin 14.6-13.9.        Hospital Medications:  Current Facility-Administered Medications   Medication Dose Frequency Provider Last Rate Last Admin    lactated ringers infusion   Continuous Rahul Rowland M.D. 100 mL/hr at 09/22/23 0505 New Bag at 09/22/23 0505    Pharmacy Consult Request ...Pain Management Review 1 Each  1 Each PHARMACY TO DOSE Rahul Rowland M.D.        oxyCODONE immediate-release (Roxicodone) tablet 5 mg  5 mg Q3HRS PRN Rahul Rowland M.D.   5 mg at 09/21/23 0802    Or    oxyCODONE immediate release (Roxicodone) tablet 10 mg  10 mg Q3HRS PRN Rahul Rowland M.D.        Or    morphine 4 MG/ML injection 4 mg  4 mg Q3HRS PRN Rahul Rowland M.D.        ondansetron (Zofran) syringe/vial injection 4 mg  4 mg Q4HRS PRN Rahul Rowland  M.D.        ondansetron (Zofran ODT) dispertab 4 mg  4 mg Q4HRS PRN Rahul Rowland M.D.        atorvastatin (Lipitor) tablet 80 mg  80 mg QHS Rahul Rowland M.D.   80 mg at 09/21/23 2109    carvedilol (Coreg) tablet 50 mg  50 mg BID WITH MEALS Rahul Rowland M.D.        pregabalin (Lyrica) capsule 200 mg  200 mg BID Rahul Rowland M.D.   200 mg at 09/22/23 0509    zolpidem (Ambien) tablet 5 mg  5 mg HS PRN Rahul Rowland M.D.   5 mg at 09/21/23 2109    lisinopril (Prinivil) tablet 20 mg  20 mg BID Rahul Rowland M.D.   20 mg at 09/22/23 0509    piperacillin-tazobactam (Zosyn) 3.375 g in  mL IVPB  3.375 g Q8HRS Rahul Rowland M.D. 25 mL/hr at 09/22/23 0506 3.375 g at 09/22/23 0506   Last reviewed on 9/21/2023  7:03 AM by Holley Leonard T       Review of Systems:  Review of Systems   Constitutional:  Negative for chills, fever and malaise/fatigue.   HENT:  Negative for congestion and sore throat.    Respiratory:  Negative for cough and shortness of breath.    Cardiovascular:  Negative for chest pain and leg swelling.   Gastrointestinal:  Negative for abdominal pain, blood in stool, constipation, diarrhea, heartburn, melena, nausea and vomiting.   Genitourinary:  Negative for dysuria and flank pain.   Musculoskeletal:  Negative for falls and myalgias.   Neurological:  Negative for weakness and headaches.   Psychiatric/Behavioral:  The patient is not nervous/anxious and does not have insomnia.    All other systems reviewed and are negative.        Vital signs:  Weight/BMI: Body mass index is 34.21 kg/m².  /58   Pulse 65   Temp 36.9 °C (98.5 °F) (Temporal)   Resp 18   Ht 1.829 m (6')   Wt 114 kg (252 lb 3.3 oz)   SpO2 95%   Vitals:    09/22/23 0330 09/22/23 0400 09/22/23 0509 09/22/23 0712   BP: 117/63  116/62 105/58   Pulse: 60  (!) 59 65   Resp: 20   18   Temp: 36.7 °C (98.1 °F)   36.9 °C (98.5 °F)   TempSrc: Temporal   Temporal   SpO2: 92%   95%   Weight:  114 kg (252 lb 3.3 oz)     Height:          Oxygen Therapy:  Pulse Oximetry: 95 %, O2 (LPM): 2, O2 Delivery Device: Silicone Nasal Cannula    Intake/Output Summary (Last 24 hours) at 9/22/2023 1018  Last data filed at 9/21/2023 2000  Gross per 24 hour   Intake 500 ml   Output 2 ml   Net 498 ml         Physical Exam:  Physical Exam  Vitals and nursing note reviewed.   Constitutional:       General: He is awake. He is not in acute distress.     Appearance: He is obese.   HENT:      Head: Normocephalic.      Nose: Nose normal. No congestion.      Mouth/Throat:      Mouth: Mucous membranes are moist.      Pharynx: Oropharynx is clear. No oropharyngeal exudate.   Eyes:      General: Scleral icterus present.      Extraocular Movements: Extraocular movements intact.      Conjunctiva/sclera: Conjunctivae normal.   Cardiovascular:      Rate and Rhythm: Normal rate and regular rhythm.      Pulses: Normal pulses.      Heart sounds: Murmur heard.   Pulmonary:      Effort: Pulmonary effort is normal. No respiratory distress.      Breath sounds: Normal breath sounds. No wheezing.   Abdominal:      General: Abdomen is flat. Bowel sounds are normal. There is no distension.      Palpations: Abdomen is soft.      Tenderness: There is no abdominal tenderness. There is no guarding.   Musculoskeletal:      Right lower leg: No edema.      Left lower leg: No edema.   Skin:     General: Skin is warm and dry.      Capillary Refill: Capillary refill takes less than 2 seconds.      Coloration: Skin is jaundiced.   Neurological:      Mental Status: He is alert and oriented to person, place, and time. Mental status is at baseline.      Cranial Nerves: No cranial nerve deficit.      Motor: No weakness.   Psychiatric:         Mood and Affect: Mood normal.         Behavior: Behavior normal. Behavior is cooperative.         Thought Content: Thought content normal.         Judgment: Judgment normal.             Labs:  Recent Labs     09/20/23  1856 09/22/23  0146   SODIUM 138 136    POTASSIUM 3.1* 3.9   CHLORIDE 101 103   CO2 23 19*   BUN 31* 47*   CREATININE 1.26 1.92*   CALCIUM 8.2* 8.0*     Recent Labs     09/20/23 1856 09/21/23 0245 09/22/23  0146   ALTSGPT 303*  --  230*   ASTSGOT 298*  --  226*   ALKPHOSPHAT 228*  --  196*   TBILIRUBIN 14.6*  --  13.9*   DBILIRUBIN  --  9.7*  --    LIPASE 192*  --   --    GLUCOSE 116*  --  161*     Recent Labs     09/20/23 1856 09/22/23  0146   WBC 5.6 2.3*   NEUTSPOLYS 78.80* 83.30*   LYMPHOCYTES 4.80* 7.30*   MONOCYTES 14.60* 8.60   EOSINOPHILS 0.90 0.00   BASOPHILS 0.40 0.40   ASTSGOT 298* 226*   ALTSGPT 303* 230*   ALKPHOSPHAT 228* 196*   TBILIRUBIN 14.6* 13.9*     Recent Labs     09/20/23 1856 09/21/23 0245 09/22/23  0146   RBC 3.62*  --  3.19*   HEMOGLOBIN 12.6*  --  11.0*   HEMATOCRIT 38.1*  --  33.8*   PLATELETCT 77*  --  58*   PROTHROMBTM  --  15.0*  --    INR  --  1.17*  --      Recent Results (from the past 24 hour(s))   CBC with Differential    Collection Time: 09/22/23  1:46 AM   Result Value Ref Range    WBC 2.3 (L) 4.8 - 10.8 K/uL    RBC 3.19 (L) 4.70 - 6.10 M/uL    Hemoglobin 11.0 (L) 14.0 - 18.0 g/dL    Hematocrit 33.8 (L) 42.0 - 52.0 %    .0 (H) 81.4 - 97.8 fL    MCH 34.5 (H) 27.0 - 33.0 pg    MCHC 32.5 32.3 - 36.5 g/dL    RDW 59.3 (H) 35.9 - 50.0 fL    Platelet Count 58 (L) 164 - 446 K/uL    Neutrophils-Polys 83.30 (H) 44.00 - 72.00 %    Lymphocytes 7.30 (L) 22.00 - 41.00 %    Monocytes 8.60 0.00 - 13.40 %    Eosinophils 0.00 0.00 - 6.90 %    Basophils 0.40 0.00 - 1.80 %    Immature Granulocytes 0.40 0.00 - 0.90 %    Nucleated RBC 0.00 0.00 - 0.20 /100 WBC    Neutrophils (Absolute) 1.93 1.82 - 7.42 K/uL    Lymphs (Absolute) 0.17 (L) 1.00 - 4.80 K/uL    Monos (Absolute) 0.20 0.00 - 0.85 K/uL    Eos (Absolute) 0.00 0.00 - 0.51 K/uL    Baso (Absolute) 0.01 0.00 - 0.12 K/uL    Immature Granulocytes (abs) 0.01 0.00 - 0.11 K/uL    NRBC (Absolute) 0.00 K/uL   Comp Metabolic Panel (CMP)    Collection Time: 09/22/23  1:46 AM    Result Value Ref Range    Sodium 136 135 - 145 mmol/L    Potassium 3.9 3.6 - 5.5 mmol/L    Chloride 103 96 - 112 mmol/L    Co2 19 (L) 20 - 33 mmol/L    Anion Gap 14.0 7.0 - 16.0    Glucose 161 (H) 65 - 99 mg/dL    Bun 47 (H) 8 - 22 mg/dL    Creatinine 1.92 (H) 0.50 - 1.40 mg/dL    Calcium 8.0 (L) 8.5 - 10.5 mg/dL    Correct Calcium 8.6 8.5 - 10.5 mg/dL    AST(SGOT) 226 (H) 12 - 45 U/L    ALT(SGPT) 230 (H) 2 - 50 U/L    Alkaline Phosphatase 196 (H) 30 - 99 U/L    Total Bilirubin 13.9 (H) 0.1 - 1.5 mg/dL    Albumin 3.3 3.2 - 4.9 g/dL    Total Protein 6.1 6.0 - 8.2 g/dL    Globulin 2.8 1.9 - 3.5 g/dL    A-G Ratio 1.2 g/dL   COD - Adult (Type and Screen)    Collection Time: 09/22/23  1:46 AM   Result Value Ref Range    ABO Grouping Only AB     Rh Grouping Only POS     Antibody Screen-Cod NEG    PLATELET MAPPING WITH BASIC TEG    Collection Time: 09/22/23  1:46 AM   Result Value Ref Range    Reaction Time Initial-R 6.1 4.6 - 9.1 min    React Time Initial Hep 5.7 4.3 - 8.3 min    Clot Kinetics-K 0.9 0.8 - 2.1 min    Clot Angle-Angle 76.3 63.0 - 78.0 degrees    Maximum Clot Strength-MA 63.5 52.0 - 69.0 mm    TEG Functional Fibrinogen(MA) 36.7 (H) 15.0 - 32.0 mm    Lysis 30 minutes-LY30 1.0 0.0 - 2.6 %    % Inhibition ADP 5.2 0.0 - 17.0 %    % Inhibition AA 4.9 0.0 - 11.0 %    TEG Algorithm Link Algorithm    IMMATURE PLT FRACTION    Collection Time: 09/22/23  1:46 AM   Result Value Ref Range    Imm. Plt Fraction 18.9 (H) 0.6 - 13.1 %   ESTIMATED GFR    Collection Time: 09/22/23  1:46 AM   Result Value Ref Range    GFR (CKD-EPI) 37 (A) >60 mL/min/1.73 m 2       Radiology Review:  DX-PORTABLE FLUORO > 1 HOUR   Final Result      1.  Endoscopic cholangiography showing patent common duct with no appreciable persisting filling defect, stricture, and satisfactory passive drainage following removal of the endoscope.      CT-ABDOMEN-PELVIS WITH   Final Result      1.  Cholelithiasis and choledocholithiasis with findings suspicious  for cholecystitis   2.  Splenomegaly      DX-CHEST-PORTABLE (1 VIEW)   Final Result      No acute cardiac or pulmonary abnormalities are identified. Lung volumes are low.            MDM (Data Review):   -Records reviewed and summarized in current documentation  -I personally reviewed and interpreted the laboratory results  -I personally reviewed the radiology images    Assessment/Recommendations:  Impressions:  Obstructive jaundice  Choledocholithiasis  Acute cholecystitis  Hypertension  History of complete heart block s/p pacemaker insertion  History of aortic stenosis s/p mechanical valve replacement and redo with bioprosthetic valve on daily aspirin  History of spontaneous subdural hemorrhage   History of CVA        MDM:  This is a pleasant 73-year-old male with a past medical history as listed above who presented to [facility] 9/20/2023 with epigastric pain.  Afebrile, no leukocytosis.  , , alk phos 228, total bilirubin 14.6, albumin 3.9.  Lipase 192.  Ammonia 26, lactic acid 1.1. INR 1.17. CT abdomen showed multiple gallstones within the gallbladder and gallbladder neck.  Pericholecystic edema.  Additionally, there are stones in the distal common bile duct.  Called and clarified with radiology regarding sizes of stones in CBD.  There are multiple stones measuring approximately 1 cm or less with a large stone in the gallbladder neck.  Additionally distal common bile duct stone measuring 6 mm.  Common bile duct not dilated, measuring 6 mm as well.  Splenomegaly also seen.    Patient underwent ERCP with sphincterotomy and stone extraction of stone x2.  Post procedurally, patient awake, alert, oriented x4.  Sitting on edge of bed without distress.  He states previous abdominal discomfort is relieved and denies nausea, vomiting.  Patient's wife states that patient looks better and states she thinks patient is jaundice is slightly improved today.  Patient is n.p.o. pending surgical  evaluation for possible cholecystectomy.  Patient did note that he did not have any pain with previous postprocedure oral intake last night.  Hemoglobin stable.  No leukocytosis.  Low suspicion for post ERCP infection, bleeding, pancreatitis.  Patient did not have a stent placed during procedure and therefore does not require any further outpatient GI follow-up.    Of note, patient's R factor showing mix of cholestatic and hepatocellular disease.  Patient is obese I suspect that he likely has a component of fatty liver disease in addition to his excessive alcohol intake which would explain his thrombocytopenia and MCV of 105.  It is possible that he is in early stages of developing cirrhosis/portal hypertension given splenomegaly seen on CT scan.  Would recommend cessation of alcohol use to prevent further deterioration/injury of his liver.     Recommendations:  Trend LFTs  Maintain n.p.o. status, pending surgical evaluation for cholecystectomy.  Encourage alcohol cessation    No further interventions from acute GI team.  GI to sign off.  Please reconsult for any further questions or concerns.        Discussed with patient, wife at bedside, Dr. Shoemaker, Dr. Anne and Dr. Hebert        Core Quality Measures   Reviewed items::  Labs, Medications and Radiology reports reviewed

## 2023-09-22 NOTE — HOSPITAL COURSE
Matti Olguin is a 73 y.o. male who presented 9/20/2023 with complete heart block s/p pacer, aortic stenosis s/p mechanical valve replacement at 38 years of age and redo with bioprosthetic valve replacement in 2012 (on daily asa only), spontaneous subdural hemorrhage on DAPT, CVA who presented to [facility] 9/20/2023 with epigastric pain      Has had RUQ and epigastric pain intermittently for several weeks to months, which became constant and worsened over last 4 days. Quality is described as a dull ache. He also noticed that he was developing yellowed skin and eyes and stool has been light colored, foul smelling, and floating. Previously was on DAPT until 1/2022 when he developed a spontaneous SDH, and now is just on aspirin 81mg daily. Does drink a 6 pack of beer about 3-4 times per week.      In ED, he was initially febrile but no leukocytosis or tachycardia, BP borderline with SBPs in 90s, and given 1 liter of LR. Labs notable for , , , lipase 192, and bilirubin 14.6. CT concerning for choledocholithiasis and probable cholecystitis. General surgery consulted and evaluated at bedside. Patient admitted for GI consultation and medical stabilization prior to planning cholecystectomy.

## 2023-09-22 NOTE — CARE PLAN
The patient is Stable - Low risk of patient condition declining or worsening    Shift Goals  Clinical Goals: IV abx, NPO at midnight  Patient Goals: rest  Family Goals: LALA    Progress made toward(s) clinical / shift goals:    Problem: Pain - Standard  Goal: Alleviation of pain or a reduction in pain to the patient’s comfort goal  Outcome: Progressing     Problem: Knowledge Deficit - Standard  Goal: Patient and family/care givers will demonstrate understanding of plan of care, disease process/condition, diagnostic tests and medications  Outcome: Progressing

## 2023-09-22 NOTE — PROGRESS NOTES
Assumed care of patient at 1900. Report received from day RN. Pt resting at EOB with wife at bedside. Denies abd pain. Tele monitor and Fall precautions in place. All needs met at this time

## 2023-09-23 ENCOUNTER — APPOINTMENT (OUTPATIENT)
Dept: CARDIOLOGY | Facility: MEDICAL CENTER | Age: 73
DRG: 417 | End: 2023-09-23
Attending: STUDENT IN AN ORGANIZED HEALTH CARE EDUCATION/TRAINING PROGRAM
Payer: MEDICARE

## 2023-09-23 PROBLEM — I35.0 SEVERE AORTIC STENOSIS: Status: ACTIVE | Noted: 2023-09-23

## 2023-09-23 PROBLEM — N18.32 STAGE 3B CHRONIC KIDNEY DISEASE: Status: ACTIVE | Noted: 2023-09-23

## 2023-09-23 PROBLEM — D69.6 THROMBOCYTOPENIA (HCC): Status: ACTIVE | Noted: 2023-09-23

## 2023-09-23 LAB
ALBUMIN SERPL BCP-MCNC: 3.2 G/DL (ref 3.2–4.9)
ALBUMIN/GLOB SERPL: 1.1 G/DL
ALP SERPL-CCNC: 187 U/L (ref 30–99)
ALT SERPL-CCNC: 199 U/L (ref 2–50)
ANION GAP SERPL CALC-SCNC: 11 MMOL/L (ref 7–16)
AST SERPL-CCNC: 191 U/L (ref 12–45)
BASOPHILS # BLD AUTO: 0.4 % (ref 0–1.8)
BASOPHILS # BLD: 0.02 K/UL (ref 0–0.12)
BILIRUB SERPL-MCNC: 10.3 MG/DL (ref 0.1–1.5)
BUN SERPL-MCNC: 60 MG/DL (ref 8–22)
CALCIUM ALBUM COR SERPL-MCNC: 8.6 MG/DL (ref 8.5–10.5)
CALCIUM SERPL-MCNC: 8 MG/DL (ref 8.5–10.5)
CHLORIDE SERPL-SCNC: 104 MMOL/L (ref 96–112)
CO2 SERPL-SCNC: 23 MMOL/L (ref 20–33)
CREAT SERPL-MCNC: 1.88 MG/DL (ref 0.5–1.4)
EOSINOPHIL # BLD AUTO: 0.02 K/UL (ref 0–0.51)
EOSINOPHIL NFR BLD: 0.4 % (ref 0–6.9)
ERYTHROCYTE [DISTWIDTH] IN BLOOD BY AUTOMATED COUNT: 59.3 FL (ref 35.9–50)
GFR SERPLBLD CREATININE-BSD FMLA CKD-EPI: 38 ML/MIN/1.73 M 2
GLOBULIN SER CALC-MCNC: 3 G/DL (ref 1.9–3.5)
GLUCOSE SERPL-MCNC: 118 MG/DL (ref 65–99)
HCT VFR BLD AUTO: 34.3 % (ref 42–52)
HGB BLD-MCNC: 11.1 G/DL (ref 14–18)
IMM GRANULOCYTES # BLD AUTO: 0.02 K/UL (ref 0–0.11)
IMM GRANULOCYTES NFR BLD AUTO: 0.4 % (ref 0–0.9)
LV EJECT FRACT  99904: 55
LV EJECT FRACT MOD 2C 99903: 48.93
LV EJECT FRACT MOD 4C 99902: 57.13
LV EJECT FRACT MOD BP 99901: 50.36
LYMPHOCYTES # BLD AUTO: 0.76 K/UL (ref 1–4.8)
LYMPHOCYTES NFR BLD: 13.9 % (ref 22–41)
MCH RBC QN AUTO: 34.3 PG (ref 27–33)
MCHC RBC AUTO-ENTMCNC: 32.4 G/DL (ref 32.3–36.5)
MCV RBC AUTO: 105.9 FL (ref 81.4–97.8)
MONOCYTES # BLD AUTO: 0.87 K/UL (ref 0–0.85)
MONOCYTES NFR BLD AUTO: 15.9 % (ref 0–13.4)
NEUTROPHILS # BLD AUTO: 3.77 K/UL (ref 1.82–7.42)
NEUTROPHILS NFR BLD: 69 % (ref 44–72)
NRBC # BLD AUTO: 0 K/UL
NRBC BLD-RTO: 0 /100 WBC (ref 0–0.2)
PLATELET # BLD AUTO: 66 K/UL (ref 164–446)
PLATELETS.RETICULATED NFR BLD AUTO: 23.4 % (ref 0.6–13.1)
POTASSIUM SERPL-SCNC: 3.6 MMOL/L (ref 3.6–5.5)
PROT SERPL-MCNC: 6.2 G/DL (ref 6–8.2)
RBC # BLD AUTO: 3.24 M/UL (ref 4.7–6.1)
SODIUM SERPL-SCNC: 138 MMOL/L (ref 135–145)
WBC # BLD AUTO: 5.5 K/UL (ref 4.8–10.8)

## 2023-09-23 PROCEDURE — 700105 HCHG RX REV CODE 258: Performed by: HOSPITALIST

## 2023-09-23 PROCEDURE — 99233 SBSQ HOSP IP/OBS HIGH 50: CPT | Performed by: HOSPITALIST

## 2023-09-23 PROCEDURE — 85025 COMPLETE CBC W/AUTO DIFF WBC: CPT

## 2023-09-23 PROCEDURE — 99223 1ST HOSP IP/OBS HIGH 75: CPT | Performed by: INTERNAL MEDICINE

## 2023-09-23 PROCEDURE — 700111 HCHG RX REV CODE 636 W/ 250 OVERRIDE (IP): Mod: JZ | Performed by: HOSPITALIST

## 2023-09-23 PROCEDURE — 36415 COLL VENOUS BLD VENIPUNCTURE: CPT

## 2023-09-23 PROCEDURE — 93306 TTE W/DOPPLER COMPLETE: CPT | Mod: 26 | Performed by: INTERNAL MEDICINE

## 2023-09-23 PROCEDURE — 93306 TTE W/DOPPLER COMPLETE: CPT

## 2023-09-23 PROCEDURE — 770020 HCHG ROOM/CARE - TELE (206)

## 2023-09-23 PROCEDURE — 85055 RETICULATED PLATELET ASSAY: CPT

## 2023-09-23 PROCEDURE — A9270 NON-COVERED ITEM OR SERVICE: HCPCS | Performed by: HOSPITALIST

## 2023-09-23 PROCEDURE — 700117 HCHG RX CONTRAST REV CODE 255: Performed by: STUDENT IN AN ORGANIZED HEALTH CARE EDUCATION/TRAINING PROGRAM

## 2023-09-23 PROCEDURE — 80053 COMPREHEN METABOLIC PANEL: CPT

## 2023-09-23 PROCEDURE — 700102 HCHG RX REV CODE 250 W/ 637 OVERRIDE(OP): Performed by: HOSPITALIST

## 2023-09-23 PROCEDURE — 99232 SBSQ HOSP IP/OBS MODERATE 35: CPT | Mod: 57 | Performed by: SURGERY

## 2023-09-23 RX ORDER — AMOXICILLIN 250 MG
1 CAPSULE ORAL DAILY
Status: DISCONTINUED | OUTPATIENT
Start: 2023-09-24 | End: 2023-09-27 | Stop reason: HOSPADM

## 2023-09-23 RX ORDER — POLYETHYLENE GLYCOL 3350 17 G/17G
1 POWDER, FOR SOLUTION ORAL DAILY
Status: DISCONTINUED | OUTPATIENT
Start: 2023-09-24 | End: 2023-09-27 | Stop reason: HOSPADM

## 2023-09-23 RX ADMIN — ATORVASTATIN CALCIUM 80 MG: 80 TABLET, FILM COATED ORAL at 20:12

## 2023-09-23 RX ADMIN — CARVEDILOL 50 MG: 25 TABLET, FILM COATED ORAL at 08:30

## 2023-09-23 RX ADMIN — ZOLPIDEM TARTRATE 5 MG: 5 TABLET ORAL at 20:12

## 2023-09-23 RX ADMIN — PIPERACILLIN AND TAZOBACTAM 3.38 G: 3; .375 INJECTION, POWDER, FOR SOLUTION INTRAVENOUS at 20:15

## 2023-09-23 RX ADMIN — PREGABALIN 200 MG: 100 CAPSULE ORAL at 04:49

## 2023-09-23 RX ADMIN — HUMAN ALBUMIN MICROSPHERES AND PERFLUTREN 3 ML: 10; .22 INJECTION, SOLUTION INTRAVENOUS at 10:45

## 2023-09-23 RX ADMIN — LISINOPRIL 20 MG: 20 TABLET ORAL at 17:49

## 2023-09-23 RX ADMIN — PIPERACILLIN AND TAZOBACTAM 3.38 G: 3; .375 INJECTION, POWDER, FOR SOLUTION INTRAVENOUS at 04:48

## 2023-09-23 RX ADMIN — PIPERACILLIN AND TAZOBACTAM 3.38 G: 3; .375 INJECTION, POWDER, FOR SOLUTION INTRAVENOUS at 13:28

## 2023-09-23 RX ADMIN — LISINOPRIL 20 MG: 20 TABLET ORAL at 04:49

## 2023-09-23 RX ADMIN — PREGABALIN 200 MG: 100 CAPSULE ORAL at 17:46

## 2023-09-23 RX ADMIN — CARVEDILOL 50 MG: 25 TABLET, FILM COATED ORAL at 17:46

## 2023-09-23 ASSESSMENT — ENCOUNTER SYMPTOMS
FEVER: 0
EYE DISCHARGE: 0
MYALGIAS: 0
CONSTIPATION: 0
SORE THROAT: 0
SENSORY CHANGE: 0
HEADACHES: 0
LOSS OF CONSCIOUSNESS: 0
CHILLS: 0
WHEEZING: 0
PALPITATIONS: 0
DIARRHEA: 0
FOCAL WEAKNESS: 0
NAUSEA: 0
SPUTUM PRODUCTION: 0
EYE PAIN: 0
HEMOPTYSIS: 0
CLAUDICATION: 0
WEAKNESS: 0
DIZZINESS: 0
SPEECH CHANGE: 0
ABDOMINAL PAIN: 0
NECK PAIN: 0
DEPRESSION: 0
DIAPHORESIS: 0
SHORTNESS OF BREATH: 0
COUGH: 0
VOMITING: 0
BRUISES/BLEEDS EASILY: 0
BACK PAIN: 0

## 2023-09-23 ASSESSMENT — LIFESTYLE VARIABLES: SUBSTANCE_ABUSE: 0

## 2023-09-23 ASSESSMENT — FIBROSIS 4 INDEX: FIB4 SCORE: 18.24

## 2023-09-23 ASSESSMENT — PAIN DESCRIPTION - PAIN TYPE: TYPE: ACUTE PAIN

## 2023-09-23 NOTE — PROGRESS NOTES
[facility] Medicine Daily Progress Note    Date of Service  9/22/2023    Chief Complaint  Matti Olguin is a 73 y.o. male admitted 9/20/2023 with abdominal pain, jaundice.    Hospital Course  Matti Olguin is a 73 y.o. 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 Quality is described as a dull ache. He also noticed that he was developing yellowed skin and eyes and stool has been light colored, foul smelling, and floating. Previously was on DAPT until 1/2022 when he developed a spontaneous SDH, and now is just on aspirin 81mg daily. Does drink a 6 pack of beer about 3-4 times per week.      In ED, he was initially febrile but no leukocytosis or tachycardia, BP borderline with SBPs in 90s, and given 1 liter of LR. Labs notable for , , , lipase 192, and bilirubin 14.6. CT concerning for choledocholithiasis and probable cholecystitis. General surgery consulted and evaluated at bedside. Patient admitted for GI consultation and medical stabilization prior to planning cholecystectomy.      Interval Problem Update  S/p ERCp with two stones removed, discussed with Dr. Anne and her CHUY Brittani Crooks. Discussed with surgery Dr. Sepulveda & JARED Villa, and they would like to watch patient for a couple days prior to surgery, tentatively planned for Sunday. Concerned about LFT's/T bili still being high, queried cardiac stability for surgery. Mildly elevated troponin on admission, will repeat and obtain an echo as well.   Patient feeling much improved, he was sitting up at edge  of bed today working on a word search, no pain, tolerating PO intake, pleasant mood very appreciative of care he's received. Vitals WNL and stable.   Labs show pancytopenia, worsening thrombocytopenia down to 58, with it so low and trending down will hold on DVT PPX today and check CBC overnight for trend. Patient looks to be clinically improving and quite well despite the lab abnormalities looking quite abnormal.    I have discussed this patient's plan of care and discharge plan at IDT rounds today with Case Management, Nursing, Nursing leadership, and other members of the IDT team.     Consultants/Specialty  general surgery and GI    Code Status  Full Code    Disposition  The patient is not medically cleared for discharge to home or a post-acute facility.  Anticipate discharge to: home with close outpatient follow-up    I have placed the appropriate orders for post-discharge needs.    Review of Systems  ROS   Review of Systems   Constitutional:  Negative for chills, diaphoresis, fever and malaise/fatigue.   HENT:  Negative for congestion, ear discharge, ear pain, hearing loss, nosebleeds, sinus pain, sore throat and tinnitus.    Eyes:  Negative for blurred vision, double vision, photophobia and pain.   Respiratory:  Negative for cough, hemoptysis, sputum production, shortness of breath, wheezing and stridor.    Cardiovascular:  Negative for chest pain, palpitations, orthopnea, claudication, leg swelling and PND.   Gastrointestinal:  Positive for abdominal pain. Negative for blood in stool, constipation, diarrhea, heartburn, melena, nausea and vomiting.   Genitourinary:  Negative for dysuria, flank pain, frequency, hematuria and urgency.   Musculoskeletal:  Negative for back pain, falls, joint pain, myalgias and neck pain.   Skin:  Negative for itching and rash.   Neurological:  Negative for dizziness, tingling, tremors, weakness and headaches.   Endo/Heme/Allergies:  Negative for environmental allergies and  polydipsia. Does not bruise/bleed easily.   Psychiatric/Behavioral:  Negative for depression, hallucinations, substance abuse and suicidal ideas.      Physical Exam  Temp:  [36.7 °C (98 °F)-37 °C (98.6 °F)] 37 °C (98.6 °F)  Pulse:  [58-90] 62  Resp:  [18-20] 18  BP: (102-118)/(54-63) 102/58  SpO2:  [91 %-98 %] 91 %    Physical Exam  Vitals and nursing note reviewed.   Constitutional:       General: He is not in acute distress.     Appearance: Normal appearance. He is not ill-appearing, toxic-appearing or diaphoretic.   HENT:      Head: Normocephalic and atraumatic.      Nose: No congestion or rhinorrhea.      Mouth/Throat:      Pharynx: No posterior oropharyngeal erythema.   Eyes:      General: No scleral ictus         Right eye: No discharge.   Cardiovascular:      Rate and Rhythm: Normal rate and regular rhythm.      Pulses: Normal pulses.      Heart sounds: Normal heart sounds. No murmur heard.     No friction rub. No gallop.   Pulmonary:      Effort: Pulmonary effort is normal. No respiratory distress.      Breath sounds: Normal breath sounds. No stridor. No wheezing, rhonchi or rales.   Abdominal:      General: There is distension.      Tenderness: There is no significant tenderness  Musculoskeletal:         General: No swelling, tenderness, deformity or signs of injury.      Cervical back: Normal range of motion.      Right lower leg: No edema.      Left lower leg: No edema.   Skin:     Capillary Refill: Capillary refill takes 2 to 3 seconds.      Coloration: Skin is not jaundiced or pale.      Findings: No bruising, erythema, lesion or rash.   Neurological:      General: No focal deficit present.      Mental Status: He is alert and oriented to person, place, and time.    Fluids    Intake/Output Summary (Last 24 hours) at 9/22/2023 2247  Last data filed at 9/22/2023 1200  Gross per 24 hour   Intake 500 ml   Output --   Net 500 ml       Laboratory  Recent Labs     09/20/23  1856 09/22/23  0146   WBC 5.6 2.3*    RBC 3.62* 3.19*   HEMOGLOBIN 12.6* 11.0*   HEMATOCRIT 38.1* 33.8*   .2* 106.0*   MCH 34.8* 34.5*   MCHC 33.1 32.5   RDW 58.7* 59.3*   PLATELETCT 77* 58*     Recent Labs     09/20/23  1856 09/22/23  0146   SODIUM 138 136   POTASSIUM 3.1* 3.9   CHLORIDE 101 103   CO2 23 19*   GLUCOSE 116* 161*   BUN 31* 47*   CREATININE 1.26 1.92*   CALCIUM 8.2* 8.0*     Recent Labs     09/21/23  0245   INR 1.17*               Imaging  DX-PORTABLE FLUORO > 1 HOUR   Final Result      1.  Endoscopic cholangiography showing patent common duct with no appreciable persisting filling defect, stricture, and satisfactory passive drainage following removal of the endoscope.      CT-ABDOMEN-PELVIS WITH   Final Result      1.  Cholelithiasis and choledocholithiasis with findings suspicious for cholecystitis   2.  Splenomegaly      DX-CHEST-PORTABLE (1 VIEW)   Final Result      No acute cardiac or pulmonary abnormalities are identified. Lung volumes are low.      EC-ECHOCARDIOGRAM COMPLETE W/O CONT    (Results Pending)        Assessment/Plan  * Choledocholithiasis with acute cholecystitis- (present on admission)  Assessment & Plan  Patient has elevated LFTs and bilirubin  IV Zosyn  Pain control with oral and IV narcotics  N.p.o.  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 Last coronary angiogram free of disease, however this was in 2013. Further stratification as indicated after ERCP. Follows Dr. Thibodeaux for cardiology outpatient.    Acute liver failure  Assessment & Plan  In setting of choledocholithiasis and alcohol use  ERCP pending  Encourage alcohol cessation      Cardiac pacemaker - [employer]- (present on admission)  Assessment & Plan  Monitor on telemetry    Primary hypertension- (present on admission)  Assessment & Plan  Continue current home medications  IV as needed medications have  been ordered           VTE prophylaxis:   SCDs/TEDs      I have performed a physical exam and reviewed and updated ROS and Plan today (9/22/2023). In review of yesterday's note (9/21/2023), there are no changes except as documented above.    Total time spent 51 minutes. I spent greater than 50% of the time for patient care, counseling, and coordination on this date, including unit/floor time, and face-to-face time with the patient as per interval events, my own review of patient's imaging and lab analysis and developing my assessment and plan above.

## 2023-09-23 NOTE — CARE PLAN
The patient is Stable - Low risk of patient condition declining or worsening    Shift Goals  Clinical Goals: patient will maintain stable hemodynamics through shift  Patient Goals: Comfort, rest  Family Goals: LALA    Progress made toward(s) clinical / shift goals:        Problem: Pain - Standard  Goal: Alleviation of pain or a reduction in pain to the patient’s comfort goal  Outcome: Progressing     Problem: Knowledge Deficit - Standard  Goal: Patient and family/care givers will demonstrate understanding of plan of care, disease process/condition, diagnostic tests and medications  Outcome: Progressing     Patient is not progressing towards the following goals:

## 2023-09-23 NOTE — PROGRESS NOTES
DATE: 9/23/2023    Hospital Day 3  choledocholithiasis .    INTERVAL EVENTS:  Feels better  Bilirubin decreased  Wishes to proceed with surgery.    REVIEW OF SYSTEMS:  Comprehensive review of systems is negative with the exception of the aforementioned HPI, PMH, and PSH bullets in accordance with CMS guidelines.    PHYSICAL EXAMINATION:  Vital Signs: /65   Pulse 61   Temp 36.6 °C (97.9 °F) (Temporal)   Resp 18   Ht 1.829 m (6')   Wt 116 kg (256 lb 6.3 oz)   SpO2 92%   Physical Exam  General: Laying in bed and in no distress  HEENT: Pupils equally round reactive to light, extraocular muscles intact, oropharynx without lesions  Neck: Supple with full range of motion  Chest: Bilateral breath sounds with symmetrical chest excursion  Cardiovascular: Regular rate and rhythm  Abdomen: Soft, nontender, nondistended  Extremities: No clubbing, cyanosis, or edema  Neurologic: Grossly intact, no focal deficits  Skin: Warm and dry  Psychiatric: Interacts appropriately    LABORATORY VALUES:  Recent Labs     09/20/23 1856 09/22/23 0146 09/23/23  0552   WBC 5.6 2.3* 5.5   RBC 3.62* 3.19* 3.24*   HEMOGLOBIN 12.6* 11.0* 11.1*   HEMATOCRIT 38.1* 33.8* 34.3*   .2* 106.0* 105.9*   MCH 34.8* 34.5* 34.3*   MCHC 33.1 32.5 32.4   RDW 58.7* 59.3* 59.3*   PLATELETCT 77* 58* 66*     Recent Labs     09/20/23 1856 09/22/23 0146 09/23/23  0552   SODIUM 138 136 138   POTASSIUM 3.1* 3.9 3.6   CHLORIDE 101 103 104   CO2 23 19* 23   GLUCOSE 116* 161* 118*   BUN 31* 47* 60*   CREATININE 1.26 1.92* 1.88*   CALCIUM 8.2* 8.0* 8.0*     Recent Labs     09/20/23 1856 09/20/23 2256 09/21/23 0245 09/21/23  0256 09/22/23 0146 09/23/23  0552   ASTSGOT 298*  --   --   --  226* 191*   ALTSGPT 303*  --   --   --  230* 199*   TBILIRUBIN 14.6*  --   --   --  13.9* 10.3*   DBILIRUBIN  --   --  9.7*  --   --   --    ALKPHOSPHAT 228*  --   --   --  196* 187*   GLOBULIN 3.0  --   --   --  2.8 3.0   INR  --   --  1.17*  --   --   --     AMMONIA  --  26  --  23  --   --      Recent Labs     09/21/23  0245   INR 1.17*       IMAGING:  DX-PORTABLE FLUORO > 1 HOUR   Final Result      1.  Endoscopic cholangiography showing patent common duct with no appreciable persisting filling defect, stricture, and satisfactory passive drainage following removal of the endoscope.      CT-ABDOMEN-PELVIS WITH   Final Result      1.  Cholelithiasis and choledocholithiasis with findings suspicious for cholecystitis   2.  Splenomegaly      DX-CHEST-PORTABLE (1 VIEW)   Final Result      No acute cardiac or pulmonary abnormalities are identified. Lung volumes are low.      EC-ECHOCARDIOGRAM COMPLETE W/ CONT    (Results Pending)       ASSESSMENT AND PLAN:    * Choledocholithiasis with acute cholecystitis- (present on admission)  Assessment & Plan  Choledocholithiasis  ERCP with extraction of 2 stones  9/22 Bilirubin remains elevated  Trend labs  Lap liyah pending    Stable metabolic function normalizing  Lap liyah in a.m.   I discussed this in detail with including the laparoscopic approach, potential converting to an open surgery, associated risk of bleeding, infection, abscess, and hematoma.  Also discussed the risk of postop bile leak, common bile duct stricture, common bile duct transection, and the significance of these complications.  He understands all the above and does wish to proceed.  We will make arrangements.           ____________________________________     Dipak Sepulveda M.D.    DD: 9/23/2023  10:45 AM

## 2023-09-23 NOTE — CONSULTS
Cardiology Consult Note:    Isaac Angel M.D.  Date & Time note created:    9/23/2023   5:02 PM     Referring MD:  Dr. Munguia    Patient ID:   Name:             Matti Olguin   YOB: 1950  Age:                 73 y.o.  male   MRN:               [id number]                                                             Chief Complaint / Reason for consult:  Valvular disease, pre-op evaluation.    History of Present Illness:    This is a 73 years old man with prior history of aortic valve replacements, presented to the hospital with choledocholithiasis and will require surgical intervention.  Patient did have a transthoracic echocardiogram which showed severe aortic stenosis of his bioprosthetic valve.  Cardiology is been consulted for further care.  Patient also has a history of complete heart block for which she underwent permanent pacemaker placement.    Patient denies having chest pain at this time.    I personally interpreted the EKG tracing which show paced rhythm.    I personally reviewed the images of his transthoracic echocardiogram which showed increased mean gradient across the bioprosthetic valve suggestive of severe stenosis.  Normal LV function.    Review of Systems:      Constitutional: Denies fevers, Denies weight changes  Eyes: Denies changes in vision, no eye pain  Ears/Nose/Throat/Mouth: Denies nasal congestion or sore throat   Cardiovascular: no chest pain, no palpitations   Respiratory: yes shortness of breath , Denies cough  Gastrointestinal/Hepatic: Denies abdominal pain, nausea, vomiting, diarrhea, constipation or GI bleeding   Genitourinary: Denies dysuria or frequency  Musculoskeletal/Rheum: Denies  joint pain and swelling   Skin: Denies rash  Neurological: Denies headache, confusion, memory loss or focal weakness/parasthesias  Psychiatric: denies mood disorder   Endocrine: Evi thyroid problems  Heme/Oncology/Lymph Nodes: Denies enlarged lymph nodes, denies  "brusing or known bleeding disorder  All other systems were reviewed and are negative (AMA/CMS criteria)                Past Medical History:   Past Medical History:   Diagnosis Date    Anticoagulation monitoring, special range     Complete heart block (HCC) 10/2014    Statust post pacemaker placement.    Dyspnea     High cholesterol     HTN (hypertension)     NSVT (nonsustained ventricular tachycardia) (HCC) - on pacer check     S/P aortic valve replacement with bioprosthetic valve 10/2014    Severe aortic stenosis 9/23/2023    Sleep apnea     Snoring     Stroke ([facility])     left sided \"tingling\"    Unspecified hemorrhagic conditions     Coumadin    Valvular heart disease 12/1988    AVR     Active Hospital Problems    Diagnosis     Cardiac pacemaker - Medtronic [Z95.0]      Priority: High    Primary hypertension [I10]      Priority: High    Severe aortic stenosis [I35.0]     Choledocholithiasis with acute cholecystitis [K80.42]     Acute liver failure [K72.00]        Past Surgical History:  Past Surgical History:   Procedure Laterality Date    AK ERCP,DIAGNOSTIC N/A 9/21/2023    Procedure: ERCP (ENDOSCOPIC RETROGRADE CHOLANGIOPANCREATOGRAPHY);  Surgeon: Pradeep Hebert M.D.;  Location: SURGERY SAME DAY [facility];  Service: Gastroenterology    AK ERCP,W/REMOVAL STONE,VASU/PANCR DUCTS N/A 9/21/2023    Procedure: ERCP, WITH CALCULUS REMOVAL FROM BILE OR PANCREATIC DUCT;  Surgeon: Pradeep Hebert M.D.;  Location: SURGERY SAME DAY [facility];  Service: Gastroenterology    SPHINCTEROTOMY N/A 9/21/2023    Procedure: SPHINCTEROTOMY;  Surgeon: Pradeep Hebert M.D.;  Location: SURGERY SAME DAY [facility];  Service: Gastroenterology    PACEMAKER INSERTION Left 10/25/2014    Medtronic Ivette COOL [id number] implanted at [facility].    RECOVERY  11/8/2013    Performed by Cath-Recovery Surgery at SURGERY SAME DAY [facility] ORS    RECOVERY  10/8/2013    Performed by Cath-Recovery Surgery at SURGERY SAME DAY [facility] ORS    RECOVERY  " 9/21/2012    Performed by Matti Clemens M.D. at SURGERY SAME DAY [facility]    AORTIC VALVE REPLACEMENT  1988    titanium    OTHER ORTHOPEDIC SURGERY      Knee scope, right shoulder       Hospital Medications:    Current Facility-Administered Medications:     Notify provider if pain remains uncontrolled, , , CONTINUOUS **AND** Use the Numeric Rating Scale (NRS), Echols-Baker Faces (WBF), or FLACC on regular floors and Critical-Care Pain Observation Tool (CPOT) on ICUs/Trauma to assess pain, , , CONTINUOUS **AND** Pulse Ox, , , CONTINUOUS **AND** Pharmacy Consult Request ...Pain Management Review 1 Each, 1 Each, Other, PHARMACY TO DOSE **AND** If patient difficult to arouse and/or has respiratory depression (respiratory rate of 10 or less), stop any opiates that are currently infusing and call a Rapid Response., , , CONTINUOUS, Rahul Rowland M.D.    oxyCODONE immediate-release (Roxicodone) tablet 5 mg, 5 mg, Oral, Q3HRS PRN, 5 mg at 09/21/23 0802 **OR** oxyCODONE immediate release (Roxicodone) tablet 10 mg, 10 mg, Oral, Q3HRS PRN **OR** morphine 4 MG/ML injection 4 mg, 4 mg, Intravenous, Q3HRS PRN, Rahul Rowland M.D.    ondansetron (Zofran) syringe/vial injection 4 mg, 4 mg, Intravenous, Q4HRS PRN, Rahul Rowland M.D.    ondansetron (Zofran ODT) dispertab 4 mg, 4 mg, Oral, Q4HRS PRN, Rahul Rowland M.D.    atorvastatin (Lipitor) tablet 80 mg, 80 mg, Oral, QHS, Rahul Rowland M.D., 80 mg at 09/22/23 2101    carvedilol (Coreg) tablet 50 mg, 50 mg, Oral, BID WITH MEALS, Rahul Rowland M.D., 50 mg at 09/23/23 0830    pregabalin (Lyrica) capsule 200 mg, 200 mg, Oral, BID, Rahul Rowland M.D., 200 mg at 09/23/23 0449    zolpidem (Ambien) tablet 5 mg, 5 mg, Oral, HS PRN, Rahul Rowland M.D., 5 mg at 09/22/23 2101    lisinopril (Prinivil) tablet 20 mg, 20 mg, Oral, BID, Rahul Rowland M.D., 20 mg at 09/23/23 0449    piperacillin-tazobactam (Zosyn) 3.375 g in  mL IVPB, 3.375 g, Intravenous, Q8HRS, Rahul Rowland M.D., Last  Rate: 25 mL/hr at 09/23/23 1328, 3.375 g at 09/23/23 1328    Current Outpatient Medications:  Medications Prior to Admission   Medication Sig Dispense Refill Last Dose    pregabalin (LYRICA) 200 MG capsule Take 200 mg by mouth 2 times a day.   9/20/2023 at AM    aspirin EC (ECOTRIN) 81 MG Tablet Delayed Response Take 1 Tablet by mouth every day. 90 Tablet 2 9/20/2023 at AM    therapeutic multivitamin-minerals (THERAGRAN-M) Tab Take 1 Tablet by mouth every day.   9/20/2023 at AM    LORazepam (ATIVAN) 1 MG Tab Take 1 mg by mouth every 8 hours as needed for Anxiety.   PRN at PRN    potassium chloride SA (KDUR) 20 MEQ Tab CR Take 1 Tab by mouth every day. 90 Tab 3 9/20/2023 at AM    carvedilol (COREG) 25 MG Tab Take 2 Tabs by mouth 2 times a day, with meals. 180 Tab 3 9/20/2023 at PM    lisinopril (PRINIVIL) 20 MG Tab Take 1 Tab by mouth 2 times a day. 180 Tab 3 9/20/2023 at PM    hydroCHLOROthiazide (HYDRODIURIL) 25 MG Tab Take 1 Tab by mouth every day. 90 Tab 3 9/20/2023 at AM    atorvastatin (LIPITOR) 80 MG tablet Take 1 Tab by mouth every bedtime. 30 Tab 2 9/20/2023 at PM    cloNIDine (CATAPRES) 0.1 MG Tab Take 1 Tab by mouth every 6 hours as needed. As needed for SBP >150 60 Tab 2 >14 DAYS at UNK    HYDROcodone-acetaminophen (NORCO) 5-325 MG Tab per tablet Take 1 Tab by mouth every four hours as needed.   >4 DAYS at PRN    folic acid (FOLVITE) 400 MCG tablet Take 400 mcg by mouth every day.   9/20/2023 at AM    zolpidem (AMBIEN) 5 MG TABS Take 5 mg by mouth at bedtime as needed for Sleep.   9/19/2023 at PM       Medication Allergy:  Allergies   Allergen Reactions    Yellow Jacket Venom Anaphylaxis    Plavix [Clopidogrel]      SDH       Family History:  Family History   Problem Relation Age of Onset    Heart Attack Father     Heart Disease Sister        Social History:  Social History     Socioeconomic History    Marital status:      Spouse name: Not on file    Number of children: Not on file    Years of  education: Not on file    Highest education level: Not on file   Occupational History    Not on file   Tobacco Use    Smoking status: Never    Smokeless tobacco: Current     Types: Chew   Vaping Use    Vaping Use: Some days    Substances: THC   Substance and Sexual Activity    Alcohol use: Yes     Comment: about 42 drinks per week, last drink 9/16/23    Drug use: No    Sexual activity: Not on file   Other Topics Concern    Not on file   Social History Narrative    Not on file     Social Determinants of Health     Financial Resource Strain: Not on file   Food Insecurity: Not on file   Transportation Needs: Not on file   Physical Activity: Not on file   Stress: Not on file   Social Connections: Not on file   Intimate Partner Violence: Not on file   Housing Stability: Not on file         Physical Exam:  Vitals/ General Appearance:   Weight/BMI: Body mass index is 34.77 kg/m².  /65   Pulse 69   Temp 36.6 °C (97.9 °F) (Temporal)   Resp 16   Ht 1.829 m (6')   Wt 116 kg (256 lb 6.3 oz)   SpO2 93%   Vitals:    09/23/23 0737 09/23/23 0830 09/23/23 0833 09/23/23 1142   BP: 108/66 118/65 118/65 123/65   Pulse: 62 61 61 69   Resp: 18   16   Temp: 36.6 °C (97.9 °F)   36.6 °C (97.9 °F)   TempSrc: Temporal   Temporal   SpO2: 92%   93%   Weight:       Height:         Oxygen Therapy:  Pulse Oximetry: 93 %, O2 (LPM): 0, O2 Delivery Device: None - Room Air    Constitutional:   No acute distress  HENMT:  Normocephalic, Atraumatic.  Eyes:  EOMI, No discharge.  Neck:  no JVD.  Cardiovascular:  + murmur, Normal heart rate, Normal rhythm.  Extremitites with intact distal pulses, no cyanosis, or edema.  Lungs:  No respiratory distress.  Abdomen: Soft, No tenderness, No guarding, No rebound.  Skin: No significant rash.  Neurologic: Alert & oriented x 3, No focal deficits noted, cranial nerves II through X are intact.  Psychiatric: Affect normal, Judgment normal, Mood normal.      MDM (Data Review):     Records reviewed and  summarized in current documentation    Lab Data Review:  Recent Results (from the past 24 hour(s))   CBC WITH DIFFERENTIAL    Collection Time: 09/23/23  5:52 AM   Result Value Ref Range    WBC 5.5 4.8 - 10.8 K/uL    RBC 3.24 (L) 4.70 - 6.10 M/uL    Hemoglobin 11.1 (L) 14.0 - 18.0 g/dL    Hematocrit 34.3 (L) 42.0 - 52.0 %    .9 (H) 81.4 - 97.8 fL    MCH 34.3 (H) 27.0 - 33.0 pg    MCHC 32.4 32.3 - 36.5 g/dL    RDW 59.3 (H) 35.9 - 50.0 fL    Platelet Count 66 (L) 164 - 446 K/uL    Neutrophils-Polys 69.00 44.00 - 72.00 %    Lymphocytes 13.90 (L) 22.00 - 41.00 %    Monocytes 15.90 (H) 0.00 - 13.40 %    Eosinophils 0.40 0.00 - 6.90 %    Basophils 0.40 0.00 - 1.80 %    Immature Granulocytes 0.40 0.00 - 0.90 %    Nucleated RBC 0.00 0.00 - 0.20 /100 WBC    Neutrophils (Absolute) 3.77 1.82 - 7.42 K/uL    Lymphs (Absolute) 0.76 (L) 1.00 - 4.80 K/uL    Monos (Absolute) 0.87 (H) 0.00 - 0.85 K/uL    Eos (Absolute) 0.02 0.00 - 0.51 K/uL    Baso (Absolute) 0.02 0.00 - 0.12 K/uL    Immature Granulocytes (abs) 0.02 0.00 - 0.11 K/uL    NRBC (Absolute) 0.00 K/uL   Comp Metabolic Panel    Collection Time: 09/23/23  5:52 AM   Result Value Ref Range    Sodium 138 135 - 145 mmol/L    Potassium 3.6 3.6 - 5.5 mmol/L    Chloride 104 96 - 112 mmol/L    Co2 23 20 - 33 mmol/L    Anion Gap 11.0 7.0 - 16.0    Glucose 118 (H) 65 - 99 mg/dL    Bun 60 (H) 8 - 22 mg/dL    Creatinine 1.88 (H) 0.50 - 1.40 mg/dL    Calcium 8.0 (L) 8.5 - 10.5 mg/dL    Correct Calcium 8.6 8.5 - 10.5 mg/dL    AST(SGOT) 191 (H) 12 - 45 U/L    ALT(SGPT) 199 (H) 2 - 50 U/L    Alkaline Phosphatase 187 (H) 30 - 99 U/L    Total Bilirubin 10.3 (H) 0.1 - 1.5 mg/dL    Albumin 3.2 3.2 - 4.9 g/dL    Total Protein 6.2 6.0 - 8.2 g/dL    Globulin 3.0 1.9 - 3.5 g/dL    A-G Ratio 1.1 g/dL   IMMATURE PLT FRACTION    Collection Time: 09/23/23  5:52 AM   Result Value Ref Range    Imm. Plt Fraction 23.4 (H) 0.6 - 13.1 %   ESTIMATED GFR    Collection Time: 09/23/23  5:52 AM   Result  Value Ref Range    GFR (CKD-EPI) 38 (A) >60 mL/min/1.73 m 2   EC-ECHOCARDIOGRAM COMPLETE W/ CONT    Collection Time: 09/23/23  9:26 AM   Result Value Ref Range    Eject.Frac. MOD BP 50.36     Eject.Frac. MOD 4C 57.13     Eject.Frac. MOD 2C 48.93     Left Ventrical Ejection Fraction 55        Imaging/Procedures Review:    Chest Xray:  Reviewed    EKG:   As in HPI.     MDM (Assessment and Plan):     Active Hospital Problems    Diagnosis     Cardiac pacemaker - Medtronic [Z95.0]      Priority: High    Primary hypertension [I10]      Priority: High    Severe aortic stenosis [I35.0]     Choledocholithiasis with acute cholecystitis [K80.42]     Acute liver failure [K72.00]          At this time, patient is euvolemic.  He is not in heart failure exacerbation.  He is not experiencing acute coronary syndrome.  He is not having an acute stroke.  Therefore, overall, his cardiovascular risk is nonmodifiable at the moment for his intended gallbladder surgery.  Further invasive cardiac work-up would not change overall risk factor at this time.  Based on current presentation and acuity of his gallbladder issue, I do think that intervention on the gallbladder will be more beneficial than harmful in terms of his overall cardiovascular risk.    In terms of his severe aortic stenosis, patient has already had 2 operations for aortic valve replacement via open heart surgery.  At this time, most likely option for him is going to be TAVR.  However, we will reevaluate in the outpatient setting once his acute gallbladder issue resolves.      Thank you for referring this patient to our cardiology service.  We will follow patient with you.      Isaac Angel MD.   Cardiology Inpatient Service.  [facility].  [phone].  [place], [place].

## 2023-09-24 ENCOUNTER — ANESTHESIA EVENT (OUTPATIENT)
Dept: SURGERY | Facility: MEDICAL CENTER | Age: 73
DRG: 417 | End: 2023-09-24
Payer: MEDICARE

## 2023-09-24 ENCOUNTER — TELEPHONE (OUTPATIENT)
Dept: CARDIOLOGY | Facility: MEDICAL CENTER | Age: 73
End: 2023-09-24
Payer: MEDICARE

## 2023-09-24 ENCOUNTER — ANESTHESIA (OUTPATIENT)
Dept: SURGERY | Facility: MEDICAL CENTER | Age: 73
DRG: 417 | End: 2023-09-24
Payer: MEDICARE

## 2023-09-24 DIAGNOSIS — Z95.3 S/P AORTIC VALVE REPLACEMENT WITH BIOPROSTHETIC VALVE: ICD-10-CM

## 2023-09-24 LAB
ALBUMIN SERPL BCP-MCNC: 2.9 G/DL (ref 3.2–4.9)
ALBUMIN/GLOB SERPL: 1 G/DL
ALP SERPL-CCNC: 177 U/L (ref 30–99)
ALT SERPL-CCNC: 174 U/L (ref 2–50)
ANION GAP SERPL CALC-SCNC: 12 MMOL/L (ref 7–16)
APTT PPP: 36.3 SEC (ref 24.7–36)
AST SERPL-CCNC: 169 U/L (ref 12–45)
BASOPHILS # BLD AUTO: 0.4 % (ref 0–1.8)
BASOPHILS # BLD: 0.02 K/UL (ref 0–0.12)
BILIRUB SERPL-MCNC: 7.8 MG/DL (ref 0.1–1.5)
BUN SERPL-MCNC: 61 MG/DL (ref 8–22)
CALCIUM ALBUM COR SERPL-MCNC: 8.5 MG/DL (ref 8.5–10.5)
CALCIUM SERPL-MCNC: 7.6 MG/DL (ref 8.5–10.5)
CHLORIDE SERPL-SCNC: 104 MMOL/L (ref 96–112)
CO2 SERPL-SCNC: 21 MMOL/L (ref 20–33)
CREAT SERPL-MCNC: 1.9 MG/DL (ref 0.5–1.4)
EOSINOPHIL # BLD AUTO: 0.11 K/UL (ref 0–0.51)
EOSINOPHIL NFR BLD: 2.3 % (ref 0–6.9)
ERYTHROCYTE [DISTWIDTH] IN BLOOD BY AUTOMATED COUNT: 59 FL (ref 35.9–50)
GFR SERPLBLD CREATININE-BSD FMLA CKD-EPI: 37 ML/MIN/1.73 M 2
GLOBULIN SER CALC-MCNC: 2.9 G/DL (ref 1.9–3.5)
GLUCOSE SERPL-MCNC: 125 MG/DL (ref 65–99)
HCT VFR BLD AUTO: 32 % (ref 42–52)
HGB BLD-MCNC: 10.5 G/DL (ref 14–18)
IMM GRANULOCYTES # BLD AUTO: 0.02 K/UL (ref 0–0.11)
IMM GRANULOCYTES NFR BLD AUTO: 0.4 % (ref 0–0.9)
INR PPP: 1.1 (ref 0.87–1.13)
LIPASE SERPL-CCNC: 694 U/L (ref 11–82)
LYMPHOCYTES # BLD AUTO: 0.65 K/UL (ref 1–4.8)
LYMPHOCYTES NFR BLD: 13.7 % (ref 22–41)
MCH RBC QN AUTO: 34.4 PG (ref 27–33)
MCHC RBC AUTO-ENTMCNC: 32.8 G/DL (ref 32.3–36.5)
MCV RBC AUTO: 104.9 FL (ref 81.4–97.8)
MONOCYTES # BLD AUTO: 0.77 K/UL (ref 0–0.85)
MONOCYTES NFR BLD AUTO: 16.2 % (ref 0–13.4)
NEUTROPHILS # BLD AUTO: 3.17 K/UL (ref 1.82–7.42)
NEUTROPHILS NFR BLD: 67 % (ref 44–72)
NRBC # BLD AUTO: 0 K/UL
NRBC BLD-RTO: 0 /100 WBC (ref 0–0.2)
PLATELET # BLD AUTO: 63 K/UL (ref 164–446)
PLATELETS.RETICULATED NFR BLD AUTO: 25.1 % (ref 0.6–13.1)
POTASSIUM SERPL-SCNC: 3.7 MMOL/L (ref 3.6–5.5)
PROT SERPL-MCNC: 5.8 G/DL (ref 6–8.2)
PROTHROMBIN TIME: 14.4 SEC (ref 12–14.6)
RBC # BLD AUTO: 3.05 M/UL (ref 4.7–6.1)
SODIUM SERPL-SCNC: 137 MMOL/L (ref 135–145)
WBC # BLD AUTO: 4.7 K/UL (ref 4.8–10.8)

## 2023-09-24 PROCEDURE — 0FT44ZZ RESECTION OF GALLBLADDER, PERCUTANEOUS ENDOSCOPIC APPROACH: ICD-10-PCS | Performed by: SURGERY

## 2023-09-24 PROCEDURE — 88304 TISSUE EXAM BY PATHOLOGIST: CPT

## 2023-09-24 PROCEDURE — 770020 HCHG ROOM/CARE - TELE (206)

## 2023-09-24 PROCEDURE — 700102 HCHG RX REV CODE 250 W/ 637 OVERRIDE(OP): Performed by: HOSPITALIST

## 2023-09-24 PROCEDURE — 36415 COLL VENOUS BLD VENIPUNCTURE: CPT

## 2023-09-24 PROCEDURE — 700101 HCHG RX REV CODE 250: Performed by: SURGERY

## 2023-09-24 PROCEDURE — 700105 HCHG RX REV CODE 258: Performed by: STUDENT IN AN ORGANIZED HEALTH CARE EDUCATION/TRAINING PROGRAM

## 2023-09-24 PROCEDURE — A9270 NON-COVERED ITEM OR SERVICE: HCPCS | Performed by: STUDENT IN AN ORGANIZED HEALTH CARE EDUCATION/TRAINING PROGRAM

## 2023-09-24 PROCEDURE — 160009 HCHG ANES TIME/MIN: Performed by: SURGERY

## 2023-09-24 PROCEDURE — 700111 HCHG RX REV CODE 636 W/ 250 OVERRIDE (IP): Mod: JZ | Performed by: HOSPITALIST

## 2023-09-24 PROCEDURE — A9270 NON-COVERED ITEM OR SERVICE: HCPCS | Performed by: HOSPITALIST

## 2023-09-24 PROCEDURE — 83690 ASSAY OF LIPASE: CPT

## 2023-09-24 PROCEDURE — 160029 HCHG SURGERY MINUTES - 1ST 30 MINS LEVEL 4: Performed by: SURGERY

## 2023-09-24 PROCEDURE — 700101 HCHG RX REV CODE 250: Performed by: STUDENT IN AN ORGANIZED HEALTH CARE EDUCATION/TRAINING PROGRAM

## 2023-09-24 PROCEDURE — 700111 HCHG RX REV CODE 636 W/ 250 OVERRIDE (IP)

## 2023-09-24 PROCEDURE — 700111 HCHG RX REV CODE 636 W/ 250 OVERRIDE (IP): Performed by: STUDENT IN AN ORGANIZED HEALTH CARE EDUCATION/TRAINING PROGRAM

## 2023-09-24 PROCEDURE — 700105 HCHG RX REV CODE 258: Performed by: HOSPITALIST

## 2023-09-24 PROCEDURE — 160048 HCHG OR STATISTICAL LEVEL 1-5: Performed by: SURGERY

## 2023-09-24 PROCEDURE — 85610 PROTHROMBIN TIME: CPT

## 2023-09-24 PROCEDURE — 99232 SBSQ HOSP IP/OBS MODERATE 35: CPT | Performed by: INTERNAL MEDICINE

## 2023-09-24 PROCEDURE — 47562 LAPAROSCOPIC CHOLECYSTECTOMY: CPT | Performed by: SURGERY

## 2023-09-24 PROCEDURE — 700102 HCHG RX REV CODE 250 W/ 637 OVERRIDE(OP): Performed by: STUDENT IN AN ORGANIZED HEALTH CARE EDUCATION/TRAINING PROGRAM

## 2023-09-24 PROCEDURE — 80053 COMPREHEN METABOLIC PANEL: CPT

## 2023-09-24 PROCEDURE — 160035 HCHG PACU - 1ST 60 MINS PHASE I: Performed by: SURGERY

## 2023-09-24 PROCEDURE — 85025 COMPLETE CBC W/AUTO DIFF WBC: CPT

## 2023-09-24 PROCEDURE — 160041 HCHG SURGERY MINUTES - EA ADDL 1 MIN LEVEL 4: Performed by: SURGERY

## 2023-09-24 PROCEDURE — 85730 THROMBOPLASTIN TIME PARTIAL: CPT

## 2023-09-24 PROCEDURE — 85055 RETICULATED PLATELET ASSAY: CPT

## 2023-09-24 PROCEDURE — 160002 HCHG RECOVERY MINUTES (STAT): Performed by: SURGERY

## 2023-09-24 PROCEDURE — 700105 HCHG RX REV CODE 258

## 2023-09-24 PROCEDURE — 99233 SBSQ HOSP IP/OBS HIGH 50: CPT | Performed by: HOSPITALIST

## 2023-09-24 RX ORDER — SODIUM CHLORIDE, SODIUM LACTATE, POTASSIUM CHLORIDE, CALCIUM CHLORIDE 600; 310; 30; 20 MG/100ML; MG/100ML; MG/100ML; MG/100ML
INJECTION, SOLUTION INTRAVENOUS CONTINUOUS
Status: DISCONTINUED | OUTPATIENT
Start: 2023-09-24 | End: 2023-09-24 | Stop reason: HOSPADM

## 2023-09-24 RX ORDER — HYDRALAZINE HYDROCHLORIDE 20 MG/ML
5 INJECTION INTRAMUSCULAR; INTRAVENOUS
Status: DISCONTINUED | OUTPATIENT
Start: 2023-09-24 | End: 2023-09-24 | Stop reason: HOSPADM

## 2023-09-24 RX ORDER — EPHEDRINE SULFATE 50 MG/ML
5 INJECTION, SOLUTION INTRAVENOUS
Status: DISCONTINUED | OUTPATIENT
Start: 2023-09-24 | End: 2023-09-24 | Stop reason: HOSPADM

## 2023-09-24 RX ORDER — ONDANSETRON 2 MG/ML
INJECTION INTRAMUSCULAR; INTRAVENOUS PRN
Status: DISCONTINUED | OUTPATIENT
Start: 2023-09-24 | End: 2023-09-24 | Stop reason: SURG

## 2023-09-24 RX ORDER — DEXAMETHASONE SODIUM PHOSPHATE 4 MG/ML
INJECTION, SOLUTION INTRA-ARTICULAR; INTRALESIONAL; INTRAMUSCULAR; INTRAVENOUS; SOFT TISSUE PRN
Status: DISCONTINUED | OUTPATIENT
Start: 2023-09-24 | End: 2023-09-24 | Stop reason: SURG

## 2023-09-24 RX ORDER — HALOPERIDOL 5 MG/ML
1 INJECTION INTRAMUSCULAR
Status: DISCONTINUED | OUTPATIENT
Start: 2023-09-24 | End: 2023-09-24 | Stop reason: HOSPADM

## 2023-09-24 RX ORDER — OXYCODONE HCL 5 MG/5 ML
5 SOLUTION, ORAL ORAL
Status: COMPLETED | OUTPATIENT
Start: 2023-09-24 | End: 2023-09-24

## 2023-09-24 RX ORDER — HYDROMORPHONE HYDROCHLORIDE 1 MG/ML
0.5 INJECTION, SOLUTION INTRAMUSCULAR; INTRAVENOUS; SUBCUTANEOUS
Status: DISCONTINUED | OUTPATIENT
Start: 2023-09-24 | End: 2023-09-24 | Stop reason: HOSPADM

## 2023-09-24 RX ORDER — EPHEDRINE SULFATE 50 MG/ML
INJECTION, SOLUTION INTRAVENOUS PRN
Status: DISCONTINUED | OUTPATIENT
Start: 2023-09-24 | End: 2023-09-24 | Stop reason: SURG

## 2023-09-24 RX ORDER — SODIUM CHLORIDE 9 MG/ML
INJECTION, SOLUTION INTRAVENOUS CONTINUOUS
Status: DISCONTINUED | OUTPATIENT
Start: 2023-09-24 | End: 2023-09-24

## 2023-09-24 RX ORDER — HYDROMORPHONE HYDROCHLORIDE 1 MG/ML
0.1 INJECTION, SOLUTION INTRAMUSCULAR; INTRAVENOUS; SUBCUTANEOUS
Status: DISCONTINUED | OUTPATIENT
Start: 2023-09-24 | End: 2023-09-24 | Stop reason: HOSPADM

## 2023-09-24 RX ORDER — DIPHENHYDRAMINE HYDROCHLORIDE 50 MG/ML
12.5 INJECTION INTRAMUSCULAR; INTRAVENOUS
Status: DISCONTINUED | OUTPATIENT
Start: 2023-09-24 | End: 2023-09-24 | Stop reason: HOSPADM

## 2023-09-24 RX ORDER — LIDOCAINE HYDROCHLORIDE 20 MG/ML
INJECTION, SOLUTION EPIDURAL; INFILTRATION; INTRACAUDAL; PERINEURAL PRN
Status: DISCONTINUED | OUTPATIENT
Start: 2023-09-24 | End: 2023-09-24 | Stop reason: SURG

## 2023-09-24 RX ORDER — ONDANSETRON 2 MG/ML
4 INJECTION INTRAMUSCULAR; INTRAVENOUS
Status: DISCONTINUED | OUTPATIENT
Start: 2023-09-24 | End: 2023-09-24 | Stop reason: HOSPADM

## 2023-09-24 RX ORDER — SODIUM CHLORIDE, SODIUM LACTATE, POTASSIUM CHLORIDE, CALCIUM CHLORIDE 600; 310; 30; 20 MG/100ML; MG/100ML; MG/100ML; MG/100ML
INJECTION, SOLUTION INTRAVENOUS
Status: DISCONTINUED | OUTPATIENT
Start: 2023-09-24 | End: 2023-09-24 | Stop reason: SURG

## 2023-09-24 RX ORDER — OXYCODONE HCL 5 MG/5 ML
10 SOLUTION, ORAL ORAL
Status: COMPLETED | OUTPATIENT
Start: 2023-09-24 | End: 2023-09-24

## 2023-09-24 RX ORDER — KETOROLAC TROMETHAMINE 30 MG/ML
INJECTION, SOLUTION INTRAMUSCULAR; INTRAVENOUS PRN
Status: DISCONTINUED | OUTPATIENT
Start: 2023-09-24 | End: 2023-09-24 | Stop reason: SURG

## 2023-09-24 RX ORDER — PHENYLEPHRINE HCL IN 0.9% NACL 0.5 MG/5ML
SYRINGE (ML) INTRAVENOUS PRN
Status: DISCONTINUED | OUTPATIENT
Start: 2023-09-24 | End: 2023-09-24 | Stop reason: SURG

## 2023-09-24 RX ORDER — HYDROMORPHONE HYDROCHLORIDE 1 MG/ML
0.2 INJECTION, SOLUTION INTRAMUSCULAR; INTRAVENOUS; SUBCUTANEOUS
Status: DISCONTINUED | OUTPATIENT
Start: 2023-09-24 | End: 2023-09-24 | Stop reason: HOSPADM

## 2023-09-24 RX ORDER — BUPIVACAINE HYDROCHLORIDE AND EPINEPHRINE 5; 5 MG/ML; UG/ML
INJECTION, SOLUTION EPIDURAL; INTRACAUDAL; PERINEURAL
Status: DISCONTINUED | OUTPATIENT
Start: 2023-09-24 | End: 2023-09-24 | Stop reason: HOSPADM

## 2023-09-24 RX ORDER — ROCURONIUM BROMIDE 10 MG/ML
INJECTION, SOLUTION INTRAVENOUS PRN
Status: DISCONTINUED | OUTPATIENT
Start: 2023-09-24 | End: 2023-09-24 | Stop reason: SURG

## 2023-09-24 RX ADMIN — ROCURONIUM BROMIDE 20 MG: 50 INJECTION, SOLUTION INTRAVENOUS at 10:47

## 2023-09-24 RX ADMIN — FENTANYL CITRATE 50 MCG: 50 INJECTION, SOLUTION INTRAMUSCULAR; INTRAVENOUS at 11:56

## 2023-09-24 RX ADMIN — ROCURONIUM BROMIDE 50 MG: 50 INJECTION, SOLUTION INTRAVENOUS at 10:41

## 2023-09-24 RX ADMIN — FENTANYL CITRATE 50 MCG: 50 INJECTION, SOLUTION INTRAMUSCULAR; INTRAVENOUS at 10:28

## 2023-09-24 RX ADMIN — KETOROLAC TROMETHAMINE 15 MG: 30 INJECTION, SOLUTION INTRAMUSCULAR; INTRAVENOUS at 12:13

## 2023-09-24 RX ADMIN — SODIUM CHLORIDE, POTASSIUM CHLORIDE, SODIUM LACTATE AND CALCIUM CHLORIDE: 600; 310; 30; 20 INJECTION, SOLUTION INTRAVENOUS at 10:26

## 2023-09-24 RX ADMIN — LIDOCAINE HYDROCHLORIDE 100 MG: 20 INJECTION, SOLUTION EPIDURAL; INFILTRATION; INTRACAUDAL at 10:41

## 2023-09-24 RX ADMIN — PROPOFOL 150 MG: 10 INJECTION, EMULSION INTRAVENOUS at 10:41

## 2023-09-24 RX ADMIN — PIPERACILLIN AND TAZOBACTAM 3.38 G: 3; .375 INJECTION, POWDER, FOR SOLUTION INTRAVENOUS at 23:37

## 2023-09-24 RX ADMIN — Medication 200 MCG: at 10:45

## 2023-09-24 RX ADMIN — ONDANSETRON 4 MG: 2 INJECTION INTRAMUSCULAR; INTRAVENOUS at 12:12

## 2023-09-24 RX ADMIN — CARVEDILOL 50 MG: 25 TABLET, FILM COATED ORAL at 17:50

## 2023-09-24 RX ADMIN — EPHEDRINE SULFATE 10 MG: 50 INJECTION INTRAVENOUS at 11:40

## 2023-09-24 RX ADMIN — OXYCODONE HYDROCHLORIDE 10 MG: 5 SOLUTION ORAL at 12:41

## 2023-09-24 RX ADMIN — Medication 500 MCG: at 10:55

## 2023-09-24 RX ADMIN — ATORVASTATIN CALCIUM 80 MG: 80 TABLET, FILM COATED ORAL at 20:15

## 2023-09-24 RX ADMIN — SODIUM CHLORIDE 40 MCG/MIN: 900 INJECTION INTRAVENOUS at 10:41

## 2023-09-24 RX ADMIN — CARVEDILOL 50 MG: 25 TABLET, FILM COATED ORAL at 08:37

## 2023-09-24 RX ADMIN — PREGABALIN 200 MG: 100 CAPSULE ORAL at 17:51

## 2023-09-24 RX ADMIN — Medication 300 MCG: at 10:52

## 2023-09-24 RX ADMIN — SUGAMMADEX 200 MG: 100 INJECTION, SOLUTION INTRAVENOUS at 12:19

## 2023-09-24 RX ADMIN — EPHEDRINE SULFATE 10 MG: 50 INJECTION INTRAVENOUS at 12:15

## 2023-09-24 RX ADMIN — OXYCODONE HYDROCHLORIDE 5 MG: 5 TABLET ORAL at 17:50

## 2023-09-24 RX ADMIN — PROPOFOL 300 MG: 10 INJECTION, EMULSION INTRAVENOUS at 10:55

## 2023-09-24 RX ADMIN — SODIUM CHLORIDE, POTASSIUM CHLORIDE, SODIUM LACTATE AND CALCIUM CHLORIDE: 600; 310; 30; 20 INJECTION, SOLUTION INTRAVENOUS at 13:44

## 2023-09-24 RX ADMIN — PIPERACILLIN AND TAZOBACTAM 3.38 G: 3; .375 INJECTION, POWDER, FOR SOLUTION INTRAVENOUS at 15:47

## 2023-09-24 RX ADMIN — PREGABALIN 200 MG: 100 CAPSULE ORAL at 05:53

## 2023-09-24 RX ADMIN — DEXAMETHASONE SODIUM PHOSPHATE 4 MG: 4 INJECTION INTRA-ARTICULAR; INTRALESIONAL; INTRAMUSCULAR; INTRAVENOUS; SOFT TISSUE at 10:51

## 2023-09-24 RX ADMIN — SODIUM CHLORIDE, POTASSIUM CHLORIDE, SODIUM LACTATE AND CALCIUM CHLORIDE: 600; 310; 30; 20 INJECTION, SOLUTION INTRAVENOUS at 18:49

## 2023-09-24 RX ADMIN — PIPERACILLIN AND TAZOBACTAM 3.38 G: 3; .375 INJECTION, POWDER, FOR SOLUTION INTRAVENOUS at 05:53

## 2023-09-24 ASSESSMENT — COGNITIVE AND FUNCTIONAL STATUS - GENERAL
STANDING UP FROM CHAIR USING ARMS: A LITTLE
MOVING TO AND FROM BED TO CHAIR: A LITTLE
SUGGESTED CMS G CODE MODIFIER MOBILITY: CJ
WALKING IN HOSPITAL ROOM: A LITTLE
DAILY ACTIVITIY SCORE: 24
MOBILITY SCORE: 20
CLIMB 3 TO 5 STEPS WITH RAILING: A LITTLE
SUGGESTED CMS G CODE MODIFIER DAILY ACTIVITY: CH

## 2023-09-24 ASSESSMENT — ENCOUNTER SYMPTOMS
DEPRESSION: 0
SHORTNESS OF BREATH: 0
CONSTIPATION: 0
WHEEZING: 0
DIARRHEA: 0
PALPITATIONS: 0
WEAKNESS: 0
NAUSEA: 0
ABDOMINAL PAIN: 0
FOCAL WEAKNESS: 0
EYE PAIN: 0
SENSORY CHANGE: 0
DIAPHORESIS: 0
CLAUDICATION: 0
HEADACHES: 0
BACK PAIN: 0
APNEA: 0
HEMOPTYSIS: 0
VOMITING: 0
FEVER: 0
SPUTUM PRODUCTION: 0
CHEST TIGHTNESS: 0
SORE THROAT: 0
CHILLS: 0
BRUISES/BLEEDS EASILY: 0
COUGH: 0
MYALGIAS: 0
NECK PAIN: 0
SPEECH CHANGE: 0
DIZZINESS: 0
LOSS OF CONSCIOUSNESS: 0
EYE DISCHARGE: 0

## 2023-09-24 ASSESSMENT — LIFESTYLE VARIABLES: SUBSTANCE_ABUSE: 0

## 2023-09-24 ASSESSMENT — PAIN DESCRIPTION - PAIN TYPE
TYPE: SURGICAL PAIN

## 2023-09-24 ASSESSMENT — PAIN SCALES - GENERAL: PAIN_LEVEL: 2

## 2023-09-24 NOTE — ASSESSMENT & PLAN NOTE
Echo revealed severe AS s/p bioprosthetic valve replacement in 2014 from mechanical valve placed in 38 years of age.  Appreciate cardiology consult for clearance for surgery  Will need evaluation outpatient for TAVR  2 g sodium fluid restricted diet 1500 mL.

## 2023-09-24 NOTE — PROGRESS NOTES
Monitor Summary:    Rhythm: Paced    HR: 54-77    Measurements:.14/.15./.45    Ectopy: rPVC              Normal Values  Rhythm SR  HR Range    Measurements 0.12-0.20 / 0.06-0.10  / 0.30-0.52

## 2023-09-24 NOTE — ANESTHESIA PROCEDURE NOTES
Airway    Date/Time: 9/24/2023 10:43 AM    Performed by: Jabari Gonzalez M.D.  Authorized by: Jabari Gonzalez M.D.    Location:  OR  Urgency:  Elective  Indications for Airway Management:  Anesthesia      Spontaneous Ventilation: absent    Sedation Level:  Deep  Preoxygenated: Yes    Patient Position:  Sniffing  Mask Difficulty Assessment:  2 - vent by mask + OA or adjuvant +/- NMBA  Final Airway Type:  Endotracheal airway  Final Endotracheal Airway:  ETT  Cuffed: Yes    Technique Used for Successful ETT Placement:  Direct laryngoscopy    Insertion Site:  Oral  Blade Type:  [place]  Laryngoscope Blade/Videolaryngoscope Blade Size:  2  ETT Size (mm):  7.5  Measured from:  Teeth  ETT to Teeth (cm):  22  Placement Verified by: auscultation and capnometry    Cormack-Lehane Classification:  Grade IIa - partial view of glottis  Number of Attempts at Approach:  1

## 2023-09-24 NOTE — CARE PLAN
The patient is Stable - Low risk of patient condition declining or worsening    Shift Goals  Clinical Goals: NPO at midnight for procedure  Patient Goals: Get Rest  Family Goals: LALA    Progress made toward(s) clinical / shift goals:  Pt vitals within normal limits. Pt NPO since midnight awaiting lap liyah in am.    Patient is not progressing towards the following goals:

## 2023-09-24 NOTE — PROGRESS NOTES
[facility] Medicine Daily Progress Note    Date of Service  9/24/2023    Chief Complaint  Matti Olguin is a 73 y.o. male admitted 9/20/2023 with abdominal pain, jaundice.    Hospital Course  Matti Olguin is a 73 y.o. 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 Quality is described as a dull ache. He also noticed that he was developing yellowed skin and eyes and stool has been light colored, foul smelling, and floating. Previously was on DAPT until 1/2022 when he developed a spontaneous SDH, and now is just on aspirin 81mg daily. Does drink a 6 pack of beer about 3-4 times per week.      In ED, he was initially febrile but no leukocytosis or tachycardia, BP borderline with SBPs in 90s, and given 1 liter of LR. Labs notable for , , , lipase 192, and bilirubin 14.6. CT concerning for choledocholithiasis and probable cholecystitis. General surgery consulted and evaluated at bedside. Patient admitted for GI consultation and medical stabilization prior to planning cholecystectomy.      Interval Problem Update  9/22: S/p ERCp with two stones removed, discussed with Dr. Anne and her CHUY Brittani Crooks. Discussed with surgery Dr. Sepulveda & JARED Villa, and they would like to watch patient for a couple days prior to surgery, tentatively planned for Sunday. Concerned about LFT's/T bili still being high, queried cardiac stability for surgery. Mildly elevated troponin on admission, will repeat and obtain an echo as well.   Patient feeling much improved, he was sitting up at  edge of bed today working on a word search, no pain, tolerating PO intake, pleasant mood very appreciative of care he's received. Vitals WNL and stable.   Labs show pancytopenia, worsening thrombocytopenia down to 58, with it so low and trending down will hold on DVT PPX today and check CBC overnight for trend. Patient looks to be clinically improving and quite well despite the lab abnormalities looking quite abnormal.  9/23:  given severe AS on echo, asked cardiology to assess risk for surgery.  Cleared for lap liyah.  Patient will need follow up with cardiology for TAVR evaluation.  Also, low platelets, discussed risks with patient and wife at bedside for bleeding, understands no more alcohol in future.  CKD also present.  Added laxatives for abdominal distention, no ascites noted.  9/24: Postop lap cholecystectomy with multiple adhesions and severe inflammation noted in operation report.  Patient doing well postop.  No oozing at incision sites.  Jaundice decreasing.  Creatinine 1.90.  Started gentle IV fluids to see if can improve since he had normal creatinine on admission 1.26 prior to CT with IV contrast.  Avoid excessive fluids due to his severe aortic stenosis.    I have discussed this patient's plan of care and discharge plan at IDT rounds today with Case Management, Nursing, Nursing leadership, and other members of the IDT team.     Consultants/Specialty  general surgery and GI  Cardiology    Code Status  Full Code    Disposition  The patient is not medically cleared for discharge to home or a post-acute facility.  Anticipate discharge to: home with close outpatient follow-up    I have placed the appropriate orders for post-discharge needs.    Review of Systems  Review of Systems   Constitutional:  Negative for chills, diaphoresis, fever and malaise/fatigue.   HENT:  Negative for congestion and sore throat.    Eyes:  Negative for pain and discharge.   Respiratory:  Negative for cough, hemoptysis, sputum  production, shortness of breath and wheezing.    Cardiovascular:  Negative for chest pain, palpitations, claudication and leg swelling.   Gastrointestinal:  Negative for abdominal pain, constipation, diarrhea, melena, nausea and vomiting.   Genitourinary:  Negative for dysuria, frequency and urgency.   Musculoskeletal:  Negative for back pain, joint pain, myalgias and neck pain.   Skin:  Negative for itching and rash.   Neurological:  Negative for dizziness, sensory change, speech change, focal weakness, loss of consciousness, weakness and headaches.   Endo/Heme/Allergies:  Does not bruise/bleed easily.   Psychiatric/Behavioral:  Negative for depression, substance abuse and suicidal ideas.       Physical Exam  Temp:  [36.1 °C (96.9 °F)-37.1 °C (98.7 °F)] 36.5 °C (97.7 °F)  Pulse:  [56-66] 56  Resp:  [15-20] 16  BP: (106-135)/(54-76) 114/67  SpO2:  [90 %-98 %] 94 %    Physical Exam  Vitals and nursing note reviewed.   Constitutional:       General: He is not in acute distress.     Appearance: Normal appearance. He is not diaphoretic.   HENT:      Head: Normocephalic and atraumatic.      Nose: Nose normal.      Mouth/Throat:      Mouth: Mucous membranes are moist.      Pharynx: No oropharyngeal exudate.   Eyes:      General: No scleral icterus.        Right eye: No discharge.         Left eye: No discharge.      Conjunctiva/sclera: Conjunctivae normal.      Pupils: Pupils are equal, round, and reactive to light.   Neck:      Thyroid: No thyromegaly.      Vascular: No JVD.      Trachea: No tracheal deviation.   Cardiovascular:      Rate and Rhythm: Normal rate and regular rhythm.      Heart sounds: Murmur heard.      No friction rub. No gallop.   Pulmonary:      Effort: Pulmonary effort is normal. No respiratory distress.      Breath sounds: Normal breath sounds. No wheezing or rales.   Chest:      Chest wall: No tenderness.   Abdominal:      General: Bowel sounds are normal. There is distension.      Palpations:  Abdomen is soft. There is no mass.      Tenderness: There is no abdominal tenderness. There is no guarding or rebound.      Comments: No ascites noted.  Distended from constipation likely.   Musculoskeletal:         General: No tenderness. Normal range of motion.      Cervical back: Normal range of motion and neck supple.   Lymphadenopathy:      Cervical: No cervical adenopathy.   Skin:     General: Skin is warm and dry.      Findings: No erythema or rash.   Neurological:      General: No focal deficit present.      Mental Status: He is alert and oriented to person, place, and time.      Cranial Nerves: No cranial nerve deficit.      Motor: No abnormal muscle tone.   Psychiatric:         Mood and Affect: Mood normal.         Behavior: Behavior normal.         Thought Content: Thought content normal.         Judgment: Judgment normal.       Fluids    Intake/Output Summary (Last 24 hours) at 9/24/2023 1643  Last data filed at 9/24/2023 1547  Gross per 24 hour   Intake 1260 ml   Output 20 ml   Net 1240 ml       Laboratory  Recent Labs     09/22/23  0146 09/23/23  0552 09/24/23  0154   WBC 2.3* 5.5 4.7*   RBC 3.19* 3.24* 3.05*   HEMOGLOBIN 11.0* 11.1* 10.5*   HEMATOCRIT 33.8* 34.3* 32.0*   .0* 105.9* 104.9*   MCH 34.5* 34.3* 34.4*   MCHC 32.5 32.4 32.8   RDW 59.3* 59.3* 59.0*   PLATELETCT 58* 66* 63*     Recent Labs     09/22/23  0146 09/23/23  0552 09/24/23  0154   SODIUM 136 138 137   POTASSIUM 3.9 3.6 3.7   CHLORIDE 103 104 104   CO2 19* 23 21   GLUCOSE 161* 118* 125*   BUN 47* 60* 61*   CREATININE 1.92* 1.88* 1.90*   CALCIUM 8.0* 8.0* 7.6*     Recent Labs     09/24/23  0154   APTT 36.3*   INR 1.10               Imaging  EC-ECHOCARDIOGRAM COMPLETE W/ CONT   Final Result      DX-PORTABLE FLUORO > 1 HOUR   Final Result      1.  Endoscopic cholangiography showing patent common duct with no appreciable persisting filling defect, stricture, and satisfactory passive drainage following removal of the endoscope.       CT-ABDOMEN-PELVIS WITH   Final Result      1.  Cholelithiasis and choledocholithiasis with findings suspicious for cholecystitis   2.  Splenomegaly      DX-CHEST-PORTABLE (1 VIEW)   Final Result      No acute cardiac or pulmonary abnormalities are identified. Lung volumes are low.           Assessment/Plan  * Choledocholithiasis with acute cholecystitis- (present on admission)  Assessment & Plan  Patient has elevated LFTs and bilirubin  IV Zosyn  Pain control with oral and IV narcotics  N.p.o.  General surgery and GI following  ERCP 9/22 9/24 lap cholecystectomy successful.        Stage 3b chronic kidney disease (HCC)- (present on admission)  Assessment & Plan    Did discuss with patient and wife at bedside.  Avoid IV contrast if possible.  Likely 2/2 IV contrast, was given CT with IV contrast on admission 9/20/2023 to evaluate right upper quadrant abdominal pain.  Gentle IV fluids NS at 50 an hour.    Thrombocytopenia (HCC)- (present on admission)  Assessment & Plan  2/2 hepatic steatosis, former heavy alcohol use,  Currently only occasional.  Understands risks for bleeding.  Check INR/PT in a.m.    Severe aortic stenosis- (present on admission)  Assessment & Plan  Echo revealed severe AS s/p bioprosthetic valve replacement in 2014 from mechanical valve placed in 38 years of age.  Appreciate cardiology consult for clearance for surgery  Will need evaluation outpatient for TAVR  2 g sodium fluid restricted diet 1500 mL.    Acute liver failure- (present on admission)  Assessment & Plan  In setting of choledocholithiasis and alcohol use  ERCP pending  Encourage alcohol cessation      Cardiac pacemaker - [employer]- (present on admission)  Assessment & Plan  Monitor on telemetry    Primary hypertension- (present on admission)  Assessment & Plan  Continue current home medications  IV as needed medications have been ordered           VTE prophylaxis:   SCDs/TEDs      I have performed a physical exam and reviewed and  updated ROS and Plan today (9/24/2023). In review of yesterday's note (9/23/2023), there are no changes except as documented above.    Total time spent 51 minutes. I spent greater than 50% of the time for patient care, counseling, and coordination on this date, including unit/floor time, and face-to-face time with the patient as per interval events, my own review of patient's imaging and lab analysis and developing my assessment and plan above.

## 2023-09-24 NOTE — ASSESSMENT & PLAN NOTE
2/2 hepatic steatosis, former heavy alcohol use,  Currently only occasional.  Understands risks for bleeding.  Stable Hg post op.

## 2023-09-24 NOTE — PROGRESS NOTES
"Cardiology Follow Up Progress Note    Date of Service  9/24/2023    Attending Physician  Diana Munguia M.D.    Primary Cardiologist: Dr. Thibodeaux    Chief Complaint   Chief Complaint   Patient presents with    Headache     \"For years, but much more intense today.\"    Jaundice     Noticed it on Monday, no hx of same.    Abdominal Pain     Bilateral upper abd pain for years, but much worse recently. Per pt does not feel like he abd is tight or swollen       HPI  Matti Olguin is a 73 y.o. male admitted 9/20/2023 with per Dr. Angel, \"with prior history of aortic valve replacements, presented to the hospital with choledocholithiasis and will require surgical intervention.  Patient did have a transthoracic echocardiogram which showed severe aortic stenosis of his bioprosthetic valve.  Cardiology is been consulted for further care.  Patient also has a history of complete heart block for which she underwent permanent pacemaker placement.     Patient denies having chest pain at this time.     I personally interpreted the EKG tracing which show paced rhythm.     I personally reviewed the images of his transthoracic echocardiogram which showed increased mean gradient across the bioprosthetic valve suggestive of severe stenosis.  Normal LV function.\"    Interim Events  9/24/2023: No overnight cardiac events . Tele monitoring personally interpreted by me shows 100% paced. VSS; RA; Daily weight pending. OR today for Lap Ariana today.    Review of Systems  Review of Systems   Respiratory:  Negative for apnea, chest tightness and shortness of breath.    Cardiovascular:  Negative for chest pain, palpitations and leg swelling.       Vital signs in last 24 hours  Temp:  [36.1 °C (97 °F)-37.1 °C (98.7 °F)] 37 °C (98.6 °F)  Pulse:  [60-69] 64  Resp:  [16-19] 16  BP: (106-135)/(54-69) 128/66  SpO2:  [90 %-96 %] 91 %    Physical Exam  Physical Exam  Vitals and nursing note reviewed.   Constitutional:       General: He is not in acute " "distress.  Cardiovascular:      Rate and Rhythm: Normal rate and regular rhythm.      Pulses: Normal pulses.      Heart sounds: Normal heart sounds.   Pulmonary:      Effort: Pulmonary effort is normal. No respiratory distress.      Breath sounds: Normal breath sounds.   Musculoskeletal:         General: No swelling.      Cervical back: Normal range of motion.      Right lower leg: No edema.      Left lower leg: No edema.   Skin:     General: Skin is warm and dry.   Neurological:      General: No focal deficit present.      Mental Status: He is alert and oriented to person, place, and time. Mental status is at baseline.   Psychiatric:         Mood and Affect: Mood normal.         Behavior: Behavior normal.         Lab Review  Lab Results   Component Value Date/Time    WBC 4.7 (L) 09/24/2023 01:54 AM    RBC 3.05 (L) 09/24/2023 01:54 AM    HEMOGLOBIN 10.5 (L) 09/24/2023 01:54 AM    HEMATOCRIT 32.0 (L) 09/24/2023 01:54 AM    .9 (H) 09/24/2023 01:54 AM    MCH 34.4 (H) 09/24/2023 01:54 AM    MCHC 32.8 09/24/2023 01:54 AM    MPV 12.1 12/24/2021 06:45 AM      Lab Results   Component Value Date/Time    SODIUM 137 09/24/2023 01:54 AM    POTASSIUM 3.7 09/24/2023 01:54 AM    CHLORIDE 104 09/24/2023 01:54 AM    CO2 21 09/24/2023 01:54 AM    GLUCOSE 125 (H) 09/24/2023 01:54 AM    BUN 61 (H) 09/24/2023 01:54 AM    CREATININE 1.90 (H) 09/24/2023 01:54 AM    CREATININE 1.1 05/29/2007 04:30 AM      Lab Results   Component Value Date/Time    ASTSGOT 169 (H) 09/24/2023 01:54 AM    ALTSGPT 174 (H) 09/24/2023 01:54 AM     Lab Results   Component Value Date/Time    CHOLSTRLTOT 117 12/20/2021 06:12 PM    LDL 64 12/20/2021 06:12 PM    HDL 37 (A) 12/20/2021 06:12 PM    TRIGLYCERIDE 78 12/20/2021 06:12 PM    TROPONINT 32 (H) 09/22/2023 12:04 PM       No results for input(s): \"NTPROBNP\" in the last 72 hours.    Cardiac Imaging and Procedures Review  Per Dr. Thibodeaux attestation    Assessment/Plan  Cholelithiasis; acute liver failure; " status post aortic valve replacement bioprosthetic valve, repeat in 2014; increasing aortic valve gradients; complete heart block s/p PPM; hypertension; CKD 3B  -OR today for lap liyah  -Normal device interrogation on 6/2/2023; 1.5 year battery  -Euvolemic on exam  -Outpatient referral to structural heart program      Thank you for allowing me to participate in the care of this patient.  I will continue to follow this patient    Please contact me with any questions.    Please see Dr. Thibodeaux's attestation for further details and MDM.     I personally spent a total of 14 minutes which includes face-to-face time and non-face-to-face time spent on preparing to see the patient, reviewing hospital notes and tests, obtaining history from the patient, performing a medically appropriate exam, counseling and educating the patient, ordering medications/tests/procedures/referrals as clinically indicated, and documenting information in the electronic medical record.    BISHNU Bennett   [facility]  [phone]

## 2023-09-24 NOTE — ANESTHESIA TIME REPORT
Anesthesia Start and Stop Event Times     Date Time Event    9/24/2023 1005 Ready for Procedure     1026 Anesthesia Start     1234 Anesthesia Stop        Responsible Staff  09/24/23    Name Role Begin End    Jabari Gonzalez M.D. Anesth 1026 1234        Overtime Reason:  no overtime (within assigned shift)    Comments:

## 2023-09-24 NOTE — CARE PLAN
The patient is Stable - Low risk of patient condition declining or worsening    Shift Goals  Clinical Goals: patient will maintain stable hemodynamics through shift  Patient Goals: Comfort, rest  Family Goals: LALA    Progress made toward(s) clinical / shift goals:  Problem: Pain - Standard  Goal: Alleviation of pain or a reduction in pain to the patient’s comfort goal  9/23/2023 1726 by NEYDA BuenrostroN.  Outcome: Progressing  9/23/2023 1725 by Jocelin Wan R.N.  Outcome: Progressing     Problem: Knowledge Deficit - Standard  Goal: Patient and family/care givers will demonstrate understanding of plan of care, disease process/condition, diagnostic tests and medications  9/23/2023 1726 by Jocelin Wan R.N.  Outcome: Progressing  9/23/2023 1725 by DEANDRE Buernostro.N.  Outcome: Progressing

## 2023-09-24 NOTE — OR NURSING
Pt is aaox4, resting comfortably in hospital bed on 2lpm nasal cannula. His respirations are equal and unlabored, he is in no acute distress. He is treated for pain per MAR with good response. He takes sips of water without difficulty or complaints of n/v. Surgical sites are clean, dry and intact. Abdomen is soft to palpation. Will continue to monitor.    Pt's s/o updated on status with all questions answered.

## 2023-09-24 NOTE — PROGRESS NOTES
Assumed care at 7am, bedside report received from Rfaia RN. Pt. Is Vpaced on the monitor. Initial assessment completed, orders reviewed, call light within reach, bed alarm is in use, and hourly rounding in place. POC addressed with patient, no additional questions at this time.

## 2023-09-24 NOTE — ASSESSMENT & PLAN NOTE
Likely 2/2 IV contrast    9/26/2023    Avoid nephrotoxins, monitor inputs and outputs  Renal ultrasound  Nephrology following

## 2023-09-24 NOTE — ANESTHESIA POSTPROCEDURE EVALUATION
Patient: Matti Olguin    Procedure Summary     Date: 09/24/23 Room / Location: [facility] 09 / SURGERY [facility]    Anesthesia Start: 1026 Anesthesia Stop: 1234    Procedure: CHOLECYSTECTOMY, LAPAROSCOPIC (Abdomen) Diagnosis: (Choledocholithiasis with acute cholecystitis)    Surgeons: Dipak Sepulveda M.D. Responsible Provider: Jabari Gonzalez M.D.    Anesthesia Type: general ASA Status: 3          Final Anesthesia Type: general  Last vitals  BP   Blood Pressure : 118/66    Temp   36.1 °C (96.9 °F)    Pulse   (!) 57   Resp   15    SpO2   94 %      Anesthesia Post Evaluation    Patient location during evaluation: PACU  Patient participation: complete - patient participated  Level of consciousness: awake and alert  Pain score: 2    Airway patency: patent  Anesthetic complications: no  Cardiovascular status: hemodynamically stable  Respiratory status: acceptable  Hydration status: euvolemic    PONV: none          No notable events documented.     Nurse Pain Score: 2 (NPRS)

## 2023-09-24 NOTE — OP REPORT
Surgeon: Dipak Sepulveda MD   Assistant: DESTINY Mao  Preoperative diagnosis: Acute cholecystitis   Postop diagnosis: Acute cholecystitis   Procedure: Laparoscopic cholecystectomy   Anesthesia: General endotracheal anesthesia   Anesthesiologist: Jabari Gonzalez MD  Indications: 73-year-old man status post an episode of choledocholithiasis.  He has had an ERCP with stone extraction.  He also has evidence of cholecystitis and a cholecystectomy is indicated.  Narrative: The procedure was discussed in detail with the patient including the laparoscopic approach, the potential for converting to an open procedure, and the associated risk of bleeding, infection, abscess, and hematoma. Also discussed the risk of postoperative bile leak, common bile duct stricture, common bile duct transection, and the significance of these complications. The patient understood all of the above and wished to proceed. The patient was placed under anesthesia by Dr. Gonzalez. Patient's abdomen was prepped with chlorhexidine prep and sterile drapes. After a time out an infraumbilical incision was made. Through this incision the peritoneal cavity was entered using the open Hason entry technique. pneumoperitoneum was achieved with co2 insufflation to a pressure of 15 mmhg mercury. under direct visualization a 12 millimeters subxiphoid and two 5mm subcostal ports were placed. the gallbladder was identified.  It was significantly inflamed, thickened, and had tremendous amount of inflammatory adhesions.  It was drained with a percutaneous drainage needle to allow for the graspers to grasp it.  It was then retracted superiorly and laterally.  The multiple dense adhesions were carefully taken down. the base of the gallbladder was carefully dissected.  The anatomy at the base of the gallbladder was fairly distorted due to fairly significant inflammation.  Eventually the cystic duct was identified and could be seen to clearly exit the gallbladder and  retract laterally along the gallbladder. In  a similar fashion the cystic artery was identified and dissected away from surrounding connective tissue. a critical view was obtained.  The cystic duct appeared to be quite short and the common duct could be see tenting up almost onto the gallbladder.  The bottom of the gallbladder was carefully dissected free and dissection proceeded towards the dome of the gallbladder.  Eventually a significant portion of the gallbladder was freed up and due to the apparent short cystic duct the gallbladder was divided right at the base with the staple load.  This clips were not sufficient to occlude the base of the gallbladder.  Similarly, the cystic artery was clipped twice proximally and one distally and divided sharply with scissors. The gallbladder was then dissected off the liver bed and placed in a bag retrieval device. Hemostasis was assured with cautery. There was no evidence of bleeding or bile leak. Ports removed and the pneumoperitoneum was released. The infraumbilical fascia was approximated with an 0 Vicryl stitch. The subcutaneous tissue was irrigated and the skin on all incisions was approximatedwith 4.0 vicryl  stitches. Dermabond was placed. The patient tolerated procedure well without apparent complication. Final counts were reported as correct.  Wound class was class III.    The first assist, DESTINY Mao, was necessary in this case due to the complexity of the operation.  Holley assisted with port placement, holding of retractors and managing the laparoscope.  She also assisted with port removal and incision closure.

## 2023-09-24 NOTE — ANESTHESIA PROCEDURE NOTES
Arterial Line    Performed by: Jabari Gonzalez M.D.  Authorized by: Jabari Gonzalez M.D.    Start Time:  9/24/2023 10:38 AM  End Time:  9/24/2023 10:40 AM  Localization: ultrasound guidance and surface landmarks    Patient Location:  OR  Indication: continuous blood pressure monitoring        Catheter Size:  20 G  Seldinger Technique?: Yes    Laterality:  Right  Site:  Radial artery  Line Secured:  Antimicrobial disc, tape and transparent dressing  Events: patient tolerated procedure well with no complications

## 2023-09-24 NOTE — CARE PLAN
The patient is Stable - Low risk of patient condition declining or worsening    Shift Goals  Clinical Goals: Lap Ariana  Patient Goals: rest, procedure  Family Goals: LALA    Progress made toward(s) clinical / shift goals:  Yes    Patient is not progressing towards the following goals:

## 2023-09-24 NOTE — PROGRESS NOTES
DATE: 9/24/2023    Hospital Day 4  choledocholithiasis     INTERVAL EVENTS:  Bilirubin continue downward trend  Denies abdominal pain    REVIEW OF SYSTEMS:  Review of Systems   Constitutional:  Negative for chills and fever.   Gastrointestinal:  Negative for abdominal pain, nausea and vomiting.   Musculoskeletal:  Negative for myalgias.       PHYSICAL EXAMINATION:  Vital Signs: Blood Pressure 128/66   Pulse 64   Temperature 37 °C (98.6 °F) (Temporal)   Respiration 16   Height 1.829 m (6')   Weight 116 kg (256 lb 6.3 oz)   Oxygen Saturation 91%   Physical Exam    LABORATORY VALUES:   Recent Labs     09/22/23  0146 09/23/23  0552 09/24/23  0154   WBC 2.3* 5.5 4.7*   RBC 3.19* 3.24* 3.05*   HEMOGLOBIN 11.0* 11.1* 10.5*   HEMATOCRIT 33.8* 34.3* 32.0*   .0* 105.9* 104.9*   MCH 34.5* 34.3* 34.4*   MCHC 32.5 32.4 32.8   RDW 59.3* 59.3* 59.0*   PLATELETCT 58* 66* 63*     Recent Labs     09/22/23  0146 09/23/23  0552 09/24/23  0154   SODIUM 136 138 137   POTASSIUM 3.9 3.6 3.7   CHLORIDE 103 104 104   CO2 19* 23 21   GLUCOSE 161* 118* 125*   BUN 47* 60* 61*   CREATININE 1.92* 1.88* 1.90*   CALCIUM 8.0* 8.0* 7.6*     Recent Labs     09/22/23  0146 09/23/23  0552 09/24/23  0154   ASTSGOT 226* 191* 169*   ALTSGPT 230* 199* 174*   TBILIRUBIN 13.9* 10.3* 7.8*   ALKPHOSPHAT 196* 187* 177*   GLOBULIN 2.8 3.0 2.9   INR  --   --  1.10     Recent Labs     09/24/23  0154   APTT 36.3*   INR 1.10        IMAGING:   EC-ECHOCARDIOGRAM COMPLETE W/ CONT   Final Result      DX-PORTABLE FLUORO > 1 HOUR   Final Result      1.  Endoscopic cholangiography showing patent common duct with no appreciable persisting filling defect, stricture, and satisfactory passive drainage following removal of the endoscope.      CT-ABDOMEN-PELVIS WITH   Final Result      1.  Cholelithiasis and choledocholithiasis with findings suspicious for cholecystitis   2.  Splenomegaly      DX-CHEST-PORTABLE (1 VIEW)   Final Result      No acute cardiac or  pulmonary abnormalities are identified. Lung volumes are low.          Core Measures & Quality Metrics    ASSESSMENT AND PLAN:   * Choledocholithiasis with acute cholecystitis- (present on admission)  Assessment & Plan  Choledocholithiasis  ERCP with extraction of 2 stones  9/22 Bilirubin remains elevated  9/24 Bilirubin trend down   Plan lap liyah    - Lap liyah today    Discussed patient condition with Family, RN, Patient, and general surgery, Dr. Sepulveda.     show

## 2023-09-24 NOTE — PROGRESS NOTES
[facility] Medicine Daily Progress Note    Date of Service  9/23/2023    Chief Complaint  Matti Olguin is a 73 y.o. male admitted 9/20/2023 with abdominal pain, jaundice.    Hospital Course  Matti Olguin is a 73 y.o. male who presented 9/20/2023 with complete heart block s/p pacer, aortic stenosis s/p mechanical valve replacement at 38 years of age and redo with bioprosthetic valve replacement in 2012 (on daily asa only), spontaneous subdural hemorrhage on DAPT, CVA who presented to [facility] 9/20/2023 with epigastric pain      Has had RUQ and epigastric pain intermittently for several weeks to months, which became constant and worsened over last 4 days. Quality is described as a dull ache. He also noticed that he was developing yellowed skin and eyes and stool has been light colored, foul smelling, and floating. Previously was on DAPT until 1/2022 when he developed a spontaneous SDH, and now is just on aspirin 81mg daily. Does drink a 6 pack of beer about 3-4 times per week.      In ED, he was initially febrile but no leukocytosis or tachycardia, BP borderline with SBPs in 90s, and given 1 liter of LR. Labs notable for , , , lipase 192, and bilirubin 14.6. CT concerning for choledocholithiasis and probable cholecystitis. General surgery consulted and evaluated at bedside. Patient admitted for GI consultation and medical stabilization prior to planning cholecystectomy.      Interval Problem Update  9/22: S/p ERCp with two stones removed, discussed with Dr. Anne and her CHUY Brittani Crooks. Discussed with surgery Dr. Sepulveda & JARED Villa, and they would like to watch patient for a couple days prior to surgery, tentatively planned for Sunday. Concerned about LFT's/T bili still being high, queried cardiac stability for surgery. Mildly elevated troponin on admission, will repeat and obtain an echo as well.   Patient feeling much improved, he was sitting up at  edge of bed today working on a word search, no pain, tolerating PO intake, pleasant mood very appreciative of care he's received. Vitals WNL and stable.   Labs show pancytopenia, worsening thrombocytopenia down to 58, with it so low and trending down will hold on DVT PPX today and check CBC overnight for trend. Patient looks to be clinically improving and quite well despite the lab abnormalities looking quite abnormal.  9/23:  given severe AS on echo, asked cardiology to assess risk for surgery.  Cleared for lap liyah.  Patient will need follow up with cardiology for TAVR evaluation.  Also, low platelets, discussed risks with patient and wife at bedside for bleeding, understands no more alcohol in future.  CKD also present.  Added laxatives for abdominal distention, no ascites noted.    I have discussed this patient's plan of care and discharge plan at IDT rounds today with Case Management, Nursing, Nursing leadership, and other members of the IDT team.     Consultants/Specialty  general surgery and GI  Cardiology    Code Status  Full Code    Disposition  The patient is not medically cleared for discharge to home or a post-acute facility.  Anticipate discharge to: home with close outpatient follow-up    I have placed the appropriate orders for post-discharge needs.    Review of Systems  Review of Systems   Constitutional:  Negative for chills, diaphoresis, fever and malaise/fatigue.   HENT:  Negative for congestion and sore throat.    Eyes:  Negative for pain and discharge.   Respiratory:  Negative for cough, hemoptysis, sputum production, shortness of breath and wheezing.    Cardiovascular:  Negative for chest pain, palpitations, claudication and leg swelling.   Gastrointestinal:  Negative for abdominal pain, constipation, diarrhea, melena, nausea and vomiting.   Genitourinary:  Negative for dysuria, frequency and urgency.   Musculoskeletal:  Negative for back pain, joint pain, myalgias and neck pain.   Skin:   Negative for itching and rash.   Neurological:  Negative for dizziness, sensory change, speech change, focal weakness, loss of consciousness, weakness and headaches.   Endo/Heme/Allergies:  Does not bruise/bleed easily.   Psychiatric/Behavioral:  Negative for depression, substance abuse and suicidal ideas.       Physical Exam  Temp:  [36.6 °C (97.9 °F)-37 °C (98.6 °F)] 36.6 °C (97.9 °F)  Pulse:  [58-69] 61  Resp:  [16-18] 16  BP: (102-131)/(52-67) 114/61  SpO2:  [90 %-93 %] 93 %    Physical Exam  Vitals and nursing note reviewed.   Constitutional:       General: He is not in acute distress.     Appearance: Normal appearance. He is not diaphoretic.   HENT:      Head: Normocephalic and atraumatic.      Nose: Nose normal.      Mouth/Throat:      Mouth: Mucous membranes are moist.      Pharynx: No oropharyngeal exudate.   Eyes:      General: No scleral icterus.        Right eye: No discharge.         Left eye: No discharge.      Conjunctiva/sclera: Conjunctivae normal.      Pupils: Pupils are equal, round, and reactive to light.   Neck:      Thyroid: No thyromegaly.      Vascular: No JVD.      Trachea: No tracheal deviation.   Cardiovascular:      Rate and Rhythm: Normal rate and regular rhythm.      Heart sounds: Murmur heard.      No friction rub. No gallop.   Pulmonary:      Effort: Pulmonary effort is normal. No respiratory distress.      Breath sounds: Normal breath sounds. No wheezing or rales.   Chest:      Chest wall: No tenderness.   Abdominal:      General: Bowel sounds are normal. There is distension.      Palpations: Abdomen is soft. There is no mass.      Tenderness: There is no abdominal tenderness. There is no guarding or rebound.      Comments: No ascites noted.  Distended from constipation likely.   Musculoskeletal:         General: No tenderness. Normal range of motion.      Cervical back: Normal range of motion and neck supple.   Lymphadenopathy:      Cervical: No cervical adenopathy.   Skin:      General: Skin is warm and dry.      Findings: No erythema or rash.   Neurological:      General: No focal deficit present.      Mental Status: He is alert and oriented to person, place, and time.      Cranial Nerves: No cranial nerve deficit.      Motor: No abnormal muscle tone.   Psychiatric:         Mood and Affect: Mood normal.         Behavior: Behavior normal.         Thought Content: Thought content normal.         Judgment: Judgment normal.       Fluids    Intake/Output Summary (Last 24 hours) at 9/23/2023 1934  Last data filed at 9/23/2023 1300  Gross per 24 hour   Intake 1312.4 ml   Output --   Net 1312.4 ml       Laboratory  Recent Labs     09/22/23  0146 09/23/23  0552   WBC 2.3* 5.5   RBC 3.19* 3.24*   HEMOGLOBIN 11.0* 11.1*   HEMATOCRIT 33.8* 34.3*   .0* 105.9*   MCH 34.5* 34.3*   MCHC 32.5 32.4   RDW 59.3* 59.3*   PLATELETCT 58* 66*     Recent Labs     09/22/23  0146 09/23/23  0552   SODIUM 136 138   POTASSIUM 3.9 3.6   CHLORIDE 103 104   CO2 19* 23   GLUCOSE 161* 118*   BUN 47* 60*   CREATININE 1.92* 1.88*   CALCIUM 8.0* 8.0*     Recent Labs     09/21/23  0245   INR 1.17*               Imaging  EC-ECHOCARDIOGRAM COMPLETE W/ CONT   Final Result      DX-PORTABLE FLUORO > 1 HOUR   Final Result      1.  Endoscopic cholangiography showing patent common duct with no appreciable persisting filling defect, stricture, and satisfactory passive drainage following removal of the endoscope.      CT-ABDOMEN-PELVIS WITH   Final Result      1.  Cholelithiasis and choledocholithiasis with findings suspicious for cholecystitis   2.  Splenomegaly      DX-CHEST-PORTABLE (1 VIEW)   Final Result      No acute cardiac or pulmonary abnormalities are identified. Lung volumes are low.           Assessment/Plan  * Choledocholithiasis with acute cholecystitis- (present on admission)  Assessment & Plan  Patient has elevated LFTs and bilirubin  IV Zosyn  Pain control with oral and IV narcotics  N.p.o.  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 Last coronary angiogram free of disease, however this was in 2013. Further stratification as indicated after ERCP. Follows Dr. Thibodeaux for cardiology outpatient.    Stage 3b chronic kidney disease (HCC)- (present on admission)  Assessment & Plan  CKD, unknown to patient  Did discuss with patient and wife at bedside.  Avoid IV contrast if possible.    Thrombocytopenia (HCC)- (present on admission)  Assessment & Plan  2/2 hepatic steatosis, former heavy alcohol use,  Currently only occasional.  Understands risks for bleeding.  Check INR/PT in a.m.    Severe aortic stenosis- (present on admission)  Assessment & Plan  Echo revealed severe AS s/p bioprosthetic valve replacement in 2014 from mechanical valve placed in 38 years of age.  Appreciate cardiology consult for clearance for surgery  Will need evaluation outpatient for TAVR    Acute liver failure- (present on admission)  Assessment & Plan  In setting of choledocholithiasis and alcohol use  ERCP pending  Encourage alcohol cessation      Cardiac pacemaker - Medtronic- (present on admission)  Assessment & Plan  Monitor on telemetry    Primary hypertension- (present on admission)  Assessment & Plan  Continue current home medications  IV as needed medications have been ordered           VTE prophylaxis:   SCDs/TEDs      I have performed a physical exam and reviewed and updated ROS and Plan today (9/23/2023). In review of yesterday's note (9/22/2023), there are no changes except as documented above.    Total time spent 51 minutes. I spent greater than 50% of the time for patient care, counseling, and coordination on this date, including unit/floor time, and face-to-face time with the patient as per interval events, my own review of patient's imaging and lab analysis and developing my assessment and plan  above.

## 2023-09-25 PROBLEM — N17.9 AKI (ACUTE KIDNEY INJURY) (HCC): Status: ACTIVE | Noted: 2023-09-23

## 2023-09-25 LAB
ALBUMIN SERPL BCP-MCNC: 3 G/DL (ref 3.2–4.9)
ALBUMIN/GLOB SERPL: 1.1 G/DL
ALP SERPL-CCNC: 159 U/L (ref 30–99)
ALT SERPL-CCNC: 209 U/L (ref 2–50)
ANION GAP SERPL CALC-SCNC: 10 MMOL/L (ref 7–16)
AST SERPL-CCNC: 261 U/L (ref 12–45)
BASOPHILS # BLD AUTO: 0.3 % (ref 0–1.8)
BASOPHILS # BLD: 0.02 K/UL (ref 0–0.12)
BILIRUB SERPL-MCNC: 8 MG/DL (ref 0.1–1.5)
BUN SERPL-MCNC: 55 MG/DL (ref 8–22)
CALCIUM ALBUM COR SERPL-MCNC: 8.5 MG/DL (ref 8.5–10.5)
CALCIUM SERPL-MCNC: 7.7 MG/DL (ref 8.5–10.5)
CHLORIDE SERPL-SCNC: 105 MMOL/L (ref 96–112)
CO2 SERPL-SCNC: 20 MMOL/L (ref 20–33)
CREAT SERPL-MCNC: 1.6 MG/DL (ref 0.5–1.4)
EOSINOPHIL # BLD AUTO: 0 K/UL (ref 0–0.51)
EOSINOPHIL NFR BLD: 0 % (ref 0–6.9)
ERYTHROCYTE [DISTWIDTH] IN BLOOD BY AUTOMATED COUNT: 60.6 FL (ref 35.9–50)
GFR SERPLBLD CREATININE-BSD FMLA CKD-EPI: 46 ML/MIN/1.73 M 2
GLOBULIN SER CALC-MCNC: 2.7 G/DL (ref 1.9–3.5)
GLUCOSE SERPL-MCNC: 178 MG/DL (ref 65–99)
HCT VFR BLD AUTO: 30 % (ref 42–52)
HGB BLD-MCNC: 9.6 G/DL (ref 14–18)
IMM GRANULOCYTES # BLD AUTO: 0.02 K/UL (ref 0–0.11)
IMM GRANULOCYTES NFR BLD AUTO: 0.3 % (ref 0–0.9)
LIPASE SERPL-CCNC: 174 U/L (ref 11–82)
LYMPHOCYTES # BLD AUTO: 0.26 K/UL (ref 1–4.8)
LYMPHOCYTES NFR BLD: 4.3 % (ref 22–41)
MCH RBC QN AUTO: 33.4 PG (ref 27–33)
MCHC RBC AUTO-ENTMCNC: 32 G/DL (ref 32.3–36.5)
MCV RBC AUTO: 104.5 FL (ref 81.4–97.8)
MONOCYTES # BLD AUTO: 0.72 K/UL (ref 0–0.85)
MONOCYTES NFR BLD AUTO: 12 % (ref 0–13.4)
NEUTROPHILS # BLD AUTO: 4.98 K/UL (ref 1.82–7.42)
NEUTROPHILS NFR BLD: 83.1 % (ref 44–72)
NRBC # BLD AUTO: 0 K/UL
NRBC BLD-RTO: 0 /100 WBC (ref 0–0.2)
PATHOLOGY CONSULT NOTE: NORMAL
PLATELET # BLD AUTO: 63 K/UL (ref 164–446)
PLATELETS.RETICULATED NFR BLD AUTO: 21.7 % (ref 0.6–13.1)
POTASSIUM SERPL-SCNC: 4.3 MMOL/L (ref 3.6–5.5)
PROT SERPL-MCNC: 5.7 G/DL (ref 6–8.2)
RBC # BLD AUTO: 2.87 M/UL (ref 4.7–6.1)
SODIUM SERPL-SCNC: 135 MMOL/L (ref 135–145)
WBC # BLD AUTO: 6 K/UL (ref 4.8–10.8)

## 2023-09-25 PROCEDURE — 700102 HCHG RX REV CODE 250 W/ 637 OVERRIDE(OP)

## 2023-09-25 PROCEDURE — 36415 COLL VENOUS BLD VENIPUNCTURE: CPT

## 2023-09-25 PROCEDURE — 700105 HCHG RX REV CODE 258: Performed by: HOSPITALIST

## 2023-09-25 PROCEDURE — A9270 NON-COVERED ITEM OR SERVICE: HCPCS

## 2023-09-25 PROCEDURE — A9270 NON-COVERED ITEM OR SERVICE: HCPCS | Performed by: HOSPITALIST

## 2023-09-25 PROCEDURE — 770020 HCHG ROOM/CARE - TELE (206)

## 2023-09-25 PROCEDURE — 83690 ASSAY OF LIPASE: CPT

## 2023-09-25 PROCEDURE — 99233 SBSQ HOSP IP/OBS HIGH 50: CPT | Performed by: HOSPITALIST

## 2023-09-25 PROCEDURE — 85055 RETICULATED PLATELET ASSAY: CPT

## 2023-09-25 PROCEDURE — 700102 HCHG RX REV CODE 250 W/ 637 OVERRIDE(OP): Performed by: HOSPITALIST

## 2023-09-25 PROCEDURE — 700105 HCHG RX REV CODE 258

## 2023-09-25 PROCEDURE — 99024 POSTOP FOLLOW-UP VISIT: CPT | Performed by: SURGERY

## 2023-09-25 PROCEDURE — 700111 HCHG RX REV CODE 636 W/ 250 OVERRIDE (IP): Mod: JZ | Performed by: HOSPITALIST

## 2023-09-25 PROCEDURE — 85025 COMPLETE CBC W/AUTO DIFF WBC: CPT

## 2023-09-25 PROCEDURE — 80053 COMPREHEN METABOLIC PANEL: CPT

## 2023-09-25 RX ORDER — CALCIUM CARBONATE 500 MG/1
500 TABLET, CHEWABLE ORAL 3 TIMES DAILY PRN
Status: DISCONTINUED | OUTPATIENT
Start: 2023-09-25 | End: 2023-09-27 | Stop reason: HOSPADM

## 2023-09-25 RX ORDER — ASPIRIN 81 MG/1
81 TABLET ORAL DAILY
Status: DISCONTINUED | OUTPATIENT
Start: 2023-09-25 | End: 2023-09-27 | Stop reason: HOSPADM

## 2023-09-25 RX ORDER — SODIUM CHLORIDE 9 MG/ML
INJECTION, SOLUTION INTRAVENOUS CONTINUOUS
Status: DISCONTINUED | OUTPATIENT
Start: 2023-09-25 | End: 2023-09-25

## 2023-09-25 RX ADMIN — CARVEDILOL 50 MG: 25 TABLET, FILM COATED ORAL at 17:31

## 2023-09-25 RX ADMIN — PIPERACILLIN AND TAZOBACTAM 3.38 G: 3; .375 INJECTION, POWDER, FOR SOLUTION INTRAVENOUS at 07:35

## 2023-09-25 RX ADMIN — ASPIRIN 81 MG: 81 TABLET, COATED ORAL at 15:30

## 2023-09-25 RX ADMIN — PREGABALIN 200 MG: 100 CAPSULE ORAL at 17:31

## 2023-09-25 RX ADMIN — OXYCODONE HYDROCHLORIDE 10 MG: 10 TABLET ORAL at 17:31

## 2023-09-25 RX ADMIN — ATORVASTATIN CALCIUM 80 MG: 80 TABLET, FILM COATED ORAL at 20:31

## 2023-09-25 RX ADMIN — CARVEDILOL 50 MG: 25 TABLET, FILM COATED ORAL at 07:39

## 2023-09-25 RX ADMIN — OXYCODONE HYDROCHLORIDE 5 MG: 5 TABLET ORAL at 13:02

## 2023-09-25 RX ADMIN — ANTACID TABLETS 500 MG: 500 TABLET, CHEWABLE ORAL at 20:49

## 2023-09-25 RX ADMIN — PREGABALIN 200 MG: 100 CAPSULE ORAL at 05:25

## 2023-09-25 RX ADMIN — OXYCODONE HYDROCHLORIDE 10 MG: 10 TABLET ORAL at 20:31

## 2023-09-25 RX ADMIN — ZOLPIDEM TARTRATE 5 MG: 5 TABLET ORAL at 20:32

## 2023-09-25 RX ADMIN — SODIUM CHLORIDE: 9 INJECTION, SOLUTION INTRAVENOUS at 00:36

## 2023-09-25 ASSESSMENT — ENCOUNTER SYMPTOMS
LOSS OF CONSCIOUSNESS: 0
BACK PAIN: 0
NAUSEA: 0
DIARRHEA: 0
PALPITATIONS: 0
SORE THROAT: 0
DEPRESSION: 0
BRUISES/BLEEDS EASILY: 0
FEVER: 0
CLAUDICATION: 0
MYALGIAS: 0
CONSTIPATION: 0
WHEEZING: 0
SENSORY CHANGE: 0
FOCAL WEAKNESS: 0
NECK PAIN: 0
HEMOPTYSIS: 0
ABDOMINAL PAIN: 0
SPEECH CHANGE: 0
WEAKNESS: 0
COUGH: 0
SHORTNESS OF BREATH: 0
EYE DISCHARGE: 0
DIAPHORESIS: 0
DIZZINESS: 0
VOMITING: 0
SPUTUM PRODUCTION: 0
HEADACHES: 0
EYE PAIN: 0
CHILLS: 0

## 2023-09-25 ASSESSMENT — LIFESTYLE VARIABLES: SUBSTANCE_ABUSE: 0

## 2023-09-25 ASSESSMENT — FIBROSIS 4 INDEX: FIB4 SCORE: 20.35

## 2023-09-25 NOTE — PROGRESS NOTES
DATE: 9/25/2023    Post Operative Day 1 laparoscopic cholecystectomy.    INTERVAL EVENTS:  Laparoscopic cholecystectomy.    PHYSICAL EXAMINATION:  Vital Signs: /64   Pulse 62   Temp 37 °C (98.6 °F) (Temporal)   Resp 16   Ht 1.829 m (6')   Wt 118 kg (259 lb 11.2 oz)   SpO2 94%     Alert.  Scleral icterus.  Abdomen is soft. Trocar sites clean and dry.    LABORATORY VALUES:  Recent Labs     09/23/23  0552 09/24/23  0154 09/25/23  0146   WBC 5.5 4.7* 6.0   RBC 3.24* 3.05* 2.87*   HEMOGLOBIN 11.1* 10.5* 9.6*   HEMATOCRIT 34.3* 32.0* 30.0*   .9* 104.9* 104.5*   MCH 34.3* 34.4* 33.4*   MCHC 32.4 32.8 32.0*   RDW 59.3* 59.0* 60.6*   PLATELETCT 66* 63* 63*     Recent Labs     09/23/23  0552 09/24/23  0154 09/25/23  0146   ASTSGOT 191* 169* 261*   ALTSGPT 199* 174* 209*   TBILIRUBIN 10.3* 7.8* 8.0*   ALKPHOSPHAT 187* 177* 159*   GLOBULIN 3.0 2.9 2.7   INR  --  1.10  --         ASSESSMENT AND PLAN:  Satisfactory progress following laparoscopic cholecystectomy.  Advance diet as tolerated.  Mobilize aggressively.  Okay to resume aspirin from a surgical standpoint.  Trend serum bilirubin.       ____________________________________     Cj Rodríguez M.D.    DD: 9/25/2023  9:08 AM

## 2023-09-25 NOTE — PROGRESS NOTES
Assumed care of patient at 1900. Report received from day RN. Pt sitting at EOB with family at bedside. Tele Monitor in place. Pt steady on feet. Pt awaiting clears dinner tray all other needs met at this time

## 2023-09-25 NOTE — CARE PLAN
The patient is Stable - Low risk of patient condition declining or worsening    Shift Goals  Clinical Goals: post op care,ADAT  Patient Goals: rest  Family Goals: LALA    Progress made toward(s) clinical / shift goals:    Problem: Pain - Standard  Goal: Alleviation of pain or a reduction in pain to the patient’s comfort goal  Outcome: Progressing     Problem: Knowledge Deficit - Standard  Goal: Patient and family/care givers will demonstrate understanding of plan of care, disease process/condition, diagnostic tests and medications  Outcome: Progressing

## 2023-09-26 LAB
ALBUMIN SERPL BCP-MCNC: 3.2 G/DL (ref 3.2–4.9)
ALBUMIN/GLOB SERPL: 1.2 G/DL
ALP SERPL-CCNC: 171 U/L (ref 30–99)
ALT SERPL-CCNC: 229 U/L (ref 2–50)
ANION GAP SERPL CALC-SCNC: 9 MMOL/L (ref 7–16)
ANISOCYTOSIS BLD QL SMEAR: ABNORMAL
AST SERPL-CCNC: 289 U/L (ref 12–45)
BACTERIA BLD CULT: NORMAL
BACTERIA BLD CULT: NORMAL
BASOPHILS # BLD AUTO: 0 % (ref 0–1.8)
BASOPHILS # BLD: 0 K/UL (ref 0–0.12)
BILIRUB SERPL-MCNC: 6.7 MG/DL (ref 0.1–1.5)
BUN SERPL-MCNC: 49 MG/DL (ref 8–22)
BURR CELLS BLD QL SMEAR: NORMAL
CALCIUM ALBUM COR SERPL-MCNC: 8.2 MG/DL (ref 8.5–10.5)
CALCIUM SERPL-MCNC: 7.6 MG/DL (ref 8.5–10.5)
CHLORIDE SERPL-SCNC: 105 MMOL/L (ref 96–112)
CO2 SERPL-SCNC: 23 MMOL/L (ref 20–33)
CREAT SERPL-MCNC: 1.7 MG/DL (ref 0.5–1.4)
CREAT UR-MCNC: 123.2 MG/DL
EOSINOPHIL # BLD AUTO: 0.07 K/UL (ref 0–0.51)
EOSINOPHIL NFR BLD: 0.9 % (ref 0–6.9)
ERYTHROCYTE [DISTWIDTH] IN BLOOD BY AUTOMATED COUNT: 62.4 FL (ref 35.9–50)
GFR SERPLBLD CREATININE-BSD FMLA CKD-EPI: 42 ML/MIN/1.73 M 2
GLOBULIN SER CALC-MCNC: 2.7 G/DL (ref 1.9–3.5)
GLUCOSE SERPL-MCNC: 115 MG/DL (ref 65–99)
HCT VFR BLD AUTO: 29.7 % (ref 42–52)
HGB BLD-MCNC: 9.4 G/DL (ref 14–18)
LYMPHOCYTES # BLD AUTO: 0.56 K/UL (ref 1–4.8)
LYMPHOCYTES NFR BLD: 7.1 % (ref 22–41)
MACROCYTES BLD QL SMEAR: ABNORMAL
MANUAL DIFF BLD: NORMAL
MCH RBC QN AUTO: 33.8 PG (ref 27–33)
MCHC RBC AUTO-ENTMCNC: 31.6 G/DL (ref 32.3–36.5)
MCV RBC AUTO: 106.8 FL (ref 81.4–97.8)
MONOCYTES # BLD AUTO: 0.36 K/UL (ref 0–0.85)
MONOCYTES NFR BLD AUTO: 4.5 % (ref 0–13.4)
MORPHOLOGY BLD-IMP: NORMAL
NEUTROPHILS # BLD AUTO: 6.91 K/UL (ref 1.82–7.42)
NEUTROPHILS NFR BLD: 87.5 % (ref 44–72)
NRBC # BLD AUTO: 0 K/UL
NRBC BLD-RTO: 0 /100 WBC (ref 0–0.2)
PLATELET # BLD AUTO: 62 K/UL (ref 164–446)
PLATELET BLD QL SMEAR: NORMAL
PLATELETS.RETICULATED NFR BLD AUTO: 26.9 % (ref 0.6–13.1)
POIKILOCYTOSIS BLD QL SMEAR: NORMAL
POTASSIUM SERPL-SCNC: 4.2 MMOL/L (ref 3.6–5.5)
POTASSIUM UR-SCNC: 42 MMOL/L
PROT SERPL-MCNC: 5.9 G/DL (ref 6–8.2)
PROT UR-MCNC: 31 MG/DL (ref 0–15)
PROT/CREAT UR: 252 MG/G (ref 15–68)
RBC # BLD AUTO: 2.78 M/UL (ref 4.7–6.1)
RBC BLD AUTO: PRESENT
SIGNIFICANT IND 70042: NORMAL
SIGNIFICANT IND 70042: NORMAL
SITE SITE: NORMAL
SITE SITE: NORMAL
SODIUM SERPL-SCNC: 137 MMOL/L (ref 135–145)
SODIUM UR-SCNC: <20 MMOL/L
SOURCE SOURCE: NORMAL
SOURCE SOURCE: NORMAL
WBC # BLD AUTO: 7.9 K/UL (ref 4.8–10.8)

## 2023-09-26 PROCEDURE — 700102 HCHG RX REV CODE 250 W/ 637 OVERRIDE(OP): Performed by: HOSPITALIST

## 2023-09-26 PROCEDURE — 99233 SBSQ HOSP IP/OBS HIGH 50: CPT | Performed by: STUDENT IN AN ORGANIZED HEALTH CARE EDUCATION/TRAINING PROGRAM

## 2023-09-26 PROCEDURE — 84300 ASSAY OF URINE SODIUM: CPT

## 2023-09-26 PROCEDURE — 85007 BL SMEAR W/DIFF WBC COUNT: CPT

## 2023-09-26 PROCEDURE — 770020 HCHG ROOM/CARE - TELE (206)

## 2023-09-26 PROCEDURE — 90677 PCV20 VACCINE IM: CPT | Performed by: STUDENT IN AN ORGANIZED HEALTH CARE EDUCATION/TRAINING PROGRAM

## 2023-09-26 PROCEDURE — 99024 POSTOP FOLLOW-UP VISIT: CPT | Performed by: NURSE PRACTITIONER

## 2023-09-26 PROCEDURE — 84133 ASSAY OF URINE POTASSIUM: CPT

## 2023-09-26 PROCEDURE — 90471 IMMUNIZATION ADMIN: CPT

## 2023-09-26 PROCEDURE — A9270 NON-COVERED ITEM OR SERVICE: HCPCS | Performed by: HOSPITALIST

## 2023-09-26 PROCEDURE — 85055 RETICULATED PLATELET ASSAY: CPT

## 2023-09-26 PROCEDURE — 80053 COMPREHEN METABOLIC PANEL: CPT

## 2023-09-26 PROCEDURE — 81001 URINALYSIS AUTO W/SCOPE: CPT

## 2023-09-26 PROCEDURE — 700111 HCHG RX REV CODE 636 W/ 250 OVERRIDE (IP): Performed by: STUDENT IN AN ORGANIZED HEALTH CARE EDUCATION/TRAINING PROGRAM

## 2023-09-26 PROCEDURE — 85025 COMPLETE CBC W/AUTO DIFF WBC: CPT

## 2023-09-26 PROCEDURE — 82570 ASSAY OF URINE CREATININE: CPT

## 2023-09-26 PROCEDURE — 84156 ASSAY OF PROTEIN URINE: CPT

## 2023-09-26 PROCEDURE — 3E0234Z INTRODUCTION OF SERUM, TOXOID AND VACCINE INTO MUSCLE, PERCUTANEOUS APPROACH: ICD-10-PCS | Performed by: STUDENT IN AN ORGANIZED HEALTH CARE EDUCATION/TRAINING PROGRAM

## 2023-09-26 RX ADMIN — OXYCODONE HYDROCHLORIDE 10 MG: 10 TABLET ORAL at 09:18

## 2023-09-26 RX ADMIN — PNEUMOCOCCAL 20-VALENT CONJUGATE VACCINE 0.5 ML
2.2; 2.2; 2.2; 2.2; 2.2; 2.2; 2.2; 2.2; 2.2; 2.2; 2.2; 2.2; 2.2; 2.2; 2.2; 2.2; 4.4; 2.2; 2.2; 2.2 INJECTION, SUSPENSION INTRAMUSCULAR at 12:03

## 2023-09-26 RX ADMIN — OXYCODONE HYDROCHLORIDE 10 MG: 10 TABLET ORAL at 20:24

## 2023-09-26 RX ADMIN — ASPIRIN 81 MG: 81 TABLET, COATED ORAL at 05:46

## 2023-09-26 RX ADMIN — CARVEDILOL 50 MG: 25 TABLET, FILM COATED ORAL at 08:24

## 2023-09-26 RX ADMIN — CARVEDILOL 50 MG: 25 TABLET, FILM COATED ORAL at 16:33

## 2023-09-26 RX ADMIN — ATORVASTATIN CALCIUM 80 MG: 80 TABLET, FILM COATED ORAL at 20:24

## 2023-09-26 RX ADMIN — OXYCODONE HYDROCHLORIDE 10 MG: 10 TABLET ORAL at 05:46

## 2023-09-26 RX ADMIN — PREGABALIN 200 MG: 100 CAPSULE ORAL at 16:30

## 2023-09-26 RX ADMIN — PREGABALIN 200 MG: 100 CAPSULE ORAL at 05:46

## 2023-09-26 RX ADMIN — OXYCODONE HYDROCHLORIDE 10 MG: 10 TABLET ORAL at 16:33

## 2023-09-26 ASSESSMENT — ENCOUNTER SYMPTOMS: ABDOMINAL PAIN: 0

## 2023-09-26 ASSESSMENT — PAIN DESCRIPTION - PAIN TYPE: TYPE: SURGICAL PAIN

## 2023-09-26 ASSESSMENT — FIBROSIS 4 INDEX: FIB4 SCORE: 21.87

## 2023-09-26 NOTE — PROGRESS NOTES
Hospital Medicine Daily Progress Note    Date of Service  9/26/2023    Chief Complaint  Matti Olguin is a 73 y.o. male admitted 9/20/2023 with abdominal pain and jaundice    Hospital Course  Matti Olguin is a 73 y.o. male who presented 9/20/2023 with complete heart block s/p pacer, aortic stenosis s/p mechanical valve replacement at 38 years of age and redo with bioprosthetic valve replacement in 2012 (on daily asa only), spontaneous subdural hemorrhage on DAPT, CVA who presented to [facility] 9/20/2023 with epigastric pain    CT concerning for choledocholithiasis and probable cholecystitis. General surgery consulted and evaluated at bedside. Patient admitted for GI consultation and medical stabilization prior to planning cholecystectomy.   S/p ERCp with two stones removed on 9/21 . S/p laparoscopic cholecystectomy on 9/24/2023.  Noted to have severe aortic stenosis on echocardiogram.  Cards recommended outpatient follow-up for TAVR evaluation    Interval Problem Update  9/26/2023    Vitals management  Remain afebrile  Patient AOx3  Clinically fair much better  Denies any discomfort  Eagerly waiting to go home    Labs reviewed, noted hemoglobin 9.5, platelet count 62,  Worsening BUN/creatinine 49/1.7, bilirubin level trended down to 6.7.        Case discussed with nephrology Dr. Schwartz regarding worsening renal function.  Nephrology eval, further recommendation    Renal ultrasound ordered  Repeat BMP in a.m. to monitor electrolytes and renal function  Repeat CBC in a.m. to monitor white count and hemoglobin   Urine protein 31, protein creatinine ratio 252      I have discussed this patient's plan of care and discharge plan at IDT rounds today with Case Management, Nursing, Nursing leadership, and other members of the IDT team.    Consultants/Specialty  cardiology, GI, and nephrology    Code Status  Full Code    Disposition  The patient is not medically cleared for discharge to  home or a post-acute facility.  Anticipate discharge to: home with close outpatient follow-up    I have placed the appropriate orders for post-discharge needs.    Review of Systems  Review of Systems   Constitutional:  Positive for malaise/fatigue.   Cardiovascular:  Negative for chest pain.   Gastrointestinal:  Negative for abdominal pain.        Physical Exam  Temp:  [36.6 °C (97.9 °F)-37.1 °C (98.8 °F)] 37.1 °C (98.8 °F)  Pulse:  [62-68] 63  Resp:  [16-20] 18  BP: (109-123)/(58-69) 109/63  SpO2:  [90 %-98 %] 91 %    Physical Exam  Constitutional:       Appearance: Normal appearance.   Cardiovascular:      Rate and Rhythm: Regular rhythm.      Pulses: Normal pulses.      Heart sounds: Normal heart sounds.   Pulmonary:      Effort: Pulmonary effort is normal.   Abdominal:      General: Abdomen is flat.   Musculoskeletal:      Right lower leg: No edema.      Left lower leg: Edema present.   Neurological:      General: No focal deficit present.      Mental Status: He is alert.         Fluids    Intake/Output Summary (Last 24 hours) at 9/26/2023 1445  Last data filed at 9/26/2023 1228  Gross per 24 hour   Intake 950 ml   Output 0 ml   Net 950 ml       Laboratory  Recent Labs     09/24/23 0154 09/25/23 0146 09/26/23 0109   WBC 4.7* 6.0 7.9   RBC 3.05* 2.87* 2.78*   HEMOGLOBIN 10.5* 9.6* 9.4*   HEMATOCRIT 32.0* 30.0* 29.7*   .9* 104.5* 106.8*   MCH 34.4* 33.4* 33.8*   MCHC 32.8 32.0* 31.6*   RDW 59.0* 60.6* 62.4*   PLATELETCT 63* 63* 62*     Recent Labs     09/24/23 0154 09/25/23 0146 09/26/23  0109   SODIUM 137 135 137   POTASSIUM 3.7 4.3 4.2   CHLORIDE 104 105 105   CO2 21 20 23   GLUCOSE 125* 178* 115*   BUN 61* 55* 49*   CREATININE 1.90* 1.60* 1.70*   CALCIUM 7.6* 7.7* 7.6*     Recent Labs     09/24/23 0154   APTT 36.3*   INR 1.10               Imaging  EC-ECHOCARDIOGRAM COMPLETE W/ CONT   Final Result      DX-PORTABLE FLUORO > 1 HOUR   Final Result      1.  Endoscopic cholangiography showing patent  common duct with no appreciable persisting filling defect, stricture, and satisfactory passive drainage following removal of the endoscope.      CT-ABDOMEN-PELVIS WITH   Final Result      1.  Cholelithiasis and choledocholithiasis with findings suspicious for cholecystitis   2.  Splenomegaly      DX-CHEST-PORTABLE (1 VIEW)   Final Result      No acute cardiac or pulmonary abnormalities are identified. Lung volumes are low.      US-RENAL    (Results Pending)        Assessment/Plan  * Choledocholithiasis with acute cholecystitis- (present on admission)  Assessment & Plan  Patient has elevated LFTs and bilirubin  IV Zosyn  Pain control with oral and IV narcotics  N.p.o.  General surgery and GI following  ERCP 9/22 9/24 lap cholecystectomy successful.  Follow LFTs and bilirubin.        DENNYS (acute kidney injury) (HCC)- (present on admission)  Assessment & Plan      Likely 2/2 IV contrast    9/26/2023    Avoid nephrotoxins, monitor inputs and outputs  Renal ultrasound  Nephrology following       Thrombocytopenia (HCC)- (present on admission)  Assessment & Plan  2/2 hepatic steatosis, former heavy alcohol use,  Currently only occasional.  Understands risks for bleeding.  Stable Hg post op.    Severe aortic stenosis- (present on admission)  Assessment & Plan  Echo revealed severe AS s/p bioprosthetic valve replacement in 2014 from mechanical valve placed in 38 years of age.  Appreciate cardiology consult for clearance for surgery  Will need evaluation outpatient for TAVR  2 g sodium fluid restricted diet 1500 mL.    Acute liver failure- (present on admission)  Assessment & Plan  In setting of choledocholithiasis and alcohol use  ERCP pending  Encourage alcohol cessation      Cardiac pacemaker - [employer]tr[employer]- (present on admission)  Assessment & Plan  Monitor on telemetry    Primary hypertension- (present on admission)  Assessment & Plan  Continue current home medications  IV as needed medications have been ordered            VTE prophylaxis: SCD    I have performed a physical exam and reviewed and updated ROS and Plan today (9/26/2023). In review of yesterday's note (9/25/2023), there are no changes except as documented above.         Greater than 51 minutes spent preparing to see patient (e.g. review of tests) obtaining and/or reviewing separately obtained history. Performing a medically appropriate examination and/ evaluation.  Counseling and educating the patient/family/caregiver.  Ordering medications, tests, or procedures.  Referring and communicating with other health care professionals.  Documenting clinical information in EPIC.  Independently interpreting results and communicating results to patient/family/caregiver.  Care coordination.

## 2023-09-26 NOTE — PROGRESS NOTES
DATE: 9/26/2023    Post Operative Day  2 laparoscopic cholecystectomy.    INTERVAL EVENTS:  Bilirubin continue downward trend  Creatinine trend up  Tolerating diet    PHYSICAL EXAMINATION:  Vital Signs: Blood Pressure 123/62   Pulse 66   Temperature 37.1 °C (98.8 °F) (Temporal)   Respiration 18   Height 1.829 m (6')   Weight 119 kg (262 lb 9.1 oz)   Oxygen Saturation 96%     Physical Exam  Vitals and nursing note reviewed.   Constitutional:       General: He is not in acute distress.  Eyes:      General: Scleral icterus (improving) present.   Abdominal:      General: There is no distension.      Palpations: Abdomen is soft.      Tenderness: There is no abdominal tenderness. There is no guarding.      Comments: Port site incisions approximated with surgical glue   Skin:     Coloration: Skin is jaundiced.      Findings: Lesion: improving.   Neurological:      Mental Status: He is alert.           LABORATORY VALUES:  Recent Labs     09/24/23 0154 09/25/23 0146 09/26/23 0109   WBC 4.7* 6.0 7.9   RBC 3.05* 2.87* 2.78*   HEMOGLOBIN 10.5* 9.6* 9.4*   HEMATOCRIT 32.0* 30.0* 29.7*   .9* 104.5* 106.8*   MCH 34.4* 33.4* 33.8*   MCHC 32.8 32.0* 31.6*   RDW 59.0* 60.6* 62.4*   PLATELETCT 63* 63* 62*     Recent Labs     09/24/23 0154 09/25/23 0146 09/26/23  0109   SODIUM 137 135 137   POTASSIUM 3.7 4.3 4.2   CHLORIDE 104 105 105   CO2 21 20 23   GLUCOSE 125* 178* 115*   BUN 61* 55* 49*   CREATININE 1.90* 1.60* 1.70*   CALCIUM 7.6* 7.7* 7.6*     Recent Labs     09/24/23  0154 09/25/23 0146 09/26/23  0109   ASTSGOT 169* 261* 289*   ALTSGPT 174* 209* 229*   TBILIRUBIN 7.8* 8.0* 6.7*   ALKPHOSPHAT 177* 159* 171*   GLOBULIN 2.9 2.7 2.7   INR 1.10  --   --               ASSESSMENT AND PLAN:  * Choledocholithiasis with acute cholecystitis- (present on admission)  Assessment & Plan  Choledocholithiasis  ERCP with extraction of 2 stones  9/22 Bilirubin remains elevated  9/24 Bilirubin trend down   Laparoscopic  cholecystectomy     - Cleared for discharge from surgical perspective  - May shower, no bathing or submerging water for at least 4 weeks  - No lifting greater than 10 pounds for 2 weeks  - If patient remains in-house will follow, continue to trend bilirubin if remains in house       ____________________________________     BISHNU Santos    DD: 9/26/2023  11:54 AM

## 2023-09-26 NOTE — CONSULTS
"[facility] -  CONSULTATION NOTE               Author: Ronak Soares M.D. Date & Time: 9/26/2023  10:53 AM       REASON FOR CONSULTATION:   - DENNYS    CHIEF COMPLAINT:   -  \"Presented on 9/20 with headache, jaundice and abdominal pain\"    HISTORY OF PRESENT ILLNESS:    This is a 73-year-old male with past medical history of hypertension, aortic stenosis s/p aortic valve replacement, complete heart block s/p pacemaker implantation, history of stroke who presented to the ED 9/20 with chief complaints of headaches, jaundice and right upper quadrant abdominal pain found to have choledocholithiasis with acute cholecystitis s/p ERCP 9/21 which was followed by laparoscopic cholecystectomy 9/24  Patient's creatinine on presentation was 1.26 9/20 with a bump up to 1.92 on 9/22.  Creatinine 9/26 1.70.  Of note patient was given IV contrast for imaging done on presentation 9/20.    Labs reviewed with sodium 137, potassium 4.2, chloride 105, bicarb 23, BUN 49 creatinine 1.70  GFR 42, corrected calcium 8.2  , , alkaline phosphatase 171, T. bili 6.7    Patient examined at bedside. Describes he is doing well. Denies any new SOB, however does endorse dyspnea on laying flat that he describes is present at his baseline.   No abdominal discomfort/pain. Describes his appetite and oral fluid/water intake is good.   No dysuria, burning/pain with urination, no frequency, no urgency.   No complaints of blood in urine.   Endorses drinking 1-2 times a week, 4-6 beers at a time.   NSAID use is infrequent, about once a week.       REVIEW OF SYSTEMS:    10 point ROS was performed and is as per HPI or otherwise negative    PAST MEDICAL HISTORY:   - Reviewed.  Includes a history of anticoagulation monitoring, complete heart block 10/14, dyslipidemia, hypertension, NSVT on pacer, s/p aortic valve replacement with bioprosthetic valve 2014, sleep apnea, stroke 2007    PAST SURGICAL HISTORY:   - Reviewed. History " of orthopedic surgery, aortic valve replacement, pacemaker insertion 2014    FAMILY HISTORY:   - Reviewed and non contributory to current illness.  Other family history includes a history of heart attack in his father and heart disease in his sister.    SOCIAL HISTORY:   - No tobacco  - Endorses current alcohol use, 4-6 beers 1-2 x a week   - No illicits    HOME MEDICATIONS:   - Reviewed and documented in chart    LABORATORY STUDIES:   - Reviewed and documented in chart    ALLERGIES:  Yellow jacket venom and Plavix [clopidogrel]    VS:  /62   Pulse 66   Temp 37.1 °C (98.8 °F) (Temporal)   Resp 18   Ht 1.829 m (6')   Wt 119 kg (262 lb 9.1 oz)   SpO2 96%   BMI 35.61 kg/m²   Physical Exam  Constitutional:       General: He is not in acute distress.     Appearance: He is not ill-appearing.   HENT:      Head: Normocephalic and atraumatic.      Right Ear: External ear normal.      Left Ear: External ear normal.      Nose: Nose normal. No congestion.      Mouth/Throat:      Mouth: Mucous membranes are moist.      Pharynx: No oropharyngeal exudate.   Eyes:      Extraocular Movements: Extraocular movements intact.      Pupils: Pupils are equal, round, and reactive to light.   Cardiovascular:      Rate and Rhythm: Normal rate.      Pulses: Normal pulses.   Pulmonary:      Effort: Pulmonary effort is normal. No respiratory distress.      Breath sounds: Normal breath sounds. No wheezing.   Abdominal:      General: Bowel sounds are normal. There is distension.      Palpations: Abdomen is soft.      Tenderness: There is no abdominal tenderness.   Musculoskeletal:         General: Normal range of motion.      Right lower leg: Edema present.      Left lower leg: Edema present.   Skin:     General: Skin is warm.      Capillary Refill: Capillary refill takes less than 2 seconds.   Neurological:      Mental Status: He is alert and oriented to person, place, and time.   Psychiatric:         Mood and Affect: Mood normal.          FLUID BALANCE:  In: 1460 [P.O.:1460]  Out: 0     LABS:  Recent Labs     09/24/23  0154 09/25/23  0146 09/26/23  0109   SODIUM 137 135 137   POTASSIUM 3.7 4.3 4.2   CHLORIDE 104 105 105   CO2 21 20 23   GLUCOSE 125* 178* 115*   BUN 61* 55* 49*   CREATININE 1.90* 1.60* 1.70*   CALCIUM 7.6* 7.7* 7.6*        IMAGING:  - Imaging studies reviewed by me    IMPRESSION:  # DENNYS on CKD II likely secondary to contrast induced nephropathy   # CKD II   # Choledocholithiasis with acute cholecystitis s/p ERCP 9/22 followed by laparoscopic cholecystectomy 9/24. General surgery following   # Severe aortic stenosis s/p bioprosthetic valve placement   # Thrombocytopenia   # Complete heart block s/p pacemaker implantation   # Essential hypertension         PLAN:  - Repeat UA, clean catch  - CT at admission, reviewed, unremarkable kidneys. Follow renal US ordered by the primary team  - Continue to hold lisinopril and HCTZ considering DENNYS   - Consider lowering carvediolol to 25 mg bid due to ongoing soft pressures (ECHO with EF 55% 9/23)   - OK to hold off on further IV hydration at this time. May consider diuresis if signs of pulmonary edema arise   - Avoid nephrotoxins like NSAIDS  - Renally dose meds  - Daily labs.   - Strict I & Os

## 2023-09-26 NOTE — CARE PLAN
The patient is Stable - Low risk of patient condition declining or worsening    Shift Goals  Clinical Goals: monitor labs, nephrology consulted  Patient Goals: go home, pain control  Family Goals: LALA    Progress made toward(s) clinical / shift goals:  Yes    Patient is not progressing towards the following goals:

## 2023-09-26 NOTE — CARE PLAN
The patient is Stable - Low risk of patient condition declining or worsening    Shift Goals  Clinical Goals: advance diet / monitor labs  Patient Goals: go home / pain control  Family Goals: LALA    Progress made toward(s) clinical / shift goals:  Pt resting comfortably. Vitals within normal limits. Pain controlled with prn medications.     Patient is not progressing towards the following goals:

## 2023-09-26 NOTE — PROGRESS NOTES
[facility] Medicine Daily Progress Note    Date of Service  9/25/2023    Chief Complaint  Matti Olguin is a 73 y.o. male admitted 9/20/2023 with abdominal pain, jaundice.    Hospital Course  Matti Olguin is a 73 y.o. 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 Quality is described as a dull ache. He also noticed that he was developing yellowed skin and eyes and stool has been light colored, foul smelling, and floating. Previously was on DAPT until 1/2022 when he developed a spontaneous SDH, and now is just on aspirin 81mg daily. Does drink a 6 pack of beer about 3-4 times per week.      In ED, he was initially febrile but no leukocytosis or tachycardia, BP borderline with SBPs in 90s, and given 1 liter of LR. Labs notable for , , , lipase 192, and bilirubin 14.6. CT concerning for choledocholithiasis and probable cholecystitis. General surgery consulted and evaluated at bedside. Patient admitted for GI consultation and medical stabilization prior to planning cholecystectomy.      Interval Problem Update  9/22: S/p ERCp with two stones removed, discussed with Dr. Anne and her CHUY Brittani Crooks. Discussed with surgery Dr. Sepulveda & JARED Villa, and they would like to watch patient for a couple days prior to surgery, tentatively planned for Sunday. Concerned about LFT's/T bili still being high, queried cardiac stability for surgery. Mildly elevated troponin on admission, will repeat and obtain an echo as well.   Patient feeling much improved, he was sitting up at  edge of bed today working on a word search, no pain, tolerating PO intake, pleasant mood very appreciative of care he's received. Vitals WNL and stable.   Labs show pancytopenia, worsening thrombocytopenia down to 58, with it so low and trending down will hold on DVT PPX today and check CBC overnight for trend. Patient looks to be clinically improving and quite well despite the lab abnormalities looking quite abnormal.  9/23:  given severe AS on echo, asked cardiology to assess risk for surgery.  Cleared for lap liyah.  Patient will need follow up with cardiology for TAVR evaluation.  Also, low platelets, discussed risks with patient and wife at bedside for bleeding, understands no more alcohol in future.  CKD also present.  Added laxatives for abdominal distention, no ascites noted.  9/24: Postop lap cholecystectomy with multiple adhesions and severe inflammation noted in operation report.  Patient doing well postop.  No oozing at incision sites.  Jaundice decreasing.  Creatinine 1.90.  Started gentle IV fluids to see if can improve since he had normal creatinine on admission 1.26 prior to CT with IV contrast.  Avoid excessive fluids due to his severe aortic stenosis.  9/25: Patient doing well.  Sitting up.  No current pain in abdomen.  Lipase improved.  Total bilirubin remains elevated at 8.0.  Creatinine 1.90-1.60.  Hemoglobin stable at 9.6.  Paced rhythm 54-72.  Surgery has advance diet today.  Currently on room air.  Plan for surgery is to follow bilirubin and liver enzymes.    I have discussed this patient's plan of care and discharge plan at IDT rounds today with Case Management, Nursing, Nursing leadership, and other members of the IDT team.     Consultants/Specialty  general surgery and GI  Cardiology    Code Status  Full Code    Disposition  The patient is not medically cleared for discharge to home or a post-acute facility.  Anticipate discharge to: home with close outpatient follow-up    I have placed  the appropriate orders for post-discharge needs.    Review of Systems  Review of Systems   Constitutional:  Negative for chills, diaphoresis, fever and malaise/fatigue.   HENT:  Negative for congestion and sore throat.    Eyes:  Negative for pain and discharge.   Respiratory:  Negative for cough, hemoptysis, sputum production, shortness of breath and wheezing.    Cardiovascular:  Negative for chest pain, palpitations, claudication and leg swelling.   Gastrointestinal:  Negative for abdominal pain, constipation, diarrhea, melena, nausea and vomiting.   Genitourinary:  Negative for dysuria, frequency and urgency.   Musculoskeletal:  Negative for back pain, joint pain, myalgias and neck pain.   Skin:  Negative for itching and rash.   Neurological:  Negative for dizziness, sensory change, speech change, focal weakness, loss of consciousness, weakness and headaches.   Endo/Heme/Allergies:  Does not bruise/bleed easily.   Psychiatric/Behavioral:  Negative for depression, substance abuse and suicidal ideas.       Physical Exam  Temp:  [36.5 °C (97.7 °F)-37 °C (98.6 °F)] 37 °C (98.6 °F)  Pulse:  [56-68] 68  Resp:  [16] 16  BP: ()/(53-64) 114/64  SpO2:  [91 %-94 %] 93 %    Physical Exam  Vitals and nursing note reviewed.   Constitutional:       General: He is not in acute distress.     Appearance: Normal appearance. He is not diaphoretic.   HENT:      Head: Normocephalic and atraumatic.      Nose: Nose normal.      Mouth/Throat:      Mouth: Mucous membranes are moist.      Pharynx: No oropharyngeal exudate.   Eyes:      General: No scleral icterus.        Right eye: No discharge.         Left eye: No discharge.      Conjunctiva/sclera: Conjunctivae normal.      Pupils: Pupils are equal, round, and reactive to light.   Neck:      Thyroid: No thyromegaly.      Vascular: No JVD.      Trachea: No tracheal deviation.   Cardiovascular:      Rate and Rhythm: Normal rate and regular rhythm.      Heart sounds: Murmur heard.       No friction rub. No gallop.   Pulmonary:      Effort: Pulmonary effort is normal. No respiratory distress.      Breath sounds: Normal breath sounds. No wheezing or rales.   Chest:      Chest wall: No tenderness.   Abdominal:      General: Bowel sounds are normal. There is distension.      Palpations: Abdomen is soft. There is no mass.      Tenderness: There is no abdominal tenderness. There is no guarding or rebound.      Comments: No ascites noted.  Distended from constipation likely.   Musculoskeletal:         General: No tenderness. Normal range of motion.      Cervical back: Normal range of motion and neck supple.   Lymphadenopathy:      Cervical: No cervical adenopathy.   Skin:     General: Skin is warm and dry.      Findings: No erythema or rash.   Neurological:      General: No focal deficit present.      Mental Status: He is alert and oriented to person, place, and time.      Cranial Nerves: No cranial nerve deficit.      Motor: No abnormal muscle tone.   Psychiatric:         Mood and Affect: Mood normal.         Behavior: Behavior normal.         Thought Content: Thought content normal.         Judgment: Judgment normal.       Fluids    Intake/Output Summary (Last 24 hours) at 9/25/2023 1714  Last data filed at 9/25/2023 1600  Gross per 24 hour   Intake 1310 ml   Output 0 ml   Net 1310 ml       Laboratory  Recent Labs     09/23/23  0552 09/24/23  0154 09/25/23  0146   WBC 5.5 4.7* 6.0   RBC 3.24* 3.05* 2.87*   HEMOGLOBIN 11.1* 10.5* 9.6*   HEMATOCRIT 34.3* 32.0* 30.0*   .9* 104.9* 104.5*   MCH 34.3* 34.4* 33.4*   MCHC 32.4 32.8 32.0*   RDW 59.3* 59.0* 60.6*   PLATELETCT 66* 63* 63*     Recent Labs     09/23/23  0552 09/24/23  0154 09/25/23  0146   SODIUM 138 137 135   POTASSIUM 3.6 3.7 4.3   CHLORIDE 104 104 105   CO2 23 21 20   GLUCOSE 118* 125* 178*   BUN 60* 61* 55*   CREATININE 1.88* 1.90* 1.60*   CALCIUM 8.0* 7.6* 7.7*     Recent Labs     09/24/23  0154   APTT 36.3*   INR 1.10                Imaging  EC-ECHOCARDIOGRAM COMPLETE W/ CONT   Final Result      DX-PORTABLE FLUORO > 1 HOUR   Final Result      1.  Endoscopic cholangiography showing patent common duct with no appreciable persisting filling defect, stricture, and satisfactory passive drainage following removal of the endoscope.      CT-ABDOMEN-PELVIS WITH   Final Result      1.  Cholelithiasis and choledocholithiasis with findings suspicious for cholecystitis   2.  Splenomegaly      DX-CHEST-PORTABLE (1 VIEW)   Final Result      No acute cardiac or pulmonary abnormalities are identified. Lung volumes are low.           Assessment/Plan  * Choledocholithiasis with acute cholecystitis- (present on admission)  Assessment & Plan  Patient has elevated LFTs and bilirubin  IV Zosyn  Pain control with oral and IV narcotics  N.p.o.  General surgery and GI following  ERCP 9/22 9/24 lap cholecystectomy successful.  Follow LFTs and bilirubin.        DENNYS (acute kidney injury) (HCC)- (present on admission)  Assessment & Plan    Avoid IV contrast if possible.  Likely 2/2 IV contrast, was given CT with IV contrast on admission 9/20/2023 to evaluate right upper quadrant abdominal pain.  Gentle IV fluids NS at 50 an hour.  On admission 9/20, normal renal function and Cr.  Cr 1.90 to 1.60, given severe AS, avoid further IVFs, po fluids only.  BMP in a.m.    Thrombocytopenia (HCC)- (present on admission)  Assessment & Plan  2/2 hepatic steatosis, former heavy alcohol use,  Currently only occasional.  Understands risks for bleeding.  Stable Hg post op.    Severe aortic stenosis- (present on admission)  Assessment & Plan  Echo revealed severe AS s/p bioprosthetic valve replacement in 2014 from mechanical valve placed in 38 years of age.  Appreciate cardiology consult for clearance for surgery  Will need evaluation outpatient for TAVR  2 g sodium fluid restricted diet 1500 mL.    Acute liver failure- (present on admission)  Assessment & Plan  In setting of  choledocholithiasis and alcohol use  ERCP pending  Encourage alcohol cessation      Cardiac pacemaker - [employer]- (present on admission)  Assessment & Plan  Monitor on telemetry    Primary hypertension- (present on admission)  Assessment & Plan  Continue current home medications  IV as needed medications have been ordered           VTE prophylaxis:   SCDs/TEDs      I have performed a physical exam and reviewed and updated ROS and Plan today (9/25/2023). In review of yesterday's note (9/24/2023), there are no changes except as documented above.    Total time spent 51 minutes. I spent greater than 50% of the time for patient care, counseling, and coordination on this date, including unit/floor time, and face-to-face time with the patient as per interval events, my own review of patient's imaging and lab analysis and developing my assessment and plan above.

## 2023-09-27 ENCOUNTER — APPOINTMENT (OUTPATIENT)
Dept: RADIOLOGY | Facility: MEDICAL CENTER | Age: 73
DRG: 417 | End: 2023-09-27
Attending: STUDENT IN AN ORGANIZED HEALTH CARE EDUCATION/TRAINING PROGRAM
Payer: MEDICARE

## 2023-09-27 ENCOUNTER — PHARMACY VISIT (OUTPATIENT)
Dept: PHARMACY | Facility: MEDICAL CENTER | Age: 73
End: 2023-09-27
Payer: COMMERCIAL

## 2023-09-27 ENCOUNTER — PATIENT OUTREACH (OUTPATIENT)
Dept: SCHEDULING | Facility: IMAGING CENTER | Age: 73
End: 2023-09-27
Payer: MEDICARE

## 2023-09-27 VITALS
TEMPERATURE: 98.6 F | WEIGHT: 261.69 LBS | BODY MASS INDEX: 35.44 KG/M2 | HEIGHT: 72 IN | RESPIRATION RATE: 16 BRPM | OXYGEN SATURATION: 94 % | DIASTOLIC BLOOD PRESSURE: 63 MMHG | HEART RATE: 61 BPM | SYSTOLIC BLOOD PRESSURE: 132 MMHG

## 2023-09-27 LAB
ALBUMIN SERPL BCP-MCNC: 3 G/DL (ref 3.2–4.9)
ALBUMIN/GLOB SERPL: 0.9 G/DL
ALP SERPL-CCNC: 170 U/L (ref 30–99)
ALT SERPL-CCNC: 219 U/L (ref 2–50)
ANION GAP SERPL CALC-SCNC: 9 MMOL/L (ref 7–16)
AST SERPL-CCNC: 266 U/L (ref 12–45)
BILIRUB SERPL-MCNC: 6.7 MG/DL (ref 0.1–1.5)
BUN SERPL-MCNC: 40 MG/DL (ref 8–22)
CALCIUM ALBUM COR SERPL-MCNC: 8.6 MG/DL (ref 8.5–10.5)
CALCIUM SERPL-MCNC: 7.8 MG/DL (ref 8.5–10.5)
CHLORIDE SERPL-SCNC: 105 MMOL/L (ref 96–112)
CO2 SERPL-SCNC: 20 MMOL/L (ref 20–33)
CREAT SERPL-MCNC: 1.28 MG/DL (ref 0.5–1.4)
GFR SERPLBLD CREATININE-BSD FMLA CKD-EPI: 60 ML/MIN/1.73 M 2
GLOBULIN SER CALC-MCNC: 3.2 G/DL (ref 1.9–3.5)
GLUCOSE SERPL-MCNC: 94 MG/DL (ref 65–99)
MAGNESIUM SERPL-MCNC: 2.3 MG/DL (ref 1.5–2.5)
PHOSPHATE SERPL-MCNC: 3.1 MG/DL (ref 2.5–4.5)
POTASSIUM SERPL-SCNC: 3.9 MMOL/L (ref 3.6–5.5)
PROT SERPL-MCNC: 6.2 G/DL (ref 6–8.2)
SODIUM SERPL-SCNC: 134 MMOL/L (ref 135–145)

## 2023-09-27 PROCEDURE — A9270 NON-COVERED ITEM OR SERVICE: HCPCS | Performed by: HOSPITALIST

## 2023-09-27 PROCEDURE — 83735 ASSAY OF MAGNESIUM: CPT

## 2023-09-27 PROCEDURE — 700102 HCHG RX REV CODE 250 W/ 637 OVERRIDE(OP): Performed by: HOSPITALIST

## 2023-09-27 PROCEDURE — 84100 ASSAY OF PHOSPHORUS: CPT

## 2023-09-27 PROCEDURE — 99239 HOSP IP/OBS DSCHRG MGMT >30: CPT | Performed by: STUDENT IN AN ORGANIZED HEALTH CARE EDUCATION/TRAINING PROGRAM

## 2023-09-27 PROCEDURE — RXMED WILLOW AMBULATORY MEDICATION CHARGE: Performed by: STUDENT IN AN ORGANIZED HEALTH CARE EDUCATION/TRAINING PROGRAM

## 2023-09-27 PROCEDURE — 80053 COMPREHEN METABOLIC PANEL: CPT

## 2023-09-27 RX ORDER — CARVEDILOL 25 MG/1
50 TABLET ORAL 2 TIMES DAILY WITH MEALS
Qty: 120 TABLET | Refills: 0 | Status: SHIPPED | OUTPATIENT
Start: 2023-09-27 | End: 2023-10-27

## 2023-09-27 RX ADMIN — ASPIRIN 81 MG: 81 TABLET, COATED ORAL at 04:32

## 2023-09-27 RX ADMIN — PREGABALIN 200 MG: 100 CAPSULE ORAL at 04:32

## 2023-09-27 RX ADMIN — CARVEDILOL 50 MG: 25 TABLET, FILM COATED ORAL at 08:03

## 2023-09-27 RX ADMIN — SENNOSIDES AND DOCUSATE SODIUM 1 TABLET: 50; 8.6 TABLET ORAL at 04:32

## 2023-09-27 RX ADMIN — OXYCODONE HYDROCHLORIDE 5 MG: 5 TABLET ORAL at 08:10

## 2023-09-27 ASSESSMENT — FIBROSIS 4 INDEX: FIB4 SCORE: 20.58

## 2023-09-27 ASSESSMENT — PAIN DESCRIPTION - PAIN TYPE: TYPE: ACUTE PAIN

## 2023-09-27 NOTE — PROGRESS NOTES
DATE: 9/27/2023    PO day # 6 ERCP with stone extraction.  PO day # 3 Lap liyah    INTERVAL EVENTS:    AST/ALT improved. Total bilirubin unchanged at 6.7.  Tolerating oral diet.    Renal indices improved.     - Remains medically cleared for discharge.     PHYSICAL EXAMINATION:  Vital Signs: /63   Pulse 61   Temp 37 °C (98.6 °F) (Temporal)   Resp 16   Ht 1.829 m (6')   Wt 119 kg (261 lb 11 oz)   SpO2 94%     Constitutional: Sitting edge of bed.  No acute distress. Afebrile and nontoxic in appearance.   HEENT: Improving scleral icterus.   Cardiovascular: Regular rate  Abdomen: Soft. No rebound tenderness or guarding. Port sites approximated with no signs of infection.   Skin: No significant jaundice.       LABORATORY VALUES:  Recent Labs     09/25/23 0146 09/26/23 0109   WBC 6.0 7.9   RBC 2.87* 2.78*   HEMOGLOBIN 9.6* 9.4*   HEMATOCRIT 30.0* 29.7*   .5* 106.8*   MCH 33.4* 33.8*   MCHC 32.0* 31.6*   RDW 60.6* 62.4*   PLATELETCT 63* 62*     Recent Labs     09/25/23  0146 09/26/23 0109 09/27/23  0130   SODIUM 135 137 134*   POTASSIUM 4.3 4.2 3.9   CHLORIDE 105 105 105   CO2 20 23 20   GLUCOSE 178* 115* 94   BUN 55* 49* 40*   CREATININE 1.60* 1.70* 1.28   CALCIUM 7.7* 7.6* 7.8*     Recent Labs     09/25/23  0146 09/26/23 0109 09/27/23  0130   ASTSGOT 261* 289* 266*   ALTSGPT 209* 229* 219*   TBILIRUBIN 8.0* 6.7* 6.7*   ALKPHOSPHAT 159* 171* 170*   GLOBULIN 2.7 2.7 3.2            IMAGING:  EC-ECHOCARDIOGRAM COMPLETE W/ CONT   Final Result      DX-PORTABLE FLUORO > 1 HOUR   Final Result      1.  Endoscopic cholangiography showing patent common duct with no appreciable persisting filling defect, stricture, and satisfactory passive drainage following removal of the endoscope.      CT-ABDOMEN-PELVIS WITH   Final Result      1.  Cholelithiasis and choledocholithiasis with findings suspicious for cholecystitis   2.  Splenomegaly      DX-CHEST-PORTABLE (1 VIEW)   Final Result      No acute cardiac or  pulmonary abnormalities are identified. Lung volumes are low.      US-RENAL    (Results Pending)       ASSESSMENT AND PLAN:  * Choledocholithiasis with acute cholecystitis- (present on admission)  Assessment & Plan  Choledocholithiasis  ERCP with extraction of 2 stones  9/22 Bilirubin remains elevated  9/24 Bilirubin trend down   Laparoscopic cholecystectomy          __________________________________     THOMPSON Hansen    DD: 9/27/2023  9:11 AM

## 2023-09-27 NOTE — PROGRESS NOTES
[facility] -  PROGRESS NOTE               Author: Ronak Soares M.D. Date & Time: 9/27/2023  10:21 AM     HPI:  This is a 73-year-old male with past medical history of hypertension, aortic stenosis s/p aortic valve replacement, complete heart block s/p pacemaker implantation, history of stroke who presented to the ED 9/20 with chief complaints of headaches, jaundice and right upper quadrant abdominal pain found to have choledocholithiasis with acute cholecystitis s/p ERCP 9/21 which was followed by laparoscopic cholecystectomy 9/24  Patient's creatinine on presentation was 1.26 9/20 with a bump up to 1.92 on 9/22.  Creatinine 9/26 1.70.  Of note patient was given IV contrast for imaging done on presentation 9/20.    DAILY NEPHROLOGY SUMMARY:  Labs reviewed.  Sodium 137, potassium 4.4, chloride 106, bicarb 20, BUN 37 improved from 46 yesterday and creatinine 1.74 improved from 2.34 yesterday.    Doing well.  No acute complaints.      REVIEW OF SYSTEMS:    10 point ROS reviewed and is as per HPI/daily summary or otherwise negative    PMH/PSH/SH/FH: Reviewed and unchanged since admission note  CURRENT MEDICATIONS: Reviewed from admission to present day    VS:  /63   Pulse 61   Temp 37 °C (98.6 °F) (Temporal)   Resp 16   Ht 1.829 m (6')   Wt 119 kg (261 lb 11 oz)   SpO2 94%   BMI 35.49 kg/m²   Physical Exam  Constitutional:       General: He is not in acute distress.     Appearance: Normal appearance. He is not ill-appearing.   HENT:      Head: Normocephalic and atraumatic.      Right Ear: External ear normal.      Left Ear: External ear normal.      Nose: Nose normal. No congestion.      Mouth/Throat:      Mouth: Mucous membranes are moist.      Pharynx: No oropharyngeal exudate.   Eyes:      Extraocular Movements: Extraocular movements intact.      Pupils: Pupils are equal, round, and reactive to light.   Cardiovascular:      Rate and Rhythm: Normal rate and regular rhythm.       Pulses: Normal pulses.   Pulmonary:      Effort: Pulmonary effort is normal.   Abdominal:      General: Abdomen is flat.      Palpations: Abdomen is soft.   Musculoskeletal:         General: Normal range of motion.      Right lower leg: Edema present.      Left lower leg: Edema present.   Skin:     General: Skin is warm.      Capillary Refill: Capillary refill takes less than 2 seconds.   Neurological:      Mental Status: He is alert and oriented to person, place, and time.   Psychiatric:         Mood and Affect: Mood normal.         Fluids:  In: 1000 [P.O.:1000]  Out: 100     LABS:  Recent Labs     09/25/23  0146 09/26/23  0109 09/27/23  0130   SODIUM 135 137 134*   POTASSIUM 4.3 4.2 3.9   CHLORIDE 105 105 105   CO2 20 23 20   GLUCOSE 178* 115* 94   BUN 55* 49* 40*   CREATININE 1.60* 1.70* 1.28   CALCIUM 7.7* 7.6* 7.8*       IMPRESSION:  # DENNYS on CKD II likely secondary to contrast induced nephropathy vs prerenal etiology   # CKD II   # Choledocholithiasis with acute cholecystitis s/p ERCP 9/22 followed by laparoscopic cholecystectomy 9/24. General surgery following   # Severe aortic stenosis s/p bioprosthetic valve placement   # Thrombocytopenia   # Complete heart block s/p pacemaker implantation   # Essential hypertension     PLAN:  - Cr continues to improve.  - Nephrology team will sign off, please reconsult if any concerns arise in the future    Thank you for allowing us to take part in this patient's care

## 2023-09-27 NOTE — DISCHARGE SUMMARY
"Discharge Summary    CHIEF COMPLAINT ON ADMISSION  Chief Complaint   Patient presents with    Headache     \"For years, but much more intense today.\"    Jaundice     Noticed it on Monday, no hx of same.    Abdominal Pain     Bilateral upper abd pain for years, but much worse recently. Per pt does not feel like he abd is tight or swollen       Reason for Admission  Abdominal Pain; Headache     Admission Date  9/20/2023    CODE STATUS  Full Code    HPI & HOSPITAL COURSE    Matti Olguin is a 73 y.o. male who presented 9/20/2023 with complete heart block s/p pacer, aortic stenosis s/p mechanical valve replacement at 38 years of age and redo with bioprosthetic valve replacement in 2012 (on daily asa only), spontaneous subdural hemorrhage on DAPT, CVA who presented to [facility] 9/20/2023 with epigastric pain    CT concerning for choledocholithiasis and probable cholecystitis. General surgery consulted and evaluated at bedside. Patient admitted for GI consultation and medical stabilization prior to planning cholecystectomy.   S/p ERCp with two stones removed on 9/21 . S/p laparoscopic cholecystectomy on 9/24/2023.  Noted to have severe aortic stenosis on echocardiogram.  Cards recommended outpatient follow-up for TAVR evaluation.  Nephrology evaluated for worsening renal function.  Surgery cleared patient for discharge.      At the time of discharge patient remain  hemodynamically stable ,asymptomatic ,labs remain unremarkable .  I will continue to hold his lisinopril for recent DENNYS.  I have recommended to follow-up with PCP and cardiology for resumption.  Patient will be discharged with close follow up with PCP and cardiology.  Advised for medicine compliance.Discharge plan was discussed with patient in details .  Patient agreed with discharge plan  and  all questions answered.       Therefore, he is discharged in good and stable condition to home with close outpatient follow-up.    The " patient met 2-midnight criteria for an inpatient stay at the time of discharge.    Discharge Date  9/27/2023          DISCHARGE DIAGNOSES  Principal Problem:    Choledocholithiasis with acute cholecystitis (POA: Yes)  Active Problems:    Primary hypertension (Chronic) (POA: Yes)    Cardiac pacemaker - Medtronic (Chronic) (POA: Yes)      Overview: October 2014: Medtronic Advisa DR COOL [id number] implanted at [facility].    Acute liver failure (POA: Yes)    Severe aortic stenosis (POA: Yes)    Thrombocytopenia (HCC) (POA: Yes)    DENNYS (acute kidney injury) (HCC) (POA: Yes)  Resolved Problems:    * No resolved hospital problems. *      FOLLOW UP  Future Appointments   Date Time Provider Department Center   11/3/2023 12:45 PM PACER CHECK-[facility] None   11/3/2023  1:30 PM Conner Thibodeaux M.D. [facility] None     No follow-up provider specified.    MEDICATIONS ON DISCHARGE     Medication List        CONTINUE taking these medications        Instructions   aspirin EC 81 MG Tbec  Commonly known as: Ecotrin   Take 1 Tablet by mouth every day.  Dose: 81 mg     atorvastatin 80 MG tablet  Commonly known as: Lipitor   Take 1 Tab by mouth every bedtime.  Dose: 80 mg     carvedilol 25 MG Tabs  Commonly known as: Coreg   Take 2 Tablets by mouth 2 times a day with meals for 30 days.  Dose: 50 mg     cloNIDine 0.1 MG Tabs  Commonly known as: Catapres   Take 1 Tab by mouth every 6 hours as needed. As needed for SBP >150  Dose: 0.1 mg     folic acid 400 MCG tablet  Commonly known as: Folvite   Take 400 mcg by mouth every day.  Dose: 400 mcg     hydroCHLOROthiazide 25 MG Tabs  Commonly known as: Hydrodiuril   Take 1 Tab by mouth every day.  Dose: 25 mg     HYDROcodone-acetaminophen 5-325 MG Tabs per tablet  Commonly known as: Norco   Take 1 Tab by mouth every four hours as needed.  Dose: 1 Tablet     lisinopril 20 MG Tabs  Commonly known as: Prinivil   Take 1 Tab by mouth 2 times a day.  Dose: 20 mg     LORazepam 1 MG  Tabs  Commonly known as: Ativan   Take 1 mg by mouth every 8 hours as needed for Anxiety.  Dose: 1 mg     pregabalin 200 MG capsule  Commonly known as: Lyrica   Take 200 mg by mouth 2 times a day.  Dose: 200 mg     therapeutic multivitamin-minerals Tabs   Take 1 Tablet by mouth every day.  Dose: 1 Tablet     zolpidem 5 MG Tabs  Commonly known as: Ambien   Take 5 mg by mouth at bedtime as needed for Sleep.  Dose: 5 mg            STOP taking these medications      potassium chloride SA 20 MEQ Tbcr  Commonly known as: Kdur              Allergies  Allergies   Allergen Reactions    Yellow Jacket Venom Anaphylaxis    Plavix [Clopidogrel]      SDH       DIET  Orders Placed This Encounter   Procedures    Diet Order Diet: Cardiac     Standing Status:   Standing     Number of Occurrences:   1     Order Specific Question:   Diet:     Answer:   Cardiac [6]       ACTIVITY  As tolerated.  Weight bearing as tolerated    CONSULTATIONS  Surgery  GI   Nephrology         LABORATORY  Lab Results   Component Value Date    SODIUM 134 (L) 09/27/2023    POTASSIUM 3.9 09/27/2023    CHLORIDE 105 09/27/2023    CO2 20 09/27/2023    GLUCOSE 94 09/27/2023    BUN 40 (H) 09/27/2023    CREATININE 1.28 09/27/2023    CREATININE 1.1 05/29/2007        Lab Results   Component Value Date    WBC 7.9 09/26/2023    HEMOGLOBIN 9.4 (L) 09/26/2023    HEMATOCRIT 29.7 (L) 09/26/2023    PLATELETCT 62 (L) 09/26/2023        Total time of the discharge process exceeds 37 minutes.

## 2023-09-27 NOTE — PROGRESS NOTES
Discharge orders received.  Patient arrived to the discharge lounge.  PIV removed by bedside RN.  Instructions given, medications reviewed and general discharge education provided to patient. Medications reviewed.  Follow up appointments discussed.  Patient verbalized understanding of dc instructions and prescriptions.  Patient signed discharge instructions.  Patient verbalized he had all belongings with him.  Patient left via car to home.  Wished patient a speedy recovery.

## 2023-09-27 NOTE — PROGRESS NOTES
Received report from Jackie RN, pt care assumed. Pt resting comfortably in bed on RA w/ wife at bedside. Pt AAOx4, endorses 6/10 pain. Denies SOB, nausea, dizziness. POC updated w/ pt, pt agreeable, no questions at this time. Fall precautions in place. No additional needs at this time, hourly rounding initiated.

## 2023-09-27 NOTE — CARE PLAN
The patient is Stable - Low risk of patient condition declining or worsening    Shift Goals  Clinical Goals: monitor renal/liver labs, pain management  Patient Goals: home  Family Goals: hoe    Progress made toward(s) clinical / shift goals:        Problem: Pain - Standard  Goal: Alleviation of pain or a reduction in pain to the patient’s comfort goal  Description: Target End Date:  Prior to discharge or change in level of care    Document on Vitals flowsheet    1.  Document pain using the appropriate pain scale per order or unit policy  2.  Educate and implement non-pharmacologic comfort measures (i.e. relaxation, distraction, massage, cold/heat therapy, etc.)  3.  Pain management medications as ordered  4.  Reassess pain after pain med administration per policy  5.  If opiods administered assess patient's response to pain medication is appropriate per POSS sedation scale  6.  Follow pain management plan developed in collaboration with patient and interdisciplinary team (including palliative care or pain specialists if applicable)  Outcome: Progressing     Problem: Knowledge Deficit - Standard  Goal: Patient and family/care givers will demonstrate understanding of plan of care, disease process/condition, diagnostic tests and medications  Description: Target End Date:  1-3 days or as soon as patient condition allows    Document in Patient Education    1.  Patient and family/caregiver oriented to unit, equipment, visitation policy and means for communicating concern  2.  Complete/review Learning Assessment  3.  Assess knowledge level of disease process/condition, treatment plan, diagnostic tests and medications  4.  Explain disease process/condition, treatment plan, diagnostic tests and medications  Outcome: Progressing       Patient is not progressing towards the following goals:

## 2023-09-28 ENCOUNTER — TELEPHONE (OUTPATIENT)
Dept: CARDIOLOGY | Facility: MEDICAL CENTER | Age: 73
End: 2023-09-28
Payer: MEDICARE

## 2023-09-28 DIAGNOSIS — Z01.810 PRE-PROCEDURAL CARDIOVASCULAR EXAMINATION: ICD-10-CM

## 2023-09-28 DIAGNOSIS — I35.0 SEVERE AORTIC STENOSIS: ICD-10-CM

## 2023-09-28 DIAGNOSIS — I35.0 NONRHEUMATIC AORTIC (VALVE) STENOSIS: ICD-10-CM

## 2023-09-28 LAB
APPEARANCE UR: ABNORMAL
BACTERIA #/AREA URNS HPF: NEGATIVE /HPF
BILIRUB UR QL STRIP.AUTO: ABNORMAL
COLOR UR: ABNORMAL
EPI CELLS #/AREA URNS HPF: ABNORMAL /HPF
GLUCOSE UR STRIP.AUTO-MCNC: NEGATIVE MG/DL
GRAN CASTS #/AREA URNS LPF: ABNORMAL /LPF
KETONES UR STRIP.AUTO-MCNC: NEGATIVE MG/DL
LEUKOCYTE ESTERASE UR QL STRIP.AUTO: NEGATIVE
MICRO URNS: ABNORMAL
NITRITE UR QL STRIP.AUTO: NEGATIVE
PH UR STRIP.AUTO: 6 [PH] (ref 5–8)
PROT UR QL STRIP: NEGATIVE MG/DL
RBC # URNS HPF: ABNORMAL /HPF
RBC UR QL AUTO: ABNORMAL
SP GR UR STRIP.AUTO: 1.02
UROBILINOGEN UR STRIP.AUTO-MCNC: 1 MG/DL
WBC #/AREA URNS HPF: ABNORMAL /HPF

## 2023-09-28 NOTE — TELEPHONE ENCOUNTER
Referral from: Dr. Thibodeaux for Severe AS s/p AVR    Patient called on 09/28/2023.    Discussed with patient and his spouse, Gunjan, consultation appointments, testing needed, and plan of care.    Patient given dates and times of testing and consultations.    Discussed previous valve replacement. Gunjan will ensure they bring valve card to consultation appointment.    Discussed patient's recent DENNYS. Recommended patient stay well hydrated before and after TAVR CTA. Also discussed patient completing repeat CMP several days before CTA. Lab order faxed to [facility] at [phone]. Receipt confirmed.     All questions answered.    Phone number given to patient for [facility] for any further questions or concerns.

## 2023-09-28 NOTE — TELEPHONE ENCOUNTER
----- Message from Rosaura Jin R.N. sent at 9/28/2023 11:19 AM PDT -----  Regarding: Pre TAVR Cath  Hey! Can you please hold pre TAVR cath 10/16-10/17? Thanks!     Render In Strict Bullet Format?: No Detail Level: Zone Continue Regimen: Myorisan 30 mg capsule: Take one capsule po BID

## 2023-09-28 NOTE — TELEPHONE ENCOUNTER
Consult on 10-11-23 at 10:20 with Dr. Avalos. Pre TAVR angio spot held on 10-16-23 at 9:00 with . Paper work  put into basket in RN office.

## 2023-09-29 ENCOUNTER — TELEPHONE (OUTPATIENT)
Dept: CARDIOLOGY | Facility: MEDICAL CENTER | Age: 73
End: 2023-09-29
Payer: MEDICARE

## 2023-09-29 NOTE — TELEPHONE ENCOUNTER
CW    Caller: Gunjan Powersbaker (Spouse)     Topic/issue: Pt's Spouse called in regards to pt just having two procedures Endoscopy and Gallblader being removed.     Pt has been experiencing abdomin pain and is having trouble breathing, Gunjan is wondering if CW can order oxygen for the pt to help him before the pt upcoming appt , she would liek a call back please advise.     Callback Number: [phone] (home)      Thank you,     Frankie WHELAN

## 2023-09-29 NOTE — TELEPHONE ENCOUNTER
Phone Number Called: [phone]    Call outcome:  Spoke to patient's wife - Gunjan     Message: Called to return patient's phone call.     Patient's wife reports that patient is having increasing shortness of breath that is impacting his ability to sleep. Patient has known severe aortic stenosis and is being worked up when he attempts to sleep due to not being able to breathe. Patient's wife also reports he is having swelling in his lower extremities. Patient is also having a decrease in his appetite and frequent nausea. His wife is also reporting that patient is complaining of abdominal discomfort and recently had Gallbladder removed. Denies chest pain. Reports he is urinated his normal amount and having regular bowel movements.     RN advised ER precautions due to symptoms. Patient's wife verbalized understanding. RN to reach out to ADD for any further recommendations.     RN also advised patient's wife reach out to surgeon and GI for additional recommendations.

## 2023-10-02 ENCOUNTER — TELEPHONE (OUTPATIENT)
Dept: CARDIOLOGY | Facility: MEDICAL CENTER | Age: 73
End: 2023-10-02
Payer: MEDICARE

## 2023-10-02 NOTE — TELEPHONE ENCOUNTER
Rosaura CHEN received VM x2 from patient's wife Gunjan 9/29 with same complaints as Edwin CHEN documented the same day.    Tried calling Gunjan back to see how the patient is doing but she did not answer. LVM requesting a call back.

## 2023-10-03 NOTE — TELEPHONE ENCOUNTER
Called patient again on [phone] but phone went straight to . LVM for patient/wife to call back and discuss. Also LVM on alternate number [phone].

## 2023-10-03 NOTE — TELEPHONE ENCOUNTER
Received VM from patient's wife Gunjan returning my call.    Called Gunjan back. She states she took him to the ER 9/29 in [place] d/t symptoms. She states the workup was unremarkable, and they recommended patient take Miralax as he was bloated/constipated. Gunjan states the patient's symptoms have improved since he has started the Miralax. No further concerns at this time.    Answered all of Gunjan's questions.     Task to Jessy TOMAS to request ER records.

## 2023-10-04 NOTE — TELEPHONE ENCOUNTER
Received records and have been scanned into "[employer]   89 y/o M w/ PMHx of HTN and CAD w/ stent p/w evaluation for fall onset 4 PM. Pt reports he walked and fell backwards. In ED, pt is now c/o L sided weakness. Pt also notes recent cough and URI Sx. Pt denies fever, CP, LOC, or any other complaint. NKDA.

## 2023-10-05 ENCOUNTER — NON-PROVIDER VISIT (OUTPATIENT)
Dept: CARDIOLOGY | Facility: MEDICAL CENTER | Age: 73
End: 2023-10-05
Payer: MEDICARE

## 2023-10-05 PROCEDURE — 93294 REM INTERROG EVL PM/LDLS PM: CPT | Performed by: INTERNAL MEDICINE

## 2023-10-05 NOTE — PROGRESS NOTES
REFERRING PHYSICIAN: Rainer Avalos MD.     CONSULTING PHYSICIAN: Peggy Doyle MD     CHIEF COMPLAINT: Dyspnea    HISTORY OF PRESENT ILLNESS: The patient is a 73 y.o. male with a past medical history of hypertension, hyperlipidemia, near syncope, mechanical aortic valve replacement with Bentall procedure and Cabrol reconstruction of his aortic root at age 38, redo bioprosthetic aortic root replacement in 2014, subdural hematoma, severe AS, heart block requiring PPM, obesity, NSVT, NAFLD who presents to clinic for dyspnea on exertion.  He has had some associated episodes of dizziness, chest pressure, and fatigue.  He was seen by cardiology and underwent an echocardiogram which shows a progression of his aortic stenosis to severe with his bioprosthetic valve.  He denies syncope, lower extremity edema.  His wife and daughter are present for the entire consultation.      PAST MEDICAL HISTORY:   Active Ambulatory Problems     Diagnosis Date Noted    History of stroke 12/05/2011    Primary hypertension     Dyslipidemia 09/04/2014    Near syncope 10/05/2014    Dyspnea 10/15/2014    S/P aortic valve replacement with bioprosthetic valve: repeat AVR 10/2014 01/13/2015    Cardiac pacemaker - Medtronic     Complete heart block (HCC)     Class 1 obesity due to excess calories with serious comorbidity and body mass index (BMI) of 32.0 to 32.9 in adult 01/12/2019    Hyperbilirubinemia 01/12/2019    NSVT (nonsustained ventricular tachycardia) ([facility]) - on pacer check 03/29/2019    Chronic nonalcoholic liver disease 10/25/2021    Subdural hematoma (HCC) 12/20/2021    Acute liver failure 09/21/2023    Severe aortic stenosis 09/23/2023    Thrombocytopenia (HCC) 09/23/2023    S/P choledochocholedochostomy with stent done at time of liver transplant (HCC) 10/11/2023     Resolved Ambulatory Problems     Diagnosis Date Noted    S/P AVR 12/05/2011    Long term current use of anticoagulant therapy 03/11/2013    Mechanical heart valve  present 11/08/2013    Stroke-like symptoms 09/03/2014    Subtherapeutic international normalized ratio (INR) 09/04/2014    Chest pain 10/05/2014    Cerebrovascular accident (CVA) involving left cerebral hemisphere ([facility]) 10/25/2021    SDH (subdural hematoma) (HCC) 12/20/2021    Agitation 12/22/2021    Choledocholithiasis with acute cholecystitis 09/21/2023    DENNYS (acute kidney injury) ([facility]) 09/23/2023     Past Medical History:   Diagnosis Date    Anticoagulation monitoring, special range     High cholesterol     HTN (hypertension)     Sleep apnea     Snoring     Stroke ([facility])     Unspecified hemorrhagic conditions     Valvular heart disease 12/1988       PAST SURGICAL HISTORY:   Past Surgical History:   Procedure Laterality Date    MAURY BY LAPAROSCOPY N/A 9/24/2023    Procedure: CHOLECYSTECTOMY, LAPAROSCOPIC;  Surgeon: Dipak Sepulveda M.D.;  Location: SURGERY [facility];  Service: General    [place] ERCP,DIAGNOSTIC N/A 9/21/2023    Procedure: ERCP (ENDOSCOPIC RETROGRADE CHOLANGIOPANCREATOGRAPHY);  Surgeon: Pradeep Hebert M.D.;  Location: SURGERY SAME DAY [facility];  Service: Gastroenterology    [place] ERCP,W/REMOVAL STONE,VASU/PANCR DUCTS N/A 9/21/2023    Procedure: ERCP, WITH CALCULUS REMOVAL FROM BILE OR PANCREATIC DUCT;  Surgeon: Pradeep Hebert M.D.;  Location: SURGERY SAME DAY [facility];  Service: Gastroenterology    SPHINCTEROTOMY N/A 9/21/2023    Procedure: SPHINCTEROTOMY;  Surgeon: Pradeep Hebert M.D.;  Location: SURGERY SAME DAY [facility];  Service: Gastroenterology    PACEMAKER INSERTION Left 10/25/2014    Medtronic Ivette COOL [id number] implanted at [facility].    RECOVERY  11/8/2013    Performed by Cath-Recovery Surgery at SURGERY SAME DAY [facility] ORS    RECOVERY  10/8/2013    Performed by Cath-Recovery Surgery at SURGERY SAME DAY [facility] ORS    RECOVERY  9/21/2012    Performed by Matti Clemens M.D. at SURGERY SAME DAY [facility] ORS    AORTIC VALVE REPLACEMENT  1988    titanium    OTHER ORTHOPEDIC SURGERY       Knee scope, right shoulder        ALLERGIES:   Allergies   Allergen Reactions    Yellow Jacket Venom Anaphylaxis    Plavix [Clopidogrel]      SDH        CURRENT MEDICATIONS:   Current Outpatient Medications:     carvedilol (COREG) 25 MG Tab, Take 2 Tablets by mouth 2 times a day with meals for 30 days., Disp: 120 Tablet, Rfl: 0    pregabalin (LYRICA) 200 MG capsule, Take 200 mg by mouth 2 times a day., Disp: , Rfl:     aspirin EC (ECOTRIN) 81 MG Tablet Delayed Response, Take 1 Tablet by mouth every day., Disp: 90 Tablet, Rfl: 2    therapeutic multivitamin-minerals (THERAGRAN-M) Tab, Take 1 Tablet by mouth every day., Disp: , Rfl:     LORazepam (ATIVAN) 1 MG Tab, Take 1 mg by mouth every 8 hours as needed for Anxiety., Disp: , Rfl:     lisinopril (PRINIVIL) 20 MG Tab, Take 1 Tab by mouth 2 times a day., Disp: 180 Tab, Rfl: 3    hydroCHLOROthiazide (HYDRODIURIL) 25 MG Tab, Take 1 Tab by mouth every day., Disp: 90 Tab, Rfl: 3    atorvastatin (LIPITOR) 80 MG tablet, Take 1 Tab by mouth every bedtime., Disp: 30 Tab, Rfl: 2    cloNIDine (CATAPRES) 0.1 MG Tab, Take 1 Tab by mouth every 6 hours as needed. As needed for SBP >150, Disp: 60 Tab, Rfl: 2    HYDROcodone-acetaminophen (NORCO) 5-325 MG Tab per tablet, Take 1 Tab by mouth every four hours as needed., Disp: , Rfl:     folic acid (FOLVITE) 400 MCG tablet, Take 400 mcg by mouth every day., Disp: , Rfl:     zolpidem (AMBIEN) 5 MG TABS, Take 5 mg by mouth at bedtime as needed for Sleep., Disp: , Rfl:     FAMILY HISTORY:   Family History   Problem Relation Age of Onset    Heart Attack Father     Heart Disease Sister         SOCIAL HISTORY:   Social History     Socioeconomic History    Marital status:      Spouse name: Not on file    Number of children: Not on file    Years of education: Not on file    Highest education level: Not on file   Occupational History    Not on file   Tobacco Use    Smoking status: Never    Smokeless tobacco: Current     Types: Chew    Vaping Use    Vaping Use: Some days    Substances: THC   Substance and Sexual Activity    Alcohol use: Yes     Comment: about 42 drinks per week, last drink 9/16/23    Drug use: No    Sexual activity: Not on file   Other Topics Concern    Not on file   Social History Narrative    Not on file     Social Determinants of Health     Financial Resource Strain: Not on file   Food Insecurity: Not on file   Transportation Needs: Not on file   Physical Activity: Not on file   Stress: Not on file   Social Connections: Not on file   Intimate Partner Violence: Not on file   Housing Stability: Not on file       REVIEW OF SYSTEMS:  Review of Systems   Constitutional:  Positive for malaise/fatigue. Negative for chills, fever and weight loss.   HENT:  Negative for ear pain, nosebleeds and tinnitus.    Eyes:  Negative for double vision, photophobia and pain.   Respiratory:  Positive for shortness of breath. Negative for cough and hemoptysis.    Cardiovascular:  Positive for chest pain (pressure with exertion). Negative for palpitations, orthopnea, leg swelling and PND.   Gastrointestinal:  Negative for abdominal pain, blood in stool, nausea and vomiting.   Genitourinary:  Negative for frequency, hematuria and urgency.   Musculoskeletal: Negative.    Skin:  Negative for rash.   Neurological:  Positive for dizziness (with standing). Negative for tremors, speech change, focal weakness, seizures and headaches.   Endo/Heme/Allergies:  Negative for polydipsia. Does not bruise/bleed easily.   Psychiatric/Behavioral:  Negative for hallucinations and memory loss.      PHYSICAL EXAMINATION:    /70 (BP Location: Left arm, Patient Position: Sitting, BP Cuff Size: Adult)   Pulse 88   Temp 36.5 °C (97.7 °F) (Temporal)   Ht 1.829 m (6')   Wt 109 kg (240 lb)   SpO2 95%   BMI 32.55 kg/m²    Physical Exam  Vitals reviewed.   Constitutional:       General: He is not in acute distress.     Appearance: Normal appearance. He is obese. He is  ill-appearing.   HENT:      Head: Normocephalic and atraumatic.      Right Ear: External ear normal.      Left Ear: External ear normal.      Nose: Nose normal. No congestion.   Eyes:      General: No scleral icterus.     Extraocular Movements: Extraocular movements intact.      Conjunctiva/sclera: Conjunctivae normal.   Cardiovascular:      Rate and Rhythm: Normal rate and regular rhythm.   Pulmonary:      Effort: Pulmonary effort is normal. No respiratory distress.   Abdominal:      General: There is no distension.      Palpations: Abdomen is soft.   Musculoskeletal:         General: Normal range of motion.      Cervical back: Normal range of motion.   Skin:     General: Skin is warm and dry.      Coloration: Skin is not jaundiced.      Findings: No rash.   Neurological:      Mental Status: He is alert and oriented to person, place, and time.      Cranial Nerves: No cranial nerve deficit.   Psychiatric:         Mood and Affect: Mood normal.         Behavior: Behavior normal.       LABS REVIEWED:  Lab Results   Component Value Date/Time    SODIUM 134 (L) 09/27/2023 01:30 AM    POTASSIUM 3.9 09/27/2023 01:30 AM    CHLORIDE 105 09/27/2023 01:30 AM    CO2 20 09/27/2023 01:30 AM    GLUCOSE 94 09/27/2023 01:30 AM    BUN 40 (H) 09/27/2023 01:30 AM    CREATININE 1.28 09/27/2023 01:30 AM    CREATININE 1.1 05/29/2007 04:30 AM      Lab Results   Component Value Date/Time    PROTHROMBTM 14.4 09/24/2023 01:54 AM    INR 1.10 09/24/2023 01:54 AM      Lab Results   Component Value Date/Time    WBC 7.9 09/26/2023 01:09 AM    RBC 2.78 (L) 09/26/2023 01:09 AM    HEMOGLOBIN 9.4 (L) 09/26/2023 01:09 AM    HEMATOCRIT 29.7 (L) 09/26/2023 01:09 AM    .8 (H) 09/26/2023 01:09 AM    MCH 33.8 (H) 09/26/2023 01:09 AM    MCHC 31.6 (L) 09/26/2023 01:09 AM    MPV 12.1 12/24/2021 06:45 AM    NEUTSPOLYS 87.50 (H) 09/26/2023 01:09 AM    LYMPHOCYTES 7.10 (L) 09/26/2023 01:09 AM    MONOCYTES 4.50 09/26/2023 01:09 AM    EOSINOPHILS 0.90  09/26/2023 01:09 AM    BASOPHILS 0.00 09/26/2023 01:09 AM    ANISOCYTOSIS 1+ 09/26/2023 01:09 AM        IMAGING REVIEWED AND INTERPRETED:    ECHOCARDIOGRAM 9/22/23  Compared to the prior study on 1/12/19 there is now presence of   increased mean gradients across the bioprosthetic aortic valve   suggestive of severe stenosis.   Normal left ventricular systolic function.   Known bioprosthetic aortic valve that is with elevated transvalvular   gradients suggestive of severe aortic stenosis. Transvalvular gradients   are - Peak: 63 mmHg, Mean: 43 mmHg. Vmax is 4.0 m/s. Aortic valve area   calculated from the continuity equation is 0.8 cm2. Dimensionless index   is 0.26. Moderate paravalvular leak is noted.  Estimated right ventricular systolic pressure is 50 mmHg.  Normal aortic root for body surface area. The ascending aorta diameter   is 3.4 cm.    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 He now presents with severe symptomatic prosthetic valve stenosis and moderate perivalvular leak.      PLAN:  I recommend that the patient is a reasonable TAVR candidate given he is prohibitive risk for cardiac surgery due to Mahesh B cirrhosis. Also with history of two sternotomies the first of which included Cabrol reconstruction of his coronary arteries he would be a challenging re-do We will continue to follow workup and discuss in multidisciplinary conference. The paravalvular leak may need additional interventional procedures however it is  the less urgent of his aortic valve issues.     The STS mortality risk score is 11.3% and the morbidity and mortality risk score is 35.1%. The STS greatly underestimates the risk posed by cirrhosis and given that he is Mahesh class B even an abdominal hernia repair would carry 30% mortality with GETA. The scores were discussed with patient.    Thank you for this very challenging consultation and participation in the patient’s care.  I will keep you apprised of all future developments.    Sincerely,     Peggy Doyle MD

## 2023-10-06 NOTE — CARDIAC REMOTE MONITOR - SCAN
Device transmission reviewed. Device demonstrated appropriate function.       Electronically Signed by: Mike Cisneros M.D.    10/10/2023  3:24 PM

## 2023-10-09 NOTE — PROGRESS NOTES
Date: 10/9/2023    History of Presenting Illness:  Matti Olguin is a 73 y.o. male who was recently admitted for acute cholecystitis and underwent a laparoscopic cholecystectomy on 9/24/2023 by Dr. Sepulveda. He has an extensive medical and cardiac history and was instructed to follow up with his PCP and cardiology.    Interval History:  Patient is here for follow up visit.  ***    Allergies as of 10/10/2023 - Reviewed 09/24/2023   Allergen Reaction Noted    Yellow jacket venom Anaphylaxis 08/17/2016    Plavix [clopidogrel]  02/04/2022         Current Outpatient Medications:     carvedilol (COREG) 25 MG Tab, Take 2 Tablets by mouth 2 times a day with meals for 30 days., Disp: 120 Tablet, Rfl: 0    pregabalin (LYRICA) 200 MG capsule, Take 200 mg by mouth 2 times a day., Disp: , Rfl:     aspirin EC (ECOTRIN) 81 MG Tablet Delayed Response, Take 1 Tablet by mouth every day., Disp: 90 Tablet, Rfl: 2    therapeutic multivitamin-minerals (THERAGRAN-M) Tab, Take 1 Tablet by mouth every day., Disp: , Rfl:     LORazepam (ATIVAN) 1 MG Tab, Take 1 mg by mouth every 8 hours as needed for Anxiety., Disp: , Rfl:     lisinopril (PRINIVIL) 20 MG Tab, Take 1 Tab by mouth 2 times a day., Disp: 180 Tab, Rfl: 3    hydroCHLOROthiazide (HYDRODIURIL) 25 MG Tab, Take 1 Tab by mouth every day., Disp: 90 Tab, Rfl: 3    atorvastatin (LIPITOR) 80 MG tablet, Take 1 Tab by mouth every bedtime., Disp: 30 Tab, Rfl: 2    cloNIDine (CATAPRES) 0.1 MG Tab, Take 1 Tab by mouth every 6 hours as needed. As needed for SBP >150, Disp: 60 Tab, Rfl: 2    HYDROcodone-acetaminophen (NORCO) 5-325 MG Tab per tablet, Take 1 Tab by mouth every four hours as needed., Disp: , Rfl:     folic acid (FOLVITE) 400 MCG tablet, Take 400 mcg by mouth every day., Disp: , Rfl:     zolpidem (AMBIEN) 5 MG TABS, Take 5 mg by mouth at bedtime as needed for Sleep., Disp: , Rfl:     Review of Systems:  ROS    Physical Exam:  Vital Signs: There were no vitals taken for  this visit.  Physical Exam    Labs:  Pathology: Acute and chronic cholecystitis with mucosal ulceration. Cholelithiasis.     Assessment & Plan:  1. Status post laparoscopic cholecystectomy      Discharge from [facility] clinic.  Can make follow-up appointment if any concerns arise.    ____________________________________  NEFTALY Hale.

## 2023-10-10 ENCOUNTER — OFFICE VISIT (OUTPATIENT)
Dept: SURGERY | Facility: MEDICAL CENTER | Age: 73
End: 2023-10-10
Attending: SURGERY
Payer: MEDICARE

## 2023-10-10 VITALS
DIASTOLIC BLOOD PRESSURE: 76 MMHG | RESPIRATION RATE: 16 BRPM | HEART RATE: 80 BPM | SYSTOLIC BLOOD PRESSURE: 140 MMHG | OXYGEN SATURATION: 94 % | BODY MASS INDEX: 32.37 KG/M2 | WEIGHT: 239 LBS | HEIGHT: 72 IN

## 2023-10-10 DIAGNOSIS — Z90.49 STATUS POST LAPAROSCOPIC CHOLECYSTECTOMY: ICD-10-CM

## 2023-10-10 PROCEDURE — 3078F DIAST BP <80 MM HG: CPT | Performed by: SURGERY

## 2023-10-10 PROCEDURE — 3077F SYST BP >= 140 MM HG: CPT | Performed by: SURGERY

## 2023-10-10 PROCEDURE — 99024 POSTOP FOLLOW-UP VISIT: CPT | Performed by: SURGERY

## 2023-10-10 ASSESSMENT — FIBROSIS 4 INDEX: FIB4 SCORE: 20.58

## 2023-10-10 NOTE — H&P
Assessment & Plan   Patient recovering well from Cholecystectomy. He has eval for AVR tomorrow.  Diet as tolerated  He is 3 weeks post op, so activity as tolerated    Follow up as needed    Patient Active Problem List    Diagnosis Date Noted    Severe aortic stenosis 09/23/2023    Thrombocytopenia (HCC) 09/23/2023    DENNYS (acute kidney injury) (HCC) 09/23/2023    Choledocholithiasis with acute cholecystitis 09/21/2023    Acute liver failure 09/21/2023    Subdural hematoma (HCC) 12/20/2021    Chronic nonalcoholic liver disease 10/25/2021    NSVT (nonsustained ventricular tachycardia) (HCC) - on pacer check 03/29/2019    Class 1 obesity due to excess calories with serious comorbidity and body mass index (BMI) of 32.0 to 32.9 in adult 01/12/2019    Hyperbilirubinemia 01/12/2019    Complete heart block (HCC)     S/P aortic valve replacement with bioprosthetic valve: repeat AVR 10/2014 01/13/2015    Cardiac pacemaker - Medtronic     Dyspnea 10/15/2014    Near syncope 10/05/2014    Dyslipidemia 09/04/2014    Primary hypertension     History of stroke 12/05/2011       Subjective     Patient is a 73 y.o. male presents 3 weeksafter cholecystectomy for acute cholecystitis. He recovered and was discharged. He has seen carsdiology and hjas a CT tomorrow for his aortic valve. He denies any issues since his surgery.    The following portions of the patient's chart were reviewed in this encounter and updated as appropriate:    History Review:      Past Surgical History:   Procedure Laterality Date    MAURY BY LAPAROSCOPY N/A 9/24/2023    Procedure: CHOLECYSTECTOMY, LAPAROSCOPIC;  Surgeon: Dipak Sepulveda M.D.;  Location: SURGERY [facility];  Service: General    [place] ERCP,DIAGNOSTIC N/A 9/21/2023    Procedure: ERCP (ENDOSCOPIC RETROGRADE CHOLANGIOPANCREATOGRAPHY);  Surgeon: Pradeep Hebert M.D.;  Location: SURGERY SAME DAY [facility];  Service: Gastroenterology    [place] ERCP,W/REMOVAL STONE,VASU/PANCR DUCTS N/A 9/21/2023    Procedure: ERCP,  "WITH CALCULUS REMOVAL FROM BILE OR PANCREATIC DUCT;  Surgeon: Pradeep Hebert M.D.;  Location: SURGERY SAME DAY [facility];  Service: Gastroenterology    SPHINCTEROTOMY N/A 9/21/2023    Procedure: SPHINCTEROTOMY;  Surgeon: Pradeep Hebert M.D.;  Location: SURGERY SAME DAY [facility];  Service: Gastroenterology    PACEMAKER INSERTION Left 10/25/2014    Medtronic Advisa DR COOL [id number] implanted at [facility].    RECOVERY  11/8/2013    Performed by Cath-Recovery Surgery at SURGERY SAME DAY [facility] ORS    RECOVERY  10/8/2013    Performed by Cath-Recovery Surgery at SURGERY SAME DAY [facility] ORS    RECOVERY  9/21/2012    Performed by Matti Clemens M.D. at SURGERY SAME DAY [facility]    AORTIC VALVE REPLACEMENT  1988    titanium    OTHER ORTHOPEDIC SURGERY      Knee scope, right shoulder       Past Medical History:   Diagnosis Date    Anticoagulation monitoring, special range     Complete heart block (HCC) 10/2014    Statust post pacemaker placement.    Dyspnea     High cholesterol     HTN (hypertension)     NSVT (nonsustained ventricular tachycardia) ([facility]) - on pacer check     S/P aortic valve replacement with bioprosthetic valve 10/2014    Severe aortic stenosis 9/23/2023    Sleep apnea     Snoring     Stroke ([facility])     left sided \"tingling\"    Unspecified hemorrhagic conditions     Coumadin    Valvular heart disease 12/1988    AVR       Family History   Problem Relation Age of Onset    Heart Attack Father     Heart Disease Sister        Social History     Tobacco Use    Smoking status: Never    Smokeless tobacco: Current     Types: Chew   Vaping Use    Vaping Use: Some days    Substances: THC   Substance Use Topics    Alcohol use: Yes     Comment: about 42 drinks per week, last drink 9/16/23    Drug use: No       Review of Systems    Objective   No abdominal pain, nausea, or change in bowel habits.  Physical Exam  HENT:      Head: Normocephalic and atraumatic.      Nose: Nose normal.   Eyes:      Extraocular " Movements: Extraocular movements intact.      Conjunctiva/sclera: Conjunctivae normal.   Cardiovascular:      Rate and Rhythm: Normal rate and regular rhythm.      Pulses: Normal pulses.   Pulmonary:      Effort: Pulmonary effort is normal.      Breath sounds: Normal breath sounds. No stridor.   Abdominal:      General: Bowel sounds are normal.      Palpations: Abdomen is soft.   Skin:     General: Skin is warm and dry.      Coloration: Skin is not jaundiced.   Neurological:      General: No focal deficit present.      Mental Status: He is oriented to person, place, and time.         BP (!) 140/76 (BP Location: Right arm, Patient Position: Sitting)   Pulse 80   Resp 16   Ht 1.829 m (6')   Wt 108 kg (239 lb)   SpO2 94%   BMI 32.41 kg/m²     Yellow jacket venom and Plavix [clopidogrel]    Current Outpatient Medications   Medication Sig Dispense Refill    carvedilol (COREG) 25 MG Tab Take 2 Tablets by mouth 2 times a day with meals for 30 days. 120 Tablet 0    pregabalin (LYRICA) 200 MG capsule Take 200 mg by mouth 2 times a day.      aspirin EC (ECOTRIN) 81 MG Tablet Delayed Response Take 1 Tablet by mouth every day. 90 Tablet 2    therapeutic multivitamin-minerals (THERAGRAN-M) Tab Take 1 Tablet by mouth every day.      LORazepam (ATIVAN) 1 MG Tab Take 1 mg by mouth every 8 hours as needed for Anxiety.      lisinopril (PRINIVIL) 20 MG Tab Take 1 Tab by mouth 2 times a day. 180 Tab 3    hydroCHLOROthiazide (HYDRODIURIL) 25 MG Tab Take 1 Tab by mouth every day. 90 Tab 3    atorvastatin (LIPITOR) 80 MG tablet Take 1 Tab by mouth every bedtime. 30 Tab 2    cloNIDine (CATAPRES) 0.1 MG Tab Take 1 Tab by mouth every 6 hours as needed. As needed for SBP >150 60 Tab 2    HYDROcodone-acetaminophen (NORCO) 5-325 MG Tab per tablet Take 1 Tab by mouth every four hours as needed.      folic acid (FOLVITE) 400 MCG tablet Take 400 mcg by mouth every day.      zolpidem (AMBIEN) 5 MG TABS Take 5 mg by mouth at bedtime as needed  for Sleep.       No current facility-administered medications for this visit.       Body mass index is 32.41 kg/m².

## 2023-10-11 ENCOUNTER — OFFICE VISIT (OUTPATIENT)
Dept: CARDIOLOGY | Facility: MEDICAL CENTER | Age: 73
End: 2023-10-11
Attending: INTERNAL MEDICINE
Payer: MEDICARE

## 2023-10-11 ENCOUNTER — DOCUMENTATION (OUTPATIENT)
Dept: CARDIOLOGY | Facility: MEDICAL CENTER | Age: 73
End: 2023-10-11

## 2023-10-11 ENCOUNTER — HOSPITAL ENCOUNTER (OUTPATIENT)
Dept: RADIOLOGY | Facility: MEDICAL CENTER | Age: 73
End: 2023-10-11
Payer: MEDICARE

## 2023-10-11 ENCOUNTER — TELEPHONE (OUTPATIENT)
Dept: CARDIOLOGY | Facility: MEDICAL CENTER | Age: 73
End: 2023-10-11

## 2023-10-11 ENCOUNTER — OFFICE VISIT (OUTPATIENT)
Dept: CARDIOTHORACIC SURGERY | Facility: MEDICAL CENTER | Age: 73
End: 2023-10-11
Payer: MEDICARE

## 2023-10-11 VITALS
TEMPERATURE: 97.7 F | BODY MASS INDEX: 32.51 KG/M2 | WEIGHT: 240 LBS | HEIGHT: 72 IN | OXYGEN SATURATION: 95 % | DIASTOLIC BLOOD PRESSURE: 70 MMHG | SYSTOLIC BLOOD PRESSURE: 116 MMHG | HEART RATE: 88 BPM

## 2023-10-11 VITALS
HEART RATE: 78 BPM | DIASTOLIC BLOOD PRESSURE: 74 MMHG | HEIGHT: 72 IN | OXYGEN SATURATION: 95 % | WEIGHT: 240 LBS | BODY MASS INDEX: 32.51 KG/M2 | RESPIRATION RATE: 14 BRPM | SYSTOLIC BLOOD PRESSURE: 110 MMHG

## 2023-10-11 DIAGNOSIS — Z90.49 S/P CHOLECYSTECTOMY: ICD-10-CM

## 2023-10-11 DIAGNOSIS — Z98.890 HISTORY OF ASCENDING AORTA REPAIR: ICD-10-CM

## 2023-10-11 DIAGNOSIS — Z01.810 PRE-PROCEDURAL CARDIOVASCULAR EXAMINATION: ICD-10-CM

## 2023-10-11 DIAGNOSIS — Z95.3 S/P AORTIC VALVE REPLACEMENT WITH BIOPROSTHETIC VALVE: ICD-10-CM

## 2023-10-11 DIAGNOSIS — I35.0 SEVERE AORTIC STENOSIS: ICD-10-CM

## 2023-10-11 DIAGNOSIS — S06.5XAA SUBDURAL HEMATOMA (HCC): ICD-10-CM

## 2023-10-11 DIAGNOSIS — I35.0 NONRHEUMATIC AORTIC (VALVE) STENOSIS: ICD-10-CM

## 2023-10-11 DIAGNOSIS — D69.6 THROMBOCYTOPENIA (HCC): ICD-10-CM

## 2023-10-11 DIAGNOSIS — Z00.6 EXAMINATION OF PARTICIPANT IN CLINICAL TRIAL: ICD-10-CM

## 2023-10-11 PROBLEM — Z94.4: Status: ACTIVE | Noted: 2023-10-11

## 2023-10-11 PROBLEM — N17.9 AKI (ACUTE KIDNEY INJURY) (HCC): Status: RESOLVED | Noted: 2023-09-23 | Resolved: 2023-10-11

## 2023-10-11 PROBLEM — T14.8XXA HEMATOMA: Status: ACTIVE | Noted: 2023-10-11

## 2023-10-11 PROBLEM — K80.42 CHOLEDOCHOLITHIASIS WITH ACUTE CHOLECYSTITIS: Status: RESOLVED | Noted: 2023-09-21 | Resolved: 2023-10-11

## 2023-10-11 PROCEDURE — 71275 CT ANGIOGRAPHY CHEST: CPT

## 2023-10-11 PROCEDURE — 99205 OFFICE O/P NEW HI 60 MIN: CPT | Performed by: THORACIC SURGERY (CARDIOTHORACIC VASCULAR SURGERY)

## 2023-10-11 PROCEDURE — 3078F DIAST BP <80 MM HG: CPT | Performed by: INTERNAL MEDICINE

## 2023-10-11 PROCEDURE — 3078F DIAST BP <80 MM HG: CPT | Performed by: THORACIC SURGERY (CARDIOTHORACIC VASCULAR SURGERY)

## 2023-10-11 PROCEDURE — 99213 OFFICE O/P EST LOW 20 MIN: CPT | Mod: 25 | Performed by: INTERNAL MEDICINE

## 2023-10-11 PROCEDURE — 99205 OFFICE O/P NEW HI 60 MIN: CPT | Performed by: INTERNAL MEDICINE

## 2023-10-11 PROCEDURE — 700117 HCHG RX CONTRAST REV CODE 255

## 2023-10-11 PROCEDURE — 3074F SYST BP LT 130 MM HG: CPT | Performed by: INTERNAL MEDICINE

## 2023-10-11 PROCEDURE — 3074F SYST BP LT 130 MM HG: CPT | Performed by: THORACIC SURGERY (CARDIOTHORACIC VASCULAR SURGERY)

## 2023-10-11 RX ADMIN — IOHEXOL 100 ML: 350 INJECTION, SOLUTION INTRAVENOUS at 09:05

## 2023-10-11 ASSESSMENT — ENCOUNTER SYMPTOMS
ORTHOPNEA: 0
PND: 0
DIZZINESS: 1
MUSCULOSKELETAL NEGATIVE: 1
COUGH: 0
ABDOMINAL PAIN: 0
EYE PAIN: 0
FOCAL WEAKNESS: 0
SEIZURES: 0
HEADACHES: 0
SPEECH CHANGE: 0
WEIGHT LOSS: 0
FEVER: 0
BRUISES/BLEEDS EASILY: 0
HEMOPTYSIS: 0
SHORTNESS OF BREATH: 1
DOUBLE VISION: 0
CHILLS: 0
HALLUCINATIONS: 0
NAUSEA: 0
TREMORS: 0
POLYDIPSIA: 0
VOMITING: 0
MEMORY LOSS: 0
BLOOD IN STOOL: 0
PALPITATIONS: 0
PHOTOPHOBIA: 0

## 2023-10-11 ASSESSMENT — FIBROSIS 4 INDEX
FIB4 SCORE: 20.58
FIB4 SCORE: 20.58

## 2023-10-11 ASSESSMENT — PATIENT HEALTH QUESTIONNAIRE - PHQ9: CLINICAL INTERPRETATION OF PHQ2 SCORE: 0

## 2023-10-11 NOTE — PROGRESS NOTES
Valve Program Functional Assessment:     [id number]   1a) Showering/bathin  1b) Walking 1 block on ground: 2  1c) Hurrying or joggin  2) Swellin  3) Fatigue: 3  4) Shortness of breath: 2  5) Sleep sitting up: 3  6) Limited enjoyment of life: 2  7) Spend the rest of your life with HF: 1  8a) Hobbies, recreational activities:2  8b) Working or doing household chores:2  8c) Visiting family or friends: 2    5 meter walk test  1) __9.27____ s/5m  2) __5.51____ s/5m  3) __7.61____ s/5m  AVG:_7.46______     Strength   1) _28_____ kg  2) _26_____ kg  3) _24_____ kg  AVG:__26____    PUCKETT ADLs  Patient independently preforms...   - Bathing: Yes   - Dressing: Yes   - Toileting: Yes   - Transferring: Yes   - Continence: Yes   - Feeding: Yes   Total Score: _6/6    Living Situation  Patient lives:  with spouse    Mobility Aids   Patient uses: cane      FRAILTY SCORE: 2/ 4

## 2023-10-11 NOTE — PROGRESS NOTES
Valve Program Consultation: 10/11/2023 for tentative TAVR 10/23/2023    Mental Status Assessment:  Appearance: normal  Behavior: normal  Mood/Affect: flat, fatigued, depressed  Thought process/content: normal  Cognition: normal  Functional ability: normal  Dental Concerns: natural teeth, patient denies s/s of active oral infection  Further mental assessment needed: no    Post-op Plan of Care:  Support systems: daughter Becky and wife Gunjan present during consultation and participating in the discussion   Patient understands discharge plan: yes  DME: cane  Home health warranted prior to procedure: no  Social concerns for discharge: none  PCP alerted of social concerns: n/a  POLST: none  Advance directive: none, provided patient with AD copy  Post-op goal: improve fatigue, breathing, live longer  Medication instructions: hold all OTC vitamins/supplements x7 days, hold lisinopril x24hrs, hold HCTZ AM of procedure    Concerns prior to procedure: liver function (labs, CTA pending), hx ETOH use (quit 3 weeks ago, patient reports 2 beers/day prior)    Met with patient during New TAVR consult.     All patient's questions and concerns were addressed during this visit. They understood pre-operative and post-operative plan of care.    Reviewed patient TAVR education packet explaining that information is provided regarding preparation for TAVR the night prior, what to expect during the hospital stay, average LOS, and what to look out for post TAVR discharge. Explained that patient will require SBE prophylactic antibiotic prior to any dental treatment post TAVR. Advised patient not to receive any new vaccinations within 1 week pre- and 1 week post-procedure.     Explained that patient should not eat or drink anything past midnight the day of the procedure. Encouraged patient to wear something clean and comfortable, easy to get on/off to check in Monday morning, time TBD. Patient may have friends and family in the  pre-operational area until patient is taken to the operating room, at which time family and friends will be asked to wait on floor 1 [facility] surgical waiting area. On completion of TAVR, heart team will update family. Once update is given, there is some time before family/friends may visit patient in their assigned room. Length of stay on average is one night, however patient may stay longer depending on specific needs at that time. Patient given printed instructions sheet with all above stated instructions. Patient states understanding of all material and education presented today and has no further questions at this time. Encouraged patient again, to contact me with any questions or concerns during this work-up process. Patient states understanding.

## 2023-10-11 NOTE — TELEPHONE ENCOUNTER
----- Message from Rosaura Jin R.N. sent at 9/28/2023 11:19 AM PDT -----  Regarding: Pre TAVR Cath  Hey! Can you please hold pre TAVR cath 10/16-10/17? Thanks!

## 2023-10-11 NOTE — TELEPHONE ENCOUNTER
Patient is scheduled on 10-16-23 for a Pre TAVR angio with . No meds to stop and patient to check in at 7:00 for a 9:00 procedure. H&P was done on 10-11-23 by .Pre admit to call patient.

## 2023-10-11 NOTE — PROGRESS NOTES
CARDIOLOGY STRUCTURAL HEART CONSULTATION    PCP: Brittany Bradshaw M.D.  REFERRING: Conner Thibodeaux MD    1. S/P aortic valve replacement with bioprosthetic valve: repeat AVR 10/2014    2. History of ascending aorta repair    3. Subdural hematoma (HCC)    4. S/P cholecystectomy    5. Thrombocytopenia (HCC)        Matti Olguin has class II, D1 prosthetic aortic valve dysfunction.  I advised proceeding with work-up for transcatheter aortic valve replacement including a CT, catheterization and CT surgery evaluation.    We discussed the risks, benefits and alternatives to TAVR including but not limited to a 10% risk of pacemaker, 5% risk of bleeding/access site complication, 2% risk of stroke, risk of contrast nephropathy and 2% risk of mortality. The patient is in agreement with proceeding.      Follow up: 6 weeks    History: Matti Olguin is a 73 y.o. male with history of thrombocytopenia (?early cirrhosis), CVA x2, subdural hematoma and aortic valve replacement with root repair and aortocoronary conduit from the anterior ascending aorta with a single aortocoronary anastomosis presenting for assessment of prosthetic valve dysfunction.  He last had a 25 Mosaic valve put in in 2014.  He was recently hospitalized with cholecystitis and went underwent endoscopic decompression followed by laparoscopic cholecystectomy.  He subsequently had marked edema which is since resolved.  He was found to have severe prosthetic aortic insufficiency as well as moderate to severe aortic stenosis-my personal interpretation of the images.  He does report fatigue and shortness of breath over the past year.      ROS: 10 pt neg except as per HPI    PE:  /74 (BP Location: Left arm, Patient Position: Sitting, BP Cuff Size: Adult)   Pulse 78   Resp 14   Ht 1.829 m (6')   Wt 109 kg (240 lb)   SpO2 95%   BMI 32.55 kg/m²   GEN: NAD  CARDIAC: Regular Systolic ejection murmur late peaking Diminished S2  RESP: Clear to  "auscultation bilaterally  ABD: Soft, non-tender, non-distended  EXT: No edema  NEURO: No focal deficit and tremor in the hands and had    Past Medical History:   Diagnosis Date    Anticoagulation monitoring, special range     Complete heart block (HCC) 10/2014    Statust post pacemaker placement.    Dyspnea     High cholesterol     HTN (hypertension)     NSVT (nonsustained ventricular tachycardia) ([facility]) - on pacer check     S/P aortic valve replacement with bioprosthetic valve 10/2014    Severe aortic stenosis 9/23/2023    Sleep apnea     Snoring     Stroke ([facility])     left sided \"tingling\"    Unspecified hemorrhagic conditions     Coumadin    Valvular heart disease 12/1988    AVR     Past Surgical History:   Procedure Laterality Date    MAURY BY LAPAROSCOPY N/A 9/24/2023    Procedure: CHOLECYSTECTOMY, LAPAROSCOPIC;  Surgeon: Dipak Sepulveda M.D.;  Location: SURGERY [facility];  Service: General    VT ERCP,DIAGNOSTIC N/A 9/21/2023    Procedure: ERCP (ENDOSCOPIC RETROGRADE CHOLANGIOPANCREATOGRAPHY);  Surgeon: Pradeep Hebert M.D.;  Location: SURGERY SAME DAY [facility];  Service: Gastroenterology    VT ERCP,W/REMOVAL STONE,VASU/PANCR DUCTS N/A 9/21/2023    Procedure: ERCP, WITH CALCULUS REMOVAL FROM BILE OR PANCREATIC DUCT;  Surgeon: Pradeep Hebert M.D.;  Location: SURGERY SAME DAY [facility];  Service: Gastroenterology    SPHINCTEROTOMY N/A 9/21/2023    Procedure: SPHINCTEROTOMY;  Surgeon: Pradeep Hebert M.D.;  Location: SURGERY SAME DAY [facility];  Service: Gastroenterology    PACEMAKER INSERTION Left 10/25/2014    Medcorby COOL [id number] implanted at [facility].    RECOVERY  11/8/2013    Performed by Cath-Recovery Surgery at SURGERY SAME DAY [facility] ORS    RECOVERY  10/8/2013    Performed by Cath-Recovery Surgery at SURGERY SAME DAY [facility] ORS    RECOVERY  9/21/2012    Performed by Matti Clemens M.D. at SURGERY SAME DAY [facility] ORS    AORTIC VALVE REPLACEMENT  1988    titanium    OTHER ORTHOPEDIC " SURGERY      Knee scope, right shoulder     Allergies   Allergen Reactions    Yellow Jacket Venom Anaphylaxis    Plavix [Clopidogrel]      [facility]     Outpatient Encounter Medications as of 10/11/2023   Medication Sig Dispense Refill    carvedilol (COREG) 25 MG Tab Take 2 Tablets by mouth 2 times a day with meals for 30 days. 120 Tablet 0    pregabalin (LYRICA) 200 MG capsule Take 200 mg by mouth 2 times a day.      aspirin EC (ECOTRIN) 81 MG Tablet Delayed Response Take 1 Tablet by mouth every day. 90 Tablet 2    therapeutic multivitamin-minerals (THERAGRAN-M) Tab Take 1 Tablet by mouth every day.      LORazepam (ATIVAN) 1 MG Tab Take 1 mg by mouth every 8 hours as needed for Anxiety.      lisinopril (PRINIVIL) 20 MG Tab Take 1 Tab by mouth 2 times a day. 180 Tab 3    hydroCHLOROthiazide (HYDRODIURIL) 25 MG Tab Take 1 Tab by mouth every day. 90 Tab 3    atorvastatin (LIPITOR) 80 MG tablet Take 1 Tab by mouth every bedtime. 30 Tab 2    cloNIDine (CATAPRES) 0.1 MG Tab Take 1 Tab by mouth every 6 hours as needed. As needed for SBP >150 60 Tab 2    HYDROcodone-acetaminophen (NORCO) 5-325 MG Tab per tablet Take 1 Tab by mouth every four hours as needed.      folic acid (FOLVITE) 400 MCG tablet Take 400 mcg by mouth every day.      zolpidem (AMBIEN) 5 MG TABS Take 5 mg by mouth at bedtime as needed for Sleep.      [] iohexol (OMNIPAQUE) 350 mg/mL (IV)        No facility-administered encounter medications on file as of 10/11/2023.     Social History     Socioeconomic History    Marital status:      Spouse name: Not on file    Number of children: Not on file    Years of education: Not on file    Highest education level: Not on file   Occupational History    Not on file   Tobacco Use    Smoking status: Never    Smokeless tobacco: Current     Types: Chew   Vaping Use    Vaping Use: Some days    Substances: THC   Substance and Sexual Activity    Alcohol use: Yes     Comment: about 42 drinks per week, last drink  9/16/23    Drug use: No    Sexual activity: Not on file   Other Topics Concern    Not on file   Social History Narrative    Not on file     Social Determinants of Health     Financial Resource Strain: Not on file   Food Insecurity: Not on file   Transportation Needs: Not on file   Physical Activity: Not on file   Stress: Not on file   Social Connections: Not on file   Intimate Partner Violence: Not on file   Housing Stability: Not on file     Family History   Problem Relation Age of Onset    Heart Attack Father     Heart Disease Sister          Studies  Lab Results   Component Value Date/Time    CHOLSTRLTOT 117 12/20/2021 06:12 PM    LDL 64 12/20/2021 06:12 PM    HDL 37 (A) 12/20/2021 06:12 PM    TRIGLYCERIDE 78 12/20/2021 06:12 PM       Lab Results   Component Value Date/Time    SODIUM 134 (L) 09/27/2023 01:30 AM    POTASSIUM 3.9 09/27/2023 01:30 AM    CHLORIDE 105 09/27/2023 01:30 AM    CO2 20 09/27/2023 01:30 AM    GLUCOSE 94 09/27/2023 01:30 AM    BUN 40 (H) 09/27/2023 01:30 AM    CREATININE 1.28 09/27/2023 01:30 AM    CREATININE 1.1 05/29/2007 04:30 AM     Lab Results   Component Value Date/Time    ALKPHOSPHAT 170 (H) 09/27/2023 01:30 AM    ASTSGOT 266 (H) 09/27/2023 01:30 AM    ALTSGPT 219 (H) 09/27/2023 01:30 AM    TBILIRUBIN 6.7 (H) 09/27/2023 01:30 AM      Patient is having symptoms of a life-threatening condition.  We discussed decision making regarding major surgery with elevated patient risk factors-valve in valve procedure, thrombocytopenia.

## 2023-10-12 ENCOUNTER — TELEPHONE (OUTPATIENT)
Dept: CARDIOLOGY | Facility: MEDICAL CENTER | Age: 73
End: 2023-10-12
Payer: MEDICARE

## 2023-10-12 ENCOUNTER — APPOINTMENT (OUTPATIENT)
Dept: ADMISSIONS | Facility: MEDICAL CENTER | Age: 73
DRG: 266 | End: 2023-10-12
Attending: INTERNAL MEDICINE
Payer: MEDICARE

## 2023-10-12 NOTE — TELEPHONE ENCOUNTER
Called and spoke with patient's wife Gunjan to get patient's prior AVR information.     25mm Medtronic Mosaic Porcine   Model: [id number]  Serial: [id number]  10/23/2014 by Dr. Rob Galeana at [facility] in [place], [place]

## 2023-10-13 ENCOUNTER — PRE-ADMISSION TESTING (OUTPATIENT)
Dept: ADMISSIONS | Facility: MEDICAL CENTER | Age: 73
DRG: 266 | End: 2023-10-13
Attending: INTERNAL MEDICINE
Payer: MEDICARE

## 2023-10-13 NOTE — OR NURSING
Patient has pacemaker    Fall risk    Patient has cath procedure on 10/16/23 and TAVR scheduled on 10/23/23. If possible, please complete labs/testing for both at 10/16/23 appt. Thank you!

## 2023-10-16 ENCOUNTER — HOSPITAL ENCOUNTER (OUTPATIENT)
Facility: MEDICAL CENTER | Age: 73
DRG: 266 | End: 2023-10-16
Attending: INTERNAL MEDICINE | Admitting: INTERNAL MEDICINE
Payer: MEDICARE

## 2023-10-16 ENCOUNTER — HOSPITAL ENCOUNTER (OUTPATIENT)
Dept: CARDIOLOGY | Facility: MEDICAL CENTER | Age: 73
End: 2023-10-16
Attending: INTERNAL MEDICINE
Payer: MEDICARE

## 2023-10-16 VITALS
BODY MASS INDEX: 31.95 KG/M2 | HEART RATE: 57 BPM | RESPIRATION RATE: 18 BRPM | OXYGEN SATURATION: 96 % | SYSTOLIC BLOOD PRESSURE: 104 MMHG | HEIGHT: 72 IN | TEMPERATURE: 96.9 F | DIASTOLIC BLOOD PRESSURE: 54 MMHG | WEIGHT: 235.89 LBS

## 2023-10-16 DIAGNOSIS — I35.0 NONRHEUMATIC AORTIC (VALVE) STENOSIS: ICD-10-CM

## 2023-10-16 DIAGNOSIS — I35.0 SEVERE AORTIC STENOSIS: ICD-10-CM

## 2023-10-16 DIAGNOSIS — Z01.810 PRE-PROCEDURAL CARDIOVASCULAR EXAMINATION: ICD-10-CM

## 2023-10-16 DIAGNOSIS — I47.29 NSVT (NONSUSTAINED VENTRICULAR TACHYCARDIA) (HCC): Chronic | ICD-10-CM

## 2023-10-16 LAB
ALBUMIN SERPL BCP-MCNC: 3.7 G/DL (ref 3.2–4.9)
ALBUMIN/GLOB SERPL: 1.1 G/DL
ALP SERPL-CCNC: 102 U/L (ref 30–99)
ALT SERPL-CCNC: 47 U/L (ref 2–50)
ANION GAP SERPL CALC-SCNC: 10 MMOL/L (ref 7–16)
APTT PPP: 35.4 SEC (ref 24.7–36)
AST SERPL-CCNC: 45 U/L (ref 12–45)
BILIRUB SERPL-MCNC: 2.8 MG/DL (ref 0.1–1.5)
BUN SERPL-MCNC: 18 MG/DL (ref 8–22)
CALCIUM ALBUM COR SERPL-MCNC: 9.2 MG/DL (ref 8.5–10.5)
CALCIUM SERPL-MCNC: 9 MG/DL (ref 8.5–10.5)
CHLORIDE SERPL-SCNC: 108 MMOL/L (ref 96–112)
CO2 SERPL-SCNC: 26 MMOL/L (ref 20–33)
CREAT SERPL-MCNC: 0.91 MG/DL (ref 0.5–1.4)
EKG IMPRESSION: NORMAL
ERYTHROCYTE [DISTWIDTH] IN BLOOD BY AUTOMATED COUNT: 59.2 FL (ref 35.9–50)
GFR SERPLBLD CREATININE-BSD FMLA CKD-EPI: 90 ML/MIN/1.73 M 2
GLOBULIN SER CALC-MCNC: 3.3 G/DL (ref 1.9–3.5)
GLUCOSE SERPL-MCNC: 109 MG/DL (ref 65–99)
HCT VFR BLD AUTO: 30.2 % (ref 42–52)
HGB BLD-MCNC: 9.8 G/DL (ref 14–18)
INR PPP: 1.14 (ref 0.87–1.13)
MCH RBC QN AUTO: 34.5 PG (ref 27–33)
MCHC RBC AUTO-ENTMCNC: 32.5 G/DL (ref 32.3–36.5)
MCV RBC AUTO: 106.3 FL (ref 81.4–97.8)
PLATELET # BLD AUTO: 81 K/UL (ref 164–446)
PLATELETS.RETICULATED NFR BLD AUTO: 17.5 % (ref 0.6–13.1)
POTASSIUM SERPL-SCNC: 3.5 MMOL/L (ref 3.6–5.5)
PROT SERPL-MCNC: 7 G/DL (ref 6–8.2)
PROTHROMBIN TIME: 14.8 SEC (ref 12–14.6)
RBC # BLD AUTO: 2.84 M/UL (ref 4.7–6.1)
SODIUM SERPL-SCNC: 144 MMOL/L (ref 135–145)
WBC # BLD AUTO: 3.9 K/UL (ref 4.8–10.8)

## 2023-10-16 PROCEDURE — 99152 MOD SED SAME PHYS/QHP 5/>YRS: CPT | Performed by: INTERNAL MEDICINE

## 2023-10-16 PROCEDURE — 160002 HCHG RECOVERY MINUTES (STAT)

## 2023-10-16 PROCEDURE — 99153 MOD SED SAME PHYS/QHP EA: CPT

## 2023-10-16 PROCEDURE — 700117 HCHG RX CONTRAST REV CODE 255: Performed by: INTERNAL MEDICINE

## 2023-10-16 PROCEDURE — 85055 RETICULATED PLATELET ASSAY: CPT

## 2023-10-16 PROCEDURE — G0278 ILIAC ART ANGIO,CARDIAC CATH: HCPCS | Performed by: INTERNAL MEDICINE

## 2023-10-16 PROCEDURE — 93457 R HRT ART/GRFT ANGIO: CPT | Mod: 26 | Performed by: INTERNAL MEDICINE

## 2023-10-16 PROCEDURE — 700111 HCHG RX REV CODE 636 W/ 250 OVERRIDE (IP)

## 2023-10-16 PROCEDURE — 93567 NJX CAR CTH SPRVLV AORTGRPHY: CPT | Performed by: INTERNAL MEDICINE

## 2023-10-16 PROCEDURE — 85027 COMPLETE CBC AUTOMATED: CPT

## 2023-10-16 PROCEDURE — 93005 ELECTROCARDIOGRAM TRACING: CPT | Performed by: INTERNAL MEDICINE

## 2023-10-16 PROCEDURE — 93010 ELECTROCARDIOGRAM REPORT: CPT | Mod: 59 | Performed by: INTERNAL MEDICINE

## 2023-10-16 PROCEDURE — 85730 THROMBOPLASTIN TIME PARTIAL: CPT

## 2023-10-16 PROCEDURE — 700101 HCHG RX REV CODE 250

## 2023-10-16 PROCEDURE — 85610 PROTHROMBIN TIME: CPT

## 2023-10-16 PROCEDURE — 160035 HCHG PACU - 1ST 60 MINS PHASE I

## 2023-10-16 PROCEDURE — 80053 COMPREHEN METABOLIC PANEL: CPT

## 2023-10-16 RX ORDER — VERAPAMIL HYDROCHLORIDE 2.5 MG/ML
INJECTION, SOLUTION INTRAVENOUS
Status: COMPLETED
Start: 2023-10-16 | End: 2023-10-16

## 2023-10-16 RX ORDER — HEPARIN SODIUM 200 [USP'U]/100ML
INJECTION, SOLUTION INTRAVENOUS
Status: COMPLETED
Start: 2023-10-16 | End: 2023-10-16

## 2023-10-16 RX ORDER — SODIUM CHLORIDE 9 MG/ML
INJECTION, SOLUTION INTRAVENOUS CONTINUOUS
Status: DISCONTINUED | OUTPATIENT
Start: 2023-10-16 | End: 2023-10-16 | Stop reason: HOSPADM

## 2023-10-16 RX ORDER — HEPARIN SODIUM 1000 [USP'U]/ML
INJECTION, SOLUTION INTRAVENOUS; SUBCUTANEOUS
Status: COMPLETED
Start: 2023-10-16 | End: 2023-10-16

## 2023-10-16 RX ORDER — LIDOCAINE HYDROCHLORIDE 20 MG/ML
INJECTION, SOLUTION INFILTRATION; PERINEURAL
Status: COMPLETED
Start: 2023-10-16 | End: 2023-10-16

## 2023-10-16 RX ORDER — MIDAZOLAM HYDROCHLORIDE 1 MG/ML
INJECTION INTRAMUSCULAR; INTRAVENOUS
Status: COMPLETED
Start: 2023-10-16 | End: 2023-10-16

## 2023-10-16 RX ADMIN — MIDAZOLAM 2 MG: 1 INJECTION, SOLUTION INTRAMUSCULAR; INTRAVENOUS at 10:17

## 2023-10-16 RX ADMIN — NITROGLYCERIN 10 ML: 20 INJECTION INTRAVENOUS at 09:51

## 2023-10-16 RX ADMIN — HEPARIN SODIUM 2000 UNITS: 200 INJECTION, SOLUTION INTRAVENOUS at 09:51

## 2023-10-16 RX ADMIN — LIDOCAINE HYDROCHLORIDE: 20 INJECTION, SOLUTION INFILTRATION; PERINEURAL at 09:50

## 2023-10-16 RX ADMIN — MIDAZOLAM 2 MG: 1 INJECTION, SOLUTION INTRAMUSCULAR; INTRAVENOUS at 09:51

## 2023-10-16 RX ADMIN — VERAPAMIL HYDROCHLORIDE 5 MG: 2.5 INJECTION, SOLUTION INTRAVENOUS at 09:51

## 2023-10-16 RX ADMIN — FENTANYL CITRATE 100 MCG: 50 INJECTION, SOLUTION INTRAMUSCULAR; INTRAVENOUS at 10:01

## 2023-10-16 RX ADMIN — IOHEXOL 180 ML: 350 INJECTION, SOLUTION INTRAVENOUS at 10:20

## 2023-10-16 RX ADMIN — HEPARIN SODIUM: 1000 INJECTION, SOLUTION INTRAVENOUS; SUBCUTANEOUS at 09:50

## 2023-10-16 ASSESSMENT — FIBROSIS 4 INDEX: FIB4 SCORE: 20.58

## 2023-10-16 NOTE — DISCHARGE INSTRUCTIONS
What to Expect Post Anesthesia    Rest and take it easy for the first 24 hours.  A responsible adult is recommended to remain with you during that time.  It is normal to feel sleepy.  We encourage you to not do anything that requires balance, judgment or coordination.    FOR 24 HOURS DO NOT:  Drive, operate machinery or run household appliances.  Drink beer or alcoholic beverages.  Make important decisions or sign legal documents.    To avoid nausea, slowly advance diet as tolerated, avoiding spicy or greasy foods for the first day.  Add more substantial food to your diet according to your provider's instructions.  Babies can be fed formula or breast milk as soon as they are hungry.  INCREASE FLUIDS AND FIBER TO AVOID CONSTIPATION.    MILD FLU-LIKE SYMPTOMS ARE NORMAL.  YOU MAY EXPERIENCE GENERALIZED MUSCLE ACHES, THROAT IRRITATION, HEADACHE AND/OR SOME NAUSEA.  If any questions arise, call your provider.  If your provider is not available, please feel free to call the Surgical Center at [phone].    MEDICATIONS: Resume taking daily medication.  Take prescribed pain medication with food.  If no medication is prescribed, you may take non-aspirin pain medication if needed.  PAIN MEDICATION CAN BE VERY CONSTIPATING.  Take a stool softener or laxative such as senokot, pericolace, or milk of magnesia if needed.    Diet    Resume your normal diet as tolerated.  A diet low in cholesterol, fat, and sodium is recommended for good health.       Discharge Instructions:  POST ANGIOGRAM  General Care Instructions  Maintain a bandage over the incision site for 24 hours.  It's normal to find a small bruise or dime-sized lump at the insertion site. This should disappear within a few weeks.  Do not apply lotions or powders to the site.  Do not immerse the catheter insertion site in water (bathtub/swimming) for five days. It is ok to shower 24 hours after the procedure.  You may resume your normal diet immediately; on the day  of your procedure, drink 6-10 glasses of water to help flush the contrast liquid out of your system.  If the doctor inserted the catheter in through your groin:  Walking short distances on a flat surface is OK. Limit going up/down stairs for the first 2 days.  DO NOT do yard work, drive, squat, lift heavy objects, or play sports for 2 days; or until your health care provider tells you it is OK.  If the doctor inserted the catheter in your arm:  For 2 days, DO NOT lift anything heavier than 10 pounds (approximately a gallon of milk). DO NOT do any heavy pushing, pulling, or twisting.    Medications  If your current medications need to be changed, you will be provided with an updated list of your medications prior to discharge.  If you take warfarin (Coumadin), resume taking your usual dose the evening after the procedure.  DO NOT STOP taking prescribed blood thinning (anti-platelet) medications unless instructed by your cardiologist.  These medications include:  Aspirin, Clopidogrel (Plavix), Ticagrelor (Brilinta), or Prasugrel (Effient)   If you take one of the following anticoagulants, RESUME 24 HOURS after your procedure:  Apixiban (Eliquis), Rivaroxaban (Xarelto), Dabigatran (Pradaxa), Edoxaban (Savaysa)  If you take metformin (Glucophage), RESUME 48 HOURS after your procedure.    When to call your healthcare provider  Call your cardiologist right away at [phone] if you have any of the following:   Problems/Concerns taking any of your prescribed heart medicines.   The insertion site has increasing pain, swelling, redness, bleeding, or drainage.   Your arm or leg below where the insertion site changes color, is cool, or is numb.   You have chest pain or shortness of breath that does not go away with rest.   Your pulse feels irregular -- very slow (less than 60 beats/minute) or very fast (over 100 beats/minute).   You have dizziness, fainting, or you are very tired.   You are coughing up blood or yellow or  green mucus.   You have chills or a fever over 101°F (38.3°C).    If there is bleeding at the catheter insertion site, apply pressure for 10 minutes.  If bleeding persists, call 911, and continue to hold pressure until advanced medical support arrives.    If you smoke, quit!  If your doctor has been urging you to quit smoking, it's for good reasons. Smoking damages your heart, blood vessels, and lungs. The good news is that quitting can halt or even reverse the damage of smoking. To quit now:  Get medical help. Ask your doctor for advice on stop-smoking programs. Also ask about medications or nicotine replacement therapy products that may help you quit smoking.  Get support. Join a support group. Ask for help from your family and friends.  Don't give up. It often takes several tries to succeed in quitting smoking.  Avoid secondhand smoke. Ask family and friends not to smoke around you.       DRESSING: Keep dressing and incision dry and intact for 24 hours, may remove dressing and shower after 1 PM  on 10/17, do not need to replace.  Do not submerge site in water for 7 days.     BOWEL FUNCTION:  Prescription pain medication may cause constipation. If you are having problems, use what you normally would or call your provider for suggestions. It also helps to stay regular by including fiber in your diet (for example: bran or fruits and vegetables) and drink plenty of liquids (water, juice, etc.).    Activity    No Strenuous Activity    No strenuous activity for 1 week.  You may tire easily; rest as needed during the day.      10 Pound Weight Restriction Post Surgery    You have a ten pound weight lifting restriction for four to six weeks after surgery. Do not lift anything heavier than a gallon of milk. Routine activities such as walking and using the stairs are safe. You may sleep in any position that is comfortable. Avoid strenuous activities and exercise that involves twisting, bending, and running.

## 2023-10-16 NOTE — PROCEDURES
"CARDIAC CATHETERIZATION REPORT    REFERRING: Rainer Avalos M.D.    PROCEDURE PHYSICIAN: Rainer Avalos MD, [facility], [facility]  ASSISTANT: None    IMPRESSIONS:  Severe prosthetic aortic valve insufficiency  2.   Patent Cabrol coronary artery graft  3.   Non-obstructive three vessel CAD    Recommendations:  Usual post cath care    Pre-procedure diagnosis:  Prosthetic valve failure  Post-procedure diagnosis: Same    Procedure performed  Selective coronary angiography  Right heart catheterization  Bypass graft angiography  Supravalvular aortography  Aortobifemoral angiography    Conscious sedation was supervised by myself and administered by trained personnel using fentanyl and versed between 944 and 1024. The patient tolerated sedation without complication.     Procedure Description  1. Access: 5/6 and 6 Telugu right radial artery and femoral artery Micropuncture technique was utilized following local anesthesia with lidocaine.  A radial cocktail containing verapamil and saline was administered in the radial artery sheath Fluoroscopic guidance was utilized for femoral access Dynamic ultrasound was utilized to gain access    2. Diagnostic description: The catheter was passed to the central circulation with the aide of J tipped 0.35\" wire. A 6F Pigtail, 6F Multipurpose, and 6F Ikari 1.5 Right were used to inject the coronary circulation, bypass grafts  and inject the ascending aorta  and inject the abdominal aorta.     3. Closing: At completion of the procedure the relevant equipment was removed from the body and hemostasis achieved by Perclose    Findings   Hemodynamics:   Aorta: 89/39 mmHg    Coronary Anatomy   Left Main: Normal   LAD: Minimal luminal irregularities   LCx: Normal   RCA: Dominant, Minimal luminal irregularities     Cabrol Grafts   Graft-LM: Patent   Graft-RCA: patent    Supravalvular aortogram:  4+ AI. Grossly normal caliber aorta    Abdominal aorta/iliac angiogram:  Widely patent ileofemoral " system    Technical Factors  1. Complications: None  2. Estimated Blood Loss: <50 cc  3. Specimens: None  4. Contrast Volume: 180 ml  5. Medications: Radial cocktail (Verapamil 2.5 mg, Nitroglycerin 100 mcg)  6. Radiation (air kerma): 530 mGy

## 2023-10-16 NOTE — PROCEDURES
This version of the note has been redacted during the course of a chart correction case. If you need access to the original text of this version of the note, please contact the Health Information Management department at [phone].

## 2023-10-16 NOTE — OR NURSING
1038 Patient arrived to PACU from cath lab.  Report from RN.  Patient is awake and alert, denies pain.  TR band to right wrist is clean, dry and soft, patient denies numbness or tingling to right hand.  Right groin is clean, dry and soft.  Educated on wrist and groin precautions.  Patient updated on plan of care.  1100  Wife to bedside.  1135 2 ml air removed from TR band, no bleeding to site.   1150 3 ml air removed from TR band, no bleeding to site.   1205 3 ml air removed from TR band, no bleeding to site.   1220 Final 3 ml air removed from TR band, no bleeding to site.   1235 TR band removed, dressing placed to site.  Right groin is clean, dry and soft.  1255 Patient up to edge of bed, denies discomfort.  Ambulated to bathroom, voided, back to [facility].  Access sites clean, dry and soft.  1322 Reviewed discharge paperwork with pt and wife Gunjan. Discussed diet, activity, medications, follow up care and worsening symptoms. No questions at this time. All lines and monitors discontinued.  1330 Pt discharged home with wife via wheelchair by RN.

## 2023-10-17 ENCOUNTER — DOCUMENTATION (OUTPATIENT)
Dept: CARDIOLOGY | Facility: MEDICAL CENTER | Age: 73
End: 2023-10-17
Payer: MEDICARE

## 2023-10-17 NOTE — PROGRESS NOTES
La TAVR Review:     Consider a 23 S3UR from a right femoral approach.  Patient has a 9 year old failed 25mm Mosiac surgical valve (21mm true I.D. x 17.5 height)  2mm RCA height and 4-5mm LCA height - difficult to visualize  23mm virtual valve to RCA ostium is a borderline 3-4mm - post dil with a 23mm True balloon may be a prabhu occlusion risk.

## 2023-10-18 ENCOUNTER — APPOINTMENT (OUTPATIENT)
Dept: ADMISSIONS | Facility: MEDICAL CENTER | Age: 73
DRG: 266 | End: 2023-10-18
Attending: INTERNAL MEDICINE
Payer: MEDICARE

## 2023-10-18 ENCOUNTER — TELEPHONE (OUTPATIENT)
Dept: CARDIOLOGY | Facility: MEDICAL CENTER | Age: 73
End: 2023-10-18
Payer: MEDICARE

## 2023-10-18 ENCOUNTER — DOCUMENTATION (OUTPATIENT)
Dept: CARDIOLOGY | Facility: MEDICAL CENTER | Age: 73
End: 2023-10-18
Payer: MEDICARE

## 2023-10-18 NOTE — LETTER
October 18, 2023        Patient:              Matti Olguin  YOB: 1950        Dear Dr. Bradshaw,      Thank you for the opportunity to participate in your patient's care.     Your patient is scheduled for transcatheter aortic valve replacement (TAVR) on Monday, October 23, 2023.    Sincerely,    [facility]    ROXANA Barrera, RN, Structural Heart Program Nurse Coordinator ([phone])  ROXANA Garcia, RN, Structural Heart Program Nurse Coordinator ([phone])

## 2023-10-18 NOTE — TELEPHONE ENCOUNTER
Thank you for the opportunity to participate in your patient's care.     Your patient is scheduled for transcatheter aortic valve replacement (TAVR) on Monday, October 23, 2023.    Sincerely,    [facility]    ROXANA Barrera, RN, Structural Heart Program Nurse Coordinator ([phone])  ROXANA Garcia, RN, Structural Heart Program Nurse Coordinator ([phone])

## 2023-10-18 NOTE — TELEPHONE ENCOUNTER
Valve Conference Plan of Care: 10/18/2023 for TAVR 10/23/2023           TAVR Candidate: Yes  Access: right femoral  Valve Size: 23 mm Resilia (fracture valve)  General Anesthesia or MAC: GA  Unit: Telemetry  Incidental findings to be discussed with PCP: Recent cholecystectomy with a 7.4 cm hematoma in the gallbladder fossa.  Colonic diverticulosis.   Clearance needed prior to procedure: No    Check in time of 1100 10/23/2023.     Pre-op appointment completed: Yes on 10/13/2023    NPO after midnight. Hold all vitamins and supplements 7 days prior, hold Lisinopril 24 hours prior, hold Hydrochlorothiazide the morning of the procedure.     Patient notified of procedure date/time and check in process.     All questions were answered. Patient has direct extension for any further questions/concerns prior to the procedure.

## 2023-10-20 ENCOUNTER — TELEPHONE (OUTPATIENT)
Dept: CARDIOLOGY | Facility: MEDICAL CENTER | Age: 73
End: 2023-10-20
Payer: MEDICARE

## 2023-10-20 DIAGNOSIS — I35.0 SEVERE AORTIC STENOSIS: ICD-10-CM

## 2023-10-20 NOTE — TELEPHONE ENCOUNTER
Called patient's wife Gunjan back. Answered her questions regarding upcoming TAVR and procedure access.

## 2023-10-20 NOTE — TELEPHONE ENCOUNTER
Received VM from patient's wife Gunjan yesterday requesting a call back to discuss some questions she has regarding patient's TAVR 10/23.

## 2023-10-20 NOTE — TELEPHONE ENCOUNTER
"Received VM from patient that he \"keeps getting phone calls to schedule TAVR.\"     Called patient back. He states he is getting live phone calls to schedule his \"valve replacement.\" There are no notes in Epic. Advised I would reach out to pre-admit to see if they're trying to reach the patient, but in the meantime, if he gets another phone call, advised him to direct the caller to my extension. Patient verbalizes understanding.    Called pre-admit, and per their records, they have not reached out to the patient. They confirmed the patient is pre-registered for TAVR 10/23/2023.    Called patient back and spoke with wife Gunjan. Advised I'm not sure who is reaching out to them, but if they call again, ok to direct the caller to my ext. Gunjan verbalizes understanding and is appreciative of the call back. No further questions or concerns at this time.  "

## 2023-10-23 ENCOUNTER — ANESTHESIA EVENT (OUTPATIENT)
Dept: SURGERY | Facility: MEDICAL CENTER | Age: 73
DRG: 266 | End: 2023-10-23
Payer: MEDICARE

## 2023-10-23 ENCOUNTER — HOSPITAL ENCOUNTER (INPATIENT)
Facility: MEDICAL CENTER | Age: 73
LOS: 1 days | DRG: 266 | End: 2023-10-24
Attending: INTERNAL MEDICINE | Admitting: INTERNAL MEDICINE
Payer: MEDICARE

## 2023-10-23 ENCOUNTER — APPOINTMENT (OUTPATIENT)
Dept: RADIOLOGY | Facility: MEDICAL CENTER | Age: 73
DRG: 266 | End: 2023-10-23
Payer: MEDICARE

## 2023-10-23 ENCOUNTER — APPOINTMENT (OUTPATIENT)
Dept: CARDIOLOGY | Facility: MEDICAL CENTER | Age: 73
DRG: 266 | End: 2023-10-23
Attending: ANESTHESIOLOGY
Payer: MEDICARE

## 2023-10-23 ENCOUNTER — ANESTHESIA (OUTPATIENT)
Dept: SURGERY | Facility: MEDICAL CENTER | Age: 73
DRG: 266 | End: 2023-10-23
Payer: MEDICARE

## 2023-10-23 DIAGNOSIS — I50.31 ACUTE DIASTOLIC HEART FAILURE (HCC): ICD-10-CM

## 2023-10-23 PROBLEM — E80.6 HYPERBILIRUBINEMIA: Status: RESOLVED | Noted: 2019-01-12 | Resolved: 2023-10-23

## 2023-10-23 PROBLEM — Z95.2 S/P TAVR (TRANSCATHETER AORTIC VALVE REPLACEMENT): Status: ACTIVE | Noted: 2023-10-23

## 2023-10-23 LAB
ABO GROUP BLD: NORMAL
ACT BLD: 185 SEC (ref 74–137)
ACT BLD: 317 SEC (ref 74–137)
ALBUMIN SERPL BCP-MCNC: 3.6 G/DL (ref 3.2–4.9)
ALBUMIN/GLOB SERPL: 1.2 G/DL
ALP SERPL-CCNC: 80 U/L (ref 30–99)
ALT SERPL-CCNC: 29 U/L (ref 2–50)
ANION GAP SERPL CALC-SCNC: 11 MMOL/L (ref 7–16)
AST SERPL-CCNC: 37 U/L (ref 12–45)
BILIRUB SERPL-MCNC: 2 MG/DL (ref 0.1–1.5)
BLD GP AB SCN SERPL QL: NORMAL
BUN SERPL-MCNC: 15 MG/DL (ref 8–22)
CALCIUM ALBUM COR SERPL-MCNC: 8.9 MG/DL (ref 8.5–10.5)
CALCIUM SERPL-MCNC: 8.6 MG/DL (ref 8.5–10.5)
CHLORIDE SERPL-SCNC: 109 MMOL/L (ref 96–112)
CO2 SERPL-SCNC: 22 MMOL/L (ref 20–33)
CREAT SERPL-MCNC: 0.75 MG/DL (ref 0.5–1.4)
EKG IMPRESSION: NORMAL
ERYTHROCYTE [DISTWIDTH] IN BLOOD BY AUTOMATED COUNT: 57.8 FL (ref 35.9–50)
ETHANOL BLD-MCNC: <10.1 MG/DL
GFR SERPLBLD CREATININE-BSD FMLA CKD-EPI: 96 ML/MIN/1.73 M 2
GLOBULIN SER CALC-MCNC: 2.9 G/DL (ref 1.9–3.5)
GLUCOSE SERPL-MCNC: 106 MG/DL (ref 65–99)
HCT VFR BLD AUTO: 30.7 % (ref 42–52)
HGB BLD-MCNC: 9.7 G/DL (ref 14–18)
MCH RBC QN AUTO: 33.3 PG (ref 27–33)
MCHC RBC AUTO-ENTMCNC: 31.6 G/DL (ref 32.3–36.5)
MCV RBC AUTO: 105.5 FL (ref 81.4–97.8)
NT-PROBNP SERPL IA-MCNC: 2154 PG/ML (ref 0–125)
NT-PROBNP SERPL IA-MCNC: 2331 PG/ML (ref 0–125)
PLATELET # BLD AUTO: 72 K/UL (ref 164–446)
PLATELETS.RETICULATED NFR BLD AUTO: 17.3 % (ref 0.6–13.1)
POTASSIUM SERPL-SCNC: 3.7 MMOL/L (ref 3.6–5.5)
PROT SERPL-MCNC: 6.5 G/DL (ref 6–8.2)
RBC # BLD AUTO: 2.91 M/UL (ref 4.7–6.1)
RH BLD: NORMAL
SODIUM SERPL-SCNC: 142 MMOL/L (ref 135–145)
WBC # BLD AUTO: 4.6 K/UL (ref 4.8–10.8)

## 2023-10-23 PROCEDURE — A9270 NON-COVERED ITEM OR SERVICE: HCPCS | Performed by: INTERNAL MEDICINE

## 2023-10-23 PROCEDURE — 33361 REPLACE AORTIC VALVE PERQ: CPT | Mod: 62,Q0 | Performed by: THORACIC SURGERY (CARDIOTHORACIC VASCULAR SURGERY)

## 2023-10-23 PROCEDURE — 700105 HCHG RX REV CODE 258

## 2023-10-23 PROCEDURE — 700102 HCHG RX REV CODE 250 W/ 637 OVERRIDE(OP): Performed by: INTERNAL MEDICINE

## 2023-10-23 PROCEDURE — C1883 ADAPT/EXT, PACING/NEURO LEAD: HCPCS | Performed by: INTERNAL MEDICINE

## 2023-10-23 PROCEDURE — 85347 COAGULATION TIME ACTIVATED: CPT

## 2023-10-23 PROCEDURE — 85055 RETICULATED PLATELET ASSAY: CPT

## 2023-10-23 PROCEDURE — 86901 BLOOD TYPING SEROLOGIC RH(D): CPT

## 2023-10-23 PROCEDURE — C1887 CATHETER, GUIDING: HCPCS | Performed by: INTERNAL MEDICINE

## 2023-10-23 PROCEDURE — C1751 CATH, INF, PER/CENT/MIDLINE: HCPCS | Performed by: INTERNAL MEDICINE

## 2023-10-23 PROCEDURE — 160009 HCHG ANES TIME/MIN: Performed by: INTERNAL MEDICINE

## 2023-10-23 PROCEDURE — 85027 COMPLETE CBC AUTOMATED: CPT

## 2023-10-23 PROCEDURE — 503001 HCHG PERFUSION: Performed by: INTERNAL MEDICINE

## 2023-10-23 PROCEDURE — 71045 X-RAY EXAM CHEST 1 VIEW: CPT

## 2023-10-23 PROCEDURE — 770020 HCHG ROOM/CARE - TELE (206)

## 2023-10-23 PROCEDURE — 700102 HCHG RX REV CODE 250 W/ 637 OVERRIDE(OP)

## 2023-10-23 PROCEDURE — 700111 HCHG RX REV CODE 636 W/ 250 OVERRIDE (IP): Performed by: INTERNAL MEDICINE

## 2023-10-23 PROCEDURE — 700105 HCHG RX REV CODE 258: Performed by: INTERNAL MEDICINE

## 2023-10-23 PROCEDURE — 93355 ECHO TRANSESOPHAGEAL (TEE): CPT

## 2023-10-23 PROCEDURE — 502240 HCHG MISC OR SUPPLY RC 0272: Performed by: INTERNAL MEDICINE

## 2023-10-23 PROCEDURE — 110372 HCHG SHELL REV 278: Performed by: INTERNAL MEDICINE

## 2023-10-23 PROCEDURE — 86900 BLOOD TYPING SEROLOGIC ABO: CPT

## 2023-10-23 PROCEDURE — 700111 HCHG RX REV CODE 636 W/ 250 OVERRIDE (IP): Mod: JZ | Performed by: ANESTHESIOLOGY

## 2023-10-23 PROCEDURE — 160036 HCHG PACU - EA ADDL 30 MINS PHASE I: Performed by: INTERNAL MEDICINE

## 2023-10-23 PROCEDURE — 93005 ELECTROCARDIOGRAM TRACING: CPT

## 2023-10-23 PROCEDURE — C1894 INTRO/SHEATH, NON-LASER: HCPCS | Performed by: INTERNAL MEDICINE

## 2023-10-23 PROCEDURE — 82077 ASSAY SPEC XCP UR&BREATH IA: CPT

## 2023-10-23 PROCEDURE — A9270 NON-COVERED ITEM OR SERVICE: HCPCS

## 2023-10-23 PROCEDURE — C1760 CLOSURE DEV, VASC: HCPCS | Performed by: INTERNAL MEDICINE

## 2023-10-23 PROCEDURE — 160042 HCHG SURGERY MINUTES - EA ADDL 1 MIN LEVEL 5: Performed by: INTERNAL MEDICINE

## 2023-10-23 PROCEDURE — 86850 RBC ANTIBODY SCREEN: CPT

## 2023-10-23 PROCEDURE — 160031 HCHG SURGERY MINUTES - 1ST 30 MINS LEVEL 5: Performed by: INTERNAL MEDICINE

## 2023-10-23 PROCEDURE — B24BZZ4 ULTRASONOGRAPHY OF HEART WITH AORTA, TRANSESOPHAGEAL: ICD-10-PCS | Performed by: THORACIC SURGERY (CARDIOTHORACIC VASCULAR SURGERY)

## 2023-10-23 PROCEDURE — 83880 ASSAY OF NATRIURETIC PEPTIDE: CPT

## 2023-10-23 PROCEDURE — 700101 HCHG RX REV CODE 250: Performed by: ANESTHESIOLOGY

## 2023-10-23 PROCEDURE — C1725 CATH, TRANSLUMIN NON-LASER: HCPCS | Performed by: INTERNAL MEDICINE

## 2023-10-23 PROCEDURE — 02RF38Z REPLACEMENT OF AORTIC VALVE WITH ZOOPLASTIC TISSUE, PERCUTANEOUS APPROACH: ICD-10-PCS | Performed by: THORACIC SURGERY (CARDIOTHORACIC VASCULAR SURGERY)

## 2023-10-23 PROCEDURE — 93010 ELECTROCARDIOGRAM REPORT: CPT | Mod: 59 | Performed by: INTERNAL MEDICINE

## 2023-10-23 PROCEDURE — 80053 COMPREHEN METABOLIC PANEL: CPT

## 2023-10-23 PROCEDURE — 160035 HCHG PACU - 1ST 60 MINS PHASE I: Performed by: INTERNAL MEDICINE

## 2023-10-23 PROCEDURE — C1769 GUIDE WIRE: HCPCS | Performed by: INTERNAL MEDICINE

## 2023-10-23 PROCEDURE — 700101 HCHG RX REV CODE 250: Performed by: INTERNAL MEDICINE

## 2023-10-23 PROCEDURE — 33361 REPLACE AORTIC VALVE PERQ: CPT | Mod: 62,Q0 | Performed by: INTERNAL MEDICINE

## 2023-10-23 PROCEDURE — 160002 HCHG RECOVERY MINUTES (STAT): Performed by: INTERNAL MEDICINE

## 2023-10-23 PROCEDURE — 700111 HCHG RX REV CODE 636 W/ 250 OVERRIDE (IP): Mod: JZ

## 2023-10-23 PROCEDURE — 160048 HCHG OR STATISTICAL LEVEL 1-5: Performed by: INTERNAL MEDICINE

## 2023-10-23 DEVICE — DEVICE CLSR 6FR HMST IMPL SLF STS PLUS ANGIOSEAL (10EA/CA): Type: IMPLANTABLE DEVICE | Status: FUNCTIONAL

## 2023-10-23 DEVICE — IMPLANTABLE DEVICE: Type: IMPLANTABLE DEVICE | Site: GROIN | Status: FUNCTIONAL

## 2023-10-23 RX ORDER — LORAZEPAM 0.5 MG/1
1 TABLET ORAL EVERY 8 HOURS PRN
Status: DISCONTINUED | OUTPATIENT
Start: 2023-10-23 | End: 2023-10-24 | Stop reason: HOSPADM

## 2023-10-23 RX ORDER — HYDRALAZINE HYDROCHLORIDE 20 MG/ML
10 INJECTION INTRAMUSCULAR; INTRAVENOUS
Status: DISCONTINUED | OUTPATIENT
Start: 2023-10-23 | End: 2023-10-24 | Stop reason: HOSPADM

## 2023-10-23 RX ORDER — VERAPAMIL HYDROCHLORIDE 2.5 MG/ML
INJECTION, SOLUTION INTRAVENOUS
Status: DISCONTINUED | OUTPATIENT
Start: 2023-10-23 | End: 2023-10-23 | Stop reason: HOSPADM

## 2023-10-23 RX ORDER — LISINOPRIL 20 MG/1
20 TABLET ORAL 2 TIMES DAILY
Status: DISCONTINUED | OUTPATIENT
Start: 2023-10-23 | End: 2023-10-24 | Stop reason: HOSPADM

## 2023-10-23 RX ORDER — ZOLPIDEM TARTRATE 5 MG/1
5 TABLET ORAL NIGHTLY PRN
Status: DISCONTINUED | OUTPATIENT
Start: 2023-10-23 | End: 2023-10-24 | Stop reason: HOSPADM

## 2023-10-23 RX ORDER — ONDANSETRON 2 MG/ML
4 INJECTION INTRAMUSCULAR; INTRAVENOUS
Status: DISCONTINUED | OUTPATIENT
Start: 2023-10-23 | End: 2023-10-23 | Stop reason: HOSPADM

## 2023-10-23 RX ORDER — ASPIRIN 81 MG/1
81 TABLET ORAL DAILY
Status: DISCONTINUED | OUTPATIENT
Start: 2023-10-24 | End: 2023-10-24 | Stop reason: HOSPADM

## 2023-10-23 RX ORDER — ONDANSETRON 2 MG/ML
INJECTION INTRAMUSCULAR; INTRAVENOUS PRN
Status: DISCONTINUED | OUTPATIENT
Start: 2023-10-23 | End: 2023-10-23 | Stop reason: SURG

## 2023-10-23 RX ORDER — HYDROMORPHONE HYDROCHLORIDE 1 MG/ML
0.2 INJECTION, SOLUTION INTRAMUSCULAR; INTRAVENOUS; SUBCUTANEOUS
Status: DISCONTINUED | OUTPATIENT
Start: 2023-10-23 | End: 2023-10-23 | Stop reason: HOSPADM

## 2023-10-23 RX ORDER — SODIUM CHLORIDE, SODIUM LACTATE, POTASSIUM CHLORIDE, CALCIUM CHLORIDE 600; 310; 30; 20 MG/100ML; MG/100ML; MG/100ML; MG/100ML
INJECTION, SOLUTION INTRAVENOUS CONTINUOUS
Status: DISCONTINUED | OUTPATIENT
Start: 2023-10-23 | End: 2023-10-23 | Stop reason: HOSPADM

## 2023-10-23 RX ORDER — LIDOCAINE HYDROCHLORIDE 20 MG/ML
INJECTION, SOLUTION INFILTRATION; PERINEURAL
Status: DISCONTINUED | OUTPATIENT
Start: 2023-10-23 | End: 2023-10-23 | Stop reason: HOSPADM

## 2023-10-23 RX ORDER — HEPARIN SODIUM,PORCINE 1000/ML
VIAL (ML) INJECTION PRN
Status: DISCONTINUED | OUTPATIENT
Start: 2023-10-23 | End: 2023-10-23 | Stop reason: SURG

## 2023-10-23 RX ORDER — HYDROMORPHONE HYDROCHLORIDE 1 MG/ML
0.4 INJECTION, SOLUTION INTRAMUSCULAR; INTRAVENOUS; SUBCUTANEOUS
Status: DISCONTINUED | OUTPATIENT
Start: 2023-10-23 | End: 2023-10-23 | Stop reason: HOSPADM

## 2023-10-23 RX ORDER — PROTAMINE SULFATE 10 MG/ML
INJECTION, SOLUTION INTRAVENOUS PRN
Status: DISCONTINUED | OUTPATIENT
Start: 2023-10-23 | End: 2023-10-23 | Stop reason: SURG

## 2023-10-23 RX ORDER — PREGABALIN 100 MG/1
200 CAPSULE ORAL 2 TIMES DAILY
Status: DISCONTINUED | OUTPATIENT
Start: 2023-10-23 | End: 2023-10-24 | Stop reason: HOSPADM

## 2023-10-23 RX ORDER — ONDANSETRON 2 MG/ML
4 INJECTION INTRAMUSCULAR; INTRAVENOUS EVERY 4 HOURS PRN
Status: DISCONTINUED | OUTPATIENT
Start: 2023-10-23 | End: 2023-10-24 | Stop reason: HOSPADM

## 2023-10-23 RX ORDER — OXYCODONE HCL 5 MG/5 ML
10 SOLUTION, ORAL ORAL
Status: DISCONTINUED | OUTPATIENT
Start: 2023-10-23 | End: 2023-10-23 | Stop reason: HOSPADM

## 2023-10-23 RX ORDER — HALOPERIDOL 5 MG/ML
1 INJECTION INTRAMUSCULAR
Status: DISCONTINUED | OUTPATIENT
Start: 2023-10-23 | End: 2023-10-23 | Stop reason: HOSPADM

## 2023-10-23 RX ORDER — HYDRALAZINE HYDROCHLORIDE 20 MG/ML
INJECTION INTRAMUSCULAR; INTRAVENOUS PRN
Status: DISCONTINUED | OUTPATIENT
Start: 2023-10-23 | End: 2023-10-23 | Stop reason: SURG

## 2023-10-23 RX ORDER — OXYCODONE HCL 5 MG/5 ML
5 SOLUTION, ORAL ORAL
Status: DISCONTINUED | OUTPATIENT
Start: 2023-10-23 | End: 2023-10-23 | Stop reason: HOSPADM

## 2023-10-23 RX ORDER — BISACODYL 10 MG
10 SUPPOSITORY, RECTAL RECTAL
Status: DISCONTINUED | OUTPATIENT
Start: 2023-10-23 | End: 2023-10-24 | Stop reason: HOSPADM

## 2023-10-23 RX ORDER — SODIUM CHLORIDE 9 MG/ML
INJECTION, SOLUTION INTRAVENOUS CONTINUOUS
Status: ACTIVE | OUTPATIENT
Start: 2023-10-23 | End: 2023-10-23

## 2023-10-23 RX ORDER — ACETAMINOPHEN 325 MG/1
650 TABLET ORAL EVERY 6 HOURS PRN
Status: DISCONTINUED | OUTPATIENT
Start: 2023-10-23 | End: 2023-10-24 | Stop reason: HOSPADM

## 2023-10-23 RX ORDER — HYDROCODONE BITARTRATE AND ACETAMINOPHEN 5; 325 MG/1; MG/1
1 TABLET ORAL EVERY 4 HOURS PRN
Status: DISCONTINUED | OUTPATIENT
Start: 2023-10-23 | End: 2023-10-24 | Stop reason: HOSPADM

## 2023-10-23 RX ORDER — SODIUM CHLORIDE, SODIUM LACTATE, POTASSIUM CHLORIDE, CALCIUM CHLORIDE 600; 310; 30; 20 MG/100ML; MG/100ML; MG/100ML; MG/100ML
INJECTION, SOLUTION INTRAVENOUS CONTINUOUS
Status: ACTIVE | OUTPATIENT
Start: 2023-10-23 | End: 2023-10-23

## 2023-10-23 RX ORDER — ATORVASTATIN CALCIUM 80 MG/1
80 TABLET, FILM COATED ORAL
Status: DISCONTINUED | OUTPATIENT
Start: 2023-10-23 | End: 2023-10-24 | Stop reason: HOSPADM

## 2023-10-23 RX ORDER — DIPHENHYDRAMINE HCL 25 MG
25 TABLET ORAL NIGHTLY PRN
Status: DISCONTINUED | OUTPATIENT
Start: 2023-10-23 | End: 2023-10-24 | Stop reason: HOSPADM

## 2023-10-23 RX ORDER — BUPIVACAINE HYDROCHLORIDE 2.5 MG/ML
INJECTION, SOLUTION EPIDURAL; INFILTRATION; INTRACAUDAL
Status: DISCONTINUED | OUTPATIENT
Start: 2023-10-23 | End: 2023-10-23 | Stop reason: HOSPADM

## 2023-10-23 RX ORDER — CEFAZOLIN SODIUM 1 G/3ML
INJECTION, POWDER, FOR SOLUTION INTRAMUSCULAR; INTRAVENOUS PRN
Status: DISCONTINUED | OUTPATIENT
Start: 2023-10-23 | End: 2023-10-23 | Stop reason: SURG

## 2023-10-23 RX ORDER — DIPHENHYDRAMINE HYDROCHLORIDE 50 MG/ML
12.5 INJECTION INTRAMUSCULAR; INTRAVENOUS
Status: DISCONTINUED | OUTPATIENT
Start: 2023-10-23 | End: 2023-10-23 | Stop reason: HOSPADM

## 2023-10-23 RX ORDER — POLYETHYLENE GLYCOL 3350 17 G/17G
1 POWDER, FOR SOLUTION ORAL
Status: DISCONTINUED | OUTPATIENT
Start: 2023-10-23 | End: 2023-10-24 | Stop reason: HOSPADM

## 2023-10-23 RX ORDER — HYDROMORPHONE HYDROCHLORIDE 1 MG/ML
0.1 INJECTION, SOLUTION INTRAMUSCULAR; INTRAVENOUS; SUBCUTANEOUS
Status: DISCONTINUED | OUTPATIENT
Start: 2023-10-23 | End: 2023-10-23 | Stop reason: HOSPADM

## 2023-10-23 RX ORDER — POTASSIUM CHLORIDE 20 MEQ/1
20 TABLET, EXTENDED RELEASE ORAL DAILY
Status: DISCONTINUED | OUTPATIENT
Start: 2023-10-23 | End: 2023-10-24 | Stop reason: HOSPADM

## 2023-10-23 RX ORDER — DIPHENHYDRAMINE HYDROCHLORIDE 50 MG/ML
25 INJECTION INTRAMUSCULAR; INTRAVENOUS EVERY 6 HOURS PRN
Status: DISCONTINUED | OUTPATIENT
Start: 2023-10-23 | End: 2023-10-24 | Stop reason: HOSPADM

## 2023-10-23 RX ORDER — DEXAMETHASONE SODIUM PHOSPHATE 4 MG/ML
INJECTION, SOLUTION INTRA-ARTICULAR; INTRALESIONAL; INTRAMUSCULAR; INTRAVENOUS; SOFT TISSUE PRN
Status: DISCONTINUED | OUTPATIENT
Start: 2023-10-23 | End: 2023-10-23 | Stop reason: SURG

## 2023-10-23 RX ORDER — CARVEDILOL 25 MG/1
50 TABLET ORAL 2 TIMES DAILY WITH MEALS
Status: DISCONTINUED | OUTPATIENT
Start: 2023-10-23 | End: 2023-10-24 | Stop reason: HOSPADM

## 2023-10-23 RX ORDER — HYDROCHLOROTHIAZIDE 25 MG/1
25 TABLET ORAL DAILY
Status: DISCONTINUED | OUTPATIENT
Start: 2023-10-24 | End: 2023-10-24 | Stop reason: HOSPADM

## 2023-10-23 RX ORDER — AMOXICILLIN 250 MG
2 CAPSULE ORAL 2 TIMES DAILY
Status: DISCONTINUED | OUTPATIENT
Start: 2023-10-23 | End: 2023-10-24 | Stop reason: HOSPADM

## 2023-10-23 RX ORDER — FUROSEMIDE 10 MG/ML
20 INJECTION INTRAMUSCULAR; INTRAVENOUS
Status: DISCONTINUED | OUTPATIENT
Start: 2023-10-23 | End: 2023-10-24 | Stop reason: HOSPADM

## 2023-10-23 RX ADMIN — HYDRALAZINE HYDROCHLORIDE 10 MG: 20 INJECTION, SOLUTION INTRAMUSCULAR; INTRAVENOUS at 12:43

## 2023-10-23 RX ADMIN — SUGAMMADEX 200 MG: 100 INJECTION, SOLUTION INTRAVENOUS at 12:51

## 2023-10-23 RX ADMIN — HEPARIN SODIUM 10000 UNITS: 1000 INJECTION, SOLUTION INTRAVENOUS; SUBCUTANEOUS at 12:15

## 2023-10-23 RX ADMIN — CARVEDILOL 50 MG: 25 TABLET, FILM COATED ORAL at 17:18

## 2023-10-23 RX ADMIN — ROCURONIUM BROMIDE 80 MG: 10 INJECTION, SOLUTION INTRAVENOUS at 11:48

## 2023-10-23 RX ADMIN — HEPARIN SODIUM 5000 UNITS: 1000 INJECTION, SOLUTION INTRAVENOUS; SUBCUTANEOUS at 12:27

## 2023-10-23 RX ADMIN — DEXAMETHASONE SODIUM PHOSPHATE 4 MG: 4 INJECTION INTRA-ARTICULAR; INTRALESIONAL; INTRAMUSCULAR; INTRAVENOUS; SOFT TISSUE at 11:48

## 2023-10-23 RX ADMIN — PROPOFOL 200 MG: 10 INJECTION, EMULSION INTRAVENOUS at 11:48

## 2023-10-23 RX ADMIN — POTASSIUM CHLORIDE 20 MEQ: 1500 TABLET, EXTENDED RELEASE ORAL at 17:19

## 2023-10-23 RX ADMIN — PROTAMINE SULFATE 50 MG: 10 INJECTION, SOLUTION INTRAVENOUS at 12:44

## 2023-10-23 RX ADMIN — ATORVASTATIN CALCIUM 80 MG: 80 TABLET, FILM COATED ORAL at 20:54

## 2023-10-23 RX ADMIN — SODIUM CHLORIDE: 9 INJECTION, SOLUTION INTRAVENOUS at 17:25

## 2023-10-23 RX ADMIN — LISINOPRIL 20 MG: 20 TABLET ORAL at 17:19

## 2023-10-23 RX ADMIN — SODIUM CHLORIDE, POTASSIUM CHLORIDE, SODIUM LACTATE AND CALCIUM CHLORIDE: 600; 310; 30; 20 INJECTION, SOLUTION INTRAVENOUS at 11:37

## 2023-10-23 RX ADMIN — ONDANSETRON 4 MG: 2 INJECTION INTRAMUSCULAR; INTRAVENOUS at 11:48

## 2023-10-23 RX ADMIN — CEFAZOLIN 2 G: 1 INJECTION, POWDER, FOR SOLUTION INTRAMUSCULAR; INTRAVENOUS at 11:59

## 2023-10-23 RX ADMIN — FUROSEMIDE 20 MG: 10 INJECTION, SOLUTION INTRAVENOUS at 17:18

## 2023-10-23 RX ADMIN — PREGABALIN 200 MG: 100 CAPSULE ORAL at 17:18

## 2023-10-23 RX ADMIN — ZOLPIDEM TARTRATE 5 MG: 5 TABLET ORAL at 21:54

## 2023-10-23 ASSESSMENT — PAIN DESCRIPTION - PAIN TYPE
TYPE: ACUTE PAIN
TYPE: ACUTE PAIN;REFERRED PAIN
TYPE: ACUTE PAIN

## 2023-10-23 ASSESSMENT — FIBROSIS 4 INDEX
FIB4 SCORE: 5.75
FIB4 SCORE: 5.75

## 2023-10-23 ASSESSMENT — PATIENT HEALTH QUESTIONNAIRE - PHQ9
SUM OF ALL RESPONSES TO PHQ9 QUESTIONS 1 AND 2: 0
2. FEELING DOWN, DEPRESSED, IRRITABLE, OR HOPELESS: NOT AT ALL
1. LITTLE INTEREST OR PLEASURE IN DOING THINGS: NOT AT ALL

## 2023-10-23 NOTE — ANESTHESIA PROCEDURE NOTES
Airway    Date/Time: 10/23/2023 11:49 AM    Performed by: Kali Johnson M.D.  Authorized by: Kali Johnson M.D.    Location:  OR  Urgency:  Elective  Difficult Airway: No    Indications for Airway Management:  Anesthesia      Spontaneous Ventilation: absent    Sedation Level:  Deep  Preoxygenated: Yes    Patient Position:  Sniffing  Mask Difficulty Assessment:  0 - not attempted  Final Airway Type:  Endotracheal airway  Final Endotracheal Airway:  ETT  Cuffed: Yes    Technique Used for Successful ETT Placement:  Direct laryngoscopy    Insertion Site:  Oral  Blade Type:  Cameron  Laryngoscope Blade/Videolaryngoscope Blade Size:  3  ETT Size (mm):  7.5  Measured from:  Teeth  ETT to Teeth (cm):  24  Placement Verified by: auscultation and capnometry    Cormack-Lehane Classification:  Grade I - full view of glottis  Number of Attempts at Approach:  1

## 2023-10-23 NOTE — OP REPORT
Operative report    PreOp Diagnosis: Severe symptomatic aortic stenosis, pacemaker in place, hypertension, dyslipidemia, thrombocytopenia      PostOp Diagnosis: Same as above      Procedure(s):  TRANSCATHETER AORTIC VALVE REPLACEMENT with a 23 mm La S3 valve in valve- Wound Class: Clean  ECHOCARDIOGRAM, TRANSESOPHAGEAL, INTRAOPERATIVE - Wound Class: None    Surgeon(s):  KATHE Yi D.O.    Anesthesiologist/Type of Anesthesia:  Anesthesiologist: Kali Johnson M.D./General    Surgical Staff:  Circulator: Dwain Tijerina R.N.; Zoey Escobar R.N.  Perfusionist: Kelly Lambert  Scrub Person: Jodi Pack  Radiology Technologist: Codi Gutierrez; Edward Molina    Specimens removed if any:  * No specimens in log *    Estimated Blood Loss: Minimal    Findings:     Postdeployment ECHO showed good seating of the valve with a transvalvular gradient of 11 mmHg with no paravalvular leak    Complications: None immediate    Disposition: Stable to PACU    Procedure:    After informed consent was obtained, the patient was brought to the operating room and placed in the supine position on the operating table afterwards, general endotracheal anesthesia was induced by the anesthesia team.  Lines, catheters etc. were placed by the nursing and anesthesia team in the usual fashion.  Bony prominences were padded, joints placed in neutral position, and the patient was prepped and draped in the usual sterile fashion.  A timeout was performed.    We began the procedure by obtaining primary and secondary arterial access.  Primary arterial access was in the right common femoral artery, and secondary arterial access was in the right radial artery.  Access for temporary venous pacing wire was placed via the left common femoral vein.  The femoral vessels were accessed using a  Seldinger's technique with ultrasound guidance.  After obtaining access, 6 Macedonian sheaths were placed here.  The right  radial artery was accessed by my cardiology colleague in the usual fashion.  Right femoral angiogram was performed to confirm good positioning of our sheath.  Following that, the temporary pacing lead was floated from the left common femoral vein to the right ventricular apex.  Testing confirmed excellent capture.  Following that, a J-wire was advanced via the right radial catheter with an overriding pigtail catheter into the aortic root.  Angiography was performed there and are coplanar view was determined.  Heparinization was performed    Via the right femoral artery, access obtained with a J-wire.  The 6 East Timorese sheath was removed and 2 Perclose sutures were placed in the usual fashion.  Following those, an 8 [place]orese sheath was placed.  We again obtained access with a J-wire and pigtail catheter.  The J-wire was exchanged for a Lunderquist wire.  This allowed us to upsize our primary arterial access site to a 14 [place]orese sheath.  Once the sheath was in place, it was secured with an Ethibond stitch.  A J-wire with an overriding AL-1 catheter was advanced to the aortic root.  The J-wire was exchanged for a 0.035 straight wire which was used to cross the aortic valve.  The AL-1 catheter was then advanced into the left ventricle.  The straight wire was then exchanged for extra-stiff Amplatz deployment wire.  The AL-1 catheter was removed.      The 23 mm valve was prepped on the back table in the usual fashion and brought up for advancement into the aorta.  Prior to placement, the configuration of the valve was confirmed to be correct.  It was advanced into the abdominal aorta under fluoroscopic guidance.  The valve and balloon were docked in the usual fashion in the abdominal aorta.  It was then advanced across the aortic arch and down into the aortic root and across the aortic valve.  The valve was then prepped for deployment.  Following that, respirations were held, rapid ventricular pacing performed, and root  angiogram shot.  The valve was then deployed us at a pressure of 4 erendira, with 14 cc of contrast..  The balloon was deflated at that point in the delivery system removed.  A 24 mm true balloon was then passed over the wire and passed across the valve and valve complex.  With rapid ventricular pacing, the TAVR valve was then dilated, fracturing the previously placed valve sewing ring.  Post deployment echo revealed trivial to no perivalvular leak and acceptable gradient.    We proceeded with removal of all her wires, catheters etc.  The right femoral artery was secured using the 2 previously placed Perclose sutures.  A 6 Maldivian Angio-Seal was also placed for additional hemostasis.  Protamine was administered to reverse heparinization.  The right radial artery was treated with a TR band and pressure was held over the left common femoral venous site.  The patient tolerated procedure well, was taken to the CCU in stable condition.          10/23/2023 12:58 PM Jaydon Pearson D.O.

## 2023-10-23 NOTE — ANESTHESIA PREPROCEDURE EVALUATION
Case: [id number] Date/Time: 10/23/23 1318    Procedures:       TRANSCATHETER AORTIC VALVE REPLACEMENT WITH POTENTIAL TRANSESOPHAGEAL ECHOCARDIOGRAM      ECHOCARDIOGRAM, TRANSESOPHAGEAL, INTRAOPERATIVE    Pre-op diagnosis: SEVERE AORTIC STENOSIS    [facility]    Surgeons: Rainer Avalos M.D.          Relevant Problems   PULMONARY   (positive) Dyspnea      CARDIAC   (positive) Cardiac pacemaker - [employer]   (positive) Complete heart block (HCC)   (positive) Primary hypertension   (positive) Severe aortic stenosis         (positive) Acute liver failure   (positive) Chronic nonalcoholic liver disease     There were no vitals taken for this visit.    Physical Exam    Airway   Mallampati: II  TM distance: >3 FB  Neck ROM: full       Cardiovascular - normal exam  Rhythm: regular  Rate: normal  (-) murmur     Dental - normal exam           Pulmonary - normal exam  Breath sounds clear to auscultation     Abdominal    Neurological - normal exam                 Anesthesia Plan    ASA 4       Plan - general       Airway plan will be ETT  JENNIFER Planned        Induction: intravenous    Postoperative Plan: Postoperative administration of opioids is intended.    Pertinent diagnostic labs and testing reviewed    Informed Consent:    Anesthetic plan and risks discussed with patient.    Use of blood products discussed with: patient whom consented to blood products.

## 2023-10-23 NOTE — PROGRESS NOTES
Pt brought to floor by PACU. Placed on tele monitor. Sites were assessed. Pt resting comfortably.

## 2023-10-23 NOTE — ANESTHESIA PROCEDURE NOTES
JENNIFER    Date/Time: 10/23/2023 11:53 AM    Performed by: Kali Johnson M.D.  Authorized by: Kali Johnson M.D.    Start Time:10/23/2023 11:53 AM  Preanesthetic Checklist: patient identified, IV checked, site marked, risks and benefits discussed, surgical consent, monitors and equipment checked, pre-op evaluation and timeout performed    Indication for JENNIFER: diagnostic   Patient Location: OR  Intubated: Yes  Bite Block: Yes  Heart Visualized: Yes  Insertion: atraumatic    **See FULL JENNIFER report in patient's chart via CV Synapse**

## 2023-10-23 NOTE — ANESTHESIA TIME REPORT
Anesthesia Start and Stop Event Times     Date Time Event    10/23/2023 1100 Ready for Procedure     1145 Anesthesia Start     1302 Anesthesia Stop        Responsible Staff  10/23/23    Name Role Begin End    Kali Johnson M.D. Anesth 1145 1302        Overtime Reason:  no overtime (within assigned shift)    Comments:

## 2023-10-23 NOTE — OP REPORT
"TRANSCATHETER AORTIC VALVE REPLACEMENT REPORT    Referring Provider: Conner Thibodeaux M.D.    INTERVENTIONAL CARDIOLOGIST: Rainer Avalos MD  CARDIAC SURGEON: Jaydon Pearson DO  ANESTHESIOLOGIST: Kali Johnson MD    ASSISTANT: None    IMPRESSIONS:  1. Successful implantation of a 23 La Apoorva S3 Ultra Resilia deployed at nominal volume via the transfemoral approach with subsequent valve fracture utilizing a 23 mm balloon  2. Acute decompensated diastolic heart failure with LVEDP of 24 mmHg    Recommendations:  4 hour bedrest  Aspirin 81 mg daily    Pre-procedure diagnosis: TAVR recommended by heart valve team. Failure of bioprosthetic AVR 25 Mosaic, stenosis and regurgitation  Post-procedure diagnosis: Same    Procedure performed  Pigtail placement/ascending aortography  Transvenous pacemaker  23 La S3 Ultra Resilia  Perclose closure  Angioseal closure  Valve fracture of the 25 mm magna valve    Procedure Description  1. Access:   A) Valve Sheath: Right femoral, 14F La eSheath. Fluoroscopic guidance was utilized for femoral access Dynamic ultrasound was utilized to gain access.  B) Temporary pacemaker: 6 Brazilian Left femoral vein. Fluoroscopic guidance was utilized for femoral access Dynamic ultrasound was utilized to gain access.  C) Pigtail catheter: 5/6 Brazilian right radial artery Micropuncture technique was utilized following local anesthesia with lidocaine.     2. Procedure Description:  A TVP and pigtail catheter were placed and appropriate pacer function and implantation angle confirmed. The preclose was deployed followed by dilation of the tract with an 8F sheath. A standard 0.035\" J wire was used to deliver an catheter to the ascending aorta and then exchange for a 0.035\" Lunderquist wire. Over this wire the La E sheath advanced into the descending aorta. AAn AL1 was placed in the ascending aorta and the valve crossed with a 0.035\" strait wire. The catheter was advanced into the " "LV apex and wire exchanged for a 0.035\" Amplatz Super Stiff wire.   Next a 23 mm La Apoorva S3 Ultra Resilia was deployed under rapid pacing with nominal volume  -3 cc and 4 erendira.  Next the commander delivery system was removed from the body and a 23 mm Bard true balloon was used to perform post inflation during rapid pacing  at 18 erendira which resulted in splaying of the original valve frame  . Successful valve implantation confirmed by JENNIFER.  Aortic insufficiency was totally abolished, mean gradient was 11 mmHg and V-max 2.0 m/s.  A pigtail catheter was used to perform left heart catheterization and LVEDP measured at 24 mmHg.  Protamine was administered and the La sheath was removed from the body after reinsertion of the dilator. The perclose sutures were tightened hemostasis was achieved. The pigtail catheter was removed. The TVP was removed.    3. Hemostasis:   A) TAVR Sheath: Perclose by Preclose technique and Angioseal  B) TVP: Manual  C) Pigtail access: TR band    Technical Factors  1. Complications: None  2. Estimated Blood Loss: 50 cc  3. Specimens: None  4. Contrast Volume: 50 ml  5. Sedation: General Anesthesia  6. Echo: JENNIFER    "

## 2023-10-23 NOTE — OR NURSING
Patient arrived to PACU in stable condition.Sites to bilateral groins CDI and soft. Labs, EKG and chest Xray completed. Taking po well and VSS. AOX4. Arterial line removed and TR band protocol started at 1415. Family has been updated and patient is stable to go to floor.

## 2023-10-23 NOTE — ANESTHESIA POSTPROCEDURE EVALUATION
Patient: Matti Olguin    Procedure Summary     Date: 10/23/23 Room / Location: [place] [id number] / SURGERY [facility]    Anesthesia Start: 1145 Anesthesia Stop: 1302    Procedures:       TRANSCATHETER AORTIC VALVE REPLACEMENT (Bilateral: Groin)      ECHOCARDIOGRAM, TRANSESOPHAGEAL, INTRAOPERATIVE (Mouth) Diagnosis: (SEVERE AORTIC STENOSIS)    Surgeons: Rainer Avalos M.D. Responsible Provider: Kali Johnson M.D.    Anesthesia Type: general ASA Status: 4          Final Anesthesia Type: general  Last vitals  BP   Blood Pressure : 125/62    Temp   36.3 °C (97.3 °F)    Pulse   (!) 58   Resp   16    SpO2   95 %      Anesthesia Post Evaluation    Patient location during evaluation: PACU  Patient participation: complete - patient participated  Level of consciousness: awake and alert    Airway patency: patent  Anesthetic complications: no  Cardiovascular status: hemodynamically stable  Respiratory status: face mask    PONV: none          No notable events documented.     Nurse Pain Score: 0 (NPRS)

## 2023-10-23 NOTE — ANESTHESIA PROCEDURE NOTES
Arterial Line    Performed by: Kali Johnson M.D.  Authorized by: Kali Johnson M.D.    Start Time:  10/23/2023 11:51 AM  End Time:  10/23/2023 11:52 AM  Localization: surface landmarks    Patient Location:  OR  Indication: continuous blood pressure monitoring        Catheter Size:  20 G  Seldinger Technique?: Yes    Laterality:  Left  Site:  Radial artery  Line Secured:  Antimicrobial disc, tape and transparent dressing  Events: patient tolerated procedure well with no complications

## 2023-10-24 ENCOUNTER — APPOINTMENT (OUTPATIENT)
Dept: RADIOLOGY | Facility: MEDICAL CENTER | Age: 73
DRG: 266 | End: 2023-10-24
Payer: MEDICARE

## 2023-10-24 ENCOUNTER — PHARMACY VISIT (OUTPATIENT)
Dept: PHARMACY | Facility: MEDICAL CENTER | Age: 73
End: 2023-10-24
Payer: COMMERCIAL

## 2023-10-24 VITALS
TEMPERATURE: 97.7 F | OXYGEN SATURATION: 93 % | WEIGHT: 232.81 LBS | HEART RATE: 79 BPM | BODY MASS INDEX: 31.53 KG/M2 | RESPIRATION RATE: 17 BRPM | SYSTOLIC BLOOD PRESSURE: 123 MMHG | HEIGHT: 72 IN | DIASTOLIC BLOOD PRESSURE: 74 MMHG

## 2023-10-24 PROBLEM — I35.0 SEVERE AORTIC STENOSIS: Status: RESOLVED | Noted: 2023-09-23 | Resolved: 2023-10-24

## 2023-10-24 LAB
ALBUMIN SERPL BCP-MCNC: 3.5 G/DL (ref 3.2–4.9)
ALBUMIN/GLOB SERPL: 1.1 G/DL
ALP SERPL-CCNC: 82 U/L (ref 30–99)
ALT SERPL-CCNC: 27 U/L (ref 2–50)
ANION GAP SERPL CALC-SCNC: 9 MMOL/L (ref 7–16)
AST SERPL-CCNC: 33 U/L (ref 12–45)
BILIRUB SERPL-MCNC: 1.7 MG/DL (ref 0.1–1.5)
BUN SERPL-MCNC: 20 MG/DL (ref 8–22)
CALCIUM ALBUM COR SERPL-MCNC: 9 MG/DL (ref 8.5–10.5)
CALCIUM SERPL-MCNC: 8.6 MG/DL (ref 8.5–10.5)
CHLORIDE SERPL-SCNC: 108 MMOL/L (ref 96–112)
CO2 SERPL-SCNC: 24 MMOL/L (ref 20–33)
CREAT SERPL-MCNC: 0.83 MG/DL (ref 0.5–1.4)
EKG IMPRESSION: NORMAL
ERYTHROCYTE [DISTWIDTH] IN BLOOD BY AUTOMATED COUNT: 58.3 FL (ref 35.9–50)
GFR SERPLBLD CREATININE-BSD FMLA CKD-EPI: 93 ML/MIN/1.73 M 2
GLOBULIN SER CALC-MCNC: 3.2 G/DL (ref 1.9–3.5)
GLUCOSE SERPL-MCNC: 155 MG/DL (ref 65–99)
HCT VFR BLD AUTO: 29.9 % (ref 42–52)
HGB BLD-MCNC: 9.7 G/DL (ref 14–18)
MCH RBC QN AUTO: 34.2 PG (ref 27–33)
MCHC RBC AUTO-ENTMCNC: 32.4 G/DL (ref 32.3–36.5)
MCV RBC AUTO: 105.3 FL (ref 81.4–97.8)
NT-PROBNP SERPL IA-MCNC: 1974 PG/ML (ref 0–125)
PLATELET # BLD AUTO: 72 K/UL (ref 164–446)
PLATELETS.RETICULATED NFR BLD AUTO: 17 % (ref 0.6–13.1)
POTASSIUM SERPL-SCNC: 3.7 MMOL/L (ref 3.6–5.5)
PROT SERPL-MCNC: 6.7 G/DL (ref 6–8.2)
RBC # BLD AUTO: 2.84 M/UL (ref 4.7–6.1)
SODIUM SERPL-SCNC: 141 MMOL/L (ref 135–145)
WBC # BLD AUTO: 5.3 K/UL (ref 4.8–10.8)

## 2023-10-24 PROCEDURE — 85055 RETICULATED PLATELET ASSAY: CPT

## 2023-10-24 PROCEDURE — 71045 X-RAY EXAM CHEST 1 VIEW: CPT

## 2023-10-24 PROCEDURE — 90662 IIV NO PRSV INCREASED AG IM: CPT | Performed by: INTERNAL MEDICINE

## 2023-10-24 PROCEDURE — 85027 COMPLETE CBC AUTOMATED: CPT

## 2023-10-24 PROCEDURE — 97535 SELF CARE MNGMENT TRAINING: CPT

## 2023-10-24 PROCEDURE — 83880 ASSAY OF NATRIURETIC PEPTIDE: CPT

## 2023-10-24 PROCEDURE — 700102 HCHG RX REV CODE 250 W/ 637 OVERRIDE(OP)

## 2023-10-24 PROCEDURE — A9270 NON-COVERED ITEM OR SERVICE: HCPCS

## 2023-10-24 PROCEDURE — 700111 HCHG RX REV CODE 636 W/ 250 OVERRIDE (IP): Performed by: INTERNAL MEDICINE

## 2023-10-24 PROCEDURE — 93005 ELECTROCARDIOGRAM TRACING: CPT

## 2023-10-24 PROCEDURE — 700111 HCHG RX REV CODE 636 W/ 250 OVERRIDE (IP): Mod: JZ

## 2023-10-24 PROCEDURE — 3E02340 INTRODUCTION OF INFLUENZA VACCINE INTO MUSCLE, PERCUTANEOUS APPROACH: ICD-10-PCS | Performed by: INTERNAL MEDICINE

## 2023-10-24 PROCEDURE — 99238 HOSP IP/OBS DSCHRG MGMT 30/<: CPT

## 2023-10-24 PROCEDURE — 93010 ELECTROCARDIOGRAM REPORT: CPT | Performed by: INTERNAL MEDICINE

## 2023-10-24 PROCEDURE — 90471 IMMUNIZATION ADMIN: CPT

## 2023-10-24 PROCEDURE — RXMED WILLOW AMBULATORY MEDICATION CHARGE

## 2023-10-24 PROCEDURE — 80053 COMPREHEN METABOLIC PANEL: CPT

## 2023-10-24 RX ORDER — FUROSEMIDE 20 MG/1
20 TABLET ORAL DAILY
Qty: 14 TABLET | Refills: 0 | Status: SHIPPED | OUTPATIENT
Start: 2023-10-24 | End: 2023-10-31

## 2023-10-24 RX ORDER — POTASSIUM CHLORIDE 20 MEQ/1
20 TABLET, EXTENDED RELEASE ORAL DAILY
Qty: 14 TABLET | Refills: 0 | Status: SHIPPED | OUTPATIENT
Start: 2023-10-25 | End: 2023-10-31

## 2023-10-24 RX ADMIN — ASPIRIN 81 MG: 81 TABLET, COATED ORAL at 06:11

## 2023-10-24 RX ADMIN — INFLUENZA A VIRUS A/VICTORIA/4897/2022 IVR-238 (H1N1) ANTIGEN (FORMALDEHYDE INACTIVATED), INFLUENZA A VIRUS A/DARWIN/9/2021 SAN-010 (H3N2) ANTIGEN (FORMALDEHYDE INACTIVATED), INFLUENZA B VIRUS B/PHUKET/3073/2013 ANTIGEN (FORMALDEHYDE INACTIVATED), AND INFLUENZA B VIRUS B/MICHIGAN/01/2021 ANTIGEN (FORMALDEHYDE INACTIVATED) 0.7 ML: 60; 60; 60; 60 INJECTION, SUSPENSION INTRAMUSCULAR at 10:05

## 2023-10-24 RX ADMIN — POTASSIUM CHLORIDE 20 MEQ: 1500 TABLET, EXTENDED RELEASE ORAL at 06:11

## 2023-10-24 RX ADMIN — PREGABALIN 200 MG: 100 CAPSULE ORAL at 06:11

## 2023-10-24 RX ADMIN — HYDROCHLOROTHIAZIDE 25 MG: 25 TABLET ORAL at 06:11

## 2023-10-24 RX ADMIN — FUROSEMIDE 20 MG: 10 INJECTION, SOLUTION INTRAVENOUS at 06:15

## 2023-10-24 RX ADMIN — LISINOPRIL 20 MG: 20 TABLET ORAL at 06:11

## 2023-10-24 ASSESSMENT — COGNITIVE AND FUNCTIONAL STATUS - GENERAL
SUGGESTED CMS G CODE MODIFIER MOBILITY: CH
MOBILITY SCORE: 24
DAILY ACTIVITIY SCORE: 24
SUGGESTED CMS G CODE MODIFIER DAILY ACTIVITY: CH

## 2023-10-24 ASSESSMENT — LIFESTYLE VARIABLES
DOES PATIENT WANT TO STOP DRINKING: NO
TOTAL SCORE: 0
EVER FELT BAD OR GUILTY ABOUT YOUR DRINKING: NO
TOTAL SCORE: 0
EVER HAD A DRINK FIRST THING IN THE MORNING TO STEADY YOUR NERVES TO GET RID OF A HANGOVER: NO
ON A TYPICAL DAY WHEN YOU DRINK ALCOHOL HOW MANY DRINKS DO YOU HAVE: 0
HOW MANY TIMES IN THE PAST YEAR HAVE YOU HAD 5 OR MORE DRINKS IN A DAY: 0
HAVE PEOPLE ANNOYED YOU BY CRITICIZING YOUR DRINKING: NO
AVERAGE NUMBER OF DAYS PER WEEK YOU HAVE A DRINK CONTAINING ALCOHOL: 0
ALCOHOL_USE: NO
TOTAL SCORE: 0
CONSUMPTION TOTAL: NEGATIVE
HAVE YOU EVER FELT YOU SHOULD CUT DOWN ON YOUR DRINKING: NO

## 2023-10-24 ASSESSMENT — FIBROSIS 4 INDEX: FIB4 SCORE: 6.26

## 2023-10-24 NOTE — THERAPY
Physical Therapy Contact Note    Patient Name: Matti Olguin  Age:  73 y.o., Sex:  male  Medical Record #: [id number]  Today's Date: 10/24/2023    Discussed  with RN       10/24/23 1027   Education Group   Education Provided Cardiac Precautions   Additional Comments Pt is seen for phase 1 cardiac rehab education with TAVR handout given and reviewed. Pt is about to dc, dressed and ready to go, reports tolerating walking well, happy with less SOB with activity. Wife present, will assist as needed.

## 2023-10-24 NOTE — PROGRESS NOTES
Discharge instructions reviewed and signed, all questions answered, no PIV, awaiting Meds to Beds.

## 2023-10-24 NOTE — PROGRESS NOTES
4 Eyes Skin Assessment Completed by APRIL Barker and APRIL Broussard.    Head WDL  Ears WDL  Nose WDL  Mouth WDL  Neck WDL  Breast/Chest Scar  Shoulder Blades WDL  Spine WDL  (R) Arm/Elbow/Hand Radial site  (L) Arm/Elbow/Hand Arterial site  Abdomen WDL  Groin Bilateral groin site  Scrotum/Coccyx/Buttocks WDL  (R) Leg WDL  (L) Leg WDL  (R) Heel/Foot/Toe WDL  (L) Heel/Foot/Toe WDL          Devices In Places Tele Box, Blood Pressure Cuff, and Pulse Ox      Interventions In Place Pillows    Possible Skin Injury No    Pictures Uploaded Into Epic N/A  Wound Consult Placed N/A  RN Wound Prevention Protocol Ordered No

## 2023-10-24 NOTE — DISCHARGE SUMMARY
PRIMARY DISCHARGE DIAGNOSIS: Status post transcatheter aortic valve replacement.    DISCHARGE DIAGNOSIS:  S/P TAVR    PROCEDURES/TESTIN. Successful transcatheter aortic valve replacement (TAVR) with #23 La Apoorva S3 Ultra Resilia deployed at nominal volume via the transfemoral approach with subsequent valve fracture utilizing a 23 mm balloon on 10/23/2023 under general anesthesia.  2. Intraoperative transesophageal echocardiogram showing results pending  3. CXR on 10/24/2023 showing   1.  Hazy left lower lobe infiltrate  2.  Trace left pleural effusion  3.  Cardiomegaly  4.  Atherosclerosis  4. EKG on 10/24/2023    Paced rhythm    Labs   Lab Results   Component Value Date/Time    SODIUM 141 10/24/2023 03:29 AM    POTASSIUM 3.7 10/24/2023 03:29 AM    CHLORIDE 108 10/24/2023 03:29 AM    CO2 24 10/24/2023 03:29 AM    GLUCOSE 155 (H) 10/24/2023 03:29 AM    BUN 20 10/24/2023 03:29 AM    CREATININE 0.83 10/24/2023 03:29 AM    CREATININE 1.1 2007 04:30 AM       Lab Results   Component Value Date/Time    PROTHROMBTM 14.8 (H) 10/16/2023 08:20 AM    INR 1.14 (H) 10/16/2023 08:20 AM       Lab Results   Component Value Date/Time    WBC 5.3 10/24/2023 03:29 AM    RBC 2.84 (L) 10/24/2023 03:29 AM    HEMOGLOBIN 9.7 (L) 10/24/2023 03:29 AM    HEMATOCRIT 29.9 (L) 10/24/2023 03:29 AM    .3 (H) 10/24/2023 03:29 AM    MCH 34.2 (H) 10/24/2023 03:29 AM    MCHC 32.4 10/24/2023 03:29 AM    MPV 12.1 2021 06:45 AM    NEUTSPOLYS 87.50 (H) 2023 01:09 AM    LYMPHOCYTES 7.10 (L) 2023 01:09 AM    MONOCYTES 4.50 2023 01:09 AM    EOSINOPHILS 0.90 2023 01:09 AM    BASOPHILS 0.00 2023 01:09 AM    ANISOCYTOSIS 1+ 2023 01:09 AM         HOSPITAL COURSE: The patient is a pleasant 73 year old male with severe symptomatic aortic stenosis of bioprosthetic aortic valve (repeat AVR placed 10/2014, original valve was mechanical which was placed when patient was in his 30s), history of stroke  while on warfarin and subdural hematoma, complete heart block and NSVT, status post PPM, hypertension, dyslipidemia. Due to the patient's symptoms, the patient underwent successful TAVR described as above. Post-procedure, the patient did well. They did require IV diuresis during their stay and will continue on oral diuretics post-operatively. Acute on chronic diastolic heart failure exacerbation as evidenced by: signs and symptoms: shortness of breath, CONNELLY, weakness/fatigue, bilateral lower extremity edema; Echocardiogram showing dilation of right ventricle, right atrium and left atrium; corroborated by elevated BNP (1974), hazy left lower lobe infiltrate, and trace left pleural effusion on chest x-ray, and elevated LVEDP of 24. They were able to ambulate without difficulty.  No further events were noted during their stay. They are now off oxygen and are to be discharged to home.    DISCHARGE MEDICATIONS:      Medication List        START taking these medications        Instructions   furosemide 20 MG Tabs  Commonly known as: Lasix   Take 1 Tablet by mouth every day.  Dose: 20 mg     potassium chloride SA 20 MEQ Tbcr  Start taking on: October 25, 2023  Commonly known as: Kdur   Take 1 Tablet by mouth every day.  Dose: 20 mEq            CONTINUE taking these medications        Instructions   aspirin EC 81 MG Tbec  Commonly known as: Ecotrin   Take 1 Tablet by mouth every day.  Dose: 81 mg     atorvastatin 80 MG tablet  Commonly known as: Lipitor   Take 1 Tab by mouth every bedtime.  Dose: 80 mg     carvedilol 25 MG Tabs  Commonly known as: Coreg   Take 2 Tablets by mouth 2 times a day with meals for 30 days.  Dose: 50 mg     cloNIDine 0.1 MG Tabs  Commonly known as: Catapres   Take 1 Tab by mouth every 6 hours as needed. As needed for SBP >150  Dose: 0.1 mg     folic acid 400 MCG tablet  Commonly known as: Folvite   Take 400 mcg by mouth every day.  Dose: 400 mcg     hydroCHLOROthiazide 25 MG Tabs  Commonly known as:  Hydrodiuril   Take 1 Tab by mouth every day.  Dose: 25 mg     HYDROcodone-acetaminophen 5-325 MG Tabs per tablet  Commonly known as: Norco   Take 1 Tab by mouth every four hours as needed.  Dose: 1 Tablet     lisinopril 20 MG Tabs  Commonly known as: Prinivil   Take 1 Tab by mouth 2 times a day.  Dose: 20 mg     LORazepam 1 MG Tabs  Commonly known as: Ativan   Take 1 mg by mouth every 8 hours as needed for Anxiety.  Dose: 1 mg     pregabalin 200 MG capsule  Commonly known as: Lyrica   Take 200 mg by mouth 2 times a day.  Dose: 200 mg     therapeutic multivitamin-minerals Tabs   Take 1 Tablet by mouth every day.  Dose: 1 Tablet     zolpidem 5 MG Tabs  Commonly known as: Ambien   Take 5 mg by mouth at bedtime as needed for Sleep.  Dose: 5 mg              DISCHARGE INSTRUCTIONS: They are given discharge instructions on potential post-operative complications and symptoms to watch out for. Their groin sites were checked and were clean, dry, and intact. Patient or family to notify us for any complications noted on the discharge instructions. They will follow up with myself, GARCIA Mejia, on Tuesday in our cardiology office. They will need labs before their follow up appointment. They will then follow up with Dr. Avalos with a repeat echocardiogram in one month for post TAVR assessment.      Future Appointments   Date Time Provider Department Center   10/31/2023  1:15 PM PACER CHECK-CAM [facility] None   10/31/2023  2:00 PM BISHNU Bautista None   11/21/2023  2:15 PM V EXAM 9 ECHO [facility]   11/29/2023  3:00 PM KATHE Yi None   10/24/2024 10:15 AM [facility] EXAM 9 ECHO [facility]       Discharge time spent with patient was 26 minutes.

## 2023-10-24 NOTE — CARE PLAN
"The patient is Stable - Low risk of patient condition declining or worsening    Shift Goals  Clinical Goals: Patient will verbalize understanding of POC for TAVR POD#0 and #1. Patient will receive TAVR procedure after care education. Patient will ambulate before bed.  Patient Goals: \"Get to go home as early as possible tomorrow.\"  Family Goals: \"Remain safe throughout the night and go home early tomorrow.\"    Progress made toward(s) clinical / shift goals:  Discussed plan for patient to ambulate x4, up to chair for all meals, and plans for EKG, chest X-ray, and Echo cardiogram. Patient verbalized understanding and had no questions or concerns. Provided patient with a verbal discussion and printed handout related to after care instructions for TAVR. Patient educated to use call light for assistance.     Patient is not progressing towards the following goals: N/A      "

## 2023-10-24 NOTE — PROGRESS NOTES
Received bedside report from APRIL Barker, pt care assumed. VS WDL, pt assessment complete. Pt A&Ox4, no c/o pain at this time. POC discussed with pt and verbalizes no questions. Pt denies any additional needs at this time. Bed locked and in lowest position, bed alarm off as patient is up to self.. Pt educated on fall risk and verbalized understanding, call light within reach, hourly rounding initiated.

## 2023-10-25 ENCOUNTER — TELEPHONE (OUTPATIENT)
Dept: CARDIOLOGY | Facility: MEDICAL CENTER | Age: 73
End: 2023-10-25
Payer: MEDICARE

## 2023-10-25 NOTE — TELEPHONE ENCOUNTER
Received a VM from patient's wife Gunjan requesting a call back to discuss some questions they have.     Called Gunjan and patient back. Answered questions they had. They also requested their BMP be faxed to [facility] to be completed on Monday prior to 1 week FU.     Faxed BMP to [phone]

## 2023-10-26 LAB — LV EJECT FRACT  99904: 55

## 2023-10-31 ENCOUNTER — APPOINTMENT (OUTPATIENT)
Dept: CARDIOLOGY | Facility: MEDICAL CENTER | Age: 73
End: 2023-10-31
Payer: MEDICARE

## 2023-10-31 ENCOUNTER — NON-PROVIDER VISIT (OUTPATIENT)
Dept: CARDIOLOGY | Facility: MEDICAL CENTER | Age: 73
End: 2023-10-31
Payer: MEDICARE

## 2023-10-31 ENCOUNTER — TELEPHONE (OUTPATIENT)
Dept: CARDIOLOGY | Facility: MEDICAL CENTER | Age: 73
End: 2023-10-31
Payer: MEDICARE

## 2023-10-31 ENCOUNTER — OFFICE VISIT (OUTPATIENT)
Dept: CARDIOLOGY | Facility: MEDICAL CENTER | Age: 73
End: 2023-10-31
Payer: MEDICARE

## 2023-10-31 VITALS
RESPIRATION RATE: 14 BRPM | HEIGHT: 72 IN | DIASTOLIC BLOOD PRESSURE: 60 MMHG | HEART RATE: 82 BPM | SYSTOLIC BLOOD PRESSURE: 100 MMHG | WEIGHT: 231 LBS | OXYGEN SATURATION: 91 % | BODY MASS INDEX: 31.29 KG/M2

## 2023-10-31 DIAGNOSIS — Z95.0 CARDIAC PACEMAKER IN SITU: Chronic | ICD-10-CM

## 2023-10-31 DIAGNOSIS — Z98.890 H/O AORTIC ROOT REPAIR: ICD-10-CM

## 2023-10-31 DIAGNOSIS — S06.5XAA SUBDURAL HEMATOMA (HCC): ICD-10-CM

## 2023-10-31 DIAGNOSIS — I44.2 COMPLETE HEART BLOCK (HCC): ICD-10-CM

## 2023-10-31 DIAGNOSIS — D69.6 THROMBOCYTOPENIA (HCC): ICD-10-CM

## 2023-10-31 DIAGNOSIS — Z95.2 S/P TAVR (TRANSCATHETER AORTIC VALVE REPLACEMENT): ICD-10-CM

## 2023-10-31 DIAGNOSIS — Z86.73 HISTORY OF STROKE: ICD-10-CM

## 2023-10-31 PROBLEM — I50.31 ACUTE DIASTOLIC HEART FAILURE (HCC): Status: RESOLVED | Noted: 2023-10-23 | Resolved: 2023-10-31

## 2023-10-31 PROCEDURE — 99214 OFFICE O/P EST MOD 30 MIN: CPT | Performed by: INTERNAL MEDICINE

## 2023-10-31 PROCEDURE — 3078F DIAST BP <80 MM HG: CPT | Performed by: INTERNAL MEDICINE

## 2023-10-31 PROCEDURE — 93280 PM DEVICE PROGR EVAL DUAL: CPT | Mod: 26 | Performed by: INTERNAL MEDICINE

## 2023-10-31 PROCEDURE — 93280 PM DEVICE PROGR EVAL DUAL: CPT | Performed by: INTERNAL MEDICINE

## 2023-10-31 PROCEDURE — 3074F SYST BP LT 130 MM HG: CPT | Performed by: INTERNAL MEDICINE

## 2023-10-31 PROCEDURE — 99213 OFFICE O/P EST LOW 20 MIN: CPT | Performed by: INTERNAL MEDICINE

## 2023-10-31 ASSESSMENT — FIBROSIS 4 INDEX: FIB4 SCORE: 6.26

## 2023-10-31 NOTE — TELEPHONE ENCOUNTER
LVM for patient to call back and discuss rescheduling 1 week post TAVR appt today d/t Flori APRN out sick.

## 2023-10-31 NOTE — PROGRESS NOTES
CARDIOLOGY STRUCTURAL HEART FOLLOWUP    PCP: Brittany Bradshaw M.D.  REFERRING: Dr. Thibodeaux    1. S/P TAVR (transcatheter aortic valve replacement)    2. Cardiac pacemaker - Medtronic    3. History of stroke    4. H/O aortic root repair with Cabrol graft    5. Thrombocytopenia (HCC)    6. Subdural hematoma (HCC)        Matti Olguin is doing well 1 week following transcatheter aortic valve replacement.  He no longer requires furosemide or potassium and this was discontinued.  I will have him update a laboratory panel in a few weeks and complete the 1 month echocardiogram before seeing him back.  At this point he has no restrictions.    Follow up: 4 weeks    History: Matti Olguin is a 73 y.o. male with history of thrombocytopenia, possible early cirrhosis, CVA x2, subdural hematoma, aortic valve replacement and root repair with aortocoronary conduit presenting for follow-up of valve in valve TAVR performed 1 week ago.  He also has a cholecystectomy.    He is accompanied again by his wife and he has made remarkable improvement in his exertional capacities.  He is having low back pain.  The groin is healed up well      PE:  /60 (BP Location: Left arm, Patient Position: Sitting, BP Cuff Size: Adult)   Pulse 82   Resp 14   Ht 1.829 m (6')   Wt 105 kg (231 lb)   SpO2 91%   BMI 31.33 kg/m²   GEN: NAD  CARDIAC: Regular. Normal S1, S2, Systolic murmur.   VASCULATURE: Normal carotid upstroke  RESP: Clear to auscultation bilaterally  ABD: Soft, non-tender, non-distended  EXT: No edema  NEURO: No focal deficit       Past Medical History:   Diagnosis Date    Alcoholic fibrosis and sclerosis of liver     Anticoagulation monitoring, special range     Complete heart block (HCC) 10/2014    Statust post pacemaker placement.    Dyspnea     High cholesterol     HTN (hypertension)     NSVT (nonsustained ventricular tachycardia) (HCC) - on pacer check     Pacemaker 2014    Pain     Back pain from neck to hips,  "shoulders    Renal disorder     Impaired kidney function after contrast, per spouse kidneys are \"looking good now\"    S/P aortic valve replacement with bioprosthetic valve 10/2014    Severe aortic stenosis 09/23/2023    Sleep apnea     Denies    Snoring     Stroke (HCC) 05/2007    Trembling in hands, impaired balance, left sided \"tingling\"    Unspecified hemorrhagic conditions     Coumadin    Valvular heart disease 12/1988    AVR     Allergies   Allergen Reactions    Yellow Jacket Venom Anaphylaxis    Plavix [Clopidogrel]      Subdural hematoma per spouse  Patient does not recall having an allergic or adverse reaction to this     Outpatient Encounter Medications as of 10/31/2023   Medication Sig Dispense Refill    pregabalin (LYRICA) 200 MG capsule Take 200 mg by mouth 2 times a day.      aspirin EC (ECOTRIN) 81 MG Tablet Delayed Response Take 1 Tablet by mouth every day. 90 Tablet 2    therapeutic multivitamin-minerals (THERAGRAN-M) Tab Take 1 Tablet by mouth every day.      LORazepam (ATIVAN) 1 MG Tab Take 1 mg by mouth every 8 hours as needed for Anxiety.      lisinopril (PRINIVIL) 20 MG Tab Take 1 Tab by mouth 2 times a day. 180 Tab 3    hydroCHLOROthiazide (HYDRODIURIL) 25 MG Tab Take 1 Tab by mouth every day. 90 Tab 3    atorvastatin (LIPITOR) 80 MG tablet Take 1 Tab by mouth every bedtime. 30 Tab 2    cloNIDine (CATAPRES) 0.1 MG Tab Take 1 Tab by mouth every 6 hours as needed. As needed for SBP >150 60 Tab 2    HYDROcodone-acetaminophen (NORCO) 5-325 MG Tab per tablet Take 1 Tab by mouth every four hours as needed.      folic acid (FOLVITE) 400 MCG tablet Take 400 mcg by mouth every day.      zolpidem (AMBIEN) 5 MG TABS Take 5 mg by mouth at bedtime as needed for Sleep.      [DISCONTINUED] potassium chloride SA (KDUR) 20 MEQ Tab CR Take 1 Tablet by mouth every day. 14 Tablet 0    [DISCONTINUED] furosemide (LASIX) 20 MG Tab Take 1 Tablet by mouth every day. 14 Tablet 0     No facility-administered encounter " medications on file as of 10/31/2023.     Social History     Socioeconomic History    Marital status:      Spouse name: Not on file    Number of children: Not on file    Years of education: Not on file    Highest education level: Not on file   Occupational History    Not on file   Tobacco Use    Smoking status: Never    Smokeless tobacco: Former     Types: Chew    Tobacco comments:     Quit since September 2023   Vaping Use    Vaping Use: Some days    Substances: THC   Substance and Sexual Activity    Alcohol use: Yes     Comment: about 24 beers per week, last drink 9/16/23    Drug use: Yes     Types: Inhaled     Comment: THC    Sexual activity: Not on file   Other Topics Concern    Not on file   Social History Narrative    Not on file     Social Determinants of Health     Financial Resource Strain: Not on file   Food Insecurity: Not on file   Transportation Needs: Not on file   Physical Activity: Not on file   Stress: Not on file   Social Connections: Not on file   Intimate Partner Violence: Not on file   Housing Stability: Not on file       Studies  Lab Results   Component Value Date/Time    CHOLSTRLTOT 117 12/20/2021 06:12 PM    LDL 64 12/20/2021 06:12 PM    HDL 37 (A) 12/20/2021 06:12 PM    TRIGLYCERIDE 78 12/20/2021 06:12 PM       Lab Results   Component Value Date/Time    SODIUM 141 10/24/2023 03:29 AM    POTASSIUM 3.7 10/24/2023 03:29 AM    CHLORIDE 108 10/24/2023 03:29 AM    CO2 24 10/24/2023 03:29 AM    GLUCOSE 155 (H) 10/24/2023 03:29 AM    BUN 20 10/24/2023 03:29 AM    CREATININE 0.83 10/24/2023 03:29 AM    CREATININE 1.1 05/29/2007 04:30 AM     Lab Results   Component Value Date/Time    ALKPHOSPHAT 82 10/24/2023 03:29 AM    ASTSGOT 33 10/24/2023 03:29 AM    ALTSGPT 27 10/24/2023 03:29 AM    TBILIRUBIN 1.7 (H) 10/24/2023 03:29 AM             No chief complaint on file.    ROS:   10 point review systems is otherwise negative except as per the HPI

## 2023-11-01 ENCOUNTER — TELEPHONE (OUTPATIENT)
Dept: CARDIOLOGY | Facility: MEDICAL CENTER | Age: 73
End: 2023-11-01
Payer: MEDICARE

## 2023-11-01 ENCOUNTER — DOCUMENTATION (OUTPATIENT)
Dept: CARDIOLOGY | Facility: MEDICAL CENTER | Age: 73
End: 2023-11-01
Payer: MEDICARE

## 2023-11-01 NOTE — LETTER
November 1, 2023        Patient:                Matti Olguin  YOB: 1950        On behalf of [facility]'s Structural Heart Program, we would like to thank you for allowing us to participate in the care of your patient, Mr. Olguin.     He underwent a successful transcatheter aortic valve replacement (TAVR) on Monday, October 23, 2023.     Your patient is scheduled to follow up with our Structural Heart Program at one month and one year post procedure.     Again, thank you for allowing us to participate in the care of your patient. If you have any questions, please do not hesitate to contact our Structural Heart team.     Sincerely,    [facility] Structural [facility] Team    ROXANA Fierro, RN, Structural Heart Program Nurse Coordinator ([phone])  ROXANA Garcia, RN, Structural Heart Program Nurse Coordinator ([phone])

## 2023-11-01 NOTE — TELEPHONE ENCOUNTER
On behalf of [facility]'s Structural Heart Program, we would like to thank you for allowing us to participate in the care of your patient, Mr. Olguin.     He underwent a successful transcatheter aortic valve replacement (TAVR) on Monday, October 23, 2023.     Your patient is scheduled to follow up with our Structural Heart Program at one month and one year post procedure.     Again, thank you for allowing us to participate in the care of your patient. If you have any questions, please do not hesitate to contact our Structural Heart team.     Sincerely,    [facility]    ROXANA Fierro, RN, Structural Heart Program Nurse Coordinator ([phone])  RXOANA Garcia, RN, Structural Heart Program Nurse Coordinator ([phone])

## 2023-11-14 LAB
CHOLEST SERPL-MCNC: 120 MG/DL
HDLC SERPL-MCNC: 63 MG/DL
LDLC SERPL CALC-MCNC: 54 MG/DL
TRIGL SERPL-MCNC: 61 MG/DL

## 2023-11-17 DIAGNOSIS — Z95.2 S/P TAVR (TRANSCATHETER AORTIC VALVE REPLACEMENT): ICD-10-CM

## 2023-11-21 ENCOUNTER — HOSPITAL ENCOUNTER (OUTPATIENT)
Dept: CARDIOLOGY | Facility: MEDICAL CENTER | Age: 73
End: 2023-11-21
Attending: INTERNAL MEDICINE
Payer: MEDICARE

## 2023-11-21 DIAGNOSIS — I35.0 SEVERE AORTIC STENOSIS: ICD-10-CM

## 2023-11-21 LAB
LV EJECT FRACT  99904: 58
LV EJECT FRACT  99904: 58
LV EJECT FRACT MOD 2C 99903: 64
LV EJECT FRACT MOD 2C 99903: 64
LV EJECT FRACT MOD 4C 99902: 57.79
LV EJECT FRACT MOD 4C 99902: 57.79
LV EJECT FRACT MOD BP 99901: 62.26
LV EJECT FRACT MOD BP 99901: 62.26

## 2023-11-21 PROCEDURE — 93306 TTE W/DOPPLER COMPLETE: CPT

## 2023-11-21 PROCEDURE — 93306 TTE W/DOPPLER COMPLETE: CPT | Mod: 26 | Performed by: INTERNAL MEDICINE

## 2023-11-21 PROCEDURE — 700117 HCHG RX CONTRAST REV CODE 255: Performed by: INTERNAL MEDICINE

## 2023-11-21 RX ADMIN — HUMAN ALBUMIN MICROSPHERES AND PERFLUTREN 3 ML: 10; .22 INJECTION, SOLUTION INTRAVENOUS at 18:00

## 2023-11-22 ENCOUNTER — TELEPHONE (OUTPATIENT)
Dept: CARDIOLOGY | Facility: MEDICAL CENTER | Age: 73
End: 2023-11-22
Payer: MEDICARE

## 2023-11-22 DIAGNOSIS — I35.0 SEVERE AORTIC STENOSIS: ICD-10-CM

## 2023-11-22 DIAGNOSIS — Z95.2 S/P TAVR (TRANSCATHETER AORTIC VALVE REPLACEMENT): ICD-10-CM

## 2023-11-22 NOTE — TELEPHONE ENCOUNTER
Phone Number Called: [phone]    Call outcome: Did not leave a detailed message. Requested patient to call back.    Message: Left message with patient to call this RN back to review echocardiogram as well as BE recommendations. Advised that office closed Thursday and Friday for holiday and that this RN would call back next week if no response today.

## 2023-11-22 NOTE — TELEPHONE ENCOUNTER
Phone Number Called: [phone]    Call outcome: Spoke to patient regarding message below.    Message: Echocardiogram results reviewed with patient as well as BE recommendations. Patient agreeable to plan at this time and order for 20 mg Xarelto placed. Patient advised he is in [place] currently but would be back in town on Saturday. All questions answered at this time. Advised to call back with any further questions or concerns.

## 2023-11-29 ENCOUNTER — DOCUMENTATION (OUTPATIENT)
Dept: CARDIOLOGY | Facility: MEDICAL CENTER | Age: 73
End: 2023-11-29
Payer: MEDICARE

## 2023-11-29 ENCOUNTER — OFFICE VISIT (OUTPATIENT)
Dept: CARDIOLOGY | Facility: MEDICAL CENTER | Age: 73
End: 2023-11-29
Attending: INTERNAL MEDICINE
Payer: MEDICARE

## 2023-11-29 VITALS
BODY MASS INDEX: 31.83 KG/M2 | DIASTOLIC BLOOD PRESSURE: 70 MMHG | WEIGHT: 235 LBS | HEIGHT: 72 IN | OXYGEN SATURATION: 93 % | RESPIRATION RATE: 16 BRPM | HEART RATE: 80 BPM | SYSTOLIC BLOOD PRESSURE: 112 MMHG

## 2023-11-29 DIAGNOSIS — Z95.0 CARDIAC PACEMAKER IN SITU: Chronic | ICD-10-CM

## 2023-11-29 DIAGNOSIS — Z95.3 S/P AORTIC VALVE REPLACEMENT WITH BIOPROSTHETIC VALVE: ICD-10-CM

## 2023-11-29 DIAGNOSIS — D69.6 THROMBOCYTOPENIA (HCC): ICD-10-CM

## 2023-11-29 DIAGNOSIS — Z98.890 H/O AORTIC ROOT REPAIR: ICD-10-CM

## 2023-11-29 DIAGNOSIS — Z95.2 S/P TAVR (TRANSCATHETER AORTIC VALVE REPLACEMENT): ICD-10-CM

## 2023-11-29 DIAGNOSIS — I47.29 NSVT (NONSUSTAINED VENTRICULAR TACHYCARDIA) (HCC): Chronic | ICD-10-CM

## 2023-11-29 DIAGNOSIS — Z86.73 HISTORY OF STROKE: ICD-10-CM

## 2023-11-29 DIAGNOSIS — I44.2 COMPLETE HEART BLOCK (HCC): ICD-10-CM

## 2023-11-29 PROCEDURE — 3078F DIAST BP <80 MM HG: CPT | Performed by: INTERNAL MEDICINE

## 2023-11-29 PROCEDURE — 3074F SYST BP LT 130 MM HG: CPT | Performed by: INTERNAL MEDICINE

## 2023-11-29 PROCEDURE — 99214 OFFICE O/P EST MOD 30 MIN: CPT | Performed by: INTERNAL MEDICINE

## 2023-11-29 PROCEDURE — 99213 OFFICE O/P EST LOW 20 MIN: CPT | Performed by: INTERNAL MEDICINE

## 2023-11-29 ASSESSMENT — FIBROSIS 4 INDEX: FIB4 SCORE: 6.26

## 2023-11-29 NOTE — PROGRESS NOTES
"Chief Complaint   Patient presents with    Other      Month post TAVR       Subjective     Matti Olguin is a 73 y.o. male who presents today     Past Medical History:   Diagnosis Date    Alcoholic fibrosis and sclerosis of liver     Anticoagulation monitoring, special range     Complete heart block (HCC) 10/2014    Statust post pacemaker placement.    Dyspnea     High cholesterol     HTN (hypertension)     NSVT (nonsustained ventricular tachycardia) (HCC) - on pacer check     Pacemaker 2014    Pain     Back pain from neck to hips, shoulders    Renal disorder     Impaired kidney function after contrast, per spouse kidneys are \"looking good now\"    S/P aortic valve replacement with bioprosthetic valve 10/2014    Severe aortic stenosis 09/23/2023    Sleep apnea     Denies    Snoring     Stroke (HCC) 05/2007    Trembling in hands, impaired balance, left sided \"tingling\"    Unspecified hemorrhagic conditions     Coumadin    Valvular heart disease 12/1988    AVR     Past Surgical History:   Procedure Laterality Date    TRANSCATHETER AORTIC VALVE REPLACEMENT Bilateral 10/23/2023    Procedure: TRANSCATHETER AORTIC VALVE REPLACEMENT;  Surgeon: Rainer Avalos M.D.;  Location: SURGERY [facility];  Service: Cardiac    ECHOCARDIOGRAM, TRANSESOPHAGEAL, INTRAOPERATIVE N/A 10/23/2023    Procedure: ECHOCARDIOGRAM, TRANSESOPHAGEAL, INTRAOPERATIVE;  Surgeon: Rainer Avalos M.D.;  Location: SURGERY [facility];  Service: Cardiac    MAURY BY LAPAROSCOPY N/A 9/24/2023    Procedure: CHOLECYSTECTOMY, LAPAROSCOPIC;  Surgeon: Dipka Sepulveda M.D.;  Location: SURGERY [facility];  Service: General    KS ERCP,DIAGNOSTIC N/A 9/21/2023    Procedure: ERCP (ENDOSCOPIC RETROGRADE CHOLANGIOPANCREATOGRAPHY);  Surgeon: Pradeep Hebert M.D.;  Location: SURGERY SAME DAY [facility];  Service: Gastroenterology    KS ERCP,W/REMOVAL STONE,VASU/PANCR DUCTS N/A 9/21/2023    Procedure: ERCP, WITH CALCULUS REMOVAL FROM BILE OR PANCREATIC DUCT;  Surgeon: " Pradeep Hebert M.D.;  Location: SURGERY SAME DAY [facility];  Service: Gastroenterology    SPHINCTEROTOMY N/A 9/21/2023    Procedure: SPHINCTEROTOMY;  Surgeon: Pradeep Hebert M.D.;  Location: SURGERY SAME DAY [facility];  Service: Gastroenterology    PACEMAKER INSERTION Left 10/25/2014    Medtronic Advisa DR COOL [id number] implanted at [facility].    RECOVERY  11/8/2013    Performed by Cath-Recovery Surgery at SURGERY SAME DAY [facility] ORS    RECOVERY  10/8/2013    Performed by Cath-Recovery Surgery at SURGERY SAME DAY [facility]S    RECOVERY  9/21/2012    Performed by Matti Clemens M.D. at SURGERY SAME DAY [facility] ORS    AORTIC VALVE REPLACEMENT  1988    titanium    OTHER ORTHOPEDIC SURGERY      Knee scope, right shoulder     Family History   Problem Relation Age of Onset    Heart Attack Father     Heart Disease Sister      Social History     Socioeconomic History    Marital status:      Spouse name: Not on file    Number of children: Not on file    Years of education: Not on file    Highest education level: Not on file   Occupational History    Not on file   Tobacco Use    Smoking status: Never    Smokeless tobacco: Former     Types: Chew    Tobacco comments:     Quit since September 2023   Vaping Use    Vaping Use: Some days    Substances: THC   Substance and Sexual Activity    Alcohol use: Yes     Comment: about 24 beers per week, last drink 9/16/23    Drug use: Yes     Types: Inhaled     Comment: THC    Sexual activity: Not on file   Other Topics Concern    Not on file   Social History Narrative    Not on file     Social Determinants of Health     Financial Resource Strain: Not on file   Food Insecurity: Not on file   Transportation Needs: Not on file   Physical Activity: Not on file   Stress: Not on file   Social Connections: Not on file   Intimate Partner Violence: Not on file   Housing Stability: Not on file     Allergies   Allergen Reactions    Yellow Jacket Venom Anaphylaxis    Plavix [Clopidogrel]       Subdural hematoma per spouse  Patient does not recall having an allergic or adverse reaction to this     Outpatient Encounter Medications as of 11/29/2023   Medication Sig Dispense Refill    rivaroxaban (XARELTO) 20 MG Tab tablet Take 1 Tablet by mouth with dinner. 90 Tablet 3    pregabalin (LYRICA) 200 MG capsule Take 200 mg by mouth 2 times a day.      therapeutic multivitamin-minerals (THERAGRAN-M) Tab Take 1 Tablet by mouth every day.      LORazepam (ATIVAN) 1 MG Tab Take 1 mg by mouth every 8 hours as needed for Anxiety.      lisinopril (PRINIVIL) 20 MG Tab Take 1 Tab by mouth 2 times a day. 180 Tab 3    hydroCHLOROthiazide (HYDRODIURIL) 25 MG Tab Take 1 Tab by mouth every day. 90 Tab 3    atorvastatin (LIPITOR) 80 MG tablet Take 1 Tab by mouth every bedtime. 30 Tab 2    cloNIDine (CATAPRES) 0.1 MG Tab Take 1 Tab by mouth every 6 hours as needed. As needed for SBP >150 60 Tab 2    HYDROcodone-acetaminophen (NORCO) 5-325 MG Tab per tablet Take 1 Tab by mouth every four hours as needed.      folic acid (FOLVITE) 400 MCG tablet Take 400 mcg by mouth every day.      zolpidem (AMBIEN) 5 MG TABS Take 5 mg by mouth at bedtime as needed for Sleep.       No facility-administered encounter medications on file as of 11/29/2023.     ROS           Objective     /70 (BP Location: Left arm, Patient Position: Sitting, BP Cuff Size: Adult)   Pulse 80   Resp 16   Ht 1.829 m (6')   Wt 107 kg (235 lb)   SpO2 93%   BMI 31.87 kg/m²     Physical Exam       Lab Results   Component Value Date/Time    CHOLSTRLTOT 120.0 11/14/2023 12:00 AM    LDL 54.0 11/14/2023 12:00 AM    HDL 63.0 11/14/2023 12:00 AM    TRIGLYCERIDE 61.0 11/14/2023 12:00 AM       Lab Results   Component Value Date/Time    SODIUM 141 10/24/2023 03:29 AM    POTASSIUM 3.7 10/24/2023 03:29 AM    CHLORIDE 108 10/24/2023 03:29 AM    CO2 24 10/24/2023 03:29 AM    GLUCOSE 155 (H) 10/24/2023 03:29 AM    BUN 20 10/24/2023 03:29 AM    CREATININE 0.83 10/24/2023  03:29 AM    CREATININE 1.1 05/29/2007 04:30 AM     Lab Results   Component Value Date/Time    ALKPHOSPHAT 82 10/24/2023 03:29 AM    ASTSGOT 33 10/24/2023 03:29 AM    ALTSGPT 27 10/24/2023 03:29 AM    TBILIRUBIN 1.7 (H) 10/24/2023 03:29 AM      Echocardiogram 11/21/2023  CONCLUSIONS  Normal left ventricular systolic function.  Moderate concentric left ventricular hypertrophy.  Known TAVR aortic valve that with  increased gradients. An acceleration   time 74 msec and  Vmax is 3.54 m/s.    Assessment & Plan     No diagnosis found.    Medical Decision Making: Today's Assessment/Status/Plan:

## 2023-11-29 NOTE — PROGRESS NOTES
Valve Program Functional Assessment:     [id number]   1a) Showering/bathin  1b) Walking 1 block on ground: 3  1c) Hurrying or joggin  2) Swellin  3) Fatigue: 3  4) Shortness of breath: 7  5) Sleep sitting up: 5  6) Limited enjoyment of life: 5  7) Spend the rest of your life with HF: 4  8a) Hobbies, recreational activities:4  8b) Working or doing household chores:5  8c) Visiting family or friends: 4

## 2023-11-30 NOTE — PROGRESS NOTES
CARDIOLOGY STRUCTURAL HEART FOLLOWUP    PCP: Brittany Bradshaw M.D.  REFERRING: Rito Thibodeaux MD    1. S/P TAVR (transcatheter aortic valve replacement)    2. S/P aortic valve replacement with bioprosthetic valve: repeat AVR 10/2014    3. Cardiac pacemaker - Medtronic    4. H/O aortic root repair    5. History of stroke    6. Complete heart block (HCC)    7. NSVT (nonsustained ventricular tachycardia) (HCC) - on pacer check    8. Thrombocytopenia (HCC)        Matti Olguin has asymptomatic increasing gradient to 27 mmHg following TAVR performed 10/23/2023 with a 23 S3 ultra Resilia valve inside of a 25 Mosaic valve followed by fracture with 23 true balloon.  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  We will measure a another echocardiogram in 3 months and plan for a 6-month course of anticoagulation.  He is otherwise doing well clinically and I advise no other changes      Follow up: 3 months    History: Matti Olguin is a 73 y.o. male with history of thrombocytopenia, cirrhosis, CVA x 2, subdural hematoma presenting for follow-up of TAVR.  He has a cabrol  graft and had 23 mm S3 ultra Resilia implanted within a 25 Mosaic valve which had been implanted in 2014.    He did well with the procedure and there was balloon fracture of the Mosaic valve with immediate postprocedural gradient of 11 mmHg.  His 1 month echo showed a rise to 25 mmHg.  He has been feeling well although a bit defeated by the news of the need for the blood thinner and the suboptimal echocardiogram result.  He does have numerous other aches and pains in the body but does not notice any clear cardiovascular symptoms aside from intermittent lightheadedness.      PE:  /70 (BP Location: Left arm, Patient Position: Sitting, BP Cuff Size: Adult)   Pulse 80   Resp 16   Ht 1.829 m (6')   Wt 107 kg (235 lb)   SpO2 93%   BMI 31.87 kg/m²   GEN: NAD  CARDIAC: Regular Systolic ejection murmur Normal S1,  "S2  RESP: Clear to auscultation bilaterally  ABD: Soft, non-tender, non-distended  EXT: Trace lower extremity edema  NEURO: No focal deficit    Past Medical History:   Diagnosis Date    Alcoholic fibrosis and sclerosis of liver     Anticoagulation monitoring, special range     Complete heart block (HCC) 10/2014    Statust post pacemaker placement.    Dyspnea     High cholesterol     HTN (hypertension)     NSVT (nonsustained ventricular tachycardia) ([facility]) - on pacer check     Pacemaker 2014    Pain     Back pain from neck to hips, shoulders    Renal disorder     Impaired kidney function after contrast, per spouse kidneys are \"looking good now\"    S/P aortic valve replacement with bioprosthetic valve 10/2014    Severe aortic stenosis 09/23/2023    Sleep apnea     Denies    Snoring     Stroke ([facility]) 05/2007    Trembling in hands, impaired balance, left sided \"tingling\"    Unspecified hemorrhagic conditions     Coumadin    Valvular heart disease 12/1988    AVR     Allergies   Allergen Reactions    Yellow Jacket Venom Anaphylaxis    Plavix [Clopidogrel]      Subdural hematoma per spouse  Patient does not recall having an allergic or adverse reaction to this     Outpatient Encounter Medications as of 11/29/2023   Medication Sig Dispense Refill    rivaroxaban (XARELTO) 20 MG Tab tablet Take 1 Tablet by mouth with dinner. 90 Tablet 3    pregabalin (LYRICA) 200 MG capsule Take 200 mg by mouth 2 times a day.      therapeutic multivitamin-minerals (THERAGRAN-M) Tab Take 1 Tablet by mouth every day.      LORazepam (ATIVAN) 1 MG Tab Take 1 mg by mouth every 8 hours as needed for Anxiety.      lisinopril (PRINIVIL) 20 MG Tab Take 1 Tab by mouth 2 times a day. 180 Tab 3    hydroCHLOROthiazide (HYDRODIURIL) 25 MG Tab Take 1 Tab by mouth every day. 90 Tab 3    atorvastatin (LIPITOR) 80 MG tablet Take 1 Tab by mouth every bedtime. 30 Tab 2    cloNIDine (CATAPRES) 0.1 MG Tab Take 1 Tab by mouth every 6 hours as needed. As needed for " SBP >150 60 Tab 2    HYDROcodone-acetaminophen (NORCO) 5-325 MG Tab per tablet Take 1 Tab by mouth every four hours as needed.      folic acid (FOLVITE) 400 MCG tablet Take 400 mcg by mouth every day.      zolpidem (AMBIEN) 5 MG TABS Take 5 mg by mouth at bedtime as needed for Sleep.       No facility-administered encounter medications on file as of 11/29/2023.     Social History     Socioeconomic History    Marital status:      Spouse name: Not on file    Number of children: Not on file    Years of education: Not on file    Highest education level: Not on file   Occupational History    Not on file   Tobacco Use    Smoking status: Never    Smokeless tobacco: Former     Types: Chew    Tobacco comments:     Quit since September 2023   Vaping Use    Vaping Use: Some days    Substances: THC   Substance and Sexual Activity    Alcohol use: Yes     Comment: about 24 beers per week, last drink 9/16/23    Drug use: Yes     Types: Inhaled     Comment: THC    Sexual activity: Not on file   Other Topics Concern    Not on file   Social History Narrative    Not on file     Social Determinants of Health     Financial Resource Strain: Not on file   Food Insecurity: Not on file   Transportation Needs: Not on file   Physical Activity: Not on file   Stress: Not on file   Social Connections: Not on file   Intimate Partner Violence: Not on file   Housing Stability: Not on file       Studies  Lab Results   Component Value Date/Time    CHOLSTRLTOT 120.0 11/14/2023 12:00 AM    LDL 54.0 11/14/2023 12:00 AM    HDL 63.0 11/14/2023 12:00 AM    TRIGLYCERIDE 61.0 11/14/2023 12:00 AM       Lab Results   Component Value Date/Time    SODIUM 141 10/24/2023 03:29 AM    POTASSIUM 3.7 10/24/2023 03:29 AM    CHLORIDE 108 10/24/2023 03:29 AM    CO2 24 10/24/2023 03:29 AM    GLUCOSE 155 (H) 10/24/2023 03:29 AM    BUN 20 10/24/2023 03:29 AM    CREATININE 0.83 10/24/2023 03:29 AM    CREATININE 1.1 05/29/2007 04:30 AM     Lab Results   Component Value  Date/Time    ALKPHOSPHAT 82 10/24/2023 03:29 AM    ASTSGOT 33 10/24/2023 03:29 AM    ALTSGPT 27 10/24/2023 03:29 AM    TBILIRUBIN 1.7 (H) 10/24/2023 03:29 AM             Chief Complaint   Patient presents with    Other      Month post TAVR     ROS:   10 point review systems is otherwise negative except as per the HPI

## 2023-12-21 ENCOUNTER — TELEPHONE (OUTPATIENT)
Dept: CARDIOLOGY | Facility: MEDICAL CENTER | Age: 73
End: 2023-12-21
Payer: MEDICARE

## 2023-12-21 NOTE — TELEPHONE ENCOUNTER
Phone number called: [phone]      Call outcome: Voicemail reached. Message left with number provided to return call.

## 2023-12-21 NOTE — TELEPHONE ENCOUNTER
Remote transmission received via home monitor, patient presenting with new onset AF, full report scanned into Media.     New onset AF  -Episode Onset: 11/3/2023 @ 1:14 AM  -Duration: 1 hour 33 minutes  -Average A Rate:328 bpm  -Average V Rate:52 bpm  -Patient on OAC?: Yes    AF Episode  -Episode Onset: 11/5/2023 @ 12:24 AM  -Duration: 4 hours 4 minutes  -Average A Rate: 349 bpm  -Average V Rate: 53 bpm  -Patient on OAC?: Yes

## 2023-12-22 NOTE — TELEPHONE ENCOUNTER
Phone number called: [phone]     Call outcome: Voicemail reached. Message left with number provided to return call.

## 2023-12-22 NOTE — TELEPHONE ENCOUNTER
Phone number called: [phone]      Call outcome: Voicemail reached. Message left with number provided to return call.

## 2024-01-04 ENCOUNTER — NON-PROVIDER VISIT (OUTPATIENT)
Dept: CARDIOLOGY | Facility: MEDICAL CENTER | Age: 74
End: 2024-01-04
Payer: COMMERCIAL

## 2024-01-04 PROCEDURE — 93294 REM INTERROG EVL PM/LDLS PM: CPT | Performed by: INTERNAL MEDICINE

## 2024-01-04 NOTE — CARDIAC REMOTE MONITOR - SCAN
Device transmission reviewed. Device demonstrated appropriate function.       Electronically Signed by: James Gutierrez M.D.    1/6/2024  4:45 PM

## 2024-01-04 NOTE — CARDIAC REMOTE MONITOR - SCAN
Device transmission reviewed. Device demonstrated appropriate function.       Electronically Signed by: James Gutierrez M.D.    1/6/2024  4:45 PM     Form read y for  [facility]

## 2024-02-29 ENCOUNTER — HOSPITAL ENCOUNTER (OUTPATIENT)
Dept: CARDIOLOGY | Facility: MEDICAL CENTER | Age: 74
End: 2024-02-29
Attending: INTERNAL MEDICINE
Payer: COMMERCIAL

## 2024-02-29 DIAGNOSIS — Z95.3 S/P AORTIC VALVE REPLACEMENT WITH BIOPROSTHETIC VALVE: ICD-10-CM

## 2024-02-29 PROCEDURE — 93306 TTE W/DOPPLER COMPLETE: CPT

## 2024-02-29 PROCEDURE — 700117 HCHG RX CONTRAST REV CODE 255: Performed by: INTERNAL MEDICINE

## 2024-02-29 RX ADMIN — HUMAN ALBUMIN MICROSPHERES AND PERFLUTREN 3 ML: 10; .22 INJECTION, SOLUTION INTRAVENOUS at 11:45

## 2024-03-01 LAB
LV EJECT FRACT  99904: 53
LV EJECT FRACT MOD 2C 99903: 52.96
LV EJECT FRACT MOD 4C 99902: 52.07
LV EJECT FRACT MOD BP 99901: 50.79

## 2024-03-01 PROCEDURE — 93306 TTE W/DOPPLER COMPLETE: CPT | Mod: 26 | Performed by: INTERNAL MEDICINE

## 2024-03-05 ENCOUNTER — OFFICE VISIT (OUTPATIENT)
Dept: CARDIOLOGY | Facility: MEDICAL CENTER | Age: 74
End: 2024-03-05
Attending: INTERNAL MEDICINE
Payer: COMMERCIAL

## 2024-03-05 VITALS
OXYGEN SATURATION: 92 % | HEIGHT: 72 IN | DIASTOLIC BLOOD PRESSURE: 70 MMHG | HEART RATE: 83 BPM | RESPIRATION RATE: 18 BRPM | BODY MASS INDEX: 31.42 KG/M2 | SYSTOLIC BLOOD PRESSURE: 112 MMHG | WEIGHT: 232 LBS

## 2024-03-05 DIAGNOSIS — K74.69 OTHER CIRRHOSIS OF LIVER (HCC): ICD-10-CM

## 2024-03-05 DIAGNOSIS — S06.5XAA SUBDURAL HEMATOMA (HCC): ICD-10-CM

## 2024-03-05 DIAGNOSIS — I44.2 COMPLETE HEART BLOCK (HCC): ICD-10-CM

## 2024-03-05 DIAGNOSIS — Z95.2 S/P TAVR (TRANSCATHETER AORTIC VALVE REPLACEMENT): ICD-10-CM

## 2024-03-05 DIAGNOSIS — Z98.890 H/O AORTIC ROOT REPAIR: ICD-10-CM

## 2024-03-05 DIAGNOSIS — I47.29 NSVT (NONSUSTAINED VENTRICULAR TACHYCARDIA) (HCC): Chronic | ICD-10-CM

## 2024-03-05 DIAGNOSIS — D69.6 THROMBOCYTOPENIA (HCC): ICD-10-CM

## 2024-03-05 DIAGNOSIS — Z95.0 CARDIAC PACEMAKER IN SITU: Chronic | ICD-10-CM

## 2024-03-05 DIAGNOSIS — I35.0 SEVERE AORTIC STENOSIS: ICD-10-CM

## 2024-03-05 DIAGNOSIS — K76.9 CHRONIC NONALCOHOLIC LIVER DISEASE: ICD-10-CM

## 2024-03-05 PROBLEM — K72.00 ACUTE LIVER FAILURE: Status: RESOLVED | Noted: 2023-09-21 | Resolved: 2024-03-05

## 2024-03-05 PROCEDURE — 3074F SYST BP LT 130 MM HG: CPT | Performed by: INTERNAL MEDICINE

## 2024-03-05 PROCEDURE — 99214 OFFICE O/P EST MOD 30 MIN: CPT | Performed by: INTERNAL MEDICINE

## 2024-03-05 PROCEDURE — 3078F DIAST BP <80 MM HG: CPT | Performed by: INTERNAL MEDICINE

## 2024-03-05 PROCEDURE — 99213 OFFICE O/P EST LOW 20 MIN: CPT | Performed by: INTERNAL MEDICINE

## 2024-03-05 RX ORDER — AMOXICILLIN 500 MG/1
2000 CAPSULE ORAL PRN
Qty: 12 CAPSULE | Refills: 3 | Status: SHIPPED | OUTPATIENT
Start: 2024-03-05

## 2024-03-05 ASSESSMENT — FIBROSIS 4 INDEX: FIB4 SCORE: 6.35

## 2024-03-05 NOTE — PROGRESS NOTES
CARDIOLOGY STRUCTURAL HEART FOLLOWUP    PCP: Brittany Bradshaw M.D.  REFERRING: Conner Thibodeaux MD    1. S/P TAVR (transcatheter aortic valve replacement)    2. Severe aortic stenosis    3. H/O aortic root repair    4. Complete heart block (HCC)    5. Cardiac pacemaker - Medtronic    6. Chronic nonalcoholic liver disease    7. NSVT (nonsustained ventricular tachycardia) (HCC) - on pacer check    8. Subdural hematoma (HCC)    9. Thrombocytopenia (HCC)    10. Other cirrhosis of liver (HCC)        Matti Olguin is well with appropriately controlled cardiovascular risk factors.  I recommend continuing with the present medication regimen including Xarelto which nicely corrected transient increase in valve gradients.  As the TAVR procedure was valve in valve he is at higher risk for thrombotic related events and accordingly I support ongoing use of Xarelto as long as it remains well-tolerated.    I did not encourage him to maintain good dental care and provided a prescription for antibiotics.    Follow up: 6 months    History: Matti Olguin is a 74 y.o. male with history of CVA x 2, subdural hematoma, cirrhosis presenting for follow-up of valve in valve TAVR performed October 2023 with a 23 mm S3 ultra Resilia implanted within a 25 Mosaic valve that had been implanted in 2014.  He also has a Cabrol graft.  LVEF is normal.  He has a pacemaker nearing [facility].    Postoperative valve gradient rimma to 25 mmHg from initial value of 11 intraoperative.  He was prescribed Xarelto and the gradient has declined to 16.  The medication has been well-tolerated.    He was shoveling snow during the recent storm and felt well.    PE:  /70 (BP Location: Left arm, Patient Position: Sitting, BP Cuff Size: Adult)   Pulse 83   Resp 18   Ht 1.829 m (6')   Wt 105 kg (232 lb)   SpO2 92%   BMI 31.46 kg/m²   GEN: NAD  CARDIAC: Regular. Normal S1, S2, Systolic murmur.   VASCULATURE: Normal carotid upstroke  RESP: Clear to  "auscultation bilaterally  ABD: Soft, non-tender, non-distended  EXT: No edema  NEURO: No focal deficit       () Today's E/M visit is associated with medical care services that serve as the continuing focal point for all needed health care services and/or with medical care services that  are part of ongoing care related to a patient's single, serious condition, or a complex condition: This includes  furnishing services to patients on an ongoing basis that result in care that is personalized  to the patient. The services result in a comprehensive, longitudinal, and continuous  relationship with the patient and involve delivery of team-based care that is accessible, coordinated with other practitioners and providers, and integrated with the broader health  care landscape.     Past Medical History:   Diagnosis Date    Alcoholic fibrosis and sclerosis of liver     Anticoagulation monitoring, special range     Complete heart block (HCC) 10/2014    Statust post pacemaker placement.    Dyspnea     High cholesterol     HTN (hypertension)     NSVT (nonsustained ventricular tachycardia) ([facility]) - on pacer check     Pacemaker 2014    Pain     Back pain from neck to hips, shoulders    Renal disorder     Impaired kidney function after contrast, per spouse kidneys are \"looking good now\"    S/P aortic valve replacement with bioprosthetic valve 10/2014    Severe aortic stenosis 09/23/2023    Sleep apnea     Denies    Snoring     Stroke ([facility]) 05/2007    Trembling in hands, impaired balance, left sided \"tingling\"    Unspecified hemorrhagic conditions     Coumadin    Valvular heart disease 12/1988    AVR     Allergies   Allergen Reactions    Yellow Jacket Venom Anaphylaxis    Plavix [Clopidogrel]      Subdural hematoma per spouse  Patient does not recall having an allergic or adverse reaction to this     Outpatient Encounter Medications as of 3/5/2024   Medication Sig Dispense Refill    amoxicillin (AMOXIL) 500 MG Cap Take 4 " Capsules by mouth as needed (take thirty to sixty minutes prior to dental appointments). 12 Capsule 3    rivaroxaban (XARELTO) 20 MG Tab tablet Take 1 Tablet by mouth with dinner. 90 Tablet 3    pregabalin (LYRICA) 200 MG capsule Take 200 mg by mouth 2 times a day.      therapeutic multivitamin-minerals (THERAGRAN-M) Tab Take 1 Tablet by mouth every day.      LORazepam (ATIVAN) 1 MG Tab Take 1 mg by mouth every 8 hours as needed for Anxiety.      lisinopril (PRINIVIL) 20 MG Tab Take 1 Tab by mouth 2 times a day. 180 Tab 3    hydroCHLOROthiazide (HYDRODIURIL) 25 MG Tab Take 1 Tab by mouth every day. 90 Tab 3    atorvastatin (LIPITOR) 80 MG tablet Take 1 Tab by mouth every bedtime. 30 Tab 2    cloNIDine (CATAPRES) 0.1 MG Tab Take 1 Tab by mouth every 6 hours as needed. As needed for SBP >150 60 Tab 2    HYDROcodone-acetaminophen (NORCO) 5-325 MG Tab per tablet Take 1 Tab by mouth every four hours as needed.      folic acid (FOLVITE) 400 MCG tablet Take 400 mcg by mouth every day.      zolpidem (AMBIEN) 5 MG TABS Take 5 mg by mouth at bedtime as needed for Sleep.      [DISCONTINUED] rivaroxaban (XARELTO) 20 MG Tab tablet Take 1 Tablet by mouth with dinner. 90 Tablet 3     No facility-administered encounter medications on file as of 3/5/2024.     Social History     Socioeconomic History    Marital status:      Spouse name: Not on file    Number of children: Not on file    Years of education: Not on file    Highest education level: Not on file   Occupational History    Not on file   Tobacco Use    Smoking status: Never    Smokeless tobacco: Former     Types: Chew    Tobacco comments:     Quit since September 2023   Vaping Use    Vaping Use: Some days    Substances: THC   Substance and Sexual Activity    Alcohol use: Yes     Comment: about 24 beers per week, last drink 9/16/23    Drug use: Not on file     Comment: THC    Sexual activity: Not on file   Other Topics Concern    Not on file   Social History Narrative  "   Not on file     Social Determinants of Health     Financial Resource Strain: Not on file   Food Insecurity: Not on file   Transportation Needs: Not on file   Physical Activity: Not on file   Stress: Not on file   Social Connections: Not on file   Intimate Partner Violence: Not on file   Housing Stability: Not on file       Studies  Lab Results   Component Value Date/Time    CHOLSTRLTOT 120.0 11/14/2023 12:00 AM    LDL 54.0 11/14/2023 12:00 AM    HDL 63.0 11/14/2023 12:00 AM    TRIGLYCERIDE 61.0 11/14/2023 12:00 AM       Lab Results   Component Value Date/Time    SODIUM 141 10/24/2023 03:29 AM    POTASSIUM 3.7 10/24/2023 03:29 AM    CHLORIDE 108 10/24/2023 03:29 AM    CO2 24 10/24/2023 03:29 AM    GLUCOSE 155 (H) 10/24/2023 03:29 AM    BUN 20 10/24/2023 03:29 AM    CREATININE 0.83 10/24/2023 03:29 AM    CREATININE 1.1 05/29/2007 04:30 AM     Lab Results   Component Value Date/Time    ALKPHOSPHAT 82 10/24/2023 03:29 AM    ASTSGOT 33 10/24/2023 03:29 AM    ALTSGPT 27 10/24/2023 03:29 AM    TBILIRUBIN 1.7 (H) 10/24/2023 03:29 AM      No results found for: \"HGB\"     Chief Complaint   Patient presents with    Aortic Stenosis    Hypertension    Supraventricular Tachycardia (SVT)     NSVT (nonsustained ventricular tachycardia) (HCC) - on pacer check       ROS:   10 point review systems is otherwise negative except as per the HPI  "

## 2024-04-11 ENCOUNTER — NON-PROVIDER VISIT (OUTPATIENT)
Dept: CARDIOLOGY | Facility: MEDICAL CENTER | Age: 74
End: 2024-04-11
Payer: COMMERCIAL

## 2024-04-11 PROCEDURE — 93294 REM INTERROG EVL PM/LDLS PM: CPT | Performed by: INTERNAL MEDICINE

## 2024-04-12 NOTE — CARDIAC REMOTE MONITOR - SCAN
Device transmission reviewed. Device demonstrated appropriate function.       Electronically Signed by: James Gutierrez M.D.    4/12/2024  8:22 AM

## 2024-04-30 ENCOUNTER — NON-PROVIDER VISIT (OUTPATIENT)
Dept: CARDIOLOGY | Facility: MEDICAL CENTER | Age: 74
End: 2024-04-30

## 2024-04-30 ENCOUNTER — TELEPHONE (OUTPATIENT)
Dept: CARDIOLOGY | Facility: MEDICAL CENTER | Age: 74
End: 2024-04-30

## 2024-04-30 ENCOUNTER — NON-PROVIDER VISIT (OUTPATIENT)
Dept: CARDIOLOGY | Facility: MEDICAL CENTER | Age: 74
End: 2024-04-30
Attending: INTERNAL MEDICINE
Payer: COMMERCIAL

## 2024-04-30 DIAGNOSIS — Z95.0 CARDIAC PACEMAKER IN SITU: ICD-10-CM

## 2024-04-30 DIAGNOSIS — I44.2 COMPLETE HEART BLOCK (HCC): ICD-10-CM

## 2024-04-30 DIAGNOSIS — Z95.0 CARDIAC PACEMAKER IN SITU: Chronic | ICD-10-CM

## 2024-04-30 PROCEDURE — 93280 PM DEVICE PROGR EVAL DUAL: CPT | Performed by: INTERNAL MEDICINE

## 2024-04-30 NOTE — CARDIAC REMOTE MONITOR - SCAN
Device transmission reviewed. Device demonstrated appropriate function.       Electronically Signed by: James Gutierrez M.D.    4/30/2024  3:56 PM

## 2024-04-30 NOTE — TELEPHONE ENCOUNTER
Anita,    Can you please enter the order for this PM gen change? This is a Dr. Avalos patient.    Thank You,  Lorraine

## 2024-04-30 NOTE — TELEPHONE ENCOUNTER
----- Message from Dianna Em, Med Ass't sent at 4/30/2024 11:19 AM PDT -----  Pts device has reached PHILLIP, needs to be changed out in the next 4-6 weeks.    Medtronic Dual Chamber PPM.

## 2024-04-30 NOTE — PROGRESS NOTES
Normal device function and no changes made. Pts battery life has reached PHILLIP, needs to be scheduled for a generator change. See flowsheet.

## 2024-04-30 NOTE — TELEPHONE ENCOUNTER
Dr. Cisneros,    I'm going to schedule this gen change with you. This patient is taking Xarelto. Would you like him to hold the Xarelto or continue it for this gen change?    Thank You,  Lorraine

## 2024-04-30 NOTE — Clinical Note
Pts device has reached PHILLIP, needs to be changed out in the next 4-6 weeks.  Medtronic Dual Chamber PPM.

## 2024-05-23 ENCOUNTER — APPOINTMENT (OUTPATIENT)
Dept: ADMISSIONS | Facility: MEDICAL CENTER | Age: 74
End: 2024-05-23
Attending: INTERNAL MEDICINE
Payer: COMMERCIAL

## 2024-05-30 ENCOUNTER — PRE-ADMISSION TESTING (OUTPATIENT)
Dept: ADMISSIONS | Facility: MEDICAL CENTER | Age: 74
End: 2024-05-30
Attending: INTERNAL MEDICINE
Payer: COMMERCIAL

## 2024-05-30 RX ORDER — CARVEDILOL 25 MG/1
25 TABLET ORAL 2 TIMES DAILY
COMMUNITY
Start: 2024-03-21

## 2024-05-30 NOTE — OR NURSING
Pre admit appointment completed with Matti.     Medication and fasting instructions given per cardiology.    Pt verbalizes understanding of all instructions given. No further questions at this time.      Work up negative, patient feels better. Plan for discharge home.

## 2024-06-19 ENCOUNTER — APPOINTMENT (OUTPATIENT)
Dept: CARDIOLOGY | Facility: MEDICAL CENTER | Age: 74
End: 2024-06-19
Attending: INTERNAL MEDICINE
Payer: COMMERCIAL

## 2024-06-19 ENCOUNTER — HOSPITAL ENCOUNTER (OUTPATIENT)
Facility: MEDICAL CENTER | Age: 74
End: 2024-06-19
Attending: INTERNAL MEDICINE | Admitting: INTERNAL MEDICINE
Payer: COMMERCIAL

## 2024-06-19 VITALS
OXYGEN SATURATION: 93 % | TEMPERATURE: 97.5 F | RESPIRATION RATE: 17 BRPM | WEIGHT: 245.59 LBS | HEIGHT: 72 IN | BODY MASS INDEX: 33.26 KG/M2 | SYSTOLIC BLOOD PRESSURE: 158 MMHG | HEART RATE: 66 BPM | DIASTOLIC BLOOD PRESSURE: 82 MMHG

## 2024-06-19 DIAGNOSIS — I44.2 COMPLETE HEART BLOCK (HCC): ICD-10-CM

## 2024-06-19 DIAGNOSIS — Z95.0 CARDIAC PACEMAKER IN SITU: ICD-10-CM

## 2024-06-19 LAB
ALBUMIN SERPL BCP-MCNC: 4.2 G/DL (ref 3.2–4.9)
ALBUMIN/GLOB SERPL: 1.4 G/DL
ALP SERPL-CCNC: 71 U/L (ref 30–99)
ALT SERPL-CCNC: 33 U/L (ref 2–50)
ANION GAP SERPL CALC-SCNC: 13 MMOL/L (ref 7–16)
AST SERPL-CCNC: 33 U/L (ref 12–45)
BILIRUB SERPL-MCNC: 2 MG/DL (ref 0.1–1.5)
BUN SERPL-MCNC: 32 MG/DL (ref 8–22)
CALCIUM ALBUM COR SERPL-MCNC: 8.9 MG/DL (ref 8.5–10.5)
CALCIUM SERPL-MCNC: 9.1 MG/DL (ref 8.5–10.5)
CHLORIDE SERPL-SCNC: 105 MMOL/L (ref 96–112)
CO2 SERPL-SCNC: 22 MMOL/L (ref 20–33)
CREAT SERPL-MCNC: 0.83 MG/DL (ref 0.5–1.4)
EKG IMPRESSION: NORMAL
EKG IMPRESSION: NORMAL
ERYTHROCYTE [DISTWIDTH] IN BLOOD BY AUTOMATED COUNT: 50.9 FL (ref 35.9–50)
GFR SERPLBLD CREATININE-BSD FMLA CKD-EPI: 93 ML/MIN/1.73 M 2
GLOBULIN SER CALC-MCNC: 3 G/DL (ref 1.9–3.5)
GLUCOSE SERPL-MCNC: 100 MG/DL (ref 65–99)
HCT VFR BLD AUTO: 45.4 % (ref 42–52)
HGB BLD-MCNC: 15.2 G/DL (ref 14–18)
INR PPP: 1.11 (ref 0.87–1.13)
MCH RBC QN AUTO: 34.5 PG (ref 27–33)
MCHC RBC AUTO-ENTMCNC: 33.5 G/DL (ref 32.3–36.5)
MCV RBC AUTO: 102.9 FL (ref 81.4–97.8)
PLATELET # BLD AUTO: 81 K/UL (ref 164–446)
PLATELETS.RETICULATED NFR BLD AUTO: 10.5 % (ref 0.6–13.1)
PMV BLD AUTO: 11.6 FL (ref 9–12.9)
POTASSIUM SERPL-SCNC: 3.8 MMOL/L (ref 3.6–5.5)
PROT SERPL-MCNC: 7.2 G/DL (ref 6–8.2)
PROTHROMBIN TIME: 14.4 SEC (ref 12–14.6)
RBC # BLD AUTO: 4.41 M/UL (ref 4.7–6.1)
SODIUM SERPL-SCNC: 140 MMOL/L (ref 135–145)
WBC # BLD AUTO: 5.5 K/UL (ref 4.8–10.8)

## 2024-06-19 PROCEDURE — 160002 HCHG RECOVERY MINUTES (STAT)

## 2024-06-19 PROCEDURE — 160035 HCHG PACU - 1ST 60 MINS PHASE I

## 2024-06-19 PROCEDURE — 33228 REMV&REPLC PM GEN DUAL LEAD: CPT | Performed by: INTERNAL MEDICINE

## 2024-06-19 PROCEDURE — 93005 ELECTROCARDIOGRAM TRACING: CPT | Performed by: INTERNAL MEDICINE

## 2024-06-19 PROCEDURE — 85610 PROTHROMBIN TIME: CPT

## 2024-06-19 PROCEDURE — C1785 PMKR, DUAL, RATE-RESP: HCPCS

## 2024-06-19 PROCEDURE — 99152 MOD SED SAME PHYS/QHP 5/>YRS: CPT | Performed by: INTERNAL MEDICINE

## 2024-06-19 PROCEDURE — 700101 HCHG RX REV CODE 250

## 2024-06-19 PROCEDURE — 700111 HCHG RX REV CODE 636 W/ 250 OVERRIDE (IP): Mod: JG

## 2024-06-19 PROCEDURE — 85027 COMPLETE CBC AUTOMATED: CPT

## 2024-06-19 PROCEDURE — 85055 RETICULATED PLATELET ASSAY: CPT

## 2024-06-19 PROCEDURE — 160046 HCHG PACU - 1ST 60 MINS PHASE II

## 2024-06-19 PROCEDURE — 93010 ELECTROCARDIOGRAM REPORT: CPT | Performed by: STUDENT IN AN ORGANIZED HEALTH CARE EDUCATION/TRAINING PROGRAM

## 2024-06-19 PROCEDURE — 80053 COMPREHEN METABOLIC PANEL: CPT

## 2024-06-19 RX ORDER — CEFAZOLIN SODIUM 1 G/3ML
INJECTION, POWDER, FOR SOLUTION INTRAMUSCULAR; INTRAVENOUS
Status: COMPLETED
Start: 2024-06-19 | End: 2024-06-19

## 2024-06-19 RX ORDER — LIDOCAINE HYDROCHLORIDE 20 MG/ML
INJECTION, SOLUTION INFILTRATION; PERINEURAL
Status: COMPLETED
Start: 2024-06-19 | End: 2024-06-19

## 2024-06-19 RX ORDER — MIDAZOLAM HYDROCHLORIDE 1 MG/ML
INJECTION INTRAMUSCULAR; INTRAVENOUS
Status: COMPLETED
Start: 2024-06-19 | End: 2024-06-19

## 2024-06-19 RX ORDER — CEPHALEXIN 500 MG/1
500 CAPSULE ORAL 2 TIMES DAILY
Qty: 10 CAPSULE | Refills: 0 | Status: SHIPPED | OUTPATIENT
Start: 2024-06-19

## 2024-06-19 RX ORDER — ONDANSETRON 2 MG/ML
4 INJECTION INTRAMUSCULAR; INTRAVENOUS EVERY 6 HOURS PRN
Status: DISCONTINUED | OUTPATIENT
Start: 2024-06-19 | End: 2024-06-19 | Stop reason: HOSPADM

## 2024-06-19 RX ORDER — ACETAMINOPHEN 325 MG/1
650 TABLET ORAL EVERY 4 HOURS PRN
Status: DISCONTINUED | OUTPATIENT
Start: 2024-06-19 | End: 2024-06-19 | Stop reason: HOSPADM

## 2024-06-19 RX ORDER — BUPIVACAINE HYDROCHLORIDE 5 MG/ML
INJECTION, SOLUTION EPIDURAL; INTRACAUDAL
Status: COMPLETED
Start: 2024-06-19 | End: 2024-06-19

## 2024-06-19 RX ADMIN — LIDOCAINE HYDROCHLORIDE: 20 INJECTION, SOLUTION INFILTRATION; PERINEURAL at 15:06

## 2024-06-19 RX ADMIN — CEFAZOLIN 2000 MG: 1 INJECTION, POWDER, FOR SOLUTION INTRAMUSCULAR; INTRAVENOUS at 15:07

## 2024-06-19 RX ADMIN — MIDAZOLAM HYDROCHLORIDE 4 MG: 1 INJECTION, SOLUTION INTRAMUSCULAR; INTRAVENOUS at 15:31

## 2024-06-19 RX ADMIN — FENTANYL CITRATE 100 MCG: 50 INJECTION, SOLUTION INTRAMUSCULAR; INTRAVENOUS at 15:31

## 2024-06-19 RX ADMIN — BUPIVACAINE HYDROCHLORIDE: 5 INJECTION, SOLUTION EPIDURAL; INTRACAUDAL at 15:07

## 2024-06-19 ASSESSMENT — FIBROSIS 4 INDEX: FIB4 SCORE: 6.35

## 2024-06-19 NOTE — H&P
"EP Pre-procedure H and P    Date of service: 6/19/2024    Reason for visit/Chief complaint: PPM at PHILLIP    HPI:   Pt is a 73 yo M. History of TAVR/AV block s/p PPM. Here for gen change for device at [facility].    Past Medical History:   Diagnosis Date    Alcoholic fibrosis and sclerosis of liver     Anticoagulation monitoring, special range     Cataract     evan IOL    Complete heart block (HCC) 10/2014    Statust post pacemaker placement.    Dyspnea     High cholesterol     HTN (hypertension)     NSVT (nonsustained ventricular tachycardia) (HCC) - on pacer check     Pacemaker 2014    Pain     Back pain from neck to hips, shoulders    Renal disorder     Impaired kidney function after contrast, per spouse kidneys are \"looking good now\"    S/P aortic valve replacement with bioprosthetic valve 10/2014    Severe aortic stenosis 09/23/2023    Snoring     Stroke (HCC) 05/2007    Trembling in hands, impaired balance, left sided \"tingling\"    Unspecified hemorrhagic conditions     xarelto    Valvular heart disease 12/1988    AVR     .sph  Family History   Problem Relation Age of Onset    Heart Attack Father     Heart Disease Sister        Physical Exam:  Vitals:    06/19/24 1234   BP: 132/78   Pulse: 78   Resp: 18   Temp: 36.4 °C (97.6 °F)   TempSrc: Temporal   SpO2: 92%   Weight: 111 kg (245 lb 9.5 oz)   Height: 1.829 m (6')     Gen: NAD, conversant  HEENT: PERRL, EOMI  LUNGS: CTA B, no w/r/r  CV: RRR, no m/r/g, no JVD  Abd: Soft, NT/ND, +BS  Ext: no edema, warm and well perfused    Labs reviewed    EKG interpreted by me:  AsVp    Impression/Recs:  1. Complete heart block (HCC)  CL-PERMANENT PACEMAKER GEN CHANGE    CL-PERMANENT PACEMAKER GEN CHANGE      2. Cardiac pacemaker in situ  CL-PERMANENT PACEMAKER GEN CHANGE    CL-PERMANENT PACEMAKER GEN CHANGE        -Risks/benefits/alternatives discussed  -All questions answered  -Proceed with PPM gen change    Mike Cisneros MD    "

## 2024-06-19 NOTE — DISCHARGE INSTRUCTIONS
What to Expect Post Sedation    Rest and take it easy for the first 24 hours.  A responsible adult is recommended to remain with you during that time.  It is normal to feel sleepy.  We encourage you to not do anything that requires balance, judgment or coordination.    FOR 24 HOURS DO NOT:  Drive, operate machinery or run household appliances.  Drink beer or alcoholic beverages.  Make important decisions or sign legal documents.    To avoid nausea, slowly advance diet as tolerated, avoiding spicy or greasy foods for the first day.  Add more substantial food to your diet according to your provider's instructions.  Babies can be fed formula or breast milk as soon as they are hungry.  INCREASE FLUIDS AND FIBER TO AVOID CONSTIPATION.    MILD FLU-LIKE SYMPTOMS ARE NORMAL.  YOU MAY EXPERIENCE GENERALIZED MUSCLE ACHES, THROAT IRRITATION, HEADACHE AND/OR SOME NAUSEA.  If any questions arise, call your provider.  If your provider is not available, please feel free to call the [facility] at [phone].    MEDICATIONS: Resume taking daily medication.  Take prescribed pain medication with food.  If no medication is prescribed, you may take non-aspirin pain medication if needed.  PAIN MEDICATION CAN BE VERY CONSTIPATING.  Take a stool softener or laxative such as senokot, pericolace, or milk of magnesia if needed.    Diet    Resume your normal diet as tolerated.  A diet low in cholesterol, fat, and sodium is recommended for good health.     Discharge Instructions:  Device Generator Change Discharge Instructions/[facility]    1.  No showers for 48 hours; try not to place dressing in line of shower spray.  If dressing begins to peel away from skin, do not shower, sponge bath only and contact cardiology office.  Use clean towel to pat dressing dry.  Keep dressing dry & in place until seen at for you follow up visit at the cardiology office.     2.  Call our office ([phone]) if you notice any increased swelling,  redness, warmth, or drainage at the implant site.  3.  Needs to be seen in emergency if you develop fever > 101F or uncontrolled pain.  4.  Always check with device clinic before any planned MRI to see if device is MRI compatible.  5. Do not place cell phones or mobile devices directly over implanted device.     BOWEL FUNCTION:  Prescription pain medication may cause constipation. If you are having problems, use what you normally would or call your provider for suggestions. It also helps to stay regular by including fiber in your diet (for example: bran or fruits and vegetables) and drink plenty of liquids (water, juice, etc.).

## 2024-06-19 NOTE — OP REPORT
[facility]     PROCEDURE: Pacemaker generator change    : Mike Cisneros M.D.    ASSISTANT: None    ANESTHESIA: Moderate sedation and local anesthetic (start time 1520, stop time 1545, total dose given 4 mg IV versed, 100 mcg IV fentanyl)    ESTIMATED BLOOD LOSS: 20 cc.    SPECIMENS: None.    COMPLICATIONS: None.    INDICATION(S):   Pacemaker at [facility], complete heart block    DESCRIPTION OF PROCEDURE: After informed written consent, the patient was brought to the electrophysiology lab in the fasting, unsedated state. The patient was prepped and draped in the usual sterile fashion. The procedure was performed under moderate sedation with local anesthetic. A left infraclavicular incision was made with a scalpel along the old scar. Access to the device pocket was made using a combination of blunt dissection and electrocautery. The old generator and leads were freed from adhesions and the generator disconnected from the leads. There was calcified rind around the pocket and we removed a significant portion of the calcified membrane. The pocket was irrigated with antibiotic solution, and the new generator was connected to the leads and inserted back in the pocket. The wound was closed with three layers of absorbable sutures and covered with Steri-Strips. Following recovery from sedation, the patient was transferred to a monitored bed in good condition.    REMOVED DEVICE INFORMATION:  Pulse generator is a MDT model A2DR01   Serial number [id number]      IMPLANTED DEVICE INFORMATION:    Pulse generator is a MDT model W1DR01   Serial number [id number]      LEAD INFORMATION:  1. Right atrial lead is a MDT model 5076-52, serial number [id number], P wave 2.6 millivolts, threshold 1.0 Volts at 0.4 milliseconds, pacing impedance 437 Ohms, implant date 10/25/2014  2. Right ventricular lead is a MDT model 5076-58, serial number [id number], R wave 0 millivolts, threshold 0.75 Volts at 0.4 milliseconds, pacing impedance 475  Ohms, implant date 10/25/96248    DEVICE PROGRAMMING:    Manoj therapy: DDD     IMPRESSIONS:  1. Successful dual chamber PPM generator change.    RECOMMENDATIONS:  1. Transfer to PPU.  2. Follow-up in device clinic for wound check and device interrogation.

## 2024-06-19 NOTE — OR NURSING
1558 Pt over from cath lab post pacemaker battery change. Left chest site clean, dry and soft, no bleeding. Pt awake and alert, denies pain and nausea. VSS.  1605 Tolerating orals  1615 Family to bedside  1627 EKG at bedside  1645 Discharge instructions provided to pt and spouse. Discussed diet, activity, follow up, prescriptions and symptom management. Pt and spouse state understanding. Pt and spouse state all questions have been answered. Copy of discharge provided to pt.  1705 Wheeled off of unit with all belongings without incident

## 2024-06-26 ENCOUNTER — NON-PROVIDER VISIT (OUTPATIENT)
Dept: CARDIOLOGY | Facility: MEDICAL CENTER | Age: 74
End: 2024-06-26

## 2024-06-26 ENCOUNTER — NON-PROVIDER VISIT (OUTPATIENT)
Dept: CARDIOLOGY | Facility: MEDICAL CENTER | Age: 74
End: 2024-06-26
Attending: INTERNAL MEDICINE
Payer: COMMERCIAL

## 2024-06-26 DIAGNOSIS — I44.2 COMPLETE HEART BLOCK (HCC): ICD-10-CM

## 2024-06-26 DIAGNOSIS — Z95.0 CARDIAC PACEMAKER IN SITU: Chronic | ICD-10-CM

## 2024-06-26 PROCEDURE — 93280 PM DEVICE PROGR EVAL DUAL: CPT | Performed by: INTERNAL MEDICINE

## 2024-06-26 NOTE — PROGRESS NOTES
Wound site is healing well. Pt advised to watch for increased redness, swelling, oozing or fever. Pt verbalized understanding. Normal device function and no changes made. See flowsheet.    Billing and remote monitoring explained and acknowledged.

## 2024-07-31 ENCOUNTER — NON-PROVIDER VISIT (OUTPATIENT)
Dept: CARDIOLOGY | Facility: MEDICAL CENTER | Age: 74
End: 2024-07-31

## 2024-07-31 ENCOUNTER — NON-PROVIDER VISIT (OUTPATIENT)
Dept: CARDIOLOGY | Facility: MEDICAL CENTER | Age: 74
End: 2024-07-31
Attending: INTERNAL MEDICINE
Payer: COMMERCIAL

## 2024-07-31 DIAGNOSIS — Z95.0 CARDIAC PACEMAKER IN SITU: Chronic | ICD-10-CM

## 2024-07-31 DIAGNOSIS — I44.2 COMPLETE HEART BLOCK (HCC): ICD-10-CM

## 2024-07-31 PROCEDURE — 93280 PM DEVICE PROGR EVAL DUAL: CPT | Performed by: INTERNAL MEDICINE

## 2024-07-31 NOTE — CARDIAC REMOTE MONITOR - SCAN
Device transmission reviewed. Device demonstrated appropriate function.       Electronically Signed by: James Gutierrze M.D.    7/31/2024  12:15 PM

## 2024-09-10 ENCOUNTER — OFFICE VISIT (OUTPATIENT)
Dept: CARDIOLOGY | Facility: MEDICAL CENTER | Age: 74
End: 2024-09-10
Attending: INTERNAL MEDICINE
Payer: COMMERCIAL

## 2024-09-10 VITALS
WEIGHT: 245 LBS | HEIGHT: 72 IN | DIASTOLIC BLOOD PRESSURE: 60 MMHG | BODY MASS INDEX: 33.18 KG/M2 | SYSTOLIC BLOOD PRESSURE: 110 MMHG | OXYGEN SATURATION: 92 % | HEART RATE: 72 BPM | RESPIRATION RATE: 18 BRPM

## 2024-09-10 DIAGNOSIS — Z98.890 H/O AORTIC ROOT REPAIR: ICD-10-CM

## 2024-09-10 DIAGNOSIS — I47.29 NSVT (NONSUSTAINED VENTRICULAR TACHYCARDIA) (HCC): Chronic | ICD-10-CM

## 2024-09-10 DIAGNOSIS — K74.69 OTHER CIRRHOSIS OF LIVER (HCC): ICD-10-CM

## 2024-09-10 DIAGNOSIS — Z95.2 S/P TAVR (TRANSCATHETER AORTIC VALVE REPLACEMENT): ICD-10-CM

## 2024-09-10 DIAGNOSIS — K76.9 CHRONIC NONALCOHOLIC LIVER DISEASE: ICD-10-CM

## 2024-09-10 DIAGNOSIS — D69.6 THROMBOCYTOPENIA (HCC): ICD-10-CM

## 2024-09-10 DIAGNOSIS — Z95.3 S/P AORTIC VALVE REPLACEMENT WITH BIOPROSTHETIC VALVE: ICD-10-CM

## 2024-09-10 PROBLEM — S06.5XAA SUBDURAL HEMATOMA (HCC): Status: RESOLVED | Noted: 2021-12-20 | Resolved: 2024-09-10

## 2024-09-10 PROCEDURE — 99214 OFFICE O/P EST MOD 30 MIN: CPT | Performed by: INTERNAL MEDICINE

## 2024-09-10 PROCEDURE — G2211 COMPLEX E/M VISIT ADD ON: HCPCS | Performed by: INTERNAL MEDICINE

## 2024-09-10 PROCEDURE — 3074F SYST BP LT 130 MM HG: CPT | Performed by: INTERNAL MEDICINE

## 2024-09-10 PROCEDURE — 3078F DIAST BP <80 MM HG: CPT | Performed by: INTERNAL MEDICINE

## 2024-09-10 PROCEDURE — 99213 OFFICE O/P EST LOW 20 MIN: CPT | Performed by: INTERNAL MEDICINE

## 2024-09-10 ASSESSMENT — FIBROSIS 4 INDEX: FIB4 SCORE: 5.11

## 2024-09-10 NOTE — PROGRESS NOTES
CARDIOLOGY OUTPATIENT FOLLOWUP    PCP: Brittany Bradshaw M.D.    1. S/P TAVR (transcatheter aortic valve replacement)    2. S/P aortic valve replacement with bioprosthetic valve: repeat AVR 10/2014    3. NSVT (nonsustained ventricular tachycardia) (HCC) - on pacer check    4. Chronic nonalcoholic liver disease    5. Thrombocytopenia (HCC)    6. H/O aortic root repair    7. Other cirrhosis of liver (HCC)        Matti Olguin continues to do well following successful transcatheter aortic valve replacement October 2023.  Given the transient elevation in valve gradients as well as valve in valve procedure I do favor a long-term anticoagulation regimen but encouraged Xarelto 10 mg daily.  He will continue on the other medication regimen including atorvastatin carvedilol and lisinopril.    Follow up: With Dr. Thibodeaux in 3 to 6 months    History: Matti Olguin is a 74 y.o. male with history of CVA x 2, subdural hematoma, cirrhosis, thrombocytopenia presenting for follow-up of valve in valve TAVR performed October 2023 with 23 S3 ultra Resilia valve within a 25 Mosaic valve which had been implanted originally in 2014.  He also has a Cabrol graft.  LVEF is normal.    Since his last evaluation he underwent generator change.  No arrhythmias are detected on the device.  He is 97% ventricularly paced on this dual-chamber device.    He did travel to [facility] recently and is going to [place] in the fall.      Physical Exam:  /60 (BP Location: Left arm, Patient Position: Sitting, BP Cuff Size: Adult)   Pulse 72   Resp 18   Ht 1.829 m (6')   Wt 111 kg (245 lb)   SpO2 92%   BMI 33.23 kg/m²   GEN: NAD  RESP: CTAB  CVS: RRR, systolic ejection murmur, harsh.  ABD: Soft, NT/ND  EXT: WWP, no edema    () Today's E/M visit is associated with medical care services that serve as the continuing focal point for all needed health care services and/or with medical care services that  are part of ongoing care  related to a patient's single, serious condition, or a complex condition: This includes  furnishing services to patients on an ongoing basis that result in care that is personalized  to the patient. The services result in a comprehensive, longitudinal, and continuous  relationship with the patient and involve delivery of team-based care that is accessible, coordinated with other practitioners and providers, and integrated with the broader health  care landscape.     The ASCVD Risk score (Maria Esther SANTIAGO, et al., 2019) failed to calculate.    Studies  Lab Results   Component Value Date/Time    CHOLSTRLTOT 120.0 11/14/2023 12:00 AM    LDL 54.0 11/14/2023 12:00 AM    HDL 63.0 11/14/2023 12:00 AM    TRIGLYCERIDE 61.0 11/14/2023 12:00 AM       Lab Results   Component Value Date/Time    SODIUM 140 06/19/2024 12:40 PM    POTASSIUM 3.8 06/19/2024 12:40 PM    CHLORIDE 105 06/19/2024 12:40 PM    CO2 22 06/19/2024 12:40 PM    GLUCOSE 100 (H) 06/19/2024 12:40 PM    BUN 32 (H) 06/19/2024 12:40 PM    CREATININE 0.83 06/19/2024 12:40 PM    CREATININE 1.1 05/29/2007 04:30 AM      Lab Results   Component Value Date/Time    PROTHROMBTM 14.4 06/19/2024 12:40 PM    INR 1.11 06/19/2024 12:40 PM      Lab Results   Component Value Date/Time    WBC 5.5 06/19/2024 12:40 PM    RBC 4.41 (L) 06/19/2024 12:40 PM    HEMOGLOBIN 15.2 06/19/2024 12:40 PM    HEMATOCRIT 45.4 06/19/2024 12:40 PM    .9 (H) 06/19/2024 12:40 PM    MCH 34.5 (H) 06/19/2024 12:40 PM    MCHC 33.5 06/19/2024 12:40 PM    MPV 11.6 06/19/2024 12:40 PM    NEUTSPOLYS 87.50 (H) 09/26/2023 01:09 AM    LYMPHOCYTES 7.10 (L) 09/26/2023 01:09 AM    MONOCYTES 4.50 09/26/2023 01:09 AM    EOSINOPHILS 0.90 09/26/2023 01:09 AM    BASOPHILS 0.00 09/26/2023 01:09 AM    ANISOCYTOSIS 1+ 09/26/2023 01:09 AM        Past Medical History:   Diagnosis Date    Alcoholic fibrosis and sclerosis of liver     Anticoagulation monitoring, special range     Cataract     evan IOL    Complete heart block (HCC)  "10/2014    Statust post pacemaker placement.    Dyspnea     High cholesterol     HTN (hypertension)     NSVT (nonsustained ventricular tachycardia) ([facility]) - on pacer check     Pacemaker 2014    Pain     Back pain from neck to hips, shoulders    Renal disorder     Impaired kidney function after contrast, per spouse kidneys are \"looking good now\"    S/P aortic valve replacement with bioprosthetic valve 10/2014    Severe aortic stenosis 09/23/2023    Snoring     Stroke (HCC) 05/2007    Trembling in hands, impaired balance, left sided \"tingling\"    Unspecified hemorrhagic conditions     xarelto    Valvular heart disease 12/1988    AVR     Allergies   Allergen Reactions    Yellow Jacket Venom Anaphylaxis    Plavix [Clopidogrel]      Subdural hematoma per spouse  Patient does not recall having an allergic or adverse reaction to this     Outpatient Encounter Medications as of 9/10/2024   Medication Sig Dispense Refill    cephALEXin (KEFLEX) 500 MG Cap Take 1 Capsule by mouth 2 times a day. 10 Capsule 0    carvedilol (COREG) 25 MG Tab Take 25 mg by mouth 2 times a day.      amoxicillin (AMOXIL) 500 MG Cap Take 4 Capsules by mouth as needed (take thirty to sixty minutes prior to dental appointments). 12 Capsule 3    rivaroxaban (XARELTO) 20 MG Tab tablet Take 1 Tablet by mouth with dinner. 90 Tablet 3    pregabalin (LYRICA) 200 MG capsule Take 200 mg by mouth 2 times a day.      therapeutic multivitamin-minerals (THERAGRAN-M) Tab Take 1 Tablet by mouth 1/2 hour after lunch.      LORazepam (ATIVAN) 1 MG Tab Take 1 mg by mouth every 8 hours as needed for Anxiety.      lisinopril (PRINIVIL) 20 MG Tab Take 1 Tab by mouth 2 times a day. 180 Tab 3    hydroCHLOROthiazide (HYDRODIURIL) 25 MG Tab Take 1 Tab by mouth every day. (Patient taking differently: Take 25 mg by mouth every morning.) 90 Tab 3    atorvastatin (LIPITOR) 80 MG tablet Take 1 Tab by mouth every bedtime. 30 Tab 2    cloNIDine (CATAPRES) 0.1 MG Tab Take 1 Tab by mouth " every 6 hours as needed. As needed for SBP >150 60 Tab 2    HYDROcodone-acetaminophen (NORCO) 5-325 MG Tab per tablet Take 1 Tab by mouth every four hours as needed.      folic acid (FOLVITE) 400 MCG tablet Take 400 mcg by mouth every morning.      zolpidem (AMBIEN) 5 MG TABS Take 5 mg by mouth at bedtime as needed for Sleep.       No facility-administered encounter medications on file as of 9/10/2024.     Social History     Socioeconomic History    Marital status:      Spouse name: Not on file    Number of children: Not on file    Years of education: Not on file    Highest education level: Not on file   Occupational History    Not on file   Tobacco Use    Smoking status: Never    Smokeless tobacco: Current     Types: Chew   Vaping Use    Vaping status: Some Days    Substances: THC   Substance and Sexual Activity    Alcohol use: Yes     Comment: occ    Drug use: Yes     Types: Inhaled     Comment: THC a couple times per week    Sexual activity: Not on file   Other Topics Concern    Not on file   Social History Narrative    Not on file     Social Determinants of Health     Financial Resource Strain: Not on file   Food Insecurity: Not on file   Transportation Needs: Not on file   Physical Activity: Not on file   Stress: Not on file   Social Connections: Not on file   Intimate Partner Violence: Not on file   Housing Stability: Not on file         ROS:   10 point review systems is otherwise negative except as per the HPI    Chief Complaint   Patient presents with    Follow-Up     F/v Dx: S/P TAVR (transcatheter aortic valve replacement)    Hypertension       Primary hypertension    Dyslipidemia

## 2024-09-21 ENCOUNTER — NON-PROVIDER VISIT (OUTPATIENT)
Dept: CARDIOLOGY | Facility: MEDICAL CENTER | Age: 74
End: 2024-09-21
Payer: COMMERCIAL

## 2024-09-23 NOTE — CARDIAC REMOTE MONITOR - SCAN
Device transmission reviewed. Device demonstrated appropriate function.       Electronically Signed by: Demraco Mederos MD, PhD    9/24/2024  8:15 AM

## 2024-10-24 ENCOUNTER — HOSPITAL ENCOUNTER (OUTPATIENT)
Dept: CARDIOLOGY | Facility: MEDICAL CENTER | Age: 74
End: 2024-10-24
Attending: INTERNAL MEDICINE
Payer: COMMERCIAL

## 2024-10-24 DIAGNOSIS — I35.0 SEVERE AORTIC STENOSIS: ICD-10-CM

## 2024-10-24 LAB
LV EJECT FRACT  99904: 51
LV EJECT FRACT MOD 2C 99903: 42.53
LV EJECT FRACT MOD 4C 99902: 59.04
LV EJECT FRACT MOD BP 99901: 51.62

## 2024-10-24 PROCEDURE — 93306 TTE W/DOPPLER COMPLETE: CPT | Mod: 26 | Performed by: INTERNAL MEDICINE

## 2024-10-24 PROCEDURE — 93306 TTE W/DOPPLER COMPLETE: CPT

## 2024-10-24 PROCEDURE — 700117 HCHG RX CONTRAST REV CODE 255: Performed by: INTERNAL MEDICINE

## 2024-10-24 RX ADMIN — HUMAN ALBUMIN MICROSPHERES AND PERFLUTREN 3 ML: 10; .22 INJECTION, SOLUTION INTRAVENOUS at 12:21

## 2024-12-22 ENCOUNTER — NON-PROVIDER VISIT (OUTPATIENT)
Dept: CARDIOLOGY | Facility: MEDICAL CENTER | Age: 74
End: 2024-12-22
Payer: COMMERCIAL

## 2024-12-23 PROCEDURE — 93294 REM INTERROG EVL PM/LDLS PM: CPT | Performed by: INTERNAL MEDICINE

## 2025-01-28 ENCOUNTER — OFFICE VISIT (OUTPATIENT)
Dept: CARDIOLOGY | Facility: MEDICAL CENTER | Age: 75
End: 2025-01-28
Attending: INTERNAL MEDICINE
Payer: COMMERCIAL

## 2025-01-28 VITALS
SYSTOLIC BLOOD PRESSURE: 110 MMHG | WEIGHT: 251 LBS | HEART RATE: 65 BPM | RESPIRATION RATE: 17 BRPM | BODY MASS INDEX: 34 KG/M2 | HEIGHT: 72 IN | DIASTOLIC BLOOD PRESSURE: 60 MMHG | OXYGEN SATURATION: 91 %

## 2025-01-28 DIAGNOSIS — Z95.2 S/P TAVR (TRANSCATHETER AORTIC VALVE REPLACEMENT): ICD-10-CM

## 2025-01-28 DIAGNOSIS — Z95.3 S/P AORTIC VALVE REPLACEMENT WITH BIOPROSTHETIC VALVE: ICD-10-CM

## 2025-01-28 DIAGNOSIS — I47.29 NSVT (NONSUSTAINED VENTRICULAR TACHYCARDIA) (HCC): Chronic | ICD-10-CM

## 2025-01-28 DIAGNOSIS — Z95.0 CARDIAC PACEMAKER IN SITU: Chronic | ICD-10-CM

## 2025-01-28 DIAGNOSIS — Z86.73 HISTORY OF STROKE: ICD-10-CM

## 2025-01-28 DIAGNOSIS — Z98.890 H/O AORTIC ROOT REPAIR: ICD-10-CM

## 2025-01-28 DIAGNOSIS — I35.0 SEVERE AORTIC STENOSIS: ICD-10-CM

## 2025-01-28 DIAGNOSIS — E78.5 DYSLIPIDEMIA: ICD-10-CM

## 2025-01-28 DIAGNOSIS — I10 PRIMARY HYPERTENSION: Chronic | ICD-10-CM

## 2025-01-28 PROCEDURE — 3078F DIAST BP <80 MM HG: CPT | Performed by: INTERNAL MEDICINE

## 2025-01-28 PROCEDURE — 99213 OFFICE O/P EST LOW 20 MIN: CPT | Performed by: INTERNAL MEDICINE

## 2025-01-28 PROCEDURE — 3074F SYST BP LT 130 MM HG: CPT | Performed by: INTERNAL MEDICINE

## 2025-01-28 PROCEDURE — G2211 COMPLEX E/M VISIT ADD ON: HCPCS | Performed by: INTERNAL MEDICINE

## 2025-01-28 PROCEDURE — 99214 OFFICE O/P EST MOD 30 MIN: CPT | Performed by: INTERNAL MEDICINE

## 2025-01-28 ASSESSMENT — FIBROSIS 4 INDEX: FIB4 SCORE: 5.11

## 2025-01-28 NOTE — PROGRESS NOTES
"Chief Complaint   Patient presents with    Hypertension     Fv Dx Primary hypertension    Follow-Up     Fv Dx NSVT (nonsustained ventricular tachycardia) (HCC) - on pacer check       Subjective     Matti Olguin is a 74 y.o. male who presents today s/p AVR and root repair 2014 with TAVR 2023 which had increased gradietns so is on NOAC, CHB     Past Medical History:   Diagnosis Date    Alcoholic fibrosis and sclerosis of liver     Anticoagulation monitoring, special range     Cataract     evan IOL    Complete heart block (HCC) 10/2014    Statust post pacemaker placement.    Dyspnea     High cholesterol     HTN (hypertension)     NSVT (nonsustained ventricular tachycardia) (HCC) - on pacer check     Pacemaker 2014    Pain     Back pain from neck to hips, shoulders    Renal disorder     Impaired kidney function after contrast, per spouse kidneys are \"looking good now\"    S/P aortic valve replacement with bioprosthetic valve 10/2014    Severe aortic stenosis 09/23/2023    Snoring     Stroke (HCC) 05/2007    Trembling in hands, impaired balance, left sided \"tingling\"    Unspecified hemorrhagic conditions     xarelto    Valvular heart disease 12/1988    AVR     Past Surgical History:   Procedure Laterality Date    TRANSCATHETER AORTIC VALVE REPLACEMENT Bilateral 10/23/2023    Procedure: TRANSCATHETER AORTIC VALVE REPLACEMENT;  Surgeon: Rainer Avalos M.D.;  Location: SURGERY [facility];  Service: Cardiac    ECHOCARDIOGRAM, TRANSESOPHAGEAL, INTRAOPERATIVE N/A 10/23/2023    Procedure: ECHOCARDIOGRAM, TRANSESOPHAGEAL, INTRAOPERATIVE;  Surgeon: Rainer Avalos M.D.;  Location: SURGERY [facility];  Service: Cardiac    MAURY BY LAPAROSCOPY N/A 9/24/2023    Procedure: CHOLECYSTECTOMY, LAPAROSCOPIC;  Surgeon: Dipak Sepulveda M.D.;  Location: SURGERY [facility];  Service: General    CT ERCP,DIAGNOSTIC N/A 9/21/2023    Procedure: ERCP (ENDOSCOPIC RETROGRADE CHOLANGIOPANCREATOGRAPHY);  Surgeon: Pradeep Hebert M.D.;  " Location: SURGERY [facility];  Service: Gastroenterology    CT ERCP,W/REMOVAL STONE,VASU/PANCR DUCTS N/A 9/21/2023    Procedure: ERCP, WITH CALCULUS REMOVAL FROM BILE OR PANCREATIC DUCT;  Surgeon: Pradeep Hebert M.D.;  Location: SURGERY [facility];  Service: Gastroenterology    SPHINCTEROTOMY N/A 9/21/2023    Procedure: SPHINCTEROTOMY;  Surgeon: Pradeep Hebert M.D.;  Location: SURGERY [facility];  Service: Gastroenterology    PACEMAKER INSERTION Left 10/25/2014    Medtronic Ivette DICKERSON MRI [id number] implanted at [facility].    RECOVERY  11/8/2013    Performed by Cath-Recovery Surgery at SURGERY SAME DAY [facility] ORS    RECOVERY  10/8/2013    Performed by Cath-Recovery Surgery at SURGERY SAME DAY [facility]S    RECOVERY  9/21/2012    Performed by Matti Clemens M.D. at SURGERY SAME DAY [facility]    AORTIC VALVE REPLACEMENT  1988    titanium    OTHER ORTHOPEDIC SURGERY      Knee scope, right shoulder     Family History   Problem Relation Age of Onset    Heart Attack Father     Heart Disease Sister      Social History     Socioeconomic History    Marital status:      Spouse name: Not on file    Number of children: Not on file    Years of education: Not on file    Highest education level: Not on file   Occupational History    Not on file   Tobacco Use    Smoking status: Never    Smokeless tobacco: Current     Types: Chew   Vaping Use    Vaping status: Some Days    Substances: THC   Substance and Sexual Activity    Alcohol use: Yes     Comment: occ    Drug use: Yes     Types: Inhaled     Comment: THC a couple times per week    Sexual activity: Not on file   Other Topics Concern    Not on file   Social History Narrative    Not on file     Social Drivers of Health     Financial Resource Strain: Not on file   Food Insecurity: Not on file   Transportation Needs: Not on file   Physical Activity: Not on file   Stress: Not on file   Social Connections: Not on file   Intimate Partner Violence: Not  on file   Housing Stability: Not on file     Allergies   Allergen Reactions    Yellow Jacket Venom Anaphylaxis    Plavix [Clopidogrel]      Subdural hematoma per spouse  Patient does not recall having an allergic or adverse reaction to this     Outpatient Encounter Medications as of 1/28/2025   Medication Sig Dispense Refill    rivaroxaban (XARELTO) 20 MG Tab tablet Take 1 Tablet by mouth with dinner. 90 Tablet 3    carvedilol (COREG) 25 MG Tab Take 25 mg by mouth 2 times a day.      amoxicillin (AMOXIL) 500 MG Cap Take 4 Capsules by mouth as needed (take thirty to sixty minutes prior to dental appointments). 12 Capsule 3    pregabalin (LYRICA) 200 MG capsule Take 200 mg by mouth 2 times a day.      therapeutic multivitamin-minerals (THERAGRAN-M) Tab Take 1 Tablet by mouth 1/2 hour after lunch.      LORazepam (ATIVAN) 1 MG Tab Take 1 mg by mouth every 8 hours as needed for Anxiety.      lisinopril (PRINIVIL) 20 MG Tab Take 1 Tab by mouth 2 times a day. 180 Tab 3    hydroCHLOROthiazide (HYDRODIURIL) 25 MG Tab Take 1 Tab by mouth every day. (Patient taking differently: Take 25 mg by mouth every morning.) 90 Tab 3    atorvastatin (LIPITOR) 80 MG tablet Take 1 Tab by mouth every bedtime. 30 Tab 2    cloNIDine (CATAPRES) 0.1 MG Tab Take 1 Tab by mouth every 6 hours as needed. As needed for SBP >150 60 Tab 2    HYDROcodone-acetaminophen (NORCO) 5-325 MG Tab per tablet Take 1 Tab by mouth every four hours as needed.      folic acid (FOLVITE) 400 MCG tablet Take 400 mcg by mouth every morning.      zolpidem (AMBIEN) 5 MG TABS Take 5 mg by mouth at bedtime as needed for Sleep.      [DISCONTINUED] cephALEXin (KEFLEX) 500 MG Cap Take 1 Capsule by mouth 2 times a day. 10 Capsule 0    [DISCONTINUED] rivaroxaban (XARELTO) 20 MG Tab tablet Take 1 Tablet by mouth with dinner. 90 Tablet 3     No facility-administered encounter medications on file as of 1/28/2025.     ROS           Objective     /60 (BP Location: Left arm,  Patient Position: Sitting, BP Cuff Size: Adult)   Pulse 65   Resp 17   Ht 1.829 m (6')   Wt 114 kg (251 lb)   SpO2 91%   BMI 34.04 kg/m²     Physical Exam  Constitutional:       General: He is not in acute distress.     Appearance: He is not diaphoretic.   Eyes:      General: No scleral icterus.  Neck:      Vascular: No JVD.   Cardiovascular:      Rate and Rhythm: Normal rate.      Heart sounds: Normal heart sounds. No murmur heard.     No friction rub. No gallop.   Pulmonary:      Effort: No respiratory distress.      Breath sounds: No wheezing or rales.   Abdominal:      General: Bowel sounds are normal.      Palpations: Abdomen is soft.   Musculoskeletal:      Right lower leg: No edema.      Left lower leg: No edema.   Skin:     Findings: No rash.   Neurological:      Mental Status: He is alert. Mental status is at baseline.   Psychiatric:         Mood and Affect: Mood normal.            We reviewed in person the most recent labs  Recent Results (from the past 30 weeks)   EC-ECHOCARDIOGRAM COMPLETE W/ CONT    Collection Time: 10/24/24 12:18 PM   Result Value Ref Range    Eject.Frac. MOD BP 51.62     Eject.Frac. MOD 4C 59.04     Eject.Frac. MOD 2C 42.53     Left Ventrical Ejection Fraction 51          Assessment & Plan     1. NSVT (nonsustained ventricular tachycardia) (HCC) - on pacer check        2. S/P TAVR (transcatheter aortic valve replacement)  rivaroxaban (XARELTO) 20 MG Tab tablet    EC-ECHOCARDIOGRAM COMPLETE W/O CONT    CBC WITHOUT DIFFERENTIAL    Comp Metabolic Panel      3. H/O aortic root repair        4. S/P aortic valve replacement with bioprosthetic valve: repeat AVR 10/2014  EC-ECHOCARDIOGRAM COMPLETE W/O CONT      5. Primary hypertension        6. Dyslipidemia  Lipid Profile      7. Cardiac pacemaker - Medtronic        8. Severe aortic stenosis  rivaroxaban (XARELTO) 20 MG Tab tablet      9. History of stroke            Medical Decision Making: Today's Assessment/Status/Plan:        It was  my pleasure to meet with Mr. Olguin.    We addressed the management of hypertension at today's visit. Blood pressure is well controlled.  We specifically assessed the labs on hypertension treatment    We addressed the management of dyslipidemia at today's visit. He is on appropriate lipid lowering medication.    We addressed the management of aortic valve replacement at today's visit. He understands the importance of antiplatelet therapy as well as dental prophylaxis for the health of their aortic valve replacement.  We have also ensured that he has appropriate echocardiogram follow-up.    ANNUAL ECHO GIVEN INCREASED GRADIENTS WITH TAVR ON XARELTO    I will see Mr. Olguin back in 1 year time and encouraged him to follow up with us over the phone or electronically using my [employer] as issues arise.    It is my pleasure to participate in the care of Mr. Olguin.  Please do not hesitate to contact me with questions or concerns.    Conner Thibodeaux MD PhD [facility]  Cardiologist [facility]    Please note that this dictation was created using voice recognition software. There may be errors I did not discover before finalizing the note.     () Today's E/M visit is associated with medical care services that serve as the continuing focal point for all needed health care services and/or with medical care services that  are part of ongoing care related to a patient's single, serious condition, or a complex condition: This includes  furnishing services to patients on an ongoing basis that result in care that is personalized  to the patient. The services result in a comprehensive, longitudinal, and continuous  relationship with the patient and involve delivery of team-based care that is accessible, coordinated with other practitioners and providers, and integrated with the broader health  care landscape.

## 2025-01-30 ENCOUNTER — APPOINTMENT (OUTPATIENT)
Dept: CARDIOLOGY | Facility: MEDICAL CENTER | Age: 75
End: 2025-01-30
Attending: INTERNAL MEDICINE
Payer: COMMERCIAL

## 2025-03-24 ENCOUNTER — NON-PROVIDER VISIT (OUTPATIENT)
Dept: CARDIOLOGY | Facility: MEDICAL CENTER | Age: 75
End: 2025-03-24
Payer: COMMERCIAL

## 2025-03-24 PROCEDURE — 93294 REM INTERROG EVL PM/LDLS PM: CPT | Performed by: INTERNAL MEDICINE

## 2025-03-24 NOTE — CARDIAC REMOTE MONITOR - SCAN
Device transmission reviewed. Device demonstrated appropriate function.       Electronically Signed by: Mike Cisneros M.D.    4/19/2025  9:56 PM

## 2025-06-24 ENCOUNTER — NON-PROVIDER VISIT (OUTPATIENT)
Dept: CARDIOLOGY | Facility: MEDICAL CENTER | Age: 75
End: 2025-06-24
Payer: COMMERCIAL

## 2025-06-25 PROCEDURE — 93294 REM INTERROG EVL PM/LDLS PM: CPT | Performed by: STUDENT IN AN ORGANIZED HEALTH CARE EDUCATION/TRAINING PROGRAM

## 2025-06-25 NOTE — CARDIAC REMOTE MONITOR - SCAN
Device transmission reviewed. Device demonstrated appropriate function.       Electronically Signed by: Demarco Mederos MD, PhD    6/25/2025  3:39 PM

## 2025-07-28 ENCOUNTER — NON-PROVIDER VISIT (OUTPATIENT)
Dept: CARDIOLOGY | Facility: MEDICAL CENTER | Age: 75
End: 2025-07-28

## 2025-07-28 ENCOUNTER — NON-PROVIDER VISIT (OUTPATIENT)
Dept: CARDIOLOGY | Facility: MEDICAL CENTER | Age: 75
End: 2025-07-28
Attending: INTERNAL MEDICINE
Payer: COMMERCIAL

## 2025-07-28 DIAGNOSIS — Z95.0 CARDIAC PACEMAKER IN SITU: Primary | Chronic | ICD-10-CM

## 2025-07-28 DIAGNOSIS — I44.2 COMPLETE HEART BLOCK (HCC): ICD-10-CM

## 2025-07-28 PROCEDURE — 93280 PM DEVICE PROGR EVAL DUAL: CPT | Performed by: STUDENT IN AN ORGANIZED HEALTH CARE EDUCATION/TRAINING PROGRAM

## (undated) DEVICE — TOWELS CLOTH SURGICAL - (4/PK 20PK/CA)

## (undated) DEVICE — JAGTOME RX 39 PRE-LOADED .025 X 260CM

## (undated) DEVICE — CATHETER EP 6FR 90CM FEM BP J CRV (J-TIP)

## (undated) DEVICE — GOWN SURGICAL XX-LARGE - (28EA/CA) SIRUS NON REINFORCED

## (undated) DEVICE — GLOVE SZ 6 BIOGEL PI MICRO - PF LF (50PR/BX 4BX/CA)

## (undated) DEVICE — GLOVE BIOGEL SZ 8.5 SURGICAL PF LTX - (50PR/BX 4BX/CA)

## (undated) DEVICE — TOWEL STOP TIMEOUT SAFETY FLAG (40EA/CA)

## (undated) DEVICE — COVER LIGHT HANDLE ALC PLUS DISP (18EA/BX)

## (undated) DEVICE — GLOVE BIOGEL PI INDICATOR SZ 6.5 SURGICAL PF LF - (50/BX 4BX/CA)

## (undated) DEVICE — BLADE SURGICAL #11 - (50/BX)

## (undated) DEVICE — TROCAR 5X100 BLADED Z-THREAD - KII (6/BX)

## (undated) DEVICE — SET EXTENSION WITH 2 PORTS (48EA/CA) ***PART #2C8610 IS A SUBSTITUTE*****

## (undated) DEVICE — GLOVE BIOGEL SZ 7 SURGICAL PF LTX - (50PR/BX 4BX/CA)

## (undated) DEVICE — BITE BLOCK, DISP.

## (undated) DEVICE — INTRODUCER SHEATH 6FR 2.5CM - DILATOR PROTRUDING (10/BX)

## (undated) DEVICE — CATHETER 6FR AL1 100CM (5/BX)

## (undated) DEVICE — SUTURE  0 ETHIBOND CT-1 30 IN (36PK/BX)

## (undated) DEVICE — SYRINGE 30 ML LL (56/BX)

## (undated) DEVICE — ARM BAND RADIAL TR BAND (5EA/BX)

## (undated) DEVICE — BAG RETRIEVAL 10ML (10EA/BX)

## (undated) DEVICE — SUTURE 0 VICRYL PLUS CT-2 - 27 INCH (36/BX)

## (undated) DEVICE — INTRODUCER CATHETER  DILATOR PROTRUDING 8FR 2.5CM (10EA/BX)

## (undated) DEVICE — DEVICE INFLATION ATRION NOVALFEX TRANSFEMORAL SYSTEM (1EA)

## (undated) DEVICE — SUCTION INSTRUMENT YANKAUER BULBOUS TIP W/O VENT (50EA/CA)

## (undated) DEVICE — CANISTER SUCTION 3000ML MECHANICAL FILTER AUTO SHUTOFF MEDI-VAC NONSTERILE LF DISP  (40EA/CA)

## (undated) DEVICE — PORT AUXILLARY WATER (50EA/BX)

## (undated) DEVICE — TUBING CLEARLINK DUO-VENT - C-FLO (48EA/CA)

## (undated) DEVICE — SENSOR OXIMETER ADULT SPO2 RD SET (20EA/BX)

## (undated) DEVICE — WIRE GUIDE AES .035 260CM WITH 3MM J TIP"

## (undated) DEVICE — BLANKET UNDERBODY FULL ACCES - (5/CA)

## (undated) DEVICE — GUIDEWIRE STARTER STRAIGHT FIXED CORE .035 150CM 4 STRAIGHT PTFE/HEPARIN COATED (10/BX)

## (undated) DEVICE — TUBE E-T HI-LO CUFF 7.5MM (10EA/PK)

## (undated) DEVICE — NEEDLE INSFL 120MM 14GA VRRS - (20/BX)

## (undated) DEVICE — STAPLE 45MM VASCULAR WHITE 2.5MM (12EA/BX)

## (undated) DEVICE — Device

## (undated) DEVICE — COVER LIGHT HANDLE FLEXIBLE - SOFT (2EA/PK 80PK/CA)

## (undated) DEVICE — SYRINGE 30 ML LS (56/BX)

## (undated) DEVICE — MASK OXYGEN VNYL ADLT MED CONC WITH 7 FOOT TUBING  - (50EA/CA)

## (undated) DEVICE — DEVICE INFLATION NOVAFLEX ATRION LOCK SYRINGE 29MM 38ML (1EA)

## (undated) DEVICE — ELECTRODE DUAL RETURN W/ CORD - (50/PK)

## (undated) DEVICE — BALLOON RETRIEVAL EXTRACTOR PRO RX   9-12MM

## (undated) DEVICE — CRIMPER CATHETER EDWARDS DISPOSABLE (1EA)

## (undated) DEVICE — GLIDESHEATH SLENDER NITINOL KIT .021 GW 6FR 10CM SINGLE WALL

## (undated) DEVICE — BUTTON ENDOSCOPY DISPOSABLE

## (undated) DEVICE — LACTATED RINGERS INJ 1000 ML - (14EA/CA 60CA/PF)

## (undated) DEVICE — SYR ANGIO CNRST INJ HI-PRS 3W 65 - (10EA/CA)"

## (undated) DEVICE — CHLORAPREP 26 ML APPLICATOR - ORANGE TINT(25/CA)

## (undated) DEVICE — NEPTUNE 4 PORT MANIFOLD - (20/PK)

## (undated) DEVICE — FILM CASSETTE ENDO

## (undated) DEVICE — GLOVE SZ 8 BIOGEL PI MICRO - PF LF (50PR/BX)

## (undated) DEVICE — SODIUM CHL IRRIGATION 0.9% 1000ML (12EA/CA)

## (undated) DEVICE — IV TUBING HI-FLO RATE W/CLAMP (50/CA)

## (undated) DEVICE — CLIP MED LG INTNL HRZN TI ESCP - (20/BX)

## (undated) DEVICE — GOWN SURGEONS X-LARGE - DISP. (30/CA)

## (undated) DEVICE — SYRINGE STERILE 10 ML LL (200/BX)

## (undated) DEVICE — CANISTER SUCTION RIGID RED 1500CC (40EA/CA)

## (undated) DEVICE — RADPAD

## (undated) DEVICE — GLOVE SZ 7.5 BIOGEL PI MICRO - PF LF (50PR/BX)

## (undated) DEVICE — COVER ENDOSCOPE DISTAL SINGLE USE (20EA/BX)

## (undated) DEVICE — TUBE CONNECTING SUCTION - CLEAR PLASTIC STERILE 72 IN (50EA/CA)

## (undated) DEVICE — WIRE GUIDE LUNDQST.035X180 - TSMG-35-180-4-LES ORDER BY BOX (5EA/BX)

## (undated) DEVICE — ELECTRODE RADIOLUCNT SOLID GEL DEFIB PADS (12EA/CA)

## (undated) DEVICE — KIT CUSTOM PROCEDURE SINGLE FOR ENDO  (15/CA)

## (undated) DEVICE — SYSTEM DELIVERY COMMANDER TAVR KIT 23MM COMPONENT (1EA)

## (undated) DEVICE — SET SUCTION/IRRIGATION WITH DISPOSABLE TIP (6/CA )PART #0250-070-520 IS A SUB

## (undated) DEVICE — PACK TAVR (3EA/CA)

## (undated) DEVICE — DEVICE BIOPSY RX BILIARY SYSTEM CAP (10EA/BX)

## (undated) DEVICE — CANNULA W/SEAL 5X100 Z-THRE - ADED KII (12/BX)

## (undated) DEVICE — SUTURE 4-0 MONOCRYL PLUS PS-2 - 27 INCH (36/BX)

## (undated) DEVICE — WATER IRRIGATION STERILE 1000ML (12EA/CA)

## (undated) DEVICE — SUTURE DEVICE CLOSURE REPAIR SYSTEM PERCLOSE PROSTYLE (10EA/BX)

## (undated) DEVICE — DRAPE CLEAR W/ELASTIC BAND RAD CARM 40 X40" (20EA/CA)"

## (undated) DEVICE — TROCAR Z THREAD 12 X 100 - BLADED (6/BX)

## (undated) DEVICE — KIT RETROFIT PROBE COVERS (24EA/EA)

## (undated) DEVICE — PACK LAP CHOLE OR - (2EA/CA)

## (undated) DEVICE — CABLE TEMPORARY PACING

## (undated) DEVICE — COVER FOOT UNIVERSAL DISP. - (25EA/CA)

## (undated) DEVICE — CATHETER PIGTAIL 6FR 145 (5EA/BX)

## (undated) DEVICE — GLOVE BIOGEL SZ 8 SURGICAL PF LTX - (50PR/BX 4BX/CA)

## (undated) DEVICE — STAPLER 45MM ARTICULATING - ENDO (3EA/BX)

## (undated) DEVICE — DRAPE MAYO STAND - (30/CA)

## (undated) DEVICE — KIT ANESTHESIA W/CIRCUIT & 3/LT BAG W/FILTER (20EA/CA)

## (undated) DEVICE — DECANTER FLD BLS - (50/CA)

## (undated) DEVICE — SET LEADWIRE 5 LEAD BEDSIDE DISPOSABLE ECG (1SET OF 5/EA)

## (undated) DEVICE — STOPCOCK IV 400 PSI 3W ROT (50EA/BX)